# Patient Record
Sex: FEMALE | Race: WHITE | NOT HISPANIC OR LATINO | Employment: FULL TIME | URBAN - METROPOLITAN AREA
[De-identification: names, ages, dates, MRNs, and addresses within clinical notes are randomized per-mention and may not be internally consistent; named-entity substitution may affect disease eponyms.]

---

## 2016-12-20 LAB — HUMAN PAPILLOMAVIRUS (HPV): NORMAL

## 2016-12-22 LAB
HUMAN PAPILLOMAVIRUS (HPV): NORMAL
PAP RECOMMENDATION EXT: NORMAL

## 2017-03-27 DIAGNOSIS — M41.9 SCOLIOSIS, UNSPECIFIED SCOLIOSIS TYPE, UNSPECIFIED SPINAL REGION: Primary | ICD-10-CM

## 2017-03-28 RX ORDER — HYDROCODONE BITARTRATE AND ACETAMINOPHEN 5; 325 MG/1; MG/1
1 TABLET ORAL
Qty: 90 TABLET | Refills: 0 | Status: SHIPPED | OUTPATIENT
Start: 2017-03-28 | End: 2017-06-19 | Stop reason: SDUPTHER

## 2017-03-29 ENCOUNTER — TELEPHONE (OUTPATIENT)
Dept: ORTHOPEDICS | Facility: CLINIC | Age: 52
End: 2017-03-29

## 2017-03-29 NOTE — TELEPHONE ENCOUNTER
Left message for pt advising that her Norco Prescription is ready for  at main [facility] at the .

## 2017-06-19 DIAGNOSIS — M41.9 SCOLIOSIS, UNSPECIFIED SCOLIOSIS TYPE, UNSPECIFIED SPINAL REGION: ICD-10-CM

## 2017-06-19 RX ORDER — HYDROCODONE BITARTRATE AND ACETAMINOPHEN 5; 325 MG/1; MG/1
1 TABLET ORAL
Qty: 90 TABLET | Refills: 0 | Status: SHIPPED | OUTPATIENT
Start: 2017-06-19 | End: 2017-06-29

## 2017-06-19 NOTE — TELEPHONE ENCOUNTER
Left message for pt advising that her script is ready for  at [facility]  on the [facility] floor [facility] spine [facility] at font desk.

## 2017-06-19 NOTE — TELEPHONE ENCOUNTER
----- Message from Mari Lewis sent at 6/19/2017  3:55 PM CDT -----  _ x 1st Request  _  2nd Request  _  3rd Request    Please refill the medication(s) listed below. Please call the patient when the prescription(s) is ready for  at the phone number [phone]    Medication #1    hydrocodone-acetaminophen 5-325mg (NORCO) 5-325 mg per tablet 90 tablet 0 3/28/2017 4/7/2017 No  Sig - Route: Take 1 tablet by mouth every 4 to 6 hours as needed for Pain. - Oral  Class: Print  Order: [id number]

## 2017-08-24 DIAGNOSIS — Z12.11 COLON CANCER SCREENING: ICD-10-CM

## 2017-08-29 ENCOUNTER — PATIENT MESSAGE (OUTPATIENT)
Dept: SPINE | Facility: CLINIC | Age: 52
End: 2017-08-29

## 2017-08-30 RX ORDER — HYDROCODONE BITARTRATE AND ACETAMINOPHEN 5; 325 MG/1; MG/1
1 TABLET ORAL EVERY 6 HOURS PRN
Qty: 60 TABLET | Refills: 0 | Status: SHIPPED | OUTPATIENT
Start: 2017-08-30 | End: 2017-09-09

## 2017-10-12 DIAGNOSIS — N63.0 LUMP OR MASS IN BREAST: Primary | ICD-10-CM

## 2017-11-03 ENCOUNTER — TELEPHONE (OUTPATIENT)
Dept: ORTHOPEDICS | Facility: CLINIC | Age: 52
End: 2017-11-03

## 2017-11-03 NOTE — TELEPHONE ENCOUNTER
Left message for pt advising that per Dr. Herrmann, she would need to get this filled by her PCP as she is too far from surgery for him to fill it.

## 2017-11-03 NOTE — TELEPHONE ENCOUNTER
----- Message from José Miguel Calvo sent at 11/3/2017 12:09 PM CDT -----  Contact: self  Pt request refill on her norco

## 2017-12-04 ENCOUNTER — OFFICE VISIT (OUTPATIENT)
Dept: PHYSICAL MEDICINE AND REHAB | Facility: CLINIC | Age: 52
End: 2017-12-04
Payer: COMMERCIAL

## 2017-12-04 VITALS
DIASTOLIC BLOOD PRESSURE: 89 MMHG | HEIGHT: 65 IN | SYSTOLIC BLOOD PRESSURE: 176 MMHG | HEART RATE: 74 BPM | BODY MASS INDEX: 26.38 KG/M2 | WEIGHT: 158.31 LBS

## 2017-12-04 DIAGNOSIS — G95.9 MYELOPATHY OF LUMBAR REGION: ICD-10-CM

## 2017-12-04 DIAGNOSIS — M47.16 LUMBAR SPONDYLOSIS WITH MYELOPATHY: ICD-10-CM

## 2017-12-04 DIAGNOSIS — R29.898 WEAKNESS OF BOTH LEGS: ICD-10-CM

## 2017-12-04 DIAGNOSIS — M41.9 KYPHOSCOLIOSIS: ICD-10-CM

## 2017-12-04 DIAGNOSIS — R29.898 WEAKNESS OF RIGHT LEG: Primary | ICD-10-CM

## 2017-12-04 DIAGNOSIS — R26.9 GAIT DISORDER: ICD-10-CM

## 2017-12-04 DIAGNOSIS — M51.37 DEGENERATION OF LUMBAR OR LUMBOSACRAL INTERVERTEBRAL DISC: ICD-10-CM

## 2017-12-04 DIAGNOSIS — M54.16 LUMBAR RADICULOPATHY: ICD-10-CM

## 2017-12-04 DIAGNOSIS — M17.11 PRIMARY OSTEOARTHRITIS OF RIGHT KNEE: ICD-10-CM

## 2017-12-04 PROCEDURE — 99204 OFFICE O/P NEW MOD 45 MIN: CPT | Mod: S$GLB,,, | Performed by: PHYSICAL MEDICINE & REHABILITATION

## 2017-12-04 PROCEDURE — 99999 PR PBB SHADOW E&M-EST. PATIENT-LVL III: CPT | Mod: PBBFAC,,, | Performed by: PHYSICAL MEDICINE & REHABILITATION

## 2017-12-04 RX ORDER — HYDROCODONE BITARTRATE AND ACETAMINOPHEN 7.5; 325 MG/1; MG/1
1 TABLET ORAL EVERY 8 HOURS PRN
Qty: 90 TABLET | Refills: 0 | Status: SHIPPED | OUTPATIENT
Start: 2018-02-04 | End: 2018-04-04 | Stop reason: SDUPTHER

## 2017-12-04 RX ORDER — HYDROCODONE BITARTRATE AND ACETAMINOPHEN 7.5; 325 MG/1; MG/1
1 TABLET ORAL EVERY 8 HOURS PRN
Qty: 90 TABLET | Refills: 0 | Status: SHIPPED | OUTPATIENT
Start: 2018-01-04 | End: 2018-02-03

## 2017-12-04 RX ORDER — PREGABALIN 50 MG/1
50 CAPSULE ORAL 3 TIMES DAILY
Qty: 90 CAPSULE | Refills: 5 | Status: SHIPPED | OUTPATIENT
Start: 2017-12-04 | End: 2018-04-04 | Stop reason: SDUPTHER

## 2017-12-04 RX ORDER — HYDROCODONE BITARTRATE AND ACETAMINOPHEN 7.5; 325 MG/1; MG/1
1 TABLET ORAL EVERY 8 HOURS PRN
Qty: 90 TABLET | Refills: 0 | Status: SHIPPED | OUTPATIENT
Start: 2017-12-04 | End: 2018-01-03

## 2017-12-04 NOTE — PROGRESS NOTES
Subjective:       Patient ID: Genia Davis is a 52 y.o. female.    Chief Complaint: Back Pain and Extremity Weakness    HPI   Mrs. Davis is coming first time to clinic for chronic low back pain, and chronic   Pain management with opiates.  Referred by Dr. Zeynep Delaney.  Patient with long standing back pain, she had a spinal tumor at 22 y/o, that required   Multiple back surgeries, with last surgery done in Feb, 2016.  She is s/p revision T10-Pelvis fusion of a failed iliac bolt on Feb 12, 2016, by dr. Herrmann. She has been having increasing b/l Lt>Rt buttock and lateral thigh pain since surgery.  She has had multiple falls because of the RT leg giving way.   She wears KAFO, secondary to Rt leg weakness,and hyperextension in knee, with Rt  Foot drop. This current prosthesis is fairly old more than 4-5 yrs.  She had multiple JENNIFFER, after the surgery, the one to the left L5/S1 TESI ,done in July, 2016 was the moat helpful, while repeat injection in November 2016,helped a little.  She continues to needs pain medication, opiates for pain control.  She failed Tramadol, was taking 3 x/ day, but states that Tramadol is very slow to work, while Hydrocodone helps quicker and  Better.  She also failed Gabapentin, did not help,a nd she did not like the way it fells.  She usualy takes Hydrocodone 1-2 tabs/day, rarely 3 tabs/day, only for more severe pain.  She is able to walk with RW, using current KAFO ( she has it since 2012-13), across 2-3 rooms, for distance she uses manual WC.   KAFO brace helps, she does not belive that she would be able to make that much walking w/o brace. She received this brace from [employer].  She tries to be self sufficient , and tries to do everything on her own.      Back Pain Description:  Length: pain is chronic pain. Length >  Since age of 22 y/o.   No past, recent injury, falls.  Intensity:  CURRENT  5/10,  AVERAGE  Pain   5/10. at BEST   0/10 , At WORST  8/10 on the WORST  day.   Location: pain is localized across lower back , bellow the waist line.  That pain radiates to b/l buttock, hips, Rt leg > Lt leg.   Pain in Rt leg comes on side and to the knee level.   It is more  leg  And hip than back pain.  Radiation: Positive to buttocks. Positive to legs.   Timing : constant  morning, afternoon pain , worse with activity, in evening  QUALITY:  Sharp/shooting pain in Lt leg. Back pain is more sharp pain.   No neuropathic : burning, tingling,   There is numbness in toes at the bottom of toes.   Positive Rt leg weakness.   Worsening factors: activity , walking,   Alleviating factors:   Symptoms interfere with daily activity, sleeping and work.   Current medications:    Failed medications:    Prior procedures. Multiple surgeries, s/p fusion T10-pelvis, and multiple JENNIFFER, last one s/p Lt L5-S1 TFESI in 07/16 and 11/2016, with some pain relief.  PT/OT: multiple times, with temporary pain relief.  Patient denies night fever/night sweats, bowel incontinence, significant weight loss and significant motor weakness ( red flags).  Patient denies any suicidal or homicidal ideations.   She is here for evaluation and chronic pain management with opiates.  .  Past Medical History:   Diagnosis Date    Cancer     tumor on back, was removed    Encounter for blood transfusion        Past Surgical History:   Procedure Laterality Date    BACK SURGERY      x 5    CHOLECYSTECTOMY         Family History   Problem Relation Age of Onset    Anesthesia problems Neg Hx     Clotting disorder Neg Hx        Social History     Social History    Marital status:      Spouse name: N/A    Number of children: N/A    Years of education: N/A     Social History Main Topics    Smoking status: Never Smoker    Smokeless tobacco: Never Used    Alcohol use No    Drug use: No    Sexual activity: Not Asked     Other Topics Concern    None     Social History Narrative    None       Current Outpatient Prescriptions    Medication Sig Dispense Refill    hydrocodone-acetaminophen 7.5-325mg (NORCO) 7.5-325 mg per tablet Take 1 tablet by mouth every 8 (eight) hours as needed for Pain. 90 tablet 0    [START ON 1/4/2018] hydrocodone-acetaminophen 7.5-325mg (NORCO) 7.5-325 mg per tablet Take 1 tablet by mouth every 8 (eight) hours as needed for Pain. 90 tablet 0    [START ON 2/4/2018] hydrocodone-acetaminophen 7.5-325mg (NORCO) 7.5-325 mg per tablet Take 1 tablet by mouth every 8 (eight) hours as needed for Pain. 90 tablet 0    lisinopril 10 MG tablet Take 1 tablet (10 mg total) by mouth once daily. 30 tablet 5    pregabalin (LYRICA) 50 MG capsule Take 1 capsule (50 mg total) by mouth 3 (three) times daily. 90 capsule 5     No current facility-administered medications for this visit.        Review of patient's allergies indicates:  No Known Allergies      Review of Systems   Constitutional: Negative for appetite change, chills, fatigue, fever and unexpected weight change.   HENT: Negative for drooling, trouble swallowing and voice change.    Eyes: Negative for pain and visual disturbance.   Respiratory: Negative for shortness of breath and wheezing.    Cardiovascular: Negative for chest pain and palpitations.   Gastrointestinal: Negative for abdominal distention, abdominal pain, constipation and diarrhea.   Genitourinary: Negative for difficulty urinating.   Musculoskeletal: Positive for arthralgias (Rt knee pain), back pain and gait problem. Negative for joint swelling, myalgias and neck stiffness.               Skin: Negative for color change and rash.   Neurological: Negative for dizziness, facial asymmetry, speech difficulty, weakness, light-headedness and numbness. Headaches:     Hematological: Negative for adenopathy.   Psychiatric/Behavioral: Negative for behavioral problems, confusion and sleep disturbance. The patient is not nervous/anxious.          Objective:      Physical Exam      GENERAL: The patient is alert,  oriented, pleasant.  HEENT: PERRLA  NECK: supple, no masses,    CV: S1S2, RRR, no murmurs, rales.  LUNGS: CTA bilateral.   ABDOMEN: soft, non-tender, non distended, bowel sounds nl.  EXTREMITIES: no edema, +2 pulses DP, b/l  SKIN: no skin rash, no skin breakdowns.  MUSCULOSKELETAL:   Gait :abnormal, she is able to make a few steps walks with no AD.  Needs a a slight longer time to get up , secondary to Rt KAFO.  Walks with limping ( in swing) and hyperextending ( in stance) RT leg.   Cervical spine: full AROM in cervical spine.   Lumbar spine, decreased activerange of motion in all planes, philomena. flexion to 60 from neutral, ext. Lacks full extension -3 degrees, cannot hyperextend secondary to long fusion, s/p T10- pelvis. palpable sacral screw,  Side bending and rotation to 25-30 degrees, b/l.  Straight leg raising on Lt tested and it is a Negative, negative on Rt, weak Hip flexors on RT leg.   Full range of motion in all joints x3 extremities, except in RT LE, that is weak.  Muscle strength 5/5 throughout x 3 extremities,  except in RT LE, that is weak.  Rt LE: HF3-, KF/KE- NT ( in KAFO), no active DF in Rt foot.  No  joint laxity throughout x4 extremities.   NEUROLOGIC: Cranial nerves II through XII intact.   Deep tendon reflexes is normal, +2 in the upper and lower extremities bilaterally.   Muscle tone is normal, no spasticity.  Sensory is intact to light touch and pinprick throughout x4 extremities.         Assessment:       1. Weakness of right leg    2. Lumbar spondylosis with myelopathy    3. Myelopathy of lumbar region    4. Degeneration of lumbar or lumbosacral intervertebral disc    5. Lumbar radiculopathy    6. Weakness of both legs    7. Primary osteoarthritis of right knee    8. Kyphoscoliosis    9. Gait disorder        Plan:       Weakness of right leg  -     HME - OTHER    Lumbar spondylosis with myelopathy  -     HME - OTHER    Myelopathy of lumbar region  -     HME - OTHER    Degeneration of lumbar  or lumbosacral intervertebral disc    Lumbar radiculopathy    Weakness of both legs    Primary osteoarthritis of right knee  -     X-Ray Knee AP Standing Bilateral; Future    Kyphoscoliosis    Gait disorder  -     X-Ray Knee AP Standing Bilateral; Future  -     HME - OTHER    Other orders  -     hydrocodone-acetaminophen 7.5-325mg (NORCO) 7.5-325 mg per tablet; Take 1 tablet by mouth every 8 (eight) hours as needed for Pain.  Dispense: 90 tablet; Refill: 0  -     hydrocodone-acetaminophen 7.5-325mg (NORCO) 7.5-325 mg per tablet; Take 1 tablet by mouth every 8 (eight) hours as needed for Pain.  Dispense: 90 tablet; Refill: 0  -     hydrocodone-acetaminophen 7.5-325mg (NORCO) 7.5-325 mg per tablet; Take 1 tablet by mouth every 8 (eight) hours as needed for Pain.  Dispense: 90 tablet; Refill: 0  -     pregabalin (LYRICA) 50 MG capsule; Take 1 capsule (50 mg total) by mouth 3 (three) times daily.  Dispense: 90 capsule; Refill: 5    patient with long standing back pain, sec.to spinal Tm, that was surgically exceeded more than 25 yrs ago. She is s/p multiple surgeries, s/p T10-pelvis fusion.   With positive Weakness in Rt LE weakness, sec. To radiculopathy /myleopathoc injury.    -- DME- other, will order a new KAFO ( given first one in 2012-13).  - pain management:   Resume Hydrocodone 10/325 mg po TID prn pain.   Added Lyrica since she failed Gabapentin, for neuropathic pain.  Will resume all supportive therapy.    RTC in 3-4 months.     Total time spent face to face with patient was 45 minutes.   More than 50% of that time was spent in counseling on diagnosis , prognosis and treatment options.   I also  patient  on common and most usual side effect of prescribed medications. Risk and benefits of opiates, possible risk of developing opiate dependence and tolerance, need of strict compliance with prescribed medications.  Pain contract, rules and obligations were discussed with patient in details.  She is aware  that I would be the only doctor prescribing her pain medications and ED in a case of emergency.  I reviewed Primary care , and other specialty's notes to better coordinate patient's  care. All questions were answered, and patient voiced understanding.

## 2018-01-03 ENCOUNTER — HOSPITAL ENCOUNTER (OUTPATIENT)
Dept: RADIOLOGY | Facility: HOSPITAL | Age: 53
Discharge: HOME | End: 2018-01-03
Attending: SPECIALIST
Payer: COMMERCIAL

## 2018-01-03 DIAGNOSIS — N63.0 LUMP OR MASS IN BREAST: ICD-10-CM

## 2018-01-03 PROCEDURE — 77066 DX MAMMO INCL CAD BI: CPT | Mod: 26,,, | Performed by: RADIOLOGY

## 2018-01-03 PROCEDURE — 76642 ULTRASOUND BREAST LIMITED: CPT | Mod: 26,LT,, | Performed by: RADIOLOGY

## 2018-01-03 PROCEDURE — 77062 BREAST TOMOSYNTHESIS BI: CPT | Mod: 26,,, | Performed by: RADIOLOGY

## 2018-01-03 PROCEDURE — 77066 DX MAMMO INCL CAD BI: CPT | Mod: TC

## 2018-01-03 PROCEDURE — 76642 ULTRASOUND BREAST LIMITED: CPT | Mod: TC,LT

## 2018-03-12 DIAGNOSIS — I10 ESSENTIAL HYPERTENSION: ICD-10-CM

## 2018-03-12 RX ORDER — LISINOPRIL 10 MG/1
10 TABLET ORAL DAILY
Qty: 30 TABLET | Refills: 5 | OUTPATIENT
Start: 2018-03-12 | End: 2019-03-12

## 2018-04-04 ENCOUNTER — OFFICE VISIT (OUTPATIENT)
Dept: PHYSICAL MEDICINE AND REHAB | Facility: CLINIC | Age: 53
End: 2018-04-04
Payer: COMMERCIAL

## 2018-04-04 VITALS
WEIGHT: 160.81 LBS | SYSTOLIC BLOOD PRESSURE: 177 MMHG | HEART RATE: 66 BPM | BODY MASS INDEX: 26.79 KG/M2 | DIASTOLIC BLOOD PRESSURE: 76 MMHG | HEIGHT: 65 IN

## 2018-04-04 DIAGNOSIS — I10 ESSENTIAL HYPERTENSION: Primary | ICD-10-CM

## 2018-04-04 DIAGNOSIS — R29.898 WEAKNESS OF BOTH LEGS: ICD-10-CM

## 2018-04-04 DIAGNOSIS — I10 HYPERTENSION, UNSPECIFIED TYPE: ICD-10-CM

## 2018-04-04 DIAGNOSIS — G95.9 MYELOPATHY OF LUMBAR REGION: ICD-10-CM

## 2018-04-04 DIAGNOSIS — M54.16 LUMBAR RADICULOPATHY: ICD-10-CM

## 2018-04-04 DIAGNOSIS — M47.819 SPONDYLOSIS WITHOUT MYELOPATHY: ICD-10-CM

## 2018-04-04 PROCEDURE — 3077F SYST BP >= 140 MM HG: CPT | Mod: CPTII,S$GLB,, | Performed by: PHYSICAL MEDICINE & REHABILITATION

## 2018-04-04 PROCEDURE — 99214 OFFICE O/P EST MOD 30 MIN: CPT | Mod: S$GLB,,, | Performed by: PHYSICAL MEDICINE & REHABILITATION

## 2018-04-04 PROCEDURE — 99999 PR PBB SHADOW E&M-EST. PATIENT-LVL III: CPT | Mod: PBBFAC,,, | Performed by: PHYSICAL MEDICINE & REHABILITATION

## 2018-04-04 PROCEDURE — 3078F DIAST BP <80 MM HG: CPT | Mod: CPTII,S$GLB,, | Performed by: PHYSICAL MEDICINE & REHABILITATION

## 2018-04-04 RX ORDER — HYDROCODONE BITARTRATE AND ACETAMINOPHEN 7.5; 325 MG/1; MG/1
1 TABLET ORAL EVERY 8 HOURS PRN
Qty: 90 TABLET | Refills: 0 | Status: SHIPPED | OUTPATIENT
Start: 2018-06-04 | End: 2018-08-06 | Stop reason: DRUGHIGH

## 2018-04-04 RX ORDER — HYDROCODONE BITARTRATE AND ACETAMINOPHEN 7.5; 325 MG/1; MG/1
1 TABLET ORAL EVERY 8 HOURS PRN
Qty: 90 TABLET | Refills: 0 | Status: SHIPPED | OUTPATIENT
Start: 2018-04-04 | End: 2018-05-04

## 2018-04-04 RX ORDER — PREGABALIN 50 MG/1
50 CAPSULE ORAL 3 TIMES DAILY
Qty: 90 CAPSULE | Refills: 5 | Status: SHIPPED | OUTPATIENT
Start: 2018-04-04 | End: 2018-05-08

## 2018-04-04 RX ORDER — HYDROCODONE BITARTRATE AND ACETAMINOPHEN 7.5; 325 MG/1; MG/1
1 TABLET ORAL EVERY 8 HOURS PRN
Qty: 90 TABLET | Refills: 0 | Status: SHIPPED | OUTPATIENT
Start: 2018-05-04 | End: 2018-06-03

## 2018-04-04 NOTE — PROGRESS NOTES
Subjective:       Patient ID: Genia Davis is a 52 y.o. female.    Chief Complaint: Back Pain and Leg Pain    Back Pain   Associated symptoms include leg pain. Pertinent negatives include no abdominal pain, chest pain, fever, numbness or weakness. Headaches:     Leg Pain    Pertinent negatives include no numbness.      Mrs. Davis who returns to clinic for chronic low back pain, and chronic   Pain management with opiates.  V 12/04/17.  Patient with long standing back pain, she had a spinal tumor at 24 y/o, that required multiple back surgeries, with last surgery done in Feb, 2016.  She is s/p revision T10-Pelvis fusion of a failed iliac bolt on Feb 12, 2016, by dr. Herrmann. She has been having increasing b/l Lt>Rt buttock and lateral thigh pain since surgery.  She has had multiple falls because of the RT leg giving way.   She wears KAFO, secondary to Rt leg weakness,and hyperextension in knee, with Rt Foot drop. This current prosthesis is fairly old more than 4-5 yrs.  She had multiple JENNIFFER, after the surgery, the one to the left L5/S1 TESI ,done in July, 2016 was the moat helpful, while repeat injection in November 2016,helped a little.  She continues to needs pain medication, opiates for pain control.  She failed Tramadol, was taking 3 x/ day, but states that Tramadol is very slow to work, while Hydrocodone helps quicker and better.  She also failed Gabapentin, did not help,a nd she did not like the way it fells.  She usualy takes Hydrocodone 1-2 tabs/day, rarely 3 tabs/day, only for more severe pain.  She is able to walk with RW, using current KAFO ( she has it since 2012-13), across 2-3 rooms, for distance she uses manual WC.   KAFO brace helps, she does not belive that she would be able to make that much walking w/o brace. She received this brace from [employer].  She tries to be self sufficient , and tries to do everything on her own.  Today, she reports that she is slightly better, pain  meds are helping,   On LCV she received prescriptions for KAFO, but she still did not start process   To receive a new KAFO.    Back Pain Description:  Length: pain is chronic pain. Length >  Since age of 22 y/o.   No past, recent injury, falls.  Intensity:  CURRENT  3-4/10,  AVERAGE  Pain   4-5/10. at BEST   0/10 , At WORST  8/10 on the WORST day.   Location: pain is localized across lower back , bellow the waist line.  That pain radiates to b/l buttock, hips, Rt leg > Lt leg.   Pain in Rt leg comes on side and to the knee level.   It is more  leg  And hip than back pain.  Radiation: Positive to buttocks. Positive to legs.   Timing : constant  morning, afternoon pain , worse with activity, in evening  QUALITY:  Sharp/shooting pain in Lt leg. Back pain is more sharp pain.   No neuropathic : burning, tingling,   There is numbness in toes at the bottom of toes.   Positive Rt leg weakness.   Worsening factors: activity , walking,   Alleviating factors:   Symptoms interfere with daily activity, sleeping and work.   Current medications:    Failed medications:    Prior procedures. Multiple surgeries, s/p fusion T10-pelvis, and multiple JENNIFFER, last one s/p Lt L5-S1 TFESI in 07/16 and 11/2016, with some pain relief.  PT/OT: multiple times, with temporary pain relief.  Patient denies night fever/night sweats, bowel incontinence, significant weight loss and significant motor weakness ( red flags).  Patient denies any suicidal or homicidal ideations.   She is here for f/u and chronic pain management with opiates.  .  Past Medical History:   Diagnosis Date    Cancer     tumor on back, was removed    Encounter for blood transfusion        Past Surgical History:   Procedure Laterality Date    BACK SURGERY      x 5    CHOLECYSTECTOMY         Family History   Problem Relation Age of Onset    Breast cancer Maternal Aunt     Anesthesia problems Neg Hx     Clotting disorder Neg Hx        Social History     Social History     Marital status:      Spouse name: N/A    Number of children: N/A    Years of education: N/A     Social History Main Topics    Smoking status: Never Smoker    Smokeless tobacco: Never Used    Alcohol use No    Drug use: No    Sexual activity: Not on file     Other Topics Concern    Not on file     Social History Narrative    No narrative on file       Current Outpatient Prescriptions   Medication Sig Dispense Refill    lisinopril 10 MG tablet Take 1 tablet (10 mg total) by mouth once daily. 30 tablet 0    pregabalin (LYRICA) 50 MG capsule Take 1 capsule (50 mg total) by mouth 3 (three) times daily. 90 capsule 5    hydrocodone-acetaminophen 7.5-325mg (NORCO) 7.5-325 mg per tablet Take 1 tablet by mouth every 8 (eight) hours as needed for Pain. 90 tablet 0    [START ON 5/4/2018] hydrocodone-acetaminophen 7.5-325mg (NORCO) 7.5-325 mg per tablet Take 1 tablet by mouth every 8 (eight) hours as needed for Pain. 90 tablet 0    [START ON 6/4/2018] hydrocodone-acetaminophen 7.5-325mg (NORCO) 7.5-325 mg per tablet Take 1 tablet by mouth every 8 (eight) hours as needed for Pain. 90 tablet 0     No current facility-administered medications for this visit.        Review of patient's allergies indicates:  No Known Allergies      Review of Systems   Constitutional: Negative for appetite change, chills, fatigue, fever and unexpected weight change.   HENT: Negative for drooling, trouble swallowing and voice change.    Eyes: Negative for pain and visual disturbance.   Respiratory: Negative for shortness of breath and wheezing.    Cardiovascular: Negative for chest pain and palpitations.   Gastrointestinal: Negative for abdominal distention, abdominal pain, constipation and diarrhea.   Genitourinary: Negative for difficulty urinating.   Musculoskeletal: Positive for arthralgias (Rt knee pain), back pain and gait problem. Negative for joint swelling, myalgias and neck stiffness.               Skin: Negative for  color change and rash.   Neurological: Negative for dizziness, facial asymmetry, speech difficulty, weakness, light-headedness and numbness. Headaches:     Hematological: Negative for adenopathy.   Psychiatric/Behavioral: Negative for behavioral problems, confusion and sleep disturbance. The patient is not nervous/anxious.          Objective:      Physical Exam      GENERAL: The patient is alert, oriented, pleasant.  HEENT: PERRLA  NECK: supple, no masses,    CV: S1S2, RRR, no murmurs, rales.  LUNGS: CTA bilateral.   ABDOMEN: soft, non-tender, non distended, bowel sounds nl.  EXTREMITIES: no edema, +2 pulses DP, b/l  SKIN: no skin rash, no skin breakdowns.  MUSCULOSKELETAL:   Gait :abnormal, she is able to make a few steps walks with no AD.  Needs a a slight longer time to get up , secondary to Rt KAFO.  Walks with limping ( in swing) and hyperextending ( in stance) RT leg.   Cervical spine: full AROM in cervical spine.   Lumbar spine, decreased activerange of motion in all planes, philomena. flexion to 60 from neutral, ext. Lacks full extension -3 degrees, cannot hyperextend secondary to long fusion, s/p T10- pelvis. palpable sacral screw,  Side bending and rotation to 25-30 degrees, b/l.  Straight leg raising on Lt tested and it is a Negative, negative on Rt, weak Hip flexors on RT leg.   Full range of motion in all joints x3 extremities, except in RT LE, that is weak.  Muscle strength 5/5 throughout x 3 extremities,  except in RT LE, that is weak.  Rt LE: HF3-, KF/KE- NT ( in KAFO), no active DF in Rt foot.  No  joint laxity throughout x4 extremities.   NEUROLOGIC: Cranial nerves II through XII intact.   Deep tendon reflexes is normal, +2 in the upper and lower extremities bilaterally.   Muscle tone is normal, no spasticity.  Sensory is intact to light touch and pinprick throughout x4 extremities.         Assessment:       1. Essential hypertension    2. Spondylosis without myelopathy    3. Weakness of both legs    4.  Myelopathy of lumbar region    5. Lumbar radiculopathy    6. Hypertension, unspecified type        Plan:       Essential hypertension  -     pregabalin (LYRICA) 50 MG capsule; Take 1 capsule (50 mg total) by mouth 3 (three) times daily.  Dispense: 90 capsule; Refill: 5  -     lisinopril 10 MG tablet; Take 1 tablet (10 mg total) by mouth once daily.  Dispense: 30 tablet; Refill: 0    Spondylosis without myelopathy  -     hydrocodone-acetaminophen 7.5-325mg (NORCO) 7.5-325 mg per tablet; Take 1 tablet by mouth every 8 (eight) hours as needed for Pain.  Dispense: 90 tablet; Refill: 0  -     hydrocodone-acetaminophen 7.5-325mg (NORCO) 7.5-325 mg per tablet; Take 1 tablet by mouth every 8 (eight) hours as needed for Pain.  Dispense: 90 tablet; Refill: 0  -     hydrocodone-acetaminophen 7.5-325mg (NORCO) 7.5-325 mg per tablet; Take 1 tablet by mouth every 8 (eight) hours as needed for Pain.  Dispense: 90 tablet; Refill: 0  -     pregabalin (LYRICA) 50 MG capsule; Take 1 capsule (50 mg total) by mouth 3 (three) times daily.  Dispense: 90 capsule; Refill: 5    Weakness of both legs  -     hydrocodone-acetaminophen 7.5-325mg (NORCO) 7.5-325 mg per tablet; Take 1 tablet by mouth every 8 (eight) hours as needed for Pain.  Dispense: 90 tablet; Refill: 0  -     hydrocodone-acetaminophen 7.5-325mg (NORCO) 7.5-325 mg per tablet; Take 1 tablet by mouth every 8 (eight) hours as needed for Pain.  Dispense: 90 tablet; Refill: 0  -     hydrocodone-acetaminophen 7.5-325mg (NORCO) 7.5-325 mg per tablet; Take 1 tablet by mouth every 8 (eight) hours as needed for Pain.  Dispense: 90 tablet; Refill: 0  -     pregabalin (LYRICA) 50 MG capsule; Take 1 capsule (50 mg total) by mouth 3 (three) times daily.  Dispense: 90 capsule; Refill: 5    Myelopathy of lumbar region  -     hydrocodone-acetaminophen 7.5-325mg (NORCO) 7.5-325 mg per tablet; Take 1 tablet by mouth every 8 (eight) hours as needed for Pain.  Dispense: 90 tablet; Refill: 0  -      hydrocodone-acetaminophen 7.5-325mg (NORCO) 7.5-325 mg per tablet; Take 1 tablet by mouth every 8 (eight) hours as needed for Pain.  Dispense: 90 tablet; Refill: 0  -     hydrocodone-acetaminophen 7.5-325mg (NORCO) 7.5-325 mg per tablet; Take 1 tablet by mouth every 8 (eight) hours as needed for Pain.  Dispense: 90 tablet; Refill: 0  -     pregabalin (LYRICA) 50 MG capsule; Take 1 capsule (50 mg total) by mouth 3 (three) times daily.  Dispense: 90 capsule; Refill: 5    Lumbar radiculopathy  -     hydrocodone-acetaminophen 7.5-325mg (NORCO) 7.5-325 mg per tablet; Take 1 tablet by mouth every 8 (eight) hours as needed for Pain.  Dispense: 90 tablet; Refill: 0  -     hydrocodone-acetaminophen 7.5-325mg (NORCO) 7.5-325 mg per tablet; Take 1 tablet by mouth every 8 (eight) hours as needed for Pain.  Dispense: 90 tablet; Refill: 0  -     hydrocodone-acetaminophen 7.5-325mg (NORCO) 7.5-325 mg per tablet; Take 1 tablet by mouth every 8 (eight) hours as needed for Pain.  Dispense: 90 tablet; Refill: 0  -     pregabalin (LYRICA) 50 MG capsule; Take 1 capsule (50 mg total) by mouth 3 (three) times daily.  Dispense: 90 capsule; Refill: 5    Hypertension, unspecified type    patient with long standing back pain, sec.to spinal Tm, that was surgically exceeded more than 25 yrs ago. She is s/p multiple surgeries, s/p T10-pelvis fusion.   With positive Weakness in Rt LE weakness, sec. To radiculopathy /myleopathic injury.    -- DME- other,received  But did not start a process yet to receive a new KAFO   ( given first one in 2012-13).  - pain management:   Resume Hydrocodone 10/325 mg po TID prn pain.   Added Lyrica since she failed Gabapentin, for neuropathic pain.  Will resume all supportive therapy.    RTC in 3 months.     Total time spent face to face with patient was 25 minutes.   More than 50% of that time was spent in [id number] on diagnosis , prognosis and treatment options.   I also  patient  on common and most usual  side effect of prescribed medications. Risk and benefits of opiates, possible risk of developing opiate dependence and tolerance, need of strict compliance with prescribed medications.  Pain contract, rules and obligations were discussed with patient in details.  She is aware that I would be the only doctor prescribing her pain medications and ED in a case of emergency.  I reviewed Primary care , and other specialty's notes to better coordinate patient's  care. All questions were answered, and patient voiced understanding.

## 2018-04-05 RX ORDER — LISINOPRIL 10 MG/1
10 TABLET ORAL DAILY
Qty: 30 TABLET | Refills: 0 | Status: SHIPPED | OUTPATIENT
Start: 2018-04-05 | End: 2018-04-09 | Stop reason: DRUGHIGH

## 2018-04-09 ENCOUNTER — OFFICE VISIT (OUTPATIENT)
Dept: FAMILY MEDICINE | Facility: CLINIC | Age: 53
End: 2018-04-09
Payer: COMMERCIAL

## 2018-04-09 ENCOUNTER — DOCUMENTATION ONLY (OUTPATIENT)
Dept: FAMILY MEDICINE | Facility: CLINIC | Age: 53
End: 2018-04-09

## 2018-04-09 VITALS
BODY MASS INDEX: 26.77 KG/M2 | TEMPERATURE: 98 F | DIASTOLIC BLOOD PRESSURE: 84 MMHG | SYSTOLIC BLOOD PRESSURE: 142 MMHG | OXYGEN SATURATION: 98 % | HEIGHT: 65 IN | WEIGHT: 160.69 LBS | HEART RATE: 61 BPM

## 2018-04-09 DIAGNOSIS — H11.31 SUBCONJUNCTIVAL HEMORRHAGE OF RIGHT EYE: ICD-10-CM

## 2018-04-09 DIAGNOSIS — I10 BENIGN ESSENTIAL HTN: Primary | ICD-10-CM

## 2018-04-09 PROCEDURE — 3079F DIAST BP 80-89 MM HG: CPT | Mod: CPTII,S$GLB,, | Performed by: NURSE PRACTITIONER

## 2018-04-09 PROCEDURE — 99213 OFFICE O/P EST LOW 20 MIN: CPT | Mod: S$GLB,,, | Performed by: NURSE PRACTITIONER

## 2018-04-09 PROCEDURE — 3077F SYST BP >= 140 MM HG: CPT | Mod: CPTII,S$GLB,, | Performed by: NURSE PRACTITIONER

## 2018-04-09 RX ORDER — LISINOPRIL AND HYDROCHLOROTHIAZIDE 12.5; 2 MG/1; MG/1
1 TABLET ORAL DAILY
Qty: 30 TABLET | Refills: 11 | Status: SHIPPED | OUTPATIENT
Start: 2018-04-09 | End: 2018-05-08 | Stop reason: SINTOL

## 2018-04-09 NOTE — PATIENT INSTRUCTIONS
If you develop any pain or worsening vision in the right eye, See an ophthalmologist or ER immediately.     Stop taking ginko, it does not do anything to help you and is a blood thinner, which can cause severe bleeding. Do not save old antibiotics.  Follow dash diet. Cut out caffeine.  4 weeks with labs prior.    Low-Salt Diet  This diet removes foods that are high in salt. It also limits the amount of salt you use when cooking. It is most often used for people with high blood pressure, edema (fluid retention), and kidney, liver, or heart disease.  Table salt contains the mineral sodium. Your body needs sodium to work normally. But too much sodium can make your health problems worse. Your healthcare provider is recommending a low-salt (also called low-sodium) diet for you. Your total daily allowance of salt is 1,500 to 2,300 milligrams (mg). It is less than 1 teaspoon of table salt. This means you can have only about 500 to 700 mg of sodium at each meal. People with certain health problems should limit salt intake to the lower end of the recommended range.    When you cook, dont add much salt. If you can cook without using salt, even better. Dont add salt to your food at the table.  When shopping, read food labels. Salt is often called sodium on the label. Choose foods that are salt-free, low salt, or very low salt. Note that foods with reduced salt may not lower your salt intake enough.    Beans, potatoes, and pasta  Ok: Dry beans, split peas, lentils, potatoes, rice, macaroni, pasta, spaghetti without added salt  Avoid: Potato chips, tortilla chips, and similar products  Breads and cereals  Ok: Low-sodium breads, rolls, cereals, and cakes; low-salt crackers, matzo crackers  Avoid: Salted crackers, pretzels, popcorn, Icelandic toast, pancakes, muffins  Dairy  Ok: Milk, chocolate milk, hot chocolate mix, low-salt cheeses, and yogurt  Avoid: Processed cheese and cheese spreads; Roquefort, Camembert, and cottage  cheese; buttermilk, instant breakfast drink  Desserts  Ok: Ice cream, frozen yogurt, juice bars, gelatin, cookies and pies, sugar, honey, jelly, hard candy  Avoid: Most pies, cakes and cookies prepared or processed with salt; instant pudding  Drinks  Ok: Tea, coffee, fizzy (carbonated) drinks, juices  Avoid: Flavored coffees, electrolyte replacement drinks, sports drinks  Meats  Ok: All fresh meat, fish, poultry, low-salt tuna, eggs, egg substitute  Avoid: Smoked, pickled, brine-cured, or salted meats and fish. This includes frey, chipped beef, corned beef, hot dogs, deli meats, ham, kosher meats, salt pork, sausage, canned tuna, salted codfish, smoked salmon, herring, sardines, or anchovies.  Seasonings and spices  Ok: Most seasonings are okay. Good substitutes for salt include: fresh herb blends, hot sauce, lemon, garlic, fuentes, vinegar, dry mustard, parsley, cilantro, horseradish, tomato paste, regular margarine, mayonnaise, unsalted butter, cream cheese, vegetable oil, cream, low-salt salad dressing and gravy.  Avoid: Regular ketchup, relishes, pickles, soy sauce, teriyaki sauce, Worcestershire sauce, BBQ sauce, tartar sauce, meat tenderizer, chili sauce, regular gravy, regular salad dressing, salted butter  Soups  Ok: Low-salt soups and broths made with allowed foods  Avoid: Bouillon cubes, soups with smoked or salted meats, regular soup and broth  Vegetables  Ok: Most vegetables are okay; also low-salt tomato and vegetable juices  Avoid: Sauerkraut and other brine-soaked vegetables; pickles and other pickled vegetables; tomato juice, olives  Date Last Reviewed: 8/1/2016  © 7882-6206 [employer]. [address] [place] [address]. All rights reserved. This information is not intended as a substitute for professional medical care. Always follow your healthcare professional's instructions.

## 2018-04-09 NOTE — PROGRESS NOTES
Health Maintenance Due   Topic Date Due    TETANUS VACCINE  11/05/1981    Pap Smear with HPV Cotest  11/05/1984    Colonoscopy  11/05/2013    Influenza Vaccine  08/01/2017

## 2018-04-09 NOTE — PROGRESS NOTES
Subjective:       Patient ID: Genia Davis is a 52 y.o. female.    Chief Complaint: Hypertension and bloodshot eye    Hypertension   This is a chronic problem. The current episode started more than 1 year ago. The problem has been gradually worsening since onset. The problem is uncontrolled. Associated symptoms include blurred vision and headaches. Pertinent negatives include no chest pain or shortness of breath. Associated agents: Took ASA once last week. Taking turmeric and gingko OTC.  Past treatments include ACE inhibitors. Compliance problems include diet.    Eye Problem    The right eye is affected. This is a new problem. The current episode started in the past 7 days (Started Friday, woke up with eye red). The problem has been gradually improving. There was no injury mechanism. The patient is experiencing no pain. She does not wear contacts. Associated symptoms include blurred vision and eye redness. Pertinent negatives include no itching. Associated symptoms comments: Blurred vision is intermittant, no double vision, just harder than normal to read. . She has tried nothing for the symptoms.     Though she had a UTI, took some old cipro that was in the house, feels better currently.   Review of Systems   Eyes: Positive for blurred vision and redness. Negative for itching.   Respiratory: Negative for shortness of breath.    Cardiovascular: Negative for chest pain.   Neurological: Positive for headaches.       Objective:      Physical Exam   Constitutional: She is oriented to person, place, and time. She appears well-developed and well-nourished. No distress.   HENT:   Head: Normocephalic and atraumatic.   Eyes: Conjunctivae are normal. Right eye exhibits no discharge. Left eye exhibits no discharge. No scleral icterus.   Cardiovascular: Normal rate, regular rhythm and normal heart sounds.  Exam reveals no gallop and no friction rub.    No murmur heard.  Pulmonary/Chest: Effort normal and breath sounds  normal. No respiratory distress. She has no wheezes. She has no rales.   Neurological: She is alert and oriented to person, place, and time.   Skin: Skin is warm and dry. She is not diaphoretic.   Psychiatric: She has a normal mood and affect. Her behavior is normal.   Nursing note and vitals reviewed.      Assessment:       1. Benign essential HTN    2. Subconjunctival hemorrhage of right eye        Plan:       Benign essential HTN  -     lisinopril-hydrochlorothiazide (PRINZIDE,ZESTORETIC) 20-12.5 mg per tablet; Take 1 tablet by mouth once daily.  Dispense: 30 tablet; Refill: 11  -     CBC auto differential; Future; Expected date: 04/09/2018  -     Comprehensive metabolic panel; Future; Expected date: 04/09/2018  -     Lipid panel; Future; Expected date: 04/09/2018  -     Microalbumin/creatinine urine ratio; Future; Expected date: 04/09/2018  -     TSH; Future; Expected date: 04/09/2018    Subconjunctival hemorrhage of right eye      If you develop any pain or worsening vision in the right eye, See an ophthalmologist or ER immediately.     Stop taking ginko, it does not do anything to help you and is a blood thinner, which can cause severe bleeding. Do not save old antibiotics.  Follow dash diet. Cut out caffeine.  4 weeks with labs prior.

## 2018-05-02 ENCOUNTER — LAB VISIT (OUTPATIENT)
Dept: LAB | Facility: HOSPITAL | Age: 53
End: 2018-05-02
Attending: NURSE PRACTITIONER
Payer: COMMERCIAL

## 2018-05-02 ENCOUNTER — TELEPHONE (OUTPATIENT)
Dept: PHYSICAL MEDICINE AND REHAB | Facility: CLINIC | Age: 53
End: 2018-05-02

## 2018-05-02 DIAGNOSIS — I10 BENIGN ESSENTIAL HTN: ICD-10-CM

## 2018-05-02 LAB
ALBUMIN SERPL BCP-MCNC: 3.7 G/DL
ALP SERPL-CCNC: 64 U/L
ALT SERPL W/O P-5'-P-CCNC: 13 U/L
ANION GAP SERPL CALC-SCNC: 7 MMOL/L
AST SERPL-CCNC: 21 U/L
BASOPHILS # BLD AUTO: 0.02 K/UL
BASOPHILS NFR BLD: 0.5 %
BILIRUB SERPL-MCNC: 0.5 MG/DL
BUN SERPL-MCNC: 17 MG/DL
CALCIUM SERPL-MCNC: 9.2 MG/DL
CHLORIDE SERPL-SCNC: 107 MMOL/L
CHOLEST SERPL-MCNC: 207 MG/DL
CHOLEST/HDLC SERPL: 3.6 {RATIO}
CO2 SERPL-SCNC: 25 MMOL/L
CREAT SERPL-MCNC: 1 MG/DL
DIFFERENTIAL METHOD: ABNORMAL
EOSINOPHIL # BLD AUTO: 0.1 K/UL
EOSINOPHIL NFR BLD: 3.4 %
ERYTHROCYTE [DISTWIDTH] IN BLOOD BY AUTOMATED COUNT: 14.1 %
EST. GFR  (AFRICAN AMERICAN): >60 ML/MIN/1.73 M^2
EST. GFR  (NON AFRICAN AMERICAN): >60 ML/MIN/1.73 M^2
GLUCOSE SERPL-MCNC: 82 MG/DL
HCT VFR BLD AUTO: 37.3 %
HDLC SERPL-MCNC: 58 MG/DL
HDLC SERPL: 28 %
HGB BLD-MCNC: 11.9 G/DL
IMM GRANULOCYTES # BLD AUTO: 0.01 K/UL
IMM GRANULOCYTES NFR BLD AUTO: 0.2 %
LDLC SERPL CALC-MCNC: 133.2 MG/DL
LYMPHOCYTES # BLD AUTO: 0.9 K/UL
LYMPHOCYTES NFR BLD: 23.2 %
MCH RBC QN AUTO: 28.1 PG
MCHC RBC AUTO-ENTMCNC: 31.9 G/DL
MCV RBC AUTO: 88 FL
MONOCYTES # BLD AUTO: 0.3 K/UL
MONOCYTES NFR BLD: 6.2 %
NEUTROPHILS # BLD AUTO: 2.7 K/UL
NEUTROPHILS NFR BLD: 66.5 %
NONHDLC SERPL-MCNC: 149 MG/DL
NRBC BLD-RTO: 0 /100 WBC
PLATELET # BLD AUTO: 213 K/UL
PMV BLD AUTO: 11.6 FL
POTASSIUM SERPL-SCNC: 4.3 MMOL/L
PROT SERPL-MCNC: 6.9 G/DL
RBC # BLD AUTO: 4.24 M/UL
SODIUM SERPL-SCNC: 139 MMOL/L
TRIGL SERPL-MCNC: 79 MG/DL
TSH SERPL DL<=0.005 MIU/L-ACNC: 2.07 UIU/ML
WBC # BLD AUTO: 4.06 K/UL

## 2018-05-02 PROCEDURE — 80053 COMPREHEN METABOLIC PANEL: CPT

## 2018-05-02 PROCEDURE — 80061 LIPID PANEL: CPT

## 2018-05-02 PROCEDURE — 84443 ASSAY THYROID STIM HORMONE: CPT

## 2018-05-02 PROCEDURE — 85025 COMPLETE CBC W/AUTO DIFF WBC: CPT

## 2018-05-02 PROCEDURE — 36415 COLL VENOUS BLD VENIPUNCTURE: CPT | Mod: PO

## 2018-05-07 ENCOUNTER — DOCUMENTATION ONLY (OUTPATIENT)
Dept: FAMILY MEDICINE | Facility: CLINIC | Age: 53
End: 2018-05-07

## 2018-05-07 NOTE — PROGRESS NOTES
Health Maintenance Due   Topic Date Due    TETANUS VACCINE  11/05/1981    Pap Smear with HPV Cotest  11/05/1984    Colonoscopy  11/05/2013

## 2018-05-08 ENCOUNTER — OFFICE VISIT (OUTPATIENT)
Dept: FAMILY MEDICINE | Facility: CLINIC | Age: 53
End: 2018-05-08
Payer: COMMERCIAL

## 2018-05-08 VITALS
HEIGHT: 65 IN | DIASTOLIC BLOOD PRESSURE: 76 MMHG | WEIGHT: 160.94 LBS | BODY MASS INDEX: 26.81 KG/M2 | TEMPERATURE: 98 F | OXYGEN SATURATION: 99 % | SYSTOLIC BLOOD PRESSURE: 142 MMHG | HEART RATE: 62 BPM

## 2018-05-08 DIAGNOSIS — Z23 NEED FOR PROPHYLACTIC VACCINATION AGAINST DIPHTHERIA AND TETANUS: ICD-10-CM

## 2018-05-08 DIAGNOSIS — I10 ESSENTIAL HYPERTENSION: Primary | ICD-10-CM

## 2018-05-08 PROCEDURE — 90715 TDAP VACCINE 7 YRS/> IM: CPT | Mod: S$GLB,,, | Performed by: INTERNAL MEDICINE

## 2018-05-08 PROCEDURE — 3008F BODY MASS INDEX DOCD: CPT | Mod: CPTII,S$GLB,, | Performed by: NURSE PRACTITIONER

## 2018-05-08 PROCEDURE — 3077F SYST BP >= 140 MM HG: CPT | Mod: CPTII,S$GLB,, | Performed by: NURSE PRACTITIONER

## 2018-05-08 PROCEDURE — 90471 IMMUNIZATION ADMIN: CPT | Mod: S$GLB,,, | Performed by: INTERNAL MEDICINE

## 2018-05-08 PROCEDURE — 3078F DIAST BP <80 MM HG: CPT | Mod: CPTII,S$GLB,, | Performed by: NURSE PRACTITIONER

## 2018-05-08 PROCEDURE — 99213 OFFICE O/P EST LOW 20 MIN: CPT | Mod: 25,S$GLB,, | Performed by: NURSE PRACTITIONER

## 2018-05-08 RX ORDER — LISINOPRIL 20 MG/1
20 TABLET ORAL DAILY
Qty: 90 TABLET | Refills: 3 | Status: SHIPPED | OUTPATIENT
Start: 2018-05-08 | End: 2018-11-13 | Stop reason: CLARIF

## 2018-05-08 NOTE — PROGRESS NOTES
Subjective:       Patient ID: Genia Davis is a 52 y.o. female.    Chief Complaint: Follow-up    Hypertension   This is a chronic problem. The current episode started more than 1 year ago. The problem has been gradually improving since onset. Pertinent negatives include no blurred vision, chest pain, headaches or shortness of breath. Associated agents: Having pain, but does not wish to take anything stronger than what she is currently on.      Review of Systems   Eyes: Negative for blurred vision.   Respiratory: Negative for shortness of breath.    Cardiovascular: Negative for chest pain.   Neurological: Negative for headaches.       Objective:       CMP  Sodium   Date Value Ref Range Status   05/02/2018 139 136 - 145 mmol/L Final     Potassium   Date Value Ref Range Status   05/02/2018 4.3 3.5 - 5.1 mmol/L Final     Chloride   Date Value Ref Range Status   05/02/2018 107 95 - 110 mmol/L Final     CO2   Date Value Ref Range Status   05/02/2018 25 23 - 29 mmol/L Final     Glucose   Date Value Ref Range Status   05/02/2018 82 70 - 110 mg/dL Final     BUN, Bld   Date Value Ref Range Status   05/02/2018 17 6 - 20 mg/dL Final     Creatinine   Date Value Ref Range Status   05/02/2018 1.0 0.5 - 1.4 mg/dL Final     Calcium   Date Value Ref Range Status   05/02/2018 9.2 8.7 - 10.5 mg/dL Final     Total Protein   Date Value Ref Range Status   05/02/2018 6.9 6.0 - 8.4 g/dL Final     Albumin   Date Value Ref Range Status   05/02/2018 3.7 3.5 - 5.2 g/dL Final     Total Bilirubin   Date Value Ref Range Status   05/02/2018 0.5 0.1 - 1.0 mg/dL Final     Comment:     For infants and newborns, interpretation of results should be based  on gestational age, weight and in agreement with clinical  observations.  Premature Infant recommended reference ranges:  Up to 24 hours.............<8.0 mg/dL  Up to 48 hours............<12.0 mg/dL  3-5 days..................<15.0 mg/dL  6-29 days.................<15.0 mg/dL       Alkaline  Phosphatase   Date Value Ref Range Status   05/02/2018 64 55 - 135 U/L Final     AST   Date Value Ref Range Status   05/02/2018 21 10 - 40 U/L Final     ALT   Date Value Ref Range Status   05/02/2018 13 10 - 44 U/L Final     Anion Gap   Date Value Ref Range Status   05/02/2018 7 (L) 8 - 16 mmol/L Final     eGFR if    Date Value Ref Range Status   05/02/2018 >60.0 >60 mL/min/1.73 m^2 Final     eGFR if non    Date Value Ref Range Status   05/02/2018 >60.0 >60 mL/min/1.73 m^2 Final     Comment:     Calculation used to obtain the estimated glomerular filtration  rate (eGFR) is the CKD-EPI equation.        Lab Results   Component Value Date    WBC 4.06 05/02/2018    HGB 11.9 (L) 05/02/2018    HCT 37.3 05/02/2018    MCV 88 05/02/2018     05/02/2018     Lab Results   Component Value Date    CHOL 207 (H) 05/02/2018    CHOL 192 11/14/2016    CHOL 226 (H) 05/02/2007     Lab Results   Component Value Date    HDL 58 05/02/2018    HDL 48 11/14/2016    HDL 53 05/02/2007     Lab Results   Component Value Date    LDLCALC 133.2 05/02/2018    LDLCALC 128.0 11/14/2016    LDLCALC 150.4 (H) 05/02/2007     Lab Results   Component Value Date    TRIG 79 05/02/2018    TRIG 80 11/14/2016    TRIG 113 05/02/2007     Lab Results   Component Value Date    CHOLHDL 28.0 05/02/2018    CHOLHDL 25.0 11/14/2016    CHOLHDL 23.5 05/02/2007     Lab Results   Component Value Date    TSH 2.074 05/02/2018     Normal microalbumin/creatinine ratio  Physical Exam   Constitutional: She is oriented to person, place, and time. She appears well-developed and well-nourished. No distress.   HENT:   Head: Normocephalic and atraumatic.   Eyes: Conjunctivae are normal. Right eye exhibits no discharge. Left eye exhibits no discharge. No scleral icterus.   Cardiovascular: Normal rate, regular rhythm and normal heart sounds.  Exam reveals no gallop and no friction rub.    No murmur heard.  Pulmonary/Chest: Effort normal and breath  sounds normal. No respiratory distress. She has no wheezes. She has no rales.   Neurological: She is alert and oriented to person, place, and time.   Skin: Skin is warm and dry. She is not diaphoretic.   Psychiatric: She has a normal mood and affect. Her behavior is normal.   Nursing note and vitals reviewed.      Assessment:       1. Essential hypertension    2. Need for prophylactic vaccination against diphtheria and tetanus        Plan:       Essential hypertension  -     lisinopril (PRINIVIL,ZESTRIL) 20 MG tablet; Take 1 tablet (20 mg total) by mouth once daily.  Dispense: 90 tablet; Refill: 3    Need for prophylactic vaccination against diphtheria and tetanus  -     (In Office Administered) Tdap Vaccine

## 2018-05-22 ENCOUNTER — CLINICAL SUPPORT (OUTPATIENT)
Dept: FAMILY MEDICINE | Facility: CLINIC | Age: 53
End: 2018-05-22
Payer: COMMERCIAL

## 2018-05-22 VITALS — DIASTOLIC BLOOD PRESSURE: 78 MMHG | HEART RATE: 57 BPM | SYSTOLIC BLOOD PRESSURE: 130 MMHG

## 2018-05-22 NOTE — PROGRESS NOTES
In for blood pressure check.  Last blood pressure at visit was 142/76  .  Last medication dose was at night .    Blood pressure today is 130/78 with pulse 57.

## 2018-07-06 ENCOUNTER — TELEPHONE (OUTPATIENT)
Dept: PHYSICAL MEDICINE AND REHAB | Facility: CLINIC | Age: 53
End: 2018-07-06

## 2018-07-06 NOTE — TELEPHONE ENCOUNTER
----- Message from Heather Prescott sent at 7/6/2018  1:08 PM CDT -----  Contact: Pt. [phone]  Needs Advice    Reason for call: The patient would like to speak to someone regarding her appointment. She would like to come in at a later time on 8/6/18 if possible      Communication Preference:PHONE     Additional Information:

## 2018-07-06 NOTE — TELEPHONE ENCOUNTER
KRYSTAL on the patient VM and told to  Keep her  Apt because there ws no later apt on that date and someone would give her call.

## 2018-07-09 ENCOUNTER — TELEPHONE (OUTPATIENT)
Dept: PHYSICAL MEDICINE AND REHAB | Facility: CLINIC | Age: 53
End: 2018-07-09

## 2018-07-09 NOTE — TELEPHONE ENCOUNTER
----- Message from Major Birmingham sent at 7/9/2018  1:39 PM CDT -----  Contact: Patient @ [phone]  Patient is returning a missed call, pls return call

## 2018-08-02 ENCOUNTER — PATIENT MESSAGE (OUTPATIENT)
Dept: SPINE | Facility: CLINIC | Age: 53
End: 2018-08-02

## 2018-08-03 ENCOUNTER — TELEPHONE (OUTPATIENT)
Dept: ORTHOPEDICS | Facility: CLINIC | Age: 53
End: 2018-08-03

## 2018-08-03 DIAGNOSIS — M41.9 SCOLIOSIS, UNSPECIFIED SCOLIOSIS TYPE, UNSPECIFIED SPINAL REGION: Primary | ICD-10-CM

## 2018-08-06 ENCOUNTER — OFFICE VISIT (OUTPATIENT)
Dept: PHYSICAL MEDICINE AND REHAB | Facility: CLINIC | Age: 53
End: 2018-08-06
Payer: COMMERCIAL

## 2018-08-06 VITALS
WEIGHT: 156 LBS | HEART RATE: 5 BPM | HEIGHT: 65 IN | BODY MASS INDEX: 25.99 KG/M2 | SYSTOLIC BLOOD PRESSURE: 164 MMHG | DIASTOLIC BLOOD PRESSURE: 81 MMHG

## 2018-08-06 DIAGNOSIS — M47.819 SPONDYLOSIS WITHOUT MYELOPATHY: ICD-10-CM

## 2018-08-06 DIAGNOSIS — M43.10 ACQUIRED SPONDYLOLISTHESIS: ICD-10-CM

## 2018-08-06 DIAGNOSIS — M51.37 DEGENERATION OF LUMBAR OR LUMBOSACRAL INTERVERTEBRAL DISC: ICD-10-CM

## 2018-08-06 DIAGNOSIS — M41.9 KYPHOSCOLIOSIS: ICD-10-CM

## 2018-08-06 DIAGNOSIS — R29.898 WEAKNESS OF RIGHT LEG: Primary | ICD-10-CM

## 2018-08-06 DIAGNOSIS — G95.9 MYELOPATHY OF LUMBAR REGION: ICD-10-CM

## 2018-08-06 PROCEDURE — 3077F SYST BP >= 140 MM HG: CPT | Mod: CPTII,S$GLB,, | Performed by: PHYSICAL MEDICINE & REHABILITATION

## 2018-08-06 PROCEDURE — 3008F BODY MASS INDEX DOCD: CPT | Mod: CPTII,S$GLB,, | Performed by: PHYSICAL MEDICINE & REHABILITATION

## 2018-08-06 PROCEDURE — 99214 OFFICE O/P EST MOD 30 MIN: CPT | Mod: S$GLB,,, | Performed by: PHYSICAL MEDICINE & REHABILITATION

## 2018-08-06 PROCEDURE — 99999 PR PBB SHADOW E&M-EST. PATIENT-LVL III: CPT | Mod: PBBFAC,,, | Performed by: PHYSICAL MEDICINE & REHABILITATION

## 2018-08-06 PROCEDURE — 3079F DIAST BP 80-89 MM HG: CPT | Mod: CPTII,S$GLB,, | Performed by: PHYSICAL MEDICINE & REHABILITATION

## 2018-08-06 RX ORDER — HYDROCODONE BITARTRATE AND ACETAMINOPHEN 10; 325 MG/1; MG/1
1 TABLET ORAL EVERY 6 HOURS PRN
Qty: 120 TABLET | Refills: 0 | Status: SHIPPED | OUTPATIENT
Start: 2018-08-06 | End: 2018-09-05

## 2018-08-06 RX ORDER — HYDROCODONE BITARTRATE AND ACETAMINOPHEN 10; 325 MG/1; MG/1
1 TABLET ORAL EVERY 6 HOURS PRN
Qty: 120 TABLET | Refills: 0 | Status: ON HOLD | OUTPATIENT
Start: 2018-09-06 | End: 2018-10-05 | Stop reason: HOSPADM

## 2018-08-06 RX ORDER — HYDROCODONE BITARTRATE AND ACETAMINOPHEN 10; 325 MG/1; MG/1
1 TABLET ORAL EVERY 6 HOURS PRN
Qty: 120 TABLET | Refills: 0 | Status: SHIPPED | OUTPATIENT
Start: 2018-10-06 | End: 2018-09-12 | Stop reason: CLARIF

## 2018-08-06 NOTE — PROGRESS NOTES
Subjective:       Patient ID: Genia Davis is a 52 y.o. female.    Chief Complaint: Back Pain; Hip Pain; and Leg Pain    Back Pain   Associated symptoms include leg pain. Pertinent negatives include no abdominal pain, chest pain, fever, numbness or weakness. Headaches:     Leg Pain    Pertinent negatives include no numbness.   Hip Pain    Pertinent negatives include no numbness.      Mrs. Davis who returns to clinic for chronic low back pain, and chronic   Pain management with opioids.  LCV 4/4/18.  Patient with long standing back pain, she had a spinal tumor at 24 y/o, that required multiple back surgeries, with last surgery done in Feb, 2016.  She is s/p revision T10-Pelvis fusion of a failed iliac bolt on Feb 12, 2016, by .   She has been having increasing b/l Lt>Rt buttock and lateral thigh pain since surgery.  She has had multiple falls because of the RT leg giving way.   She wears KAFO, secondary to Rt leg weakness,and hyperextension in knee, with Rt Foot drop. This current prosthesis is fairly old more than 4-5 yrs.  She had multiple JENNIFFER, after the surgery, the one to the left L5/S1 TF JENNIFFER ,done in July, 2016 was the moat helpful, while repeat injection in November 2016,helped a little.  She continues to needs pain medication, opiates for pain control.  She failed Tramadol, was taking 3 x/ day, but states that Tramadol is very slow to work, while Hydrocodone helps quicker and better.  She also failed Gabapentin, did not help, and she did not like the way it fells.  She usualy takes Hydrocodone 1-2 tabs/day, rarely 3 tabs/day, only for more severe pain.  She is able to walk with RW, using current KAFO ( she has it since 2012-13), across 2-3 rooms, for distance she uses manual WC.   KAFO brace helps, she does not belive that she would be able to make that much walking w/o brace. She received this brace from [employer].  She tries to be self sufficient , and tries to do everything  "on her own.  Today, she reports that she is slightly better, pain meds are helping,   On LCV she received prescriptions for KAFO, but she still did not start process   to receive a new KAFO.  Since LCV, she reports worsening of LBP, that radiates to Rt hip and Rt leg down to the knee.  She reports increased weakness in RT leg, she has more difficulties to walk.   States that she is afraid that her hardware "shift" and she made an apointment with dr. Herrmann.    Back Pain Description:  Length: pain is chronic pain. Length >  Since age of 22 y/o.   No past, recent injury, falls.  Intensity:  CURRENT  5/10,  AVERAGE  Pain   4-5/10. at BEST   0/10 , At WORST  8/10 on the WORST day.   Location: pain is localized across lower back , bellow the waist line.  That pain radiates to b/l buttock, hips, Rt leg > Lt leg.   Pain in Rt leg comes on side and to the knee level.   It is more  leg  And hip than back pain.  Radiation: Positive to buttocks. Positive to legs.   Timing : constant  morning, afternoon pain , worse with activity, in evening  QUALITY:  Sharp/shooting pain in Lt leg. Back pain is more sharp pain.   No neuropathic : burning, tingling,   There is numbness in toes at the bottom of toes.   Positive Rt leg weakness.   Worsening factors: activity , walking,   Alleviating factors:   Symptoms interfere with daily activity, sleeping and work.   Current medications:    Failed medications:    Prior procedures. Multiple surgeries, s/p fusion T10-pelvis, and multiple JENNIFFER, last one s/p Lt L5-S1 TFESI in 07/16 and 11/2016, with some pain relief.  PT/OT: multiple times, with temporary pain relief.  Patient denies night fever/night sweats, bowel incontinence, significant weight loss and significant motor weakness ( red flags).  Patient denies any suicidal or homicidal ideations.   She is here for f/u and chronic pain management with opioids.  .  Past Medical History:   Diagnosis Date    Cancer     tumor on back, was removed "    Encounter for blood transfusion        Past Surgical History:   Procedure Laterality Date    BACK SURGERY      x 5    CHOLECYSTECTOMY         Family History   Problem Relation Age of Onset    Breast cancer Maternal Aunt     Anesthesia problems Neg Hx     Clotting disorder Neg Hx        Social History     Social History    Marital status:      Spouse name: N/A    Number of children: N/A    Years of education: N/A     Social History Main Topics    Smoking status: Never Smoker    Smokeless tobacco: Never Used    Alcohol use No    Drug use: No    Sexual activity: Not Asked     Other Topics Concern    None     Social History Narrative    None       Current Outpatient Prescriptions   Medication Sig Dispense Refill    HYDROcodone-acetaminophen (NORCO)  mg per tablet Take 1 tablet by mouth every 6 (six) hours as needed for Pain. 120 tablet 0    [START ON 9/6/2018] HYDROcodone-acetaminophen (NORCO)  mg per tablet Take 1 tablet by mouth every 6 (six) hours as needed for Pain. 120 tablet 0    [START ON 10/6/2018] HYDROcodone-acetaminophen (NORCO)  mg per tablet Take 1 tablet by mouth every 6 (six) hours as needed for Pain. 120 tablet 0    lisinopril (PRINIVIL,ZESTRIL) 20 MG tablet Take 1 tablet (20 mg total) by mouth once daily. 90 tablet 3     No current facility-administered medications for this visit.        Review of patient's allergies indicates:  No Known Allergies      Review of Systems   Constitutional: Negative for appetite change, chills, fatigue, fever and unexpected weight change.   HENT: Negative for drooling, trouble swallowing and voice change.    Eyes: Negative for pain and visual disturbance.   Respiratory: Negative for shortness of breath and wheezing.    Cardiovascular: Negative for chest pain and palpitations.   Gastrointestinal: Negative for abdominal distention, abdominal pain, constipation and diarrhea.   Genitourinary: Negative for difficulty urinating.    Musculoskeletal: Positive for arthralgias (Rt knee pain), back pain and gait problem. Negative for joint swelling, myalgias and neck stiffness.               Skin: Negative for color change and rash.   Neurological: Negative for dizziness, facial asymmetry, speech difficulty, weakness, light-headedness and numbness. Headaches:     Hematological: Negative for adenopathy.   Psychiatric/Behavioral: Negative for behavioral problems, confusion and sleep disturbance. The patient is not nervous/anxious.          Objective:      Physical Exam      GENERAL: The patient is alert, oriented, pleasant.  HEENT: PERRLA  NECK: supple, no masses,    CV: S1S2, RRR, no murmurs, rales.  LUNGS: CTA bilateral.   ABDOMEN: soft, non-tender, non distended, bowel sounds nl.  EXTREMITIES: no edema, +2 pulses DP, b/l  SKIN: no skin rash, no skin breakdowns.  MUSCULOSKELETAL:   Gait :abnormal, she is able to make a few steps walks with no AD.  Needs a a slight longer time to get up , secondary to Rt KAFO.  Walks with limping ( in swing) and hyperextending ( in stance) RT leg.   Cervical spine: full AROM in cervical spine.   Lumbar spine, decreased active range of motion in all planes, philomena. flexion to 60 from neutral, ext. Lacks full extension -3 degrees, cannot hyperextend secondary to long fusion, s/p T10- pelvis. palpable sacral screw,  Side bending and rotation to 25-30 degrees, b/l.  Straight leg raising on Lt tested and it is a Negative, negative on Rt, weak Hip flexors on RT leg.   Full range of motion in all joints x3 extremities, except in RT LE, that is weak.  Muscle strength 5/5 throughout x 3 extremities,  except in RT LE, that is weak.  Rt LE: HF3-, KF/KE- NT ( in KAFO), no active DF in Rt foot.  No  joint laxity throughout x4 extremities.   NEUROLOGIC: Cranial nerves II through XII intact.   Deep tendon reflexes is normal, +2 in the upper and lower extremities bilaterally.   Muscle tone is normal, no spasticity.  Sensory is  intact to light touch and pinprick throughout x4 extremities.     Assessment:       1. Weakness of right leg    2. Spondylosis without myelopathy    3. Degeneration of lumbar or lumbosacral intervertebral disc    4. Myelopathy of lumbar region    5. Kyphoscoliosis    6. Acquired spondylolisthesis        Plan:       Weakness of right leg  -     HYDROcodone-acetaminophen (NORCO)  mg per tablet; Take 1 tablet by mouth every 6 (six) hours as needed for Pain.  Dispense: 120 tablet; Refill: 0  -     HYDROcodone-acetaminophen (NORCO)  mg per tablet; Take 1 tablet by mouth every 6 (six) hours as needed for Pain.  Dispense: 120 tablet; Refill: 0  -     HYDROcodone-acetaminophen (NORCO)  mg per tablet; Take 1 tablet by mouth every 6 (six) hours as needed for Pain.  Dispense: 120 tablet; Refill: 0  -     HME - OTHER    Spondylosis without myelopathy  -     HYDROcodone-acetaminophen (NORCO)  mg per tablet; Take 1 tablet by mouth every 6 (six) hours as needed for Pain.  Dispense: 120 tablet; Refill: 0  -     HYDROcodone-acetaminophen (NORCO)  mg per tablet; Take 1 tablet by mouth every 6 (six) hours as needed for Pain.  Dispense: 120 tablet; Refill: 0  -     HYDROcodone-acetaminophen (NORCO)  mg per tablet; Take 1 tablet by mouth every 6 (six) hours as needed for Pain.  Dispense: 120 tablet; Refill: 0    Degeneration of lumbar or lumbosacral intervertebral disc  -     HYDROcodone-acetaminophen (NORCO)  mg per tablet; Take 1 tablet by mouth every 6 (six) hours as needed for Pain.  Dispense: 120 tablet; Refill: 0  -     HYDROcodone-acetaminophen (NORCO)  mg per tablet; Take 1 tablet by mouth every 6 (six) hours as needed for Pain.  Dispense: 120 tablet; Refill: 0  -     HYDROcodone-acetaminophen (NORCO)  mg per tablet; Take 1 tablet by mouth every 6 (six) hours as needed for Pain.  Dispense: 120 tablet; Refill: 0    Myelopathy of lumbar region  -     HYDROcodone-acetaminophen  (NORCO)  mg per tablet; Take 1 tablet by mouth every 6 (six) hours as needed for Pain.  Dispense: 120 tablet; Refill: 0  -     HYDROcodone-acetaminophen (NORCO)  mg per tablet; Take 1 tablet by mouth every 6 (six) hours as needed for Pain.  Dispense: 120 tablet; Refill: 0  -     HYDROcodone-acetaminophen (NORCO)  mg per tablet; Take 1 tablet by mouth every 6 (six) hours as needed for Pain.  Dispense: 120 tablet; Refill: 0  -     HME - OTHER    Kyphoscoliosis  -     HYDROcodone-acetaminophen (NORCO)  mg per tablet; Take 1 tablet by mouth every 6 (six) hours as needed for Pain.  Dispense: 120 tablet; Refill: 0  -     HYDROcodone-acetaminophen (NORCO)  mg per tablet; Take 1 tablet by mouth every 6 (six) hours as needed for Pain.  Dispense: 120 tablet; Refill: 0  -     HYDROcodone-acetaminophen (NORCO)  mg per tablet; Take 1 tablet by mouth every 6 (six) hours as needed for Pain.  Dispense: 120 tablet; Refill: 0    Acquired spondylolisthesis  -     HYDROcodone-acetaminophen (NORCO)  mg per tablet; Take 1 tablet by mouth every 6 (six) hours as needed for Pain.  Dispense: 120 tablet; Refill: 0  -     HYDROcodone-acetaminophen (NORCO)  mg per tablet; Take 1 tablet by mouth every 6 (six) hours as needed for Pain.  Dispense: 120 tablet; Refill: 0  -     HYDROcodone-acetaminophen (NORCO)  mg per tablet; Take 1 tablet by mouth every 6 (six) hours as needed for Pain.  Dispense: 120 tablet; Refill: 0    patient with long standing back pain, sec.to spinal Tm, that was surgically exceeded more than 25 yrs ago. She is s/p multiple surgeries, s/p T10-pelvis fusion.   With positive Weakness in Rt LE weakness, secondary to radiculopathy /myleopathic injury.    -- DME- other,new prescription given,    Since she did not start a process yet to receive a new KAFO      - Pain management:   Resume Hydrocodone 10/325 mg po TID-QID prn pain.   She failed Lyricu, and Gabapentin.  Will  resume all supportive therapy.    RTC in 3 months.     Total time spent face to face with patient was 25 minutes.   More than 50% of that time was spent in [id number] on diagnosis , prognosis and treatment options.   I also  patient  on common and most usual side effect of prescribed medications. Risk and benefits of opiates, possible risk of developing opiate dependence and tolerance, need of strict compliance with prescribed medications.  Pain contract, rules and obligations were discussed with patient in details.  She is aware that I would be the only doctor prescribing her pain medications and ED in a case of emergency.  I reviewed Primary care , and other specialty's notes to better coordinate patient's  care. All questions were answered, and patient voiced understanding.

## 2018-08-29 DIAGNOSIS — Z12.11 COLON CANCER SCREENING: ICD-10-CM

## 2018-08-31 ENCOUNTER — HOSPITAL ENCOUNTER (OUTPATIENT)
Dept: RADIOLOGY | Facility: HOSPITAL | Age: 53
Discharge: HOME | End: 2018-08-31
Attending: ORTHOPAEDIC SURGERY
Payer: COMMERCIAL

## 2018-08-31 ENCOUNTER — OFFICE VISIT (OUTPATIENT)
Dept: ORTHOPEDICS | Facility: CLINIC | Age: 53
End: 2018-08-31
Payer: COMMERCIAL

## 2018-08-31 VITALS — WEIGHT: 158.81 LBS | BODY MASS INDEX: 26.46 KG/M2 | HEIGHT: 65 IN

## 2018-08-31 DIAGNOSIS — S32.009K PSEUDOARTHROSIS OF LUMBAR SPINE: Primary | ICD-10-CM

## 2018-08-31 DIAGNOSIS — M41.9 SCOLIOSIS, UNSPECIFIED SCOLIOSIS TYPE, UNSPECIFIED SPINAL REGION: ICD-10-CM

## 2018-08-31 PROCEDURE — 72082 X-RAY EXAM ENTIRE SPI 2/3 VW: CPT | Mod: 26,,, | Performed by: RADIOLOGY

## 2018-08-31 PROCEDURE — 3008F BODY MASS INDEX DOCD: CPT | Mod: CPTII,S$GLB,, | Performed by: ORTHOPAEDIC SURGERY

## 2018-08-31 PROCEDURE — 72082 X-RAY EXAM ENTIRE SPI 2/3 VW: CPT | Mod: TC

## 2018-08-31 PROCEDURE — 99999 PR PBB SHADOW E&M-EST. PATIENT-LVL III: CPT | Mod: PBBFAC,,, | Performed by: ORTHOPAEDIC SURGERY

## 2018-08-31 PROCEDURE — 99213 OFFICE O/P EST LOW 20 MIN: CPT | Mod: S$GLB,,, | Performed by: ORTHOPAEDIC SURGERY

## 2018-08-31 NOTE — PROGRESS NOTES
Date: 08/31/2018    Supervising Physician: Rocky Herrmann M.D.    Date of Surgery: 2/12/2016    Procedure: revision T11-pelvis PSF with L iliac connector, revision for connector failure    History: Genia Davis is seen today for follow-up following the above listed procedure.  The patient had been doing well up until 6 weeks ago when she began experiencing new onser back pain. She has pain with inhalation on the right and radicular nerve and left hip pain. She has been trying NSAIDS with little relief.     Exam: Incision is healed.   Sacral screws palpable.  There is no sign of infection. Neuro exam is stable. No signs of DVT.    Radiographs: XR done today showing layton fracture at L5 on the left and loosening of the lest SI bolt.     CT reviewed which showed some evidence of iliac bolt loosening, but difficult to tell due to limited views of the pelvis.     Assessment/Plan: New onset back pain with layton fracture and loosening of the left SI screw. This is concerning for continued pseudoarthrosis of the lumbar spine. We will send her for CT lumbar spine to evaluate fusion and then call her to discuss non operative treatment vs revision fusion .     We will call the patient with the results.     Thank you for the opportunity to participate in this patient's care. Please give me a call if there are any concerns or questions.      I spent 15 minutes with the patient of which greater than 1/2 the time was devoted to counciling the patient regarding treatment options.

## 2018-09-04 ENCOUNTER — HOSPITAL ENCOUNTER (OUTPATIENT)
Dept: RADIOLOGY | Facility: HOSPITAL | Age: 53
Discharge: HOME | End: 2018-09-04
Attending: ORTHOPAEDIC SURGERY
Payer: COMMERCIAL

## 2018-09-04 DIAGNOSIS — S32.009K PSEUDOARTHROSIS OF LUMBAR SPINE: ICD-10-CM

## 2018-09-04 PROCEDURE — 72131 CT LUMBAR SPINE W/O DYE: CPT | Mod: 26,,, | Performed by: RADIOLOGY

## 2018-09-04 PROCEDURE — 72131 CT LUMBAR SPINE W/O DYE: CPT | Mod: TC

## 2018-09-10 DIAGNOSIS — S32.009K PSEUDOARTHROSIS OF LUMBAR SPINE: Primary | ICD-10-CM

## 2018-09-10 RX ORDER — MUPIROCIN 20 MG/G
OINTMENT TOPICAL
Status: CANCELLED | OUTPATIENT
Start: 2018-09-10

## 2018-09-10 RX ORDER — SODIUM CHLORIDE 9 MG/ML
INJECTION, SOLUTION INTRAVENOUS CONTINUOUS
Status: CANCELLED | OUTPATIENT
Start: 2018-09-10

## 2018-09-12 ENCOUNTER — TELEPHONE (OUTPATIENT)
Dept: FAMILY MEDICINE | Facility: CLINIC | Age: 53
End: 2018-09-12

## 2018-09-12 ENCOUNTER — TELEPHONE (OUTPATIENT)
Dept: PREADMISSION TESTING | Facility: HOSPITAL | Age: 53
End: 2018-09-12

## 2018-09-12 ENCOUNTER — ANESTHESIA EVENT (OUTPATIENT)
Dept: SURGERY | Facility: HOSPITAL | Age: 53
DRG: 460 | End: 2018-09-12
Payer: COMMERCIAL

## 2018-09-12 DIAGNOSIS — Z01.818 PREOPERATIVE TESTING: Primary | ICD-10-CM

## 2018-09-12 NOTE — TELEPHONE ENCOUNTER
----- Message from Sánchez Garrison sent at 9/12/2018 11:41 AM CDT -----  Contact: Pt  Name of Who is Calling: Genia      What is the request in detail: Patient is calling requesting to speak with a nurse in regards to getting clearance for a procedure on October 2      Can the clinic reply by [facility]: no      What Number to Call Back if not in [facility]: [phone]

## 2018-09-12 NOTE — PRE-PROCEDURE INSTRUCTIONS
Patient stated that has not had any problems with anesthesia in the past.Preop instructions given. Hold asa, asa containing products, nsaids, vitamins and supplements one week prior to surgery. Will need to get medical clearance from your PCP, Dr. Marcy Elliott in [place].  Please call to set up an appt. Will also need labs ua and poc appt. Our  will call to set up these appts.Verbalizes understanding.

## 2018-09-12 NOTE — PRE-PROCEDURE INSTRUCTIONS
Nothing to eat after midnight prior to surgery. May have gum, and hard candy until 8 hours before surgery/procedure time.  May have clear liquids( water, gatorade, powerade or apple juice) until 2 hours prior to surgery/procedure time.  No red or orange drinks. If in doubt , drink water. Nothing to drink 2 hours before arrival time for surgery/procedure. If you are told to take medication in the morning of surgery, it may be taken with a sip of water. If your doctor tells you something different pertaining to when to stop eating or drinking, follow your doctor's instructions.  Shower the night before surgery and the morning of surgery with an antibacterial soap( hibiclens or dial antibacterial soap).  Nothing on the skin once shower. Do not apply any deodorant, lotion, powder, perfume,or aftershave.  No jewelry going to surgery.  Call your surgeon for any change in medical status. Call the preop center for any questions.Verbalizes understanding.

## 2018-09-12 NOTE — ANESTHESIA PREPROCEDURE EVALUATION
Anesthesia Assessment: Preoperative EQUATION     Planned Procedure: Procedure(s) (LRB):  FUSION, SPINE, LUMBAR, TLIF, POSTERIOR APPROACH, USING PEDICLE SCREW T10-Pelvis Revision Co-Case With Dr. Samano **AIRO** Depuy SNS: Motors/SSEP/EMG (N/A)  Requested Anesthesia Type:General  Surgeon: Rocky Herrmann MD  Service: Neurosurgery  Known or anticipated Date of Surgery:10/2/2018     Surgeon notes: reviewed     Electronic QUestionnaire Assessment completed via nurse interview with patient.    NO AQ        Triage considerations:         Previous anesthesia records:GETA and No problems  2/12/2016 REVISION OF HARDWARE T10-PELVIS   Airway/Jaw/Neck:  Airway Findings: Mouth Opening: Small, but > 3cm Tongue: Normal  General Airway Assessment: Adult, Good  Mallampati: III  TM Distance: 4 - 6 cm      Present Prior to Hospital Arrival?: No Placement Date: 02/12/16 Placement Time: 1044 Method of Intubation: Direct laryngoscopy Inserted by: CRNA Airway Device: Endotracheal Tube Mask Ventilation: Easy Blade: Cortés #2 Airway Device Size: 7.0 Style: Cuffed Cuff Inflation: Minimal occlusive pressure Inflation Amount (mL): 8 Placement Verified By: Auscultation;Capnometry Grade: Grade II Complicating Factors: Small mouth Findings Post-Intubation: Positive EtCO2;Bilateral breath sounds;Atraumatic/Condition of teeth unchanged Depth of Insertion (cm): 20 Secured at: Lips Complications: None Breath Sounds: Equal Bilateral Insertion attempts (enter comment if more than 2 attempts): 1 Removal Date: 02/12/16 Removal Time: 1244  Last PCP note: within 1 month , outside [facility]   Subspecialty notes: ORTHOPEDICS, AND PM&R     Other important co-morbidities: PER Epic; HTN     Tests already available:  Available tests,  within 3 months , within [facility] .8/31/2018 CT LUMBAR SPINE , 11/15/2015 EKG                            Instructions given. (See in Nurse's note)     Optimization:  Anesthesia Preop Clinic Assessment  Indicated    Medical  Opinion Indicated                            t                Plan:    Testing:  CBC, BMP, PT/INR, PTT, T&S and UA                           Consultation:Patient's PCP for re-evaluation Patient's PCP for a statement of optimization                            Patient  has previously scheduled Medical Appointment:NONE     Navigation: Tests Scheduled.                         Consults scheduled.                        Results will be tracked by Preop Clinic.                                     Electronically signed by Shabnam Emerson RN at 9/12/2018 11:41 AM       Pre-admit on 10/2/2018            Detailed Report    9/17 Labs and ua resulted and noted. Notified Dr. Vick's staff to have Dr. Vick review ua and micro ua, abnormal.   9/18 per notes: Patient had taken some old cipro. Is 4 days out and does not have any symptoms,so is cured, per Dr. Vick.     9/18 Per addendum to Dr. Vick's note:Patient is medically cleared for surgery.  Shabnam Emerson RN BSN  Preop Center                                                                                                               09/12/2018  Genia Davis is a 52 y.o., female.    Anesthesia Evaluation    I have reviewed the Patient Summary Reports.    I have reviewed the Nursing Notes.   I have reviewed the Medications.     Review of Systems  Anesthesia Hx:  No problems with previous Anesthesia  History of prior surgery of interest to airway management or planning: Denies Family Hx of Anesthesia complications.   Denies Personal Hx of Anesthesia complications.   Social:  Non-Smoker, No Alcohol Use    Hematology/Oncology:  Hematology Normal   Oncology Normal     EENT/Dental:EENT/Dental Normal   Cardiovascular:   Exercise tolerance: good Hypertension, poorly controlled ECG has been reviewed.  Functional Capacity 2 METS  Hypertension, Essential Hypertension    Pulmonary:  Pulmonary Normal  Denies Shortness of breath.  Denies Recent URI.    Renal/:  Renal/ Normal      Hepatic/GI:  Hepatic/GI Normal    Musculoskeletal:  Musculoskeletal General/Symptoms: (uses wc for distance) Functional capacity is ambulatory with walker.  Joint Disease:  Arthritis DJD KNEE Spine Disorders: lumbar  Lumbar Spine Disorders, Lumbar Disc Disease PSEUDOARTHROSIS OF LUMBAR SPINE  Neurological:  Neuro Symptoms of pain Back pain, l hip pain and r leg pain Chronic Pain Syndrome Neuromuscular Disease   Endocrine:  Endocrine Normal    Dermatological:  Skin Normal    Psych:  Psychiatric Normal           Physical Exam  General:  Well nourished    Airway/Jaw/Neck:  Airway Findings: Mouth Opening: Normal Tongue: Normal  General Airway Assessment: Adult  Mallampati: II  TM Distance: Normal, at least 6 cm        Eyes/Ears/Nose:  EYES/EARS/NOSE FINDINGS: Normal   Dental:  DENTAL FINDINGS: Normal   Chest/Lungs:  Chest/Lungs Clear    Heart/Vascular:  Heart Findings: Normal Heart murmur: negative Vascular Findings: Normal    Abdomen:  Abdomen Findings: Normal    Musculoskeletal:  Musculoskeletal Findings: Normal   Skin:  Skin Findings: Normal    Mental Status:  Mental Status Findings: Normal        Anesthesia Plan  Type of Anesthesia, risks & benefits discussed:  Anesthesia Type:  general  Patient's Preference:   Intra-op Monitoring Plan: standard ASA monitors and arterial line  Intra-op Monitoring Plan Comments:   Post Op Pain Control Plan: multimodal analgesia, IV/PO Opioids PRN and per primary service following discharge from PACU  Post Op Pain Control Plan Comments:   Induction:   IV  Beta Blocker:  Patient is not currently on a Beta-Blocker (No further documentation required).       Informed Consent: Patient understands risks and agrees with Anesthesia plan.  Questions answered. Anesthesia consent signed with patient.  ASA Score: 2     Day of Surgery Review of History & Physical:    H&P update referred to the surgeon.         Ready For Surgery From Anesthesia Perspective.

## 2018-09-12 NOTE — TELEPHONE ENCOUNTER
----- Message from Shabnam Emerson RN sent at 9/12/2018 12:10 PM CDT -----  Please schedule poc, labs,and  ua no sooner than Sept 18th .Thanks!

## 2018-09-12 NOTE — TELEPHONE ENCOUNTER
Hello Provider,     You patient indicated above requires pre-op clearance/ risk stratification ro a TLIF with Neurosurgery on 10/2/2018. They were last seen in your office on   5/8/2018.  If a chart review is  appropriate for clearance, please indicated in a note in the EMR.      If not, please advise timely so that an appointment can be scheduled.  The follow labs/tests have been ordered, U/a, BMP, PT, PTT, CBC, & Type & Screen.    If further diagnostics are required please feel free to initiate.    Luis Alberto  [id number]

## 2018-09-12 NOTE — PRE ADMISSION SCREENING
Anesthesia Assessment: Preoperative EQUATION    Planned Procedure: Procedure(s) (LRB):  FUSION, SPINE, LUMBAR, TLIF, POSTERIOR APPROACH, USING PEDICLE SCREW T10-Pelvis Revision Co-Case With Dr. Samano **AIRO** Depuy SNS: Motors/SSEP/EMG (N/A)  Requested Anesthesia Type:General  Surgeon: Rocky Herrmann MD  Service: Neurosurgery  Known or anticipated Date of Surgery:10/2/2018    Surgeon notes: reviewed    Electronic QUestionnaire Assessment completed via nurse interview with patient.    NO AQ      Triage considerations:       Previous anesthesia records:GETA and No problems  2/12/2016 REVISION OF HARDWARE T10-PELVIS   Airway/Jaw/Neck:  Airway Findings: Mouth Opening: Small, but > 3cm Tongue: Normal  General Airway Assessment: Adult, Good  Mallampati: III  TM Distance: 4 - 6 cm      Present Prior to Hospital Arrival?: No Placement Date: 02/12/16 Placement Time: 1044 Method of Intubation: Direct laryngoscopy Inserted by: CRNA Airway Device: Endotracheal Tube Mask Ventilation: Easy Blade: Cortés #2 Airway Device Size: 7.0 Style: Cuffed Cuff Inflation: Minimal occlusive pressure Inflation Amount (mL): 8 Placement Verified By: Auscultation;Capnometry Grade: Grade II Complicating Factors: Small mouth Findings Post-Intubation: Positive EtCO2;Bilateral breath sounds;Atraumatic/Condition of teeth unchanged Depth of Insertion (cm): 20 Secured at: Lips Complications: None Breath Sounds: Equal Bilateral Insertion attempts (enter comment if more than 2 attempts): 1 Removal Date: 02/12/16 Removal Time: 1244  Last PCP note: within 1 month , outside [facility]   Subspecialty notes: ORTHOPEDICS, AND PM&R    Other important co-morbidities: PER Epic; HTN     Tests already available:  Available tests,  within 3 months , within [facility] .8/31/2018 CT LUMBAR SPINE , 11/15/2015 EKG            Instructions given. (See in Nurse's note)    Optimization:  Anesthesia Preop Clinic Assessment  Indicated    Medical Opinion Indicated       t       Plan:    Testing:  CBC, BMP, PT/INR, PTT, T&S and UA   Pre-anesthesia  visit       Visit focus: concerns in complex and/or prolonged anesthesia     Consultation:Patient's PCP for re-evaluation Patient's PCP for a statement of optimization      Patient  has previously scheduled Medical Appointment:NONE    Navigation: Tests Scheduled.              Consults scheduled.             Results will be tracked by Preop Clinic.

## 2018-09-13 ENCOUNTER — TELEPHONE (OUTPATIENT)
Dept: PREADMISSION TESTING | Facility: HOSPITAL | Age: 53
End: 2018-09-13

## 2018-09-13 ENCOUNTER — OFFICE VISIT (OUTPATIENT)
Dept: FAMILY MEDICINE | Facility: CLINIC | Age: 53
End: 2018-09-13
Payer: COMMERCIAL

## 2018-09-13 ENCOUNTER — LAB VISIT (OUTPATIENT)
Dept: LAB | Facility: HOSPITAL | Age: 53
End: 2018-09-13
Attending: ANESTHESIOLOGY
Payer: COMMERCIAL

## 2018-09-13 ENCOUNTER — DOCUMENTATION ONLY (OUTPATIENT)
Dept: FAMILY MEDICINE | Facility: CLINIC | Age: 53
End: 2018-09-13

## 2018-09-13 VITALS
TEMPERATURE: 98 F | BODY MASS INDEX: 26.77 KG/M2 | HEART RATE: 60 BPM | DIASTOLIC BLOOD PRESSURE: 94 MMHG | HEIGHT: 65 IN | OXYGEN SATURATION: 100 % | WEIGHT: 160.69 LBS | SYSTOLIC BLOOD PRESSURE: 144 MMHG | RESPIRATION RATE: 16 BRPM

## 2018-09-13 DIAGNOSIS — Z01.818 PREOPERATIVE TESTING: ICD-10-CM

## 2018-09-13 DIAGNOSIS — Z01.818 PREOPERATIVE CLEARANCE: Primary | ICD-10-CM

## 2018-09-13 LAB
ANION GAP SERPL CALC-SCNC: 7 MMOL/L
APTT BLDCRRT: 24.8 SEC
BASOPHILS # BLD AUTO: 0.02 K/UL
BASOPHILS NFR BLD: 0.5 %
BUN SERPL-MCNC: 17 MG/DL
CALCIUM SERPL-MCNC: 9.4 MG/DL
CHLORIDE SERPL-SCNC: 106 MMOL/L
CO2 SERPL-SCNC: 25 MMOL/L
CREAT SERPL-MCNC: 0.9 MG/DL
DIFFERENTIAL METHOD: ABNORMAL
EOSINOPHIL # BLD AUTO: 0.1 K/UL
EOSINOPHIL NFR BLD: 2.5 %
ERYTHROCYTE [DISTWIDTH] IN BLOOD BY AUTOMATED COUNT: 14.6 %
EST. GFR  (AFRICAN AMERICAN): >60 ML/MIN/1.73 M^2
EST. GFR  (NON AFRICAN AMERICAN): >60 ML/MIN/1.73 M^2
GLUCOSE SERPL-MCNC: 84 MG/DL
HCT VFR BLD AUTO: 40 %
HGB BLD-MCNC: 12.6 G/DL
IMM GRANULOCYTES # BLD AUTO: 0.01 K/UL
IMM GRANULOCYTES NFR BLD AUTO: 0.2 %
INR PPP: 0.9
LYMPHOCYTES # BLD AUTO: 1 K/UL
LYMPHOCYTES NFR BLD: 22.3 %
MCH RBC QN AUTO: 27.9 PG
MCHC RBC AUTO-ENTMCNC: 31.5 G/DL
MCV RBC AUTO: 89 FL
MONOCYTES # BLD AUTO: 0.4 K/UL
MONOCYTES NFR BLD: 8 %
NEUTROPHILS # BLD AUTO: 2.9 K/UL
NEUTROPHILS NFR BLD: 66.5 %
NRBC BLD-RTO: 0 /100 WBC
PLATELET # BLD AUTO: 211 K/UL
PMV BLD AUTO: 12.1 FL
POTASSIUM SERPL-SCNC: 4.5 MMOL/L
PROTHROMBIN TIME: 9.9 SEC
RBC # BLD AUTO: 4.52 M/UL
SODIUM SERPL-SCNC: 138 MMOL/L
WBC # BLD AUTO: 4.39 K/UL

## 2018-09-13 PROCEDURE — 3080F DIAST BP >= 90 MM HG: CPT | Mod: CPTII,S$GLB,, | Performed by: INTERNAL MEDICINE

## 2018-09-13 PROCEDURE — 85025 COMPLETE CBC W/AUTO DIFF WBC: CPT

## 2018-09-13 PROCEDURE — 80048 BASIC METABOLIC PNL TOTAL CA: CPT

## 2018-09-13 PROCEDURE — 36415 COLL VENOUS BLD VENIPUNCTURE: CPT | Mod: PO

## 2018-09-13 PROCEDURE — 99213 OFFICE O/P EST LOW 20 MIN: CPT | Mod: S$GLB,,, | Performed by: INTERNAL MEDICINE

## 2018-09-13 PROCEDURE — 3077F SYST BP >= 140 MM HG: CPT | Mod: CPTII,S$GLB,, | Performed by: INTERNAL MEDICINE

## 2018-09-13 PROCEDURE — 85610 PROTHROMBIN TIME: CPT

## 2018-09-13 PROCEDURE — 93010 ELECTROCARDIOGRAM REPORT: CPT | Mod: S$GLB,,, | Performed by: INTERNAL MEDICINE

## 2018-09-13 PROCEDURE — 3008F BODY MASS INDEX DOCD: CPT | Mod: CPTII,S$GLB,, | Performed by: INTERNAL MEDICINE

## 2018-09-13 PROCEDURE — 85730 THROMBOPLASTIN TIME PARTIAL: CPT

## 2018-09-13 PROCEDURE — 93005 ELECTROCARDIOGRAM TRACING: CPT | Mod: S$GLB,,, | Performed by: INTERNAL MEDICINE

## 2018-09-13 NOTE — TELEPHONE ENCOUNTER
----- Message from Shabnam Emerson RN sent at 9/13/2018  8:42 AM CDT -----  Please cancel poc and lab for 9/27. We will get her t&s am of surgery and can see anesthesia am of surgery. Everything else was done on the phone because she had told [facility] that she could not come here. Please notify the patient. Thanks!

## 2018-09-13 NOTE — PROGRESS NOTES
Health Maintenance Due   Topic Date Due    Pap Smear with HPV Cotest  11/05/1984    Colonoscopy  11/05/2013    Influenza Vaccine  08/01/2018

## 2018-09-13 NOTE — PROGRESS NOTES
"Subjective:       Patient ID: Genia Davis is a 52 y.o. female.    Chief Complaint: surgical clearance (back)    HPI       C.C..  Surgical Clearance    Surgery:     Repair broken layton and screws in back   Date of Surgery:  10/2/18   By Montez     Patient has  tolerated anesthesia without problems in the past   Yes    Hx of cad:   no  Chest pain in last 6 mo: no  Hx of lung disease :Pneumonia in 2016  Pt is a smoker this same: no    Functional status:  Good. Uses a walker.       Review of Systems      Objective:      Vitals:    09/13/18 0817   BP: (!) 144/94   Pulse: 60   Resp: 16   Temp: 97.7 °F (36.5 °C)   TempSrc: Oral   SpO2: 100%   Weight: 72.9 kg (160 lb 11.5 oz)   Height: 5' 5" (1.651 m)   PainSc: 0-No pain     Physical Exam   Constitutional: She appears well-developed and well-nourished.   Cardiovascular: Normal rate, regular rhythm and normal heart sounds.   Pulmonary/Chest: Effort normal and breath sounds normal.   Abdominal: Soft. There is no tenderness.   Neurological: She is alert.   Psychiatric: She has a normal mood and affect. Her behavior is normal. Thought content normal.   Nursing note and vitals reviewed.  poor gait, uses a walker.       Assessment:       1. Preoperative clearance          Plan:   Patient is medically cleared for surgery    Preoperative clearance  -     EKG 12-lead; Future      Follow-up in about 3 months (around 12/13/2018).      "

## 2018-09-17 ENCOUNTER — TELEPHONE (OUTPATIENT)
Dept: FAMILY MEDICINE | Facility: CLINIC | Age: 53
End: 2018-09-17

## 2018-09-17 NOTE — TELEPHONE ENCOUNTER
----- Message from Shabnam Emerson RN sent at 9/17/2018  8:22 AM CDT -----  Please have Dr. Vick review ua and micro ua. He may need to treat.Thanks!  Shabnam Emerson RN BSN  Preop Center

## 2018-09-17 NOTE — TELEPHONE ENCOUNTER
Spoke with patient.  Symptoms on 09/13 or so.  Took old cipro.  States she has not had any symptoms since.  States she is going out of town.  If you treat she will have to give us [facility] location [facility]

## 2018-09-18 ENCOUNTER — TELEPHONE (OUTPATIENT)
Dept: FAMILY MEDICINE | Facility: CLINIC | Age: 53
End: 2018-09-18

## 2018-09-18 NOTE — TELEPHONE ENCOUNTER
----- Message from Shabnam Emerson RN sent at 9/18/2018  8:00 AM CDT -----  I've reviewed your note for preop clearance. Can you please provide a statement of medical clearance for surgery?  Thanks!

## 2018-09-25 ENCOUNTER — TELEPHONE (OUTPATIENT)
Dept: FAMILY MEDICINE | Facility: CLINIC | Age: 53
End: 2018-09-25

## 2018-09-25 NOTE — TELEPHONE ENCOUNTER
Patient is not having symptoms.  I suggested culture in the urine with a new sample but she is out of town

## 2018-09-25 NOTE — TELEPHONE ENCOUNTER
----- Message from Kiley Calvo sent at 9/25/2018  1:17 PM CDT -----  Please call pt at [phone]  Pt was diagnosed with a urinary infection /Dr Vick has not called in any meds / she has surgery 1 week from today   [id number] [place] [place] - [address] AT [facility]  [address]  [place] [address]  Phone: [phone] Fax: [phone]

## 2018-10-01 ENCOUNTER — TELEPHONE (OUTPATIENT)
Dept: ORTHOPEDICS | Facility: CLINIC | Age: 53
End: 2018-10-01

## 2018-10-01 NOTE — TELEPHONE ENCOUNTER
Number left is not a valid number, so I contacted Tamera in preservice to see if there is something that we need to do ,as patients surgery is scheduled for tomorrow. Tamera advised that it is already flagged medically urgent, so the patient wont be held up due to authorization, but it does show that the case is currently with the medical director. My number was given to Tamrea in case she needs to get more information later today regarding this case.

## 2018-10-01 NOTE — TELEPHONE ENCOUNTER
----- Message from Emperatriz Quigley sent at 10/1/2018  9:03 AM CDT -----  Contact: EDITH Vaughn [[id number]]        What is the request in detail: Elizabeth calling to speak with April in regards to pt's authorization.. Please advise..      Can the clinic reply by [facility]: no      What Number to Call Back [facility]: [phone] ext [phone]

## 2018-10-02 ENCOUNTER — ANESTHESIA (OUTPATIENT)
Dept: SURGERY | Facility: HOSPITAL | Age: 53
DRG: 460 | End: 2018-10-02
Payer: COMMERCIAL

## 2018-10-02 ENCOUNTER — HOSPITAL ENCOUNTER (INPATIENT)
Facility: HOSPITAL | Age: 53
LOS: 5 days | Discharge: HOME HEALTH | DRG: 460 | End: 2018-10-07
Attending: ORTHOPAEDIC SURGERY | Admitting: ORTHOPAEDIC SURGERY
Payer: COMMERCIAL

## 2018-10-02 DIAGNOSIS — I10 ESSENTIAL HYPERTENSION: Primary | Chronic | ICD-10-CM

## 2018-10-02 DIAGNOSIS — S32.009K PSEUDOARTHROSIS OF LUMBAR SPINE: ICD-10-CM

## 2018-10-02 DIAGNOSIS — Z01.818 PREOPERATIVE TESTING: ICD-10-CM

## 2018-10-02 LAB
ABO + RH BLD: NORMAL
ALBUMIN SERPL BCP-MCNC: 3.1 G/DL
ALP SERPL-CCNC: 60 U/L
ALT SERPL W/O P-5'-P-CCNC: 22 U/L
ANION GAP SERPL CALC-SCNC: 5 MMOL/L
AST SERPL-CCNC: 33 U/L
B-HCG UR QL: NEGATIVE
BASOPHILS # BLD AUTO: 0 K/UL
BASOPHILS NFR BLD: 0 %
BILIRUB SERPL-MCNC: 0.3 MG/DL
BLD GP AB SCN CELLS X3 SERPL QL: NORMAL
BUN SERPL-MCNC: 14 MG/DL
CALCIUM SERPL-MCNC: 8.1 MG/DL
CHLORIDE SERPL-SCNC: 112 MMOL/L
CO2 SERPL-SCNC: 21 MMOL/L
CREAT SERPL-MCNC: 0.8 MG/DL
CTP QC/QA: YES
DIFFERENTIAL METHOD: ABNORMAL
EOSINOPHIL # BLD AUTO: 0 K/UL
EOSINOPHIL NFR BLD: 0 %
ERYTHROCYTE [DISTWIDTH] IN BLOOD BY AUTOMATED COUNT: 14.6 %
EST. GFR  (AFRICAN AMERICAN): >60 ML/MIN/1.73 M^2
EST. GFR  (NON AFRICAN AMERICAN): >60 ML/MIN/1.73 M^2
GLUCOSE SERPL-MCNC: 102 MG/DL (ref 70–110)
GLUCOSE SERPL-MCNC: 120 MG/DL
HCO3 UR-SCNC: 21.1 MMOL/L (ref 24–28)
HCT VFR BLD AUTO: 32.5 %
HCT VFR BLD CALC: 30 %PCV (ref 36–54)
HGB BLD-MCNC: 10.9 G/DL
IMM GRANULOCYTES # BLD AUTO: 0.05 K/UL
IMM GRANULOCYTES NFR BLD AUTO: 0.6 %
LYMPHOCYTES # BLD AUTO: 0.3 K/UL
LYMPHOCYTES NFR BLD: 3.8 %
MCH RBC QN AUTO: 29.1 PG
MCHC RBC AUTO-ENTMCNC: 33.5 G/DL
MCV RBC AUTO: 87 FL
MONOCYTES # BLD AUTO: 0.2 K/UL
MONOCYTES NFR BLD: 1.8 %
NEUTROPHILS # BLD AUTO: 7.9 K/UL
NEUTROPHILS NFR BLD: 93.8 %
NRBC BLD-RTO: 0 /100 WBC
PCO2 BLDA: 32.8 MMHG (ref 35–45)
PH SMN: 7.42 [PH] (ref 7.35–7.45)
PLATELET # BLD AUTO: 173 K/UL
PMV BLD AUTO: 11.7 FL
PO2 BLDA: 236 MMHG (ref 80–100)
POC BE: -3 MMOL/L
POC IONIZED CALCIUM: 1.13 MMOL/L (ref 1.06–1.42)
POC SATURATED O2: 100 % (ref 95–100)
POC TCO2: 22 MMOL/L (ref 23–27)
POTASSIUM BLD-SCNC: 3.5 MMOL/L (ref 3.5–5.1)
POTASSIUM SERPL-SCNC: 4 MMOL/L
PROT SERPL-MCNC: 5.6 G/DL
RBC # BLD AUTO: 3.75 M/UL
SAMPLE: ABNORMAL
SODIUM BLD-SCNC: 142 MMOL/L (ref 136–145)
SODIUM SERPL-SCNC: 138 MMOL/L
WBC # BLD AUTO: 8.4 K/UL

## 2018-10-02 PROCEDURE — 80053 COMPREHEN METABOLIC PANEL: CPT

## 2018-10-02 PROCEDURE — 0KXG0Z5 TRANSFER LEFT TRUNK MUSCLE, LATISSIMUS DORSI MYOCUTANEOUS FLAP, OPEN APPROACH: ICD-10-PCS | Performed by: SURGERY

## 2018-10-02 PROCEDURE — 27201423 OPTIME MED/SURG SUP & DEVICES STERILE SUPPLY: Performed by: ORTHOPAEDIC SURGERY

## 2018-10-02 PROCEDURE — 27100021 HC MULTIPORT INFUSION MANIFOLD: Performed by: NURSE ANESTHETIST, CERTIFIED REGISTERED

## 2018-10-02 PROCEDURE — 0QP304Z REMOVAL OF INTERNAL FIXATION DEVICE FROM LEFT PELVIC BONE, OPEN APPROACH: ICD-10-PCS | Performed by: ORTHOPAEDIC SURGERY

## 2018-10-02 PROCEDURE — 0SG30AJ FUSION OF LUMBOSACRAL JOINT WITH INTERBODY FUSION DEVICE, POSTERIOR APPROACH, ANTERIOR COLUMN, OPEN APPROACH: ICD-10-PCS | Performed by: ORTHOPAEDIC SURGERY

## 2018-10-02 PROCEDURE — 86922 COMPATIBILITY TEST ANTIGLOB: CPT

## 2018-10-02 PROCEDURE — 27100025 HC TUBING, SET FLUID WARMER: Performed by: NURSE ANESTHETIST, CERTIFIED REGISTERED

## 2018-10-02 PROCEDURE — 0JJT0ZZ INSPECTION OF TRUNK SUBCUTANEOUS TISSUE AND FASCIA, OPEN APPROACH: ICD-10-PCS | Performed by: SURGERY

## 2018-10-02 PROCEDURE — 20930 SP BONE ALGRFT MORSEL ADD-ON: CPT | Mod: ,,, | Performed by: ORTHOPAEDIC SURGERY

## 2018-10-02 PROCEDURE — 86901 BLOOD TYPING SEROLOGIC RH(D): CPT

## 2018-10-02 PROCEDURE — 85025 COMPLETE CBC W/AUTO DIFF WBC: CPT

## 2018-10-02 PROCEDURE — 27000221 HC OXYGEN, UP TO 24 HOURS

## 2018-10-02 PROCEDURE — 71000039 HC RECOVERY, EACH ADD'L HOUR: Performed by: ORTHOPAEDIC SURGERY

## 2018-10-02 PROCEDURE — 14301 TIS TRNFR ANY 30.1-60 SQ CM: CPT | Mod: 51,,, | Performed by: SURGERY

## 2018-10-02 PROCEDURE — 27201037 HC PRESSURE MONITORING SET UP

## 2018-10-02 PROCEDURE — 22633 ARTHRD CMBN 1NTRSPC LUMBAR: CPT | Mod: 62,,, | Performed by: ORTHOPAEDIC SURGERY

## 2018-10-02 PROCEDURE — 36000710: Performed by: ORTHOPAEDIC SURGERY

## 2018-10-02 PROCEDURE — 15002 WOUND PREP TRK/ARM/LEG: CPT | Mod: ,,, | Performed by: SURGERY

## 2018-10-02 PROCEDURE — 20936 SP BONE AGRFT LOCAL ADD-ON: CPT | Mod: ,,, | Performed by: ORTHOPAEDIC SURGERY

## 2018-10-02 PROCEDURE — 0QH304Z INSERTION OF INTERNAL FIXATION DEVICE INTO LEFT PELVIC BONE, OPEN APPROACH: ICD-10-PCS | Performed by: ORTHOPAEDIC SURGERY

## 2018-10-02 PROCEDURE — 22614 ARTHRD PST TQ 1NTRSPC EA ADD: CPT | Mod: 62,,, | Performed by: NEUROLOGICAL SURGERY

## 2018-10-02 PROCEDURE — 27800505 HC CATH, RADIAL ARTERY KIT: Performed by: NURSE ANESTHETIST, CERTIFIED REGISTERED

## 2018-10-02 PROCEDURE — 37000009 HC ANESTHESIA EA ADD 15 MINS: Performed by: ORTHOPAEDIC SURGERY

## 2018-10-02 PROCEDURE — 81025 URINE PREGNANCY TEST: CPT | Performed by: ORTHOPAEDIC SURGERY

## 2018-10-02 PROCEDURE — 61783 SCAN PROC SPINAL: CPT | Mod: ,,, | Performed by: ORTHOPAEDIC SURGERY

## 2018-10-02 PROCEDURE — 22614 ARTHRD PST TQ 1NTRSPC EA ADD: CPT | Mod: 62,,, | Performed by: ORTHOPAEDIC SURGERY

## 2018-10-02 PROCEDURE — C9290 INJ, BUPIVACAINE LIPOSOME: HCPCS | Performed by: ORTHOPAEDIC SURGERY

## 2018-10-02 PROCEDURE — 63600175 PHARM REV CODE 636 W HCPCS: Performed by: STUDENT IN AN ORGANIZED HEALTH CARE EDUCATION/TRAINING PROGRAM

## 2018-10-02 PROCEDURE — 0KXF0Z5 TRANSFER RIGHT TRUNK MUSCLE, LATISSIMUS DORSI MYOCUTANEOUS FLAP, OPEN APPROACH: ICD-10-PCS | Performed by: SURGERY

## 2018-10-02 PROCEDURE — 25000003 PHARM REV CODE 250: Performed by: NURSE ANESTHETIST, CERTIFIED REGISTERED

## 2018-10-02 PROCEDURE — 0SB40ZZ EXCISION OF LUMBOSACRAL DISC, OPEN APPROACH: ICD-10-PCS | Performed by: ORTHOPAEDIC SURGERY

## 2018-10-02 PROCEDURE — 0QH004Z INSERTION OF INTERNAL FIXATION DEVICE INTO LUMBAR VERTEBRA, OPEN APPROACH: ICD-10-PCS | Performed by: ORTHOPAEDIC SURGERY

## 2018-10-02 PROCEDURE — 25000003 PHARM REV CODE 250: Performed by: STUDENT IN AN ORGANIZED HEALTH CARE EDUCATION/TRAINING PROGRAM

## 2018-10-02 PROCEDURE — D9220A PRA ANESTHESIA: Mod: CRNA,,, | Performed by: NURSE ANESTHETIST, CERTIFIED REGISTERED

## 2018-10-02 PROCEDURE — 15734 MUSCLE-SKIN GRAFT TRUNK: CPT | Mod: ,,, | Performed by: SURGERY

## 2018-10-02 PROCEDURE — 25000003 PHARM REV CODE 250: Performed by: ORTHOPAEDIC SURGERY

## 2018-10-02 PROCEDURE — 22853 INSJ BIOMECHANICAL DEVICE: CPT | Mod: 59,,, | Performed by: ORTHOPAEDIC SURGERY

## 2018-10-02 PROCEDURE — 63600175 PHARM REV CODE 636 W HCPCS: Performed by: ORTHOPAEDIC SURGERY

## 2018-10-02 PROCEDURE — 27800903 OPTIME MED/SURG SUP & DEVICES OTHER IMPLANTS: Performed by: ORTHOPAEDIC SURGERY

## 2018-10-02 PROCEDURE — 22849 REINSERT SPINAL FIXATION: CPT | Mod: ,,, | Performed by: ORTHOPAEDIC SURGERY

## 2018-10-02 PROCEDURE — D9220A PRA ANESTHESIA: Mod: ANES,,, | Performed by: ANESTHESIOLOGY

## 2018-10-02 PROCEDURE — 94761 N-INVAS EAR/PLS OXIMETRY MLT: CPT

## 2018-10-02 PROCEDURE — S0028 INJECTION, FAMOTIDINE, 20 MG: HCPCS | Performed by: NURSE ANESTHETIST, CERTIFIED REGISTERED

## 2018-10-02 PROCEDURE — 14302 TIS TRNFR ADDL 30 SQ CM: CPT | Mod: ,,, | Performed by: SURGERY

## 2018-10-02 PROCEDURE — 0QP004Z REMOVAL OF INTERNAL FIXATION DEVICE FROM LUMBAR VERTEBRA, OPEN APPROACH: ICD-10-PCS | Performed by: ORTHOPAEDIC SURGERY

## 2018-10-02 PROCEDURE — 22633 ARTHRD CMBN 1NTRSPC LUMBAR: CPT | Mod: 62,,, | Performed by: NEUROLOGICAL SURGERY

## 2018-10-02 PROCEDURE — C1713 ANCHOR/SCREW BN/BN,TIS/BN: HCPCS | Performed by: ORTHOPAEDIC SURGERY

## 2018-10-02 PROCEDURE — C1729 CATH, DRAINAGE: HCPCS | Performed by: ORTHOPAEDIC SURGERY

## 2018-10-02 PROCEDURE — 20600001 HC STEP DOWN PRIVATE ROOM

## 2018-10-02 PROCEDURE — 63600175 PHARM REV CODE 636 W HCPCS: Performed by: NURSE ANESTHETIST, CERTIFIED REGISTERED

## 2018-10-02 PROCEDURE — 15003 WOUND PREP ADDL 100 CM: CPT | Mod: ,,, | Performed by: SURGERY

## 2018-10-02 PROCEDURE — 0QB20ZZ EXCISION OF RIGHT PELVIC BONE, OPEN APPROACH: ICD-10-PCS | Performed by: ORTHOPAEDIC SURGERY

## 2018-10-02 PROCEDURE — 36620 INSERTION CATHETER ARTERY: CPT | Mod: 59,,, | Performed by: ANESTHESIOLOGY

## 2018-10-02 PROCEDURE — 37000008 HC ANESTHESIA 1ST 15 MINUTES: Performed by: ORTHOPAEDIC SURGERY

## 2018-10-02 PROCEDURE — 71000033 HC RECOVERY, INTIAL HOUR: Performed by: ORTHOPAEDIC SURGERY

## 2018-10-02 PROCEDURE — 36000711: Performed by: ORTHOPAEDIC SURGERY

## 2018-10-02 DEVICE — MATRIX BONE CELLR VIVIGEN 1CC: Type: IMPLANTABLE DEVICE | Site: SPINE THORACIC | Status: FUNCTIONAL

## 2018-10-02 DEVICE — IMPLANTABLE DEVICE: Type: IMPLANTABLE DEVICE | Site: SPINE THORACIC | Status: FUNCTIONAL

## 2018-10-02 DEVICE — SCREW BONE SPINAL CORICAL 6X40: Type: IMPLANTABLE DEVICE | Site: SPINE THORACIC | Status: FUNCTIONAL

## 2018-10-02 DEVICE — SCREW INNER SINGLE SET TITANIU: Type: IMPLANTABLE DEVICE | Site: SPINE THORACIC | Status: FUNCTIONAL

## 2018-10-02 DEVICE — CONNECTOR ILIAC 5.5 X 20MM: Type: IMPLANTABLE DEVICE | Site: SPINE THORACIC | Status: FUNCTIONAL

## 2018-10-02 DEVICE — SCREW BONE SPINAL CORICAL 7X40: Type: IMPLANTABLE DEVICE | Site: SPINE THORACIC | Status: FUNCTIONAL

## 2018-10-02 DEVICE — KIT BONE GRAFT INFUSE LG 8MM: Type: IMPLANTABLE DEVICE | Site: SPINE THORACIC | Status: FUNCTIONAL

## 2018-10-02 DEVICE — SCREW SPINAL 5.5 8 X 80MM: Type: IMPLANTABLE DEVICE | Site: SPINE THORACIC | Status: FUNCTIONAL

## 2018-10-02 RX ORDER — LIDOCAINE HCL/PF 100 MG/5ML
SYRINGE (ML) INTRAVENOUS
Status: DISCONTINUED | OUTPATIENT
Start: 2018-10-02 | End: 2018-10-02

## 2018-10-02 RX ORDER — HYDROMORPHONE HYDROCHLORIDE 1 MG/ML
0.2 INJECTION, SOLUTION INTRAMUSCULAR; INTRAVENOUS; SUBCUTANEOUS EVERY 5 MIN PRN
Status: DISCONTINUED | OUTPATIENT
Start: 2018-10-02 | End: 2018-10-02 | Stop reason: HOSPADM

## 2018-10-02 RX ORDER — POLYETHYLENE GLYCOL 3350 17 G/17G
17 POWDER, FOR SOLUTION ORAL DAILY
Status: DISCONTINUED | OUTPATIENT
Start: 2018-10-02 | End: 2018-10-07 | Stop reason: HOSPADM

## 2018-10-02 RX ORDER — ACETAMINOPHEN 10 MG/ML
INJECTION, SOLUTION INTRAVENOUS
Status: DISCONTINUED | OUTPATIENT
Start: 2018-10-02 | End: 2018-10-02

## 2018-10-02 RX ORDER — PHENYLEPHRINE HYDROCHLORIDE 10 MG/ML
INJECTION INTRAVENOUS
Status: DISCONTINUED | OUTPATIENT
Start: 2018-10-02 | End: 2018-10-02

## 2018-10-02 RX ORDER — HYDROMORPHONE HYDROCHLORIDE 1 MG/ML
INJECTION, SOLUTION INTRAMUSCULAR; INTRAVENOUS; SUBCUTANEOUS
Status: DISCONTINUED
Start: 2018-10-02 | End: 2018-10-02 | Stop reason: WASHOUT

## 2018-10-02 RX ORDER — PROPOFOL 10 MG/ML
VIAL (ML) INTRAVENOUS CONTINUOUS PRN
Status: DISCONTINUED | OUTPATIENT
Start: 2018-10-02 | End: 2018-10-02

## 2018-10-02 RX ORDER — SODIUM CHLORIDE 9 MG/ML
INJECTION, SOLUTION INTRAVENOUS CONTINUOUS
Status: DISCONTINUED | OUTPATIENT
Start: 2018-10-02 | End: 2018-10-02

## 2018-10-02 RX ORDER — BACITRACIN 50000 [IU]/1
INJECTION, POWDER, FOR SOLUTION INTRAMUSCULAR
Status: DISCONTINUED | OUTPATIENT
Start: 2018-10-02 | End: 2018-10-02 | Stop reason: HOSPADM

## 2018-10-02 RX ORDER — ACETAMINOPHEN 10 MG/ML
1000 INJECTION, SOLUTION INTRAVENOUS EVERY 8 HOURS
Status: COMPLETED | OUTPATIENT
Start: 2018-10-02 | End: 2018-10-03

## 2018-10-02 RX ORDER — FENTANYL CITRATE 50 UG/ML
INJECTION, SOLUTION INTRAMUSCULAR; INTRAVENOUS
Status: DISCONTINUED | OUTPATIENT
Start: 2018-10-02 | End: 2018-10-02

## 2018-10-02 RX ORDER — BISACODYL 10 MG
10 SUPPOSITORY, RECTAL RECTAL DAILY PRN
Status: DISCONTINUED | OUTPATIENT
Start: 2018-10-02 | End: 2018-10-07 | Stop reason: HOSPADM

## 2018-10-02 RX ORDER — MULTIVIT WITH MINERALS/HERBS
1 TABLET ORAL DAILY
COMMUNITY

## 2018-10-02 RX ORDER — HEPARIN SODIUM 5000 [USP'U]/ML
5000 INJECTION, SOLUTION INTRAVENOUS; SUBCUTANEOUS EVERY 8 HOURS
Status: DISCONTINUED | OUTPATIENT
Start: 2018-10-03 | End: 2018-10-07 | Stop reason: HOSPADM

## 2018-10-02 RX ORDER — DIPHENHYDRAMINE HCL 25 MG
25 CAPSULE ORAL EVERY 6 HOURS PRN
Status: DISCONTINUED | OUTPATIENT
Start: 2018-10-02 | End: 2018-10-03

## 2018-10-02 RX ORDER — LISINOPRIL 20 MG/1
20 TABLET ORAL DAILY
Status: DISCONTINUED | OUTPATIENT
Start: 2018-10-02 | End: 2018-10-07 | Stop reason: HOSPADM

## 2018-10-02 RX ORDER — KETAMINE HYDROCHLORIDE 10 MG/ML
INJECTION, SOLUTION INTRAMUSCULAR; INTRAVENOUS
Status: DISCONTINUED | OUTPATIENT
Start: 2018-10-02 | End: 2018-10-02

## 2018-10-02 RX ORDER — MUPIROCIN 20 MG/G
OINTMENT TOPICAL
Status: DISCONTINUED | OUTPATIENT
Start: 2018-10-02 | End: 2018-10-02

## 2018-10-02 RX ORDER — FAMOTIDINE 10 MG/ML
INJECTION INTRAVENOUS
Status: DISCONTINUED | OUTPATIENT
Start: 2018-10-02 | End: 2018-10-02

## 2018-10-02 RX ORDER — PROPOFOL 10 MG/ML
VIAL (ML) INTRAVENOUS
Status: DISCONTINUED | OUTPATIENT
Start: 2018-10-02 | End: 2018-10-02

## 2018-10-02 RX ORDER — BUPIVACAINE HYDROCHLORIDE AND EPINEPHRINE 5; 5 MG/ML; UG/ML
INJECTION, SOLUTION EPIDURAL; INTRACAUDAL; PERINEURAL
Status: DISCONTINUED | OUTPATIENT
Start: 2018-10-02 | End: 2018-10-02

## 2018-10-02 RX ORDER — MIDAZOLAM HYDROCHLORIDE 1 MG/ML
INJECTION, SOLUTION INTRAMUSCULAR; INTRAVENOUS
Status: DISCONTINUED | OUTPATIENT
Start: 2018-10-02 | End: 2018-10-02

## 2018-10-02 RX ORDER — CEFAZOLIN SODIUM 1 G/3ML
INJECTION, POWDER, FOR SOLUTION INTRAMUSCULAR; INTRAVENOUS
Status: DISCONTINUED | OUTPATIENT
Start: 2018-10-02 | End: 2018-10-02

## 2018-10-02 RX ORDER — REMIFENTANIL HYDROCHLORIDE 1 MG/ML
INJECTION, POWDER, LYOPHILIZED, FOR SOLUTION INTRAVENOUS CONTINUOUS PRN
Status: DISCONTINUED | OUTPATIENT
Start: 2018-10-02 | End: 2018-10-02

## 2018-10-02 RX ORDER — SUCCINYLCHOLINE CHLORIDE 20 MG/ML
INJECTION INTRAMUSCULAR; INTRAVENOUS
Status: DISCONTINUED | OUTPATIENT
Start: 2018-10-02 | End: 2018-10-02

## 2018-10-02 RX ORDER — CEFAZOLIN SODIUM 1 G/3ML
2 INJECTION, POWDER, FOR SOLUTION INTRAMUSCULAR; INTRAVENOUS
Status: COMPLETED | OUTPATIENT
Start: 2018-10-02 | End: 2018-10-03

## 2018-10-02 RX ORDER — DEXAMETHASONE SODIUM PHOSPHATE 4 MG/ML
INJECTION, SOLUTION INTRA-ARTICULAR; INTRALESIONAL; INTRAMUSCULAR; INTRAVENOUS; SOFT TISSUE
Status: DISCONTINUED | OUTPATIENT
Start: 2018-10-02 | End: 2018-10-02

## 2018-10-02 RX ORDER — HYDROMORPHONE HCL IN 0.9% NACL 6 MG/30 ML
PATIENT CONTROLLED ANALGESIA SYRINGE INTRAVENOUS CONTINUOUS
Status: DISCONTINUED | OUTPATIENT
Start: 2018-10-02 | End: 2018-10-04

## 2018-10-02 RX ORDER — ONDANSETRON 2 MG/ML
INJECTION INTRAMUSCULAR; INTRAVENOUS
Status: DISCONTINUED | OUTPATIENT
Start: 2018-10-02 | End: 2018-10-02

## 2018-10-02 RX ORDER — LIDOCAINE HYDROCHLORIDE AND EPINEPHRINE 10; 10 MG/ML; UG/ML
INJECTION, SOLUTION INFILTRATION; PERINEURAL
Status: DISCONTINUED | OUTPATIENT
Start: 2018-10-02 | End: 2018-10-02 | Stop reason: HOSPADM

## 2018-10-02 RX ORDER — GLYCOPYRROLATE 0.2 MG/ML
INJECTION INTRAMUSCULAR; INTRAVENOUS
Status: DISCONTINUED | OUTPATIENT
Start: 2018-10-02 | End: 2018-10-02

## 2018-10-02 RX ORDER — ROCURONIUM BROMIDE 10 MG/ML
INJECTION, SOLUTION INTRAVENOUS
Status: DISCONTINUED | OUTPATIENT
Start: 2018-10-02 | End: 2018-10-02

## 2018-10-02 RX ORDER — NALOXONE HCL 0.4 MG/ML
0.02 VIAL (ML) INJECTION
Status: DISCONTINUED | OUTPATIENT
Start: 2018-10-02 | End: 2018-10-05

## 2018-10-02 RX ORDER — LACTULOSE 10 G/15ML
20 SOLUTION ORAL EVERY 6 HOURS PRN
Status: DISCONTINUED | OUTPATIENT
Start: 2018-10-02 | End: 2018-10-07 | Stop reason: HOSPADM

## 2018-10-02 RX ORDER — SODIUM CHLORIDE 0.9 % (FLUSH) 0.9 %
3 SYRINGE (ML) INJECTION
Status: DISCONTINUED | OUTPATIENT
Start: 2018-10-02 | End: 2018-10-02 | Stop reason: HOSPADM

## 2018-10-02 RX ORDER — METHOCARBAMOL 750 MG/1
750 TABLET, FILM COATED ORAL 3 TIMES DAILY
Status: DISCONTINUED | OUTPATIENT
Start: 2018-10-02 | End: 2018-10-07 | Stop reason: HOSPADM

## 2018-10-02 RX ORDER — FAMOTIDINE 20 MG/1
20 TABLET, FILM COATED ORAL 2 TIMES DAILY
Status: DISCONTINUED | OUTPATIENT
Start: 2018-10-02 | End: 2018-10-07 | Stop reason: HOSPADM

## 2018-10-02 RX ORDER — SODIUM CHLORIDE 0.9 % (FLUSH) 0.9 %
5 SYRINGE (ML) INJECTION
Status: DISCONTINUED | OUTPATIENT
Start: 2018-10-02 | End: 2018-10-07 | Stop reason: HOSPADM

## 2018-10-02 RX ORDER — VANCOMYCIN HYDROCHLORIDE 1 G/20ML
INJECTION, POWDER, LYOPHILIZED, FOR SOLUTION INTRAVENOUS
Status: DISCONTINUED | OUTPATIENT
Start: 2018-10-02 | End: 2018-10-02 | Stop reason: HOSPADM

## 2018-10-02 RX ORDER — SODIUM CHLORIDE 9 MG/ML
INJECTION, SOLUTION INTRAVENOUS CONTINUOUS
Status: ACTIVE | OUTPATIENT
Start: 2018-10-02 | End: 2018-10-02

## 2018-10-02 RX ORDER — ONDANSETRON 2 MG/ML
4 INJECTION INTRAMUSCULAR; INTRAVENOUS EVERY 8 HOURS PRN
Status: DISCONTINUED | OUTPATIENT
Start: 2018-10-02 | End: 2018-10-03

## 2018-10-02 RX ORDER — RAMELTEON 8 MG/1
8 TABLET ORAL NIGHTLY PRN
Status: DISCONTINUED | OUTPATIENT
Start: 2018-10-02 | End: 2018-10-07 | Stop reason: HOSPADM

## 2018-10-02 RX ADMIN — ROCURONIUM BROMIDE 10 MG: 10 INJECTION, SOLUTION INTRAVENOUS at 09:10

## 2018-10-02 RX ADMIN — FAMOTIDINE 20 MG: 10 INJECTION, SOLUTION INTRAVENOUS at 08:10

## 2018-10-02 RX ADMIN — SODIUM CHLORIDE: 0.9 INJECTION, SOLUTION INTRAVENOUS at 06:10

## 2018-10-02 RX ADMIN — LIDOCAINE HYDROCHLORIDE 75 MG: 20 INJECTION, SOLUTION INTRAVENOUS at 07:10

## 2018-10-02 RX ADMIN — GLYCOPYRROLATE 0.2 MG: 0.2 INJECTION, SOLUTION INTRAMUSCULAR; INTRAVENOUS at 01:10

## 2018-10-02 RX ADMIN — PROPOFOL 150 MCG/KG/MIN: 10 INJECTION, EMULSION INTRAVENOUS at 07:10

## 2018-10-02 RX ADMIN — GLYCOPYRROLATE 0.1 MG: 0.2 INJECTION, SOLUTION INTRAMUSCULAR; INTRAVENOUS at 06:10

## 2018-10-02 RX ADMIN — DIPHENHYDRAMINE HYDROCHLORIDE 25 MG: 25 CAPSULE ORAL at 04:10

## 2018-10-02 RX ADMIN — METHOCARBAMOL 750 MG: 750 TABLET ORAL at 03:10

## 2018-10-02 RX ADMIN — KETAMINE HYDROCHLORIDE 20 MG: 10 INJECTION, SOLUTION INTRAMUSCULAR; INTRAVENOUS at 10:10

## 2018-10-02 RX ADMIN — KETAMINE HYDROCHLORIDE 20 MG: 10 INJECTION, SOLUTION INTRAMUSCULAR; INTRAVENOUS at 08:10

## 2018-10-02 RX ADMIN — LISINOPRIL 20 MG: 20 TABLET ORAL at 03:10

## 2018-10-02 RX ADMIN — SODIUM CHLORIDE 0.75 MCG: 9 INJECTION, SOLUTION INTRAVENOUS at 01:10

## 2018-10-02 RX ADMIN — MUPIROCIN: 20 OINTMENT TOPICAL at 06:10

## 2018-10-02 RX ADMIN — DIPHENHYDRAMINE HYDROCHLORIDE 25 MG: 25 CAPSULE ORAL at 10:10

## 2018-10-02 RX ADMIN — KETAMINE HYDROCHLORIDE 20 MG: 10 INJECTION, SOLUTION INTRAMUSCULAR; INTRAVENOUS at 01:10

## 2018-10-02 RX ADMIN — SODIUM CHLORIDE, SODIUM GLUCONATE, SODIUM ACETATE, POTASSIUM CHLORIDE, MAGNESIUM CHLORIDE, SODIUM PHOSPHATE, DIBASIC, AND POTASSIUM PHOSPHATE: .53; .5; .37; .037; .03; .012; .00082 INJECTION, SOLUTION INTRAVENOUS at 08:10

## 2018-10-02 RX ADMIN — METHOCARBAMOL 750 MG: 750 TABLET ORAL at 09:10

## 2018-10-02 RX ADMIN — KETAMINE HYDROCHLORIDE 10 MG: 10 INJECTION, SOLUTION INTRAMUSCULAR; INTRAVENOUS at 09:10

## 2018-10-02 RX ADMIN — KETAMINE HYDROCHLORIDE 30 MG: 10 INJECTION, SOLUTION INTRAMUSCULAR; INTRAVENOUS at 07:10

## 2018-10-02 RX ADMIN — KETAMINE HYDROCHLORIDE 10 MG: 10 INJECTION, SOLUTION INTRAMUSCULAR; INTRAVENOUS at 11:10

## 2018-10-02 RX ADMIN — CEFAZOLIN 2 G: 1 INJECTION, POWDER, FOR SOLUTION INTRAMUSCULAR; INTRAVENOUS at 08:10

## 2018-10-02 RX ADMIN — Medication: at 05:10

## 2018-10-02 RX ADMIN — CEFAZOLIN 2 G: 330 INJECTION, POWDER, FOR SOLUTION INTRAMUSCULAR; INTRAVENOUS at 12:10

## 2018-10-02 RX ADMIN — ROCURONIUM BROMIDE 30 MG: 10 INJECTION, SOLUTION INTRAVENOUS at 09:10

## 2018-10-02 RX ADMIN — SODIUM CHLORIDE, SODIUM GLUCONATE, SODIUM ACETATE, POTASSIUM CHLORIDE, MAGNESIUM CHLORIDE, SODIUM PHOSPHATE, DIBASIC, AND POTASSIUM PHOSPHATE: .53; .5; .37; .037; .03; .012; .00082 INJECTION, SOLUTION INTRAVENOUS at 01:10

## 2018-10-02 RX ADMIN — PROPOFOL 100 MG: 10 INJECTION, EMULSION INTRAVENOUS at 07:10

## 2018-10-02 RX ADMIN — DEXAMETHASONE SODIUM PHOSPHATE 4 MG: 4 INJECTION, SOLUTION INTRAMUSCULAR; INTRAVENOUS at 08:10

## 2018-10-02 RX ADMIN — KETAMINE HYDROCHLORIDE 10 MG: 10 INJECTION, SOLUTION INTRAMUSCULAR; INTRAVENOUS at 01:10

## 2018-10-02 RX ADMIN — Medication: at 02:10

## 2018-10-02 RX ADMIN — ACETAMINOPHEN 1000 MG: 10 INJECTION, SOLUTION INTRAVENOUS at 08:10

## 2018-10-02 RX ADMIN — POLYETHYLENE GLYCOL 3350 17 G: 17 POWDER, FOR SOLUTION ORAL at 03:10

## 2018-10-02 RX ADMIN — SUCCINYLCHOLINE CHLORIDE 80 MG: 20 INJECTION, SOLUTION INTRAMUSCULAR; INTRAVENOUS at 07:10

## 2018-10-02 RX ADMIN — ONDANSETRON 4 MG: 2 INJECTION INTRAMUSCULAR; INTRAVENOUS at 01:10

## 2018-10-02 RX ADMIN — MIDAZOLAM HYDROCHLORIDE 2 MG: 1 INJECTION, SOLUTION INTRAMUSCULAR; INTRAVENOUS at 06:10

## 2018-10-02 RX ADMIN — PHENYLEPHRINE HYDROCHLORIDE 150 MCG: 10 INJECTION INTRAVENOUS at 01:10

## 2018-10-02 RX ADMIN — SODIUM CHLORIDE 0.25 MCG: 9 INJECTION, SOLUTION INTRAVENOUS at 01:10

## 2018-10-02 RX ADMIN — CEFAZOLIN 2 G: 330 INJECTION, POWDER, FOR SOLUTION INTRAMUSCULAR; INTRAVENOUS at 08:10

## 2018-10-02 RX ADMIN — FAMOTIDINE 20 MG: 20 TABLET ORAL at 09:10

## 2018-10-02 RX ADMIN — KETAMINE HYDROCHLORIDE 10 MG: 10 INJECTION, SOLUTION INTRAMUSCULAR; INTRAVENOUS at 12:10

## 2018-10-02 RX ADMIN — ACETAMINOPHEN 1000 MG: 10 INJECTION, SOLUTION INTRAVENOUS at 03:10

## 2018-10-02 RX ADMIN — SODIUM CHLORIDE: 0.9 INJECTION, SOLUTION INTRAVENOUS at 02:10

## 2018-10-02 RX ADMIN — FENTANYL CITRATE 100 MCG: 50 INJECTION, SOLUTION INTRAMUSCULAR; INTRAVENOUS at 07:10

## 2018-10-02 RX ADMIN — Medication 0.07 MCG/KG/MIN: at 07:10

## 2018-10-02 NOTE — ANESTHESIA PROCEDURE NOTES
Arterial    Diagnosis: arthrosis    Patient location during procedure: done in OR  Procedure start time: 10/2/2018 7:23 AM  Timeout: 10/2/2018 7:23 AM  Procedure end time: 10/2/2018 7:25 AM  Staffing  Anesthesiologist: Zbigniew Chanel MD  Performed: anesthesiologist   Anesthesiologist was present at the time of the procedure.  Preanesthetic Checklist  Completed: patient identified, site marked, surgical consent, pre-op evaluation, timeout performed, IV checked, risks and benefits discussed, monitors and equipment checked and anesthesia consent givenArterial  Skin Prep: chlorhexidine gluconate  Local Infiltration: none  Orientation: left  Location: radial  Catheter Size: 20 G  Catheter placement by Anatomical landmarks. Heme positive aspiration all ports.Insertion Attempts: 1

## 2018-10-02 NOTE — NURSING TRANSFER
Nursing Transfer Note      10/2/2018     Transfer To: 1003a    Transfer via bed    Transfer with cardiac monitoring    Transported by pca and float rn    Medicines sent: ivf,pca    Chart send with patient: Yes    Notified: spouse did not answer phone,  Message sent via [facility] messaging    Patient reassessed at: 10/2/2018

## 2018-10-02 NOTE — OP NOTE
DATE OF PROCEDURE:  10/02/2018    SURGEON:  Rocky Herrmann M.D.    CO-SURGEON:  Ahmet Samano M.D. of the Neurosurgery Service.    PREOPERATIVE DIAGNOSES:  Lumbosacral pseudoarthrosis status post T11 to pelvis   posterior spinal fusion.  History of L2 ependymoma status post corpectomy.    POSTOPERATIVE DIAGNOSES:  Lumbosacral pseudoarthrosis status post T11 to pelvis   posterior spinal fusion.  History of L2 ependymoma status post corpectomy.    PROCEDURES PERFORMED:  1.  Revision posterior spinal fusion, L2 to pelvis.  2.  Removal and reinsertion of posterior segmental instrumentation.  3.  Removal of pelvic instrumentation.  4.  Revision pelvic fixation with S2AI screws.  5.  Posterior lumbar interbody fusion, L5 to S1.  6.  Application of titanium intervertebral spacer device to L5 to S1 disk   defect.  7.  Local bone graft and Infuse large bone graft.  8.  Use of intraoperative CT-guided navigation.    ANESTHESIA:  General endotracheal anesthesia.    ESTIMATED BLOOD LOSS:  350 mL.    IMPLANTS:  DePuy Expedium and a size 12 titanium Concorde cage at the L5 to S1   level.    SPONGE AND NEEDLE COUNT:  Correct x2.    FINDINGS:  Dorsal durotomy at the L5 to S1 interspace, status post primary   watertight repair.    DRAINS:  Per Plastic Surgery.    NEUROLOGICAL MONITORING NOTES:  Motor evoked potentials, somatosensory evoked   potentials and free-run EMG as well as EMG stimulation of lumbar screws.  The   patient had absent right lower extremity motor potentials from pre-incisional   baseline throughout the entire procedure.  There was no significant EMG   activity.  All new screws stimulated are greater than 20 milliamperes with the   exception of the left L5 screw which stimulated at 10 milliamperes.  The tract   was palpated and found to be acceptable.    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  Recently, she has developed new onset back pain with   broken left-sided layton as well as loose iliac bolt and is here for revision   surgery today.    DESCRIPTION OF INFORMED CONSENT: I had a  discussion with the patient and her  regarding the planned procedure. We specifically discussed the risks, benefits, and alternatives to surgery. We discussed the surgical procedure including the skin incision, discectomy and nerve decompression: they understand the risks include but are not limited to bleeding, infection, damage to arteries, veins and nerves, re-herniation, death, paralysis, blindness, spinal fluid leak, continued or worsening pain, the possible need for more surgery in the future as well as the possibility other unforseen and unknown complications. We talked about expected hospital stay and recovery period. All questions were answered; they understand and wish to proceed.       DESCRIPTION OF PROCEDURE:  The patient was met in the preoperative area where   midline back was marked as the operative site.  All final questions were   answered.  Subsequently, she was brought to the Operating Room where general   endotracheal anesthesia was induced.  A Carpio catheter was placed in standard   sterile fashion.  The patient was then flipped prone onto a spinal navigation   table.  Head was secured on a facial pillow and the arms were held in 90/90   position.  All bony prominences were padded and then personally checked by me,   paying special attention to the eyes, nose, mouth, axilla, elbows, hands, chest,   hips, genitalia, knees and feet.  Being satisfied with positioning and   confirmed this to be adequate with Anesthesia, the patient's lower back was   prepped and draped in normal sterile fashion.    A full timeout was then closed identifying the patient, the procedural site and   levels, the availability of all instruments and implants, no specific nursing,   surgical,  anesthetic or neurological monitoring concerns.  Finding that it was   safe to use proceed with surgery, the patient was given weight appropriate dose   of Ancef and I infiltrated the planned incision with 10 mL of 1% lidocaine with   epinephrine.  Next, I made a midline lumbar skin incision and performed a   revision exposure of the patient's hardware essentially from the level of the   crosslink down.  Great care was taken of the patient's known wide central   laminectomy.  We then found the left-sided layton to be broken at the L5 level   consistent with preoperative imaging.  Additionally, we dissected out both   bilaterally.  We then removed the iliac bolt set plugs and all lumbar set plugs   and cut the layton at the L2 level.  Next, we removed all indwelling screws in L5   and the pelvis.  The wound was then thoroughly irrigated.  At this point, we   placed a navigation array through a small incision over the right iliac crest   and performed our navigation spin.  We then using CT-guided navigation placed   bilateral L5 and S1 as well as S2AI screws in a standard fashion.  These were   confirmed to be in adequate position with both AP and lateral intraoperative   radiographs.  The wound was then again irrigated.  Next, began our lumbar-5   laminectomy to facilitate interbody fusion.  The lamina was thinned with a   high-speed drill.  Please note a dorsal durotomy was encountered and closed with   primary repair.  We then identified the exiting L5 nerve root in the lateral   recess and followed the thecal sac distally to the L5 to S1 interspace.  We then   performed a subtotal L5 to S1 diskectomy in a standard fashion.  The disk space   was then thoroughly irrigated.  The patient's local bone graft was packed into   the anterior aspect of the disk space and the cage itself was packed with   autograft.  The cage was then inserted in standard fashion again confirmed to be   in adequate position on AP and lateral  radiographs.  At this point, rods were   measured, cut, contoured and locked into place with  provided set   plugs and torque wrench using a bar connector for the rods proximally.  All   bony surfaces from L2 to the sacrum were decorticated with a high-speed drill   and a large Infuse bone graft mixed with the patient's local bone graft and 1   gram of vancomycin powder was laid dorsally from L2 to the sacrum.  Evicel was   then used to supplement the durotomy repair and the wound was turned over to the   Plastic Surgery team for closure.    JUSTIFICATION OF MARIANNA, 22 MODIFIER:  Plastic Surgery closure:  This is a   complex revision operation.  The patient is with a history of multiple prior   spinal operations as well as radiation.  All aspects of this patient's care more   difficult from preoperative decision making to intraoperative dissection,   revision, instrumentation, laminectomy and interbody fusion.  The combined   efforts of two attending surgeons indicated to help expedite surgery, decrease   blood loss and improve outcomes.

## 2018-10-02 NOTE — TRANSFER OF CARE
"Anesthesia Transfer of Care Note    Patient: Genia Davis    Procedure(s) Performed: Procedure(s) (LRB):  FUSION, SPINE, LUMBAR, TLIF, POSTERIOR APPROACH, USING PEDICLE SCREW T10-Pelvis Revision  (N/A)  CREATION, FLAP, MUSCLE ROTATION (N/A)    Patient location: PACU    Anesthesia Type: general    Transport from OR: Transported from OR on 6-10 L/min O2 by face mask with adequate spontaneous ventilation    Post pain: adequate analgesia    Post assessment: no apparent anesthetic complications    Post vital signs: stable    Level of consciousness: responds to stimulation and sedated    Nausea/Vomiting: no nausea/vomiting    Complications: none    Transfer of care protocol was followed      Last vitals:   Visit Vitals  BP (!) 159/75 (BP Location: Right arm, Patient Position: Lying)   Pulse 100   Temp 37.2 °C (99 °F) (Axillary)   Resp 20   Ht 5' 5" (1.651 m)   Wt 72.6 kg (160 lb)   LMP 08/02/2018   SpO2 99%   Breastfeeding? No   BMI 26.63 kg/m²     "

## 2018-10-02 NOTE — H&P
DATE: 10/2/2018  PATIENT: Genia Davis    Attending Physician: Rocky Herrmann M.D.    CHIEF COMPLAINT: preop    HISTORY:  Genia Davis is a 52 y.o. female here for revision spinal fusion.    PAST MEDICAL/SURGICAL HISTORY:  Past Medical History:   Diagnosis Date    Cancer     tumor on back, was removed    Encounter for blood transfusion      Past Surgical History:   Procedure Laterality Date    BACK SURGERY      x 5    CHOLECYSTECTOMY      CLOSURE - back N/A 11/19/2015    Performed by [facility] Cox [facility] at [facility] OR 2ND FLR    FLAP-MUSCLE (ROTATION) - bilateral latissimus dorsi N/A 11/19/2015    Performed by [facility]mel Cox [facility] at [facility] OR [facility]    FUSION-SPINAL - T11-Pelvis Posterior Spinal Fusion/Revision Exploration Spinal Fusion Posterior Instrumentation T11-Pelvis N/A 11/19/2015    Performed by Rocky Herrmann MD at [facility] OR [facility]    INJECTION-STEROID-EPIDURAL-TRANSFORAMINAL Left 11/16/2016    Performed by Kevan Bah MD at [facility]    INJECTION-STEROID-EPIDURAL-TRANSFORAMINAL Left 7/6/2016    Performed by [facility]evan Bah MD at [facility]    MYELOGRAM-THORACIC AND LUMBAR N/A 6/20/2016    [facility]    Revision of hardware T10-Pelvis N/A 2/12/2016    Performed by Rocky Herrmann MD at [facility] OR [facility] FLR       Current Medications:   Current Facility-Administered Medications:     0.9%  NaCl infusion, , Intravenous, Continuous, Rocky Herrmann MD    mupirocin 2 % ointment, , Nasal, On Call Procedure, Rocky Herrmann MD    Social History:   Social History     Socioeconomic History    Marital status:      Spouse name: Not on file    Number of children: Not on file    Years of education: Not on file    Highest education level: Not on file   Social Needs    Financial resource strain: Not on file    Food insecurity - worry: Not on file    Food insecurity - inability: Not on file    Transportation  "needs - medical: Not on file    Transportation needs - non-medical: Not on file   Occupational History    Not on file   Tobacco Use    Smoking status: Never Smoker    Smokeless tobacco: Never Used   Substance and Sexual Activity    Alcohol use: No     Alcohol/week: 0.0 oz    Drug use: No    Sexual activity: Not on file   Other Topics Concern    Not on file   Social History Narrative    Not on file       REVIEW OF SYSTEMS:  Constitution: Negative. Negative for chills, fever and night sweats.   Cardiovascular: Negative for chest pain and syncope.     PHYSICAL EXAMINATION:    BP (!) 201/81   Pulse (!) 57   Temp 98 °F (36.7 °C) (Oral)   Resp 16   Ht 5' 5" (1.651 m)   Wt 72.6 kg (160 lb)   LMP 08/02/2018   SpO2 100%   Breastfeeding? No   BMI 26.63 kg/m²     General: The patient is a very pleasant 52 y.o. female in no apparent distress, the patient is orientatied to person, place and time.   Psych: Normal mood and affect  HEENT: Vision grossly intact, hearing intact to the spoken word.  Lungs: Respirations unlabored.        ASSESSMENT/PLAN:    Lumbar pseudoarthrosis.    Plan for revision T10-Pelvis PSF today with L4-S1 TLIF.    "

## 2018-10-02 NOTE — PLAN OF CARE
Pt vital signs wnl.  Pt verbalizes pain is tolerable with pca.  Pt verbalizes no nausea.  Back incision dressign intact.

## 2018-10-02 NOTE — PROGRESS NOTES
Attempted to call report to Michelle (6th flr rn [id number]) but Rn giving meds.  Instructed to call back in 5 minutes.

## 2018-10-03 LAB
ANION GAP SERPL CALC-SCNC: 5 MMOL/L
BASOPHILS # BLD AUTO: 0.02 K/UL
BASOPHILS NFR BLD: 0.3 %
BUN SERPL-MCNC: 11 MG/DL
CALCIUM SERPL-MCNC: 8.4 MG/DL
CHLORIDE SERPL-SCNC: 110 MMOL/L
CO2 SERPL-SCNC: 24 MMOL/L
CREAT SERPL-MCNC: 0.9 MG/DL
DIFFERENTIAL METHOD: ABNORMAL
EOSINOPHIL # BLD AUTO: 0.1 K/UL
EOSINOPHIL NFR BLD: 1.5 %
ERYTHROCYTE [DISTWIDTH] IN BLOOD BY AUTOMATED COUNT: 15.1 %
EST. GFR  (AFRICAN AMERICAN): >60 ML/MIN/1.73 M^2
EST. GFR  (NON AFRICAN AMERICAN): >60 ML/MIN/1.73 M^2
GLUCOSE SERPL-MCNC: 85 MG/DL
HCT VFR BLD AUTO: 29.3 %
HGB BLD-MCNC: 9.3 G/DL
IMM GRANULOCYTES # BLD AUTO: 0.03 K/UL
IMM GRANULOCYTES NFR BLD AUTO: 0.5 %
LYMPHOCYTES # BLD AUTO: 0.8 K/UL
LYMPHOCYTES NFR BLD: 13.3 %
MCH RBC QN AUTO: 28.5 PG
MCHC RBC AUTO-ENTMCNC: 31.7 G/DL
MCV RBC AUTO: 90 FL
MONOCYTES # BLD AUTO: 0.4 K/UL
MONOCYTES NFR BLD: 7.2 %
NEUTROPHILS # BLD AUTO: 4.6 K/UL
NEUTROPHILS NFR BLD: 77.2 %
NRBC BLD-RTO: 0 /100 WBC
PLATELET # BLD AUTO: 157 K/UL
PMV BLD AUTO: 12.1 FL
POTASSIUM SERPL-SCNC: 3.8 MMOL/L
RBC # BLD AUTO: 3.26 M/UL
SODIUM SERPL-SCNC: 139 MMOL/L
WBC # BLD AUTO: 6.01 K/UL

## 2018-10-03 PROCEDURE — 25000003 PHARM REV CODE 250: Performed by: STUDENT IN AN ORGANIZED HEALTH CARE EDUCATION/TRAINING PROGRAM

## 2018-10-03 PROCEDURE — 36415 COLL VENOUS BLD VENIPUNCTURE: CPT

## 2018-10-03 PROCEDURE — 20600001 HC STEP DOWN PRIVATE ROOM

## 2018-10-03 PROCEDURE — 63600175 PHARM REV CODE 636 W HCPCS: Performed by: STUDENT IN AN ORGANIZED HEALTH CARE EDUCATION/TRAINING PROGRAM

## 2018-10-03 PROCEDURE — 85025 COMPLETE CBC W/AUTO DIFF WBC: CPT

## 2018-10-03 PROCEDURE — 80048 BASIC METABOLIC PNL TOTAL CA: CPT

## 2018-10-03 PROCEDURE — 94761 N-INVAS EAR/PLS OXIMETRY MLT: CPT

## 2018-10-03 RX ORDER — ONDANSETRON 8 MG/1
8 TABLET, ORALLY DISINTEGRATING ORAL EVERY 8 HOURS PRN
Status: DISCONTINUED | OUTPATIENT
Start: 2018-10-03 | End: 2018-10-07 | Stop reason: HOSPADM

## 2018-10-03 RX ORDER — HYDROXYZINE HYDROCHLORIDE 25 MG/1
25 TABLET, FILM COATED ORAL 3 TIMES DAILY PRN
Status: DISCONTINUED | OUTPATIENT
Start: 2018-10-03 | End: 2018-10-07 | Stop reason: HOSPADM

## 2018-10-03 RX ADMIN — ACETAMINOPHEN 1000 MG: 10 INJECTION, SOLUTION INTRAVENOUS at 12:10

## 2018-10-03 RX ADMIN — FAMOTIDINE 20 MG: 20 TABLET ORAL at 09:10

## 2018-10-03 RX ADMIN — DIPHENHYDRAMINE HYDROCHLORIDE 25 MG: 25 CAPSULE ORAL at 03:10

## 2018-10-03 RX ADMIN — CEFAZOLIN 2 G: 1 INJECTION, POWDER, FOR SOLUTION INTRAMUSCULAR; INTRAVENOUS at 04:10

## 2018-10-03 RX ADMIN — METHOCARBAMOL 750 MG: 750 TABLET ORAL at 08:10

## 2018-10-03 RX ADMIN — HEPARIN SODIUM 5000 UNITS: 5000 INJECTION, SOLUTION INTRAVENOUS; SUBCUTANEOUS at 02:10

## 2018-10-03 RX ADMIN — ACETAMINOPHEN 1000 MG: 10 INJECTION, SOLUTION INTRAVENOUS at 10:10

## 2018-10-03 RX ADMIN — Medication: at 03:10

## 2018-10-03 RX ADMIN — DIPHENHYDRAMINE HYDROCHLORIDE 25 MG: 25 CAPSULE ORAL at 10:10

## 2018-10-03 RX ADMIN — Medication: at 11:10

## 2018-10-03 RX ADMIN — METHOCARBAMOL 750 MG: 750 TABLET ORAL at 02:10

## 2018-10-03 RX ADMIN — DIPHENHYDRAMINE HYDROCHLORIDE 25 MG: 25 CAPSULE ORAL at 04:10

## 2018-10-03 RX ADMIN — ONDANSETRON 8 MG: 8 TABLET, ORALLY DISINTEGRATING ORAL at 08:10

## 2018-10-03 RX ADMIN — Medication: at 06:10

## 2018-10-03 RX ADMIN — METHOCARBAMOL 750 MG: 750 TABLET ORAL at 09:10

## 2018-10-03 RX ADMIN — FAMOTIDINE 20 MG: 20 TABLET ORAL at 08:10

## 2018-10-03 RX ADMIN — HEPARIN SODIUM 5000 UNITS: 5000 INJECTION, SOLUTION INTRAVENOUS; SUBCUTANEOUS at 05:10

## 2018-10-03 RX ADMIN — LISINOPRIL 20 MG: 20 TABLET ORAL at 09:10

## 2018-10-03 RX ADMIN — HYDROXYZINE HYDROCHLORIDE 25 MG: 25 TABLET, FILM COATED ORAL at 07:10

## 2018-10-03 RX ADMIN — HEPARIN SODIUM 5000 UNITS: 5000 INJECTION, SOLUTION INTRAVENOUS; SUBCUTANEOUS at 10:10

## 2018-10-03 NOTE — ASSESSMENT & PLAN NOTE
52 y.o. female POD 1 status post: L2-Pelvis Revision PSF     Pain control  PT/OT: WBAT   DVT PPx: SCDs at all times when not ambulating / Heparin TID  Abx: Postop  Labs: Reviewed - Hgb 9 - Will follow and transfuse as needed  Drain(s):  Superficial: 440cc     Dispo: Lay flat through today. Carpio and PCA to remain in place. Plan for sitting up tomorrow. Plastic surgery managing drains.

## 2018-10-03 NOTE — PLAN OF CARE
Problem: Fall Risk (Adult)  Goal: Absence of Falls  Patient will demonstrate the desired outcomes by discharge/transition of care.  Outcome: Ongoing (interventions implemented as appropriate)  Plan of care reviewed patient verbalized understanding. VSS, AAOx4. Dilaudid PCA pump is being used to control the pain. Tolerating a regular diet. Client is to remain flat for 48 hours per MD order. Back incision intact, Left back JOSE drain has sanguinous output. Carpio catheter is intact with clear yellow output. Sinus rhythm on telemetry. Free from falls/injuries. No acute distress noted. KOSTAS's and SCD's are intact. Family is at the bedside. Client is sleeping in bed with call light within reach. Will continue to monitor.

## 2018-10-03 NOTE — PROGRESS NOTES
Plastic Surgery Progress Note    Genia Davis is a 52 y.o. female 1s/p complex closure of spine incision with myofascial advancement flap.        Vitals:    10/03/18 0800 10/03/18 1000 10/03/18 1100 10/03/18 1500   BP: (!) 146/65      BP Location: Left arm      Patient Position: Lying      Pulse: 61 65 62 67   Resp: 14 16     Temp: 98.2 °F (36.8 °C)      TempSrc: Oral      SpO2: 97% 96%     Weight:       Height:           I/O last 3 completed shifts:  In: 3204.3 [P.O.:400; I.V.:2604.3; IV Piggyback:200]  Out: 4690 [Urine:3900; Drains:440; Blood:350]  I/O this shift:  In: -   Out: 600 [Urine:550; Drains:50]  Drain output since surgery is 490cc s/s    Gen:  NAD  Chest: Non-labored breathing  Heart: RRR  Wound: c/d/i.  Drain in place.      Lab Results   Component Value Date    WBC 6.01 10/03/2018    HGB 9.3 (L) 10/03/2018    HCT 29.3 (L) 10/03/2018    MCV 90 10/03/2018     10/03/2018     Lab Results   Component Value Date    CREATININE 0.9 10/03/2018    BUN 11 10/03/2018     10/03/2018    K 3.8 10/03/2018     10/03/2018    CO2 24 10/03/2018     Appreciate primary team management.    --continue drains  --continue dressing  --continue abx

## 2018-10-03 NOTE — PROGRESS NOTES
"[facility]  Orthopedics  Progress Note    Patient Name: Genia Davis  MRN: [id number]  Admission Date: 10/2/2018  Hospital Length of Stay: 1 days  Attending Provider: Rocky Herrmann MD  Primary Care Provider: Norman Vick MD  Follow-up For: Procedure(s) (LRB):  FUSION, SPINE, LUMBAR, TLIF, POSTERIOR APPROACH, USING PEDICLE SCREW T10-Pelvis Revision  (N/A)  CREATION, FLAP, MUSCLE ROTATION (N/A)    Post-Operative Day: 1 Day Post-Op  Subjective:     Principal Problem:Pseudoarthrosis of lumbar spine    Principal Orthopedic Problem: As above - s/p L2-Pelvis revision with L5/1 TLIF    Interval History: NAEO, Pain controlled, No headaches. PCA and mchugh in place. Drains in place.    Review of patient's allergies indicates:   Allergen Reactions    Hydrochlorothiazide Other (See Comments)     Muscle pain       Current Facility-Administered Medications   Medication    acetaminophen (10 mg/mL) injection 1,000 mg    bisacodyl suppository 10 mg    diphenhydrAMINE capsule 25 mg    famotidine tablet 20 mg    heparin (porcine) injection 5,000 Units    HYDROmorphone PCA in 0.9 % NaCl 6 Mg/30 mL (0.2 mg/mL)    lactulose 20 gram/30 mL solution Soln 20 g    lisinopril tablet 20 mg    methocarbamol tablet 750 mg    naloxone 0.4 mg/mL injection 0.02 mg    ondansetron injection 4 mg    polyethylene glycol packet 17 g    promethazine (PHENERGAN) 6.25 mg in dextrose 5 % 50 mL IVPB    ramelteon tablet 8 mg    sodium chloride 0.9% flush 5 mL     Objective:     Vital Signs (Most Recent):  Temp: 97.6 °F (36.4 °C) (10/03/18 0322)  Pulse: 60 (10/03/18 0700)  Resp: 18 (10/03/18 0322)  BP: 136/62 (10/03/18 0322)  SpO2: 96 % (10/03/18 0322) Vital Signs (24h Range):  Temp:  [97.6 °F (36.4 °C)-99 °F (37.2 °C)] 97.6 °F (36.4 °C)  Pulse:  [] 60  Resp:  [10-24] 18  SpO2:  [91 %-99 %] 96 %  BP: (125-159)/(60-75) 136/62     Weight: 72.6 kg (160 lb)  Height: 5' 5" (165.1 cm)  Body mass index is 26.63 " kg/m².      Intake/Output Summary (Last 24 hours) at 10/3/2018 0907  Last data filed at 10/3/2018 0459  Gross per 24 hour   Intake 2404.25 ml   Output 4090 ml   Net -1685.75 ml       Ortho/SPM Exam  PE:    AA&O x 4.  NAD  HEENT:  NCAT, sclera nonicteric  Lungs:  Respirations are equal and unlabored.  CV:  2+ bilateral upper and lower extremity pulses.  Skin:  Intact throughout.    MSK:    Dressings CDI  Motor at baseline  SILT at baseline        Significant Labs: All pertinent labs within the past 24 hours have been reviewed.    Significant Imaging: I have reviewed all pertinent imaging results/findings.    Assessment/Plan:     * Pseudoarthrosis of lumbar spine    52 y.o. female POD 1 status post: L2-Pelvis Revision PSF     Pain control  PT/OT: WBAT   DVT PPx: SCDs at all times when not ambulating / Heparin TID  Abx: Postop  Labs: Reviewed - Hgb 9 - Will follow and transfuse as needed  Drain(s):  Superficial: 440cc     Dispo: Lay flat through today. Carpio and PCA to remain in place. Plan for sitting up tomorrow. Plastic surgery managing drains.                   Denis Dean MD  Orthopedics  [facility]

## 2018-10-03 NOTE — SUBJECTIVE & OBJECTIVE
"Principal Problem:Pseudoarthrosis of lumbar spine    Principal Orthopedic Problem: As above - s/p L2-Pelvis revision with L5/1 TLIF    Interval History: NAEO, Pain controlled, No headaches. PCA and mchugh in place. Drains in place.    Review of patient's allergies indicates:   Allergen Reactions    Hydrochlorothiazide Other (See Comments)     Muscle pain       Current Facility-Administered Medications   Medication    acetaminophen (10 mg/mL) injection 1,000 mg    bisacodyl suppository 10 mg    diphenhydrAMINE capsule 25 mg    famotidine tablet 20 mg    heparin (porcine) injection 5,000 Units    HYDROmorphone PCA in 0.9 % NaCl 6 Mg/30 mL (0.2 mg/mL)    lactulose 20 gram/30 mL solution Soln 20 g    lisinopril tablet 20 mg    methocarbamol tablet 750 mg    naloxone 0.4 mg/mL injection 0.02 mg    ondansetron injection 4 mg    polyethylene glycol packet 17 g    promethazine (PHENERGAN) 6.25 mg in dextrose 5 % 50 mL IVPB    ramelteon tablet 8 mg    sodium chloride 0.9% flush 5 mL     Objective:     Vital Signs (Most Recent):  Temp: 97.6 °F (36.4 °C) (10/03/18 0322)  Pulse: 60 (10/03/18 0700)  Resp: 18 (10/03/18 0322)  BP: 136/62 (10/03/18 0322)  SpO2: 96 % (10/03/18 0322) Vital Signs (24h Range):  Temp:  [97.6 °F (36.4 °C)-99 °F (37.2 °C)] 97.6 °F (36.4 °C)  Pulse:  [] 60  Resp:  [10-24] 18  SpO2:  [91 %-99 %] 96 %  BP: (125-159)/(60-75) 136/62     Weight: 72.6 kg (160 lb)  Height: 5' 5" (165.1 cm)  Body mass index is 26.63 kg/m².      Intake/Output Summary (Last 24 hours) at 10/3/2018 0907  Last data filed at 10/3/2018 0459  Gross per 24 hour   Intake 2404.25 ml   Output 4090 ml   Net -1685.75 ml       Ortho/SPM Exam  PE:    AA&O x 4.  NAD  HEENT:  NCAT, sclera nonicteric  Lungs:  Respirations are equal and unlabored.  CV:  2+ bilateral upper and lower extremity pulses.  Skin:  Intact throughout.    MSK:    Dressings CDI  Motor at baseline  SILT at baseline        Significant Labs: All pertinent labs " within the past 24 hours have been reviewed.    Significant Imaging: I have reviewed all pertinent imaging results/findings.

## 2018-10-03 NOTE — ANESTHESIA POSTPROCEDURE EVALUATION
"Anesthesia Post Evaluation    Patient: Genia Davis    Procedure(s) Performed: Procedure(s) (LRB):  FUSION, SPINE, LUMBAR, TLIF, POSTERIOR APPROACH, USING PEDICLE SCREW T10-Pelvis Revision  (N/A)  CREATION, FLAP, MUSCLE ROTATION (N/A)    Final Anesthesia Type: general  Patient location during evaluation: PACU  Patient participation: Yes- Able to Participate  Level of consciousness: awake and alert  Post-procedure vital signs: reviewed and stable  Pain management: adequate  Airway patency: patent  PONV status at discharge: No PONV  Anesthetic complications: no      Cardiovascular status: hemodynamically stable  Respiratory status: unassisted, spontaneous ventilation and room air  Hydration status: euvolemic  Follow-up not needed.        Visit Vitals  /62 (BP Location: Left arm, Patient Position: Lying)   Pulse 66   Temp 36.4 °C (97.6 °F) (Oral)   Resp 18   Ht 5' 5" (1.651 m)   Wt 72.6 kg (160 lb)   LMP 08/02/2018   SpO2 96%   Breastfeeding? No   BMI 26.63 kg/m²       Pain/Sascha Score: Pain Assessment Performed: Yes (10/3/2018 12:30 AM)  Presence of Pain: complains of pain/discomfort (10/2/2018  7:43 PM)  Pain Rating Prior to Med Admin: 5 (10/3/2018  6:10 AM)  Pain Rating Post Med Admin: 6 (10/2/2018  4:52 PM)  Sascha Score: 9 (10/2/2018  4:52 PM)        "

## 2018-10-03 NOTE — ANESTHESIA RELEASE NOTE
"Anesthesia Release from PACU Note    Patient: Genia Davis    Procedure(s) Performed: Procedure(s) (LRB):  FUSION, SPINE, LUMBAR, TLIF, POSTERIOR APPROACH, USING PEDICLE SCREW T10-Pelvis Revision  (N/A)  CREATION, FLAP, MUSCLE ROTATION (N/A)    Anesthesia type: general    Post pain: Adequate analgesia    Post assessment: no apparent anesthetic complications and tolerated procedure well    Last Vitals:   Visit Vitals  /62 (BP Location: Left arm, Patient Position: Lying)   Pulse 66   Temp 36.4 °C (97.6 °F) (Oral)   Resp 18   Ht 5' 5" (1.651 m)   Wt 72.6 kg (160 lb)   LMP 08/02/2018   SpO2 96%   Breastfeeding? No   BMI 26.63 kg/m²       Post vital signs: stable    Level of consciousness: awake and alert     Nausea/Vomiting: no nausea/no vomiting    Complications: none    Airway Patency: patent    Respiratory: unassisted, spontaneous ventilation, room air    Cardiovascular: stable and blood pressure at baseline    Hydration: euvolemic  "

## 2018-10-04 LAB
ANION GAP SERPL CALC-SCNC: 6 MMOL/L
BASOPHILS # BLD AUTO: 0.01 K/UL
BASOPHILS NFR BLD: 0.2 %
BUN SERPL-MCNC: 8 MG/DL
CALCIUM SERPL-MCNC: 8.2 MG/DL
CHLORIDE SERPL-SCNC: 107 MMOL/L
CO2 SERPL-SCNC: 23 MMOL/L
CREAT SERPL-MCNC: 0.7 MG/DL
DIFFERENTIAL METHOD: ABNORMAL
EOSINOPHIL # BLD AUTO: 0.2 K/UL
EOSINOPHIL NFR BLD: 3.5 %
ERYTHROCYTE [DISTWIDTH] IN BLOOD BY AUTOMATED COUNT: 14.8 %
EST. GFR  (AFRICAN AMERICAN): >60 ML/MIN/1.73 M^2
EST. GFR  (NON AFRICAN AMERICAN): >60 ML/MIN/1.73 M^2
GLUCOSE SERPL-MCNC: 78 MG/DL
HCT VFR BLD AUTO: 27.5 %
HGB BLD-MCNC: 8.7 G/DL
IMM GRANULOCYTES # BLD AUTO: 0.03 K/UL
IMM GRANULOCYTES NFR BLD AUTO: 0.5 %
LYMPHOCYTES # BLD AUTO: 0.7 K/UL
LYMPHOCYTES NFR BLD: 12 %
MCH RBC QN AUTO: 28.4 PG
MCHC RBC AUTO-ENTMCNC: 31.6 G/DL
MCV RBC AUTO: 90 FL
MONOCYTES # BLD AUTO: 0.5 K/UL
MONOCYTES NFR BLD: 8.3 %
NEUTROPHILS # BLD AUTO: 4.5 K/UL
NEUTROPHILS NFR BLD: 75.5 %
NRBC BLD-RTO: 0 /100 WBC
PLATELET # BLD AUTO: 140 K/UL
PMV BLD AUTO: 11.6 FL
POTASSIUM SERPL-SCNC: 4.2 MMOL/L
RBC # BLD AUTO: 3.06 M/UL
SODIUM SERPL-SCNC: 136 MMOL/L
WBC # BLD AUTO: 5.92 K/UL

## 2018-10-04 PROCEDURE — G8987 SELF CARE CURRENT STATUS: HCPCS | Mod: CK

## 2018-10-04 PROCEDURE — 25000003 PHARM REV CODE 250: Performed by: STUDENT IN AN ORGANIZED HEALTH CARE EDUCATION/TRAINING PROGRAM

## 2018-10-04 PROCEDURE — 63600175 PHARM REV CODE 636 W HCPCS: Performed by: STUDENT IN AN ORGANIZED HEALTH CARE EDUCATION/TRAINING PROGRAM

## 2018-10-04 PROCEDURE — 80048 BASIC METABOLIC PNL TOTAL CA: CPT

## 2018-10-04 PROCEDURE — 97166 OT EVAL MOD COMPLEX 45 MIN: CPT

## 2018-10-04 PROCEDURE — G8988 SELF CARE GOAL STATUS: HCPCS | Mod: CJ

## 2018-10-04 PROCEDURE — 97161 PT EVAL LOW COMPLEX 20 MIN: CPT

## 2018-10-04 PROCEDURE — 85025 COMPLETE CBC W/AUTO DIFF WBC: CPT

## 2018-10-04 PROCEDURE — 36415 COLL VENOUS BLD VENIPUNCTURE: CPT

## 2018-10-04 PROCEDURE — 20600001 HC STEP DOWN PRIVATE ROOM

## 2018-10-04 RX ORDER — HYDROMORPHONE HYDROCHLORIDE 1 MG/ML
1 INJECTION, SOLUTION INTRAMUSCULAR; INTRAVENOUS; SUBCUTANEOUS EVERY 4 HOURS PRN
Status: DISCONTINUED | OUTPATIENT
Start: 2018-10-04 | End: 2018-10-07 | Stop reason: HOSPADM

## 2018-10-04 RX ORDER — OXYCODONE HYDROCHLORIDE 10 MG/1
10 TABLET ORAL EVERY 4 HOURS PRN
Status: DISCONTINUED | OUTPATIENT
Start: 2018-10-04 | End: 2018-10-07 | Stop reason: HOSPADM

## 2018-10-04 RX ORDER — OXYCODONE HYDROCHLORIDE 5 MG/1
5 TABLET ORAL EVERY 4 HOURS PRN
Status: DISCONTINUED | OUTPATIENT
Start: 2018-10-04 | End: 2018-10-07 | Stop reason: HOSPADM

## 2018-10-04 RX ORDER — ACETAMINOPHEN 10 MG/ML
1000 INJECTION, SOLUTION INTRAVENOUS EVERY 8 HOURS
Status: COMPLETED | OUTPATIENT
Start: 2018-10-04 | End: 2018-10-05

## 2018-10-04 RX ADMIN — METHOCARBAMOL 750 MG: 750 TABLET ORAL at 09:10

## 2018-10-04 RX ADMIN — METHOCARBAMOL 750 MG: 750 TABLET ORAL at 03:10

## 2018-10-04 RX ADMIN — HYDROMORPHONE HYDROCHLORIDE 1 MG: 1 INJECTION, SOLUTION INTRAMUSCULAR; INTRAVENOUS; SUBCUTANEOUS at 07:10

## 2018-10-04 RX ADMIN — HYDROXYZINE HYDROCHLORIDE 25 MG: 25 TABLET, FILM COATED ORAL at 06:10

## 2018-10-04 RX ADMIN — LISINOPRIL 20 MG: 20 TABLET ORAL at 11:10

## 2018-10-04 RX ADMIN — HYDROXYZINE HYDROCHLORIDE 25 MG: 25 TABLET, FILM COATED ORAL at 03:10

## 2018-10-04 RX ADMIN — OXYCODONE HYDROCHLORIDE 10 MG: 10 TABLET ORAL at 03:10

## 2018-10-04 RX ADMIN — OXYCODONE HYDROCHLORIDE 10 MG: 10 TABLET ORAL at 09:10

## 2018-10-04 RX ADMIN — HEPARIN SODIUM 5000 UNITS: 5000 INJECTION, SOLUTION INTRAVENOUS; SUBCUTANEOUS at 09:10

## 2018-10-04 RX ADMIN — HEPARIN SODIUM 5000 UNITS: 5000 INJECTION, SOLUTION INTRAVENOUS; SUBCUTANEOUS at 05:10

## 2018-10-04 RX ADMIN — METHOCARBAMOL 750 MG: 750 TABLET ORAL at 11:10

## 2018-10-04 RX ADMIN — FAMOTIDINE 20 MG: 20 TABLET ORAL at 09:10

## 2018-10-04 RX ADMIN — HYDROXYZINE HYDROCHLORIDE 25 MG: 25 TABLET, FILM COATED ORAL at 09:10

## 2018-10-04 RX ADMIN — ACETAMINOPHEN 1000 MG: 10 INJECTION, SOLUTION INTRAVENOUS at 04:10

## 2018-10-04 RX ADMIN — ACETAMINOPHEN 1000 MG: 10 INJECTION, SOLUTION INTRAVENOUS at 09:10

## 2018-10-04 RX ADMIN — HEPARIN SODIUM 5000 UNITS: 5000 INJECTION, SOLUTION INTRAVENOUS; SUBCUTANEOUS at 04:10

## 2018-10-04 NOTE — PLAN OF CARE
Problem: Patient Care Overview  Goal: Plan of Care Review  Outcome: Ongoing (interventions implemented as appropriate)  Plan of care reviewed patient verbalized understanding. VSS, AAOx4. Dilaudid PCA pump is being used to control the pain. Tolerating a regular diet. Client is to remain flat  per MD order. Back incision intact, Left back JOSE drain has sanguinous output. Carpio catheter is intact with clear yellow output. Sinus rhythm on telemetry. Free from falls/injuries. No acute distress noted. KOSTAS's and SCD's are intact. Bilateral heel boots Family is at the bedside. Client is sleeping in bed with call light within reach. Will continue to monitor.

## 2018-10-04 NOTE — PLAN OF CARE
"POD 2 s/p T10-Pelvis Revision TLIF with muscle flap creation. PT/OT ordered to eval and treat. PT/OT recs pending. Patient currently lives with her spouse. Patient has good family support at home. CM completed discharge assessment and planning with patient. Patient verbalized understanding. All questions and concerns addressed. SW and CM will continue to follow for any additional needs. Plan A to discharge home with family support and plans for outpatient rehab as soon as medically stable. Plan B to discharge home with family support and home health.    Patient stated she has no HH preference and has been to [facility] in the past. Patient stated her desire is to discharge home with family support. Patient denies the need for home health stating "I've been through this enough in the past and I know what to do". Patient has family support and owns all necessary DME.    PCP: Norman Vick MD    Pharmacy:   [facility]/pharmacy #[id number] - [place], [place] - [address] AT [facility]  [address]  [place] [address]  Phone: [phone] Fax: [phone]    Payor: BLUE CROSS BLUE SHIELD / Plan: BCBS ALL OUT OF STATE / Product Type: PPO /      10/04/18 1430   Discharge Assessment   Assessment Type Discharge Planning Assessment   Confirmed/corrected address and phone number on facesheet? Yes   Assessment information obtained from? Patient;Medical Record   Expected Length of Stay (days) 4   Communicated expected length of stay with patient/caregiver yes   Prior to hospitilization cognitive status: Alert/Oriented   Prior to hospitalization functional status: Assistive Equipment   Current cognitive status: Alert/Oriented   Current Functional Status: Assistive Equipment   Lives With spouse   Able to Return to Prior Arrangements yes   Is patient able to care for self after discharge? Yes   Who are your caregiver(s) and their phone number(s)? spouse- Michel Davis [phone]   Patient's perception of discharge " disposition home or selfcare   Readmission Within The Last 30 Days no previous admission in last 30 days   Patient currently being followed by outpatient case management? No   Patient currently receives any other outside agency services? No   Equipment Currently Used at Home walker, rolling;raised toilet;bath bench   Do you have any problems affording any of your prescribed medications? No   Is the patient taking medications as prescribed? yes   Does the patient have transportation home? Yes   Transportation Available family or friend will provide   Does the patient receive services at the [facility]? No   Discharge Plan A Home with family;Other  (outpatient rehab)   Discharge Plan B Home Health;Home with family   Patient/Family In Agreement With Plan yes

## 2018-10-04 NOTE — ASSESSMENT & PLAN NOTE
52 y.o. female POD 2 status post: L2-Pelvis Revision PSF     Pain control  PT/OT: WBAT   DVT PPx: SCDs at all times when not ambulating / Heparin TID  Abx: Postop  Labs: Reviewed - Hgb 9 - Will follow and transfuse as needed  Drain(s):  Superficial: 190cc     Dispo: Sit up test performed at bedside at 1230. If no HA develops will transition to chair later today. DC PCA. Plan for oral PO pain control. Carpio out tomorrow if mobilizing well.

## 2018-10-04 NOTE — SUBJECTIVE & OBJECTIVE
"Principal Problem:Pseudoarthrosis of lumbar spine    Principal Orthopedic Problem: As above - s/p L2-Pelvis revision with L5/1 TLIF    Interval History: NAEO, Pain controlled, No headaches. PCA and mchugh in place. Drains in place. Itching improved with medication change    Review of patient's allergies indicates:   Allergen Reactions    Hydrochlorothiazide Other (See Comments)     Muscle pain       Current Facility-Administered Medications   Medication    acetaminophen (10 mg/mL) injection 1,000 mg    bisacodyl suppository 10 mg    famotidine tablet 20 mg    heparin (porcine) injection 5,000 Units    HYDROmorphone injection 1 mg    hydrOXYzine HCl tablet 25 mg    lactulose 20 gram/30 mL solution Soln 20 g    lisinopril tablet 20 mg    methocarbamol tablet 750 mg    naloxone 0.4 mg/mL injection 0.02 mg    ondansetron disintegrating tablet 8 mg    oxyCODONE immediate release tablet 5 mg    oxyCODONE immediate release tablet Tab 10 mg    polyethylene glycol packet 17 g    promethazine (PHENERGAN) 6.25 mg in dextrose 5 % 50 mL IVPB    ramelteon tablet 8 mg    sodium chloride 0.9% flush 5 mL     Objective:     Vital Signs (Most Recent):  Temp: 99.7 °F (37.6 °C) (10/04/18 1205)  Pulse: 80 (10/04/18 1205)  Resp: 18 (10/04/18 1205)  BP: 135/61 (10/04/18 1205)  SpO2: 97 % (10/04/18 1205) Vital Signs (24h Range):  Temp:  [97.7 °F (36.5 °C)-100 °F (37.8 °C)] 99.7 °F (37.6 °C)  Pulse:  [66-92] 80  Resp:  [14-19] 18  SpO2:  [95 %-97 %] 97 %  BP: (131-153)/(61-68) 135/61     Weight: 72.6 kg (160 lb)  Height: 5' 5" (165.1 cm)  Body mass index is 26.63 kg/m².      Intake/Output Summary (Last 24 hours) at 10/4/2018 1231  Last data filed at 10/4/2018 0500  Gross per 24 hour   Intake --   Output 2540 ml   Net -2540 ml       Ortho/SPM Exam    PE:    AA&O x 4.  NAD  HEENT:  NCAT, sclera nonicteric  Lungs:  Respirations are equal and unlabored.  CV:  2+ bilateral upper and lower extremity pulses.  Skin:  Intact " throughout.    MSK:    Dressings CDI  Motor at baseline  SILT at baseline        Significant Labs: All pertinent labs within the past 24 hours have been reviewed.    Significant Imaging: I have reviewed all pertinent imaging results/findings.

## 2018-10-04 NOTE — PLAN OF CARE
Problem: Patient Care Overview  Goal: Plan of Care Review  Outcome: Ongoing (interventions implemented as appropriate)  Pt AAOx4 w/ VSS. Patient is tolerating diet well w/ had no complaints of nausea or vomiting. Urine output remains adequate per mchugh catheter w/ clear yellow urine output. Neurovascular checks performed q4. Patient pain controlled w/ PCA pump w/ CO2 monitoring. HOB remained flat per md order. Patient is free from falls or injury. Pt is resting with side rails up x3, call light w/ in reach. wctmp.

## 2018-10-04 NOTE — PT/OT/SLP EVAL
Physical Therapy Evaluation    Patient Name:  Genia Davis   MRN:  [id number]    Recommendations:     Discharge Recommendations: home health therapy services (refusing placement)  Discharge Equipment Recommendations: none   Barriers to discharge: None    Assessment:     Genia Davis is a 52 y.o. female admitted with a medical diagnosis of Pseudoarthrosis of lumbar spine.  She presents with the following impairments/functional limitations:  weakness, gait instability, impaired functional mobilty, impaired balance, decreased lower extremity function, pain, edema, impaired skin, orthopedic precautions, impaired joint extensibility, impaired muscle length limiting overall functional mobility. Evaluation limited secondary to LSO not being present in room. Able to roll with Min A and transition from sit<>supine with Mod A for LE and trunk management. Verbalizes understanding of spinal precautions. Good static sitting balance edge of bed with BUE support. To benefit from continued skilled PT intervention to address deficits. DC rec's for HHPT to improve safety and overall functional independence.    Rehab Prognosis:  Good; patient would benefit from acute skilled PT services to address these deficits and reach maximum level of function.      Recent Surgery: Procedure(s) (LRB):  FUSION, SPINE, LUMBAR, TLIF, POSTERIOR APPROACH, USING PEDICLE SCREW T10-Pelvis Revision  (N/A)  CREATION, FLAP, MUSCLE ROTATION (N/A) 2 Days Post-Op    Plan:     During this hospitalization, patient to be seen 4 x/week to address the above listed problems via gait training, therapeutic activities, therapeutic exercises, neuromuscular re-education  · Plan of Care Expires:  11/04/18   Plan of Care Reviewed with: patient    Subjective     Communicated with RN prior to session.  Patient found supine upon PT entry to room, agreeable to evaluation.      Chief Complaint: mild low back    Pain/Comfort:  · Pain Rating 1: 0/10(at rest)  · Pain  Addressed 1: Reposition, Distraction, Cessation of Activity  · Pain Rating Post-Intervention 1: (reports minimal pain sitting on side of bed. Not rated with VAS)    Patients cultural, spiritual, Jehovah's witness conflicts given the current situation: none stated    Living Environment:  Patient live swith  in 1-story home [address]. Walk in shower with shower chair present.  Prior to admission, patients level of function was Indep using rolling walker for household and community ambulation with KAFO donned.  Patient has the following equipment: wheelchair, walker, rolling, shower chair(R KAFO).  DME owned (not currently used): none.  Upon discharge, patient will have assistance from  as needed.    Objective:     Patient found with: pulse ox (continuous), peripheral IV, PCA, JOSE drain, telemetry, mchugh catheter     General Precautions: Standard, fall   Orthopedic Precautions:spinal precautions   Braces: LSO(not present in room)     Exams:  · Cognitive Exam:  Patient is oriented to Person, Place, Time and Situation  · Gross Motor Coordination:  impaired secondary to RLE weakness  · Postural Exam:  Patient presented with the following abnormalities: -       Forward head  · Sensation: -       Intact  light/touch BLE  · Skin Integrity/Edema:  -       Skin integrity: Visible skin intact  · RLE ROM: PROM WFL  · RLE Strength: 3+/5 ankle PF; 3/5 ankle DF to neutral ROM; knee flexion 4/5; knee extension 3-/5; hip abduction/adduction 4-/5; hip flexion NT secondary to pain  · LLE ROM: WFL  · LLE Strength: 5/5 ankle PF; 5/5 ankle DF; knee flexion 4+/5; knee extension 5/5; hip abduction/adduction 5/5; hip flexion NT secondary to pain     Functional Mobility:  · Bed Mobility:     · Rolling Left:  minimum assistance  · Rolling Right: minimum assistance  · Supine to Sit: moderate assistance  · Sit to Supine: moderate assistance  · Balance: good static sitting balance with BUE support; poor dynamic sitting balance    AM-PAC 6  CLICK MOBILITY  Total Score:11       Therapeutic Activities and Exercises:  Co-evaluation with OT.     Patient educated on:   - role of PT/POC    - safety with all functional mobility   - bed mobility training   - spinal precautions   - importance of participation with therapy services   - evaluation limited secondary to brace not being present    Verbalized understanding of all education provided.    Writing therapist contacted CM who was able to reach MD team stating patient needs brace for out of bed activity. Brace not present in room. Further mobility deferred on this date.      Patient left supine with all lines intact, call button in reach, RN notified and ot present.    GOALS:   Multidisciplinary Problems     Physical Therapy Goals        Problem: Physical Therapy Goal    Goal Priority Disciplines Outcome Goal Variances Interventions   Physical Therapy Goal     PT, PT/OT Ongoing (interventions implemented as appropriate)     Description:  Goals to be met by: 10/14/18     Patient will increase functional independence with mobility by performin. Supine to sit with MInimal Assistance  2. Sit to supine with MInimal Assistance  3. Sit to stand transfer with Minimal Assistance  4. Bed to chair transfer with Minimal Assistance using Rolling Walker  5. Gait  x 20 feet with Minimal Assistance using Rolling Walker.                       History:     Past Medical History:   Diagnosis Date    Cancer     tumor on back, was removed    Encounter for blood transfusion        Past Surgical History:   Procedure Laterality Date    BACK SURGERY      x 5    CHOLECYSTECTOMY      CLOSURE - back N/A 2015    Performed by Rommel Cox MD at [facility] OR [address] [place] [address]    CREATION OF MUSCLE ROTATIONAL FLAP N/A 10/2/2018    Procedure: CREATION, FLAP, MUSCLE ROTATION;  Surgeon: Rommel Cox MD;  Location: [facility] OR [address] [place] [address];  Service: Plastics;  Laterality: N/A;    CREATION, FLAP, MUSCLE ROTATION N/A 10/2/2018     Performed by Rommel Cox MD at [facility] OR 2ND FLR    FLAP-MUSCLE (ROTATION) - bilateral latissimus dorsi N/A 11/19/2015    Performed by Rommel Cox MD at [facility] OR 2ND FLR    FUSION, SPINE, LUMBAR, TLIF, POSTERIOR APPROACH, USING PEDICLE SCREW T10-Pelvis Revision  N/A 10/2/2018    Performed by Rocky Herrmann MD at [facility] OR 2ND FLR    FUSION-SPINAL - T11-Pelvis Posterior Spinal Fusion/Revision Exploration Spinal Fusion Posterior Instrumentation T11-Pelvis N/A 11/19/2015    Performed by Rocky Herrmann MD at [facility] OR 2ND FLR    INJECTION-STEROID-EPIDURAL-TRANSFORAMINAL Left 11/16/2016    Performed by [facility]evan Bah [facility] at [facility] PAIN MGT    INJECTION-STEROID-EPIDURAL-TRANSFORAMINAL Left 7/6/2016    Performed by Kevan Bah MD at [facility] PAIN MGT    MYELOGRAM-THORACIC AND LUMBAR N/A 6/20/2016    Performed by [facility] at [facility] OR 2ND FLR    Revision of hardware T10-Pelvis N/A 2/12/2016    Performed by Rocky Herrmann MD at [facility] OR 2ND FLR       Clinical Decision Making:     History  Co-morbidities and personal factors that may impact the plan of care Examination  Body Structures and Functions, activity limitations and participation restrictions that may impact the plan of care Clinical Presentation   Decision Making/ Complexity Score   Co-morbidities:   [x] Time since onset of injury / illness / exacerbation  [x] Status of current condition  []Patient's cognitive status and safety concerns    [x] Multiple Medical Problems (see med hx)  Personal Factors:   [] Patient's age  [] Prior Level of function   [] Patient's home situation (environment and family support)  [] Patient's level of motivation  [] Expected progression of patient      HISTORY:(criteria)    [] 33092 - no personal factors/history    [] 05070 - has 1-2 personal factor/comorbidity     [x] 05121 - has >3 personal factor/comorbidity     Body Regions:  [] Objective examination findings  [] Head     []  Neck   [] Trunk   [] Upper Extremity  [x] Lower Extremity    Body Systems:  [] For communication ability, affect, cognition, language, and learning style: the assessment of the ability to make needs known, consciousness, orientation (person, place, and time), expected emotional /behavioral responses, and learning preferences (eg, learning barriers, education  needs)  [x] For the neuromuscular system: a general assessment of gross coordinated movement (eg, balance, gait, locomotion, transfers, and transitions) and motor function  (motor control and motor learning)  [x] For the musculoskeletal system: the assessment of gross symmetry, gross range of motion, gross strength, height, and weight  [x] For the integumentary system: the assessment of pliability(texture), presence of scar formation, skin color, and skin integrity  [] For cardiovascular/pulmonary system: the assessment of heart rate, respiratory rate, blood pressure, and edema     Activity limitations:    [] Patient's cognitive status and saf ety concerns          [] Status of current condition      [] Weight bearing restriction  [] Cardiopulmunary Restriction    Participation Restrictions:   [] Goals and goal agreement with the patient     [] Rehab potential (prognosis) and probable outcome      Examination of Body System: (criteria)    [] 22865 - addressing 1-2 elements    [x] 47413 - addressing a total of 3 or more elements     [] 06817 -  Addressing a total of 4 or more elements         Clinical Presentation: (criteria)  Stable - 89505     On examination of body system using standardized tests and measures patient presents with 4 or more elements from any of the following: body structures and functions, activity limitations, and/or participation restrictions.  Leading to a clinical presentation that is considered stable and/or uncomplicated                              Clinical Decision Making  (Eval Complexity):  Low- 92339     Time Tracking:     PT Received  On: 10/04/18  PT Start Time: 1501     PT Stop Time: 1533  PT Total Time (min): 32 min     Billable Minutes: Evaluation 32      Ahmet Hernández III, PT  10/04/2018

## 2018-10-04 NOTE — OP NOTE
DATE OF PROCEDURE:  10/02/2018.    PREOPERATIVE DIAGNOSIS:  Spinal wound.    POSTOPERATIVE DIAGNOSIS:  Spinal wound.    PROCEDURES PERFORMED:  1.  Paraspinous and latissimus dorsi muscle advancement flaps closure of spinal   wound, measuring 20 cm x 12 cm, and subcutaneous advancement flap closure of the   skin, measuring 20 cm x 10 cm.  2.  Wound preparation for flap.    SURGEON:  Rommel Cox M.D., F.A.CLinneaS.    ANESTHESIA:  General.    DESCRIPTION OF PROCEDURE:  After completion of the spinal portion of the   procedure, I entered the room.  The wound was then re-excised of all unhealthy   tissue.  The muscle was noted to be very scarred and stuck down.  The   paraspinous as well as latissimus dorsi muscle were free from the overlying   skin.  They were freed from underneath to increase mobility.  Next, the muscle   advancement flaps were then advanced to the midline, measuring 20 x 12 cm.  They   were closed using interrupted 0-PDS sutures.  Next, the subcutaneous   advancement flaps were advanced to the midline measuring 20 x 10 cm and closed   in the midline using interrupted 2-0 Vicryl followed by running 3-0 Monocryl   subcuticular suture.  There was one drain placed during this procedure.  It was   a superficial drain above the muscle.  There were no complications with this   procedure.  Blood loss was approximately 100 mL for all portions of the   procedure.  The patient was transferred to the Recovery Room in stable   condition.      SCOTTIEB/YARIEL  dd: 10/04/2018 10:53:25 (CDT)  td: 10/04/2018 12:14:13 (CDT)  Doc ID   #[id number]  Job ID #[id number]    CC:

## 2018-10-04 NOTE — PLAN OF CARE
Problem: Physical Therapy Goal  Goal: Physical Therapy Goal  Goals to be met by: 10/14/18     Patient will increase functional independence with mobility by performin. Supine to sit with MInimal Assistance  2. Sit to supine with MInimal Assistance  3. Sit to stand transfer with Minimal Assistance  4. Bed to chair transfer with Minimal Assistance using Rolling Walker  5. Gait  x 20 feet with Minimal Assistance using Rolling Walker.     Outcome: Ongoing (interventions implemented as appropriate)  Evaluation complete.  Goals established to improve functional mobility.    DC rec's for -SNF.    Ahmet Hernández III, VÍCTORT, PT  10/4/2018

## 2018-10-04 NOTE — PROGRESS NOTES
Plastic Surgery Progress Note    Genia Davis is a 52 y.o. female 2 days s/p complex spine incision closure with muscle advancement flap.      Vitals:    10/04/18 0800 10/04/18 0808 10/04/18 1100 10/04/18 1205   BP:  131/61  135/61   BP Location:  Left arm     Patient Position:  Lying  Lying   Pulse: 87 77 81 80   Resp:  14  18   Temp:  99.4 °F (37.4 °C)  99.7 °F (37.6 °C)   TempSrc:  Oral  Oral   SpO2:  96%  97%   Weight:       Height:           Drain ouput 190cc s/s last 24hrs.     Gen:  NAD  Chest: Non-labored breathing  Heart: RRR  Incision c/d/i    Lab Results   Component Value Date    WBC 5.92 10/04/2018    HGB 8.7 (L) 10/04/2018    HCT 27.5 (L) 10/04/2018    MCV 90 10/04/2018     (L) 10/04/2018     Lab Results   Component Value Date    CREATININE 0.7 10/04/2018    BUN 8 10/04/2018     10/04/2018    K 4.2 10/04/2018     10/04/2018    CO2 23 10/04/2018         Plan:  Appreciate primary team management of this patient.    Please keep drain in place    ARINA Albarado MD, FACS  Plastic Surgery Resident

## 2018-10-04 NOTE — PROGRESS NOTES
[facility]  Orthopedics  Progress Note    Patient Name: Genia Davis  MRN: [id number]  Admission Date: 10/2/2018  Hospital Length of Stay: 2 days  Attending Provider: Rocky Herrmann MD  Primary Care Provider: Norman Vick MD  Follow-up For: Procedure(s) (LRB):  FUSION, SPINE, LUMBAR, TLIF, POSTERIOR APPROACH, USING PEDICLE SCREW T10-Pelvis Revision  (N/A)  CREATION, FLAP, MUSCLE ROTATION (N/A)    Post-Operative Day: 2 Days Post-Op  Subjective:     Principal Problem:Pseudoarthrosis of lumbar spine    Principal Orthopedic Problem: As above - s/p L2-Pelvis revision with L5/1 TLIF    Interval History: NAEO, Pain controlled, No headaches. PCA and mchugh in place. Drains in place. Itching improved with medication change    Review of patient's allergies indicates:   Allergen Reactions    Hydrochlorothiazide Other (See Comments)     Muscle pain       Current Facility-Administered Medications   Medication    acetaminophen (10 mg/mL) injection 1,000 mg    bisacodyl suppository 10 mg    famotidine tablet 20 mg    heparin (porcine) injection 5,000 Units    HYDROmorphone injection 1 mg    hydrOXYzine HCl tablet 25 mg    lactulose 20 gram/30 mL solution Soln 20 g    lisinopril tablet 20 mg    methocarbamol tablet 750 mg    naloxone 0.4 mg/mL injection 0.02 mg    ondansetron disintegrating tablet 8 mg    oxyCODONE immediate release tablet 5 mg    oxyCODONE immediate release tablet Tab 10 mg    polyethylene glycol packet 17 g    promethazine (PHENERGAN) 6.25 mg in dextrose 5 % 50 mL IVPB    ramelteon tablet 8 mg    sodium chloride 0.9% flush 5 mL     Objective:     Vital Signs (Most Recent):  Temp: 99.7 °F (37.6 °C) (10/04/18 1205)  Pulse: 80 (10/04/18 1205)  Resp: 18 (10/04/18 1205)  BP: 135/61 (10/04/18 1205)  SpO2: 97 % (10/04/18 1205) Vital Signs (24h Range):  Temp:  [97.7 °F (36.5 °C)-100 °F (37.8 °C)] 99.7 °F (37.6 °C)  Pulse:  [66-92] 80  Resp:  [14-19] 18  SpO2:  [95 %-97 %] 97  "%  BP: (131-153)/(61-68) 135/61     Weight: 72.6 kg (160 lb)  Height: 5' 5" (165.1 cm)  Body mass index is 26.63 kg/m².      Intake/Output Summary (Last 24 hours) at 10/4/2018 1231  Last data filed at 10/4/2018 0500  Gross per 24 hour   Intake --   Output 2540 ml   Net -2540 ml       Ortho/SPM Exam    PE:    AA&O x 4.  NAD  HEENT:  NCAT, sclera nonicteric  Lungs:  Respirations are equal and unlabored.  CV:  2+ bilateral upper and lower extremity pulses.  Skin:  Intact throughout.    MSK:    Dressings CDI  Motor at baseline  SILT at baseline        Significant Labs: All pertinent labs within the past 24 hours have been reviewed.    Significant Imaging: I have reviewed all pertinent imaging results/findings.    Assessment/Plan:     * Pseudoarthrosis of lumbar spine    52 y.o. female POD 2 status post: L2-Pelvis Revision PSF     Pain control  PT/OT: WBAT   DVT PPx: SCDs at all times when not ambulating / Heparin TID  Abx: Postop  Labs: Reviewed - Hgb 9 - Will follow and transfuse as needed  Drain(s):  Superficial: 190cc     Dispo: Sit up test performed at bedside at 1230. If no HA develops will transition to chair later today. DC PCA. Plan for oral PO pain control. Carpio out tomorrow if mobilizing well.                  Denis Dean MD  Orthopedics  [facility]  "

## 2018-10-04 NOTE — PLAN OF CARE
Problem: Occupational Therapy Goal  Goal: Occupational Therapy Goal  Goals to be met by: 7 days (10/11/18)     Patient will increase functional independence with ADLs by performing:    UE Dressing with Supervision.  LE Dressing with Contact Guard Assistance.  Grooming while EOB with Supervision.  Toileting from toilet with Contact Guard Assistance for hygiene and clothing management.   Supine to sit with Contact Guard Assistance.  Stand pivot transfers with Contact Guard Assistance.  Pt will complete functional mobility household distance with CGA using AD as needed.    Outcome: Ongoing (interventions implemented as appropriate)  Eval and POC set 10/4/18

## 2018-10-04 NOTE — PT/OT/SLP EVAL
Occupational Therapy   Evaluation    Name: Genia Davis  MRN: [id number]  Admitting Diagnosis:  Pseudoarthrosis of lumbar spine 2 Days Post-Op  T10-Pelvis Revision TLIF with muscle flap creation    Recommendations:     Discharge Recommendations: home with home health (refuses SNF)  Discharge Equipment Recommendations:  none  Barriers to discharge:  None    History:     Occupational Profile:  Living Environment: Pt lives with her  and daughter in 1-story house with 1 NOLBERTO and walk-in shower with seat.  Previous level of function: Uses RW for ambulation, W/C for long distances; (I) with ADLs and wears R KAFO for ambulation  Roles and Routines: Volunteer work  Equipment Used at Home:  walker, rolling, shower chair, wheelchair(R KAFO)  Assistance upon Discharge: Family able to assist    Past Medical History:   Diagnosis Date    Cancer     tumor on back, was removed    Encounter for blood transfusion        Past Surgical History:   Procedure Laterality Date    BACK SURGERY      x 5    CHOLECYSTECTOMY      CLOSURE - back N/A 11/19/2015    Performed by Rommel Cox MD at [facility] OR [address] [place] [address]    CREATION OF MUSCLE ROTATIONAL FLAP N/A 10/2/2018    Procedure: CREATION, FLAP, MUSCLE ROTATION;  Surgeon: Rommel Cox MD;  Location: [facility] OR [address] [place] [address];  Service: Plastics;  Laterality: N/A;    CREATION, FLAP, MUSCLE ROTATION N/A 10/2/2018    Performed by Rommel Cox MD at [facility] OR [address] [place] [address]    FLAP-MUSCLE (ROTATION) - bilateral latissimus dorsi N/A 11/19/2015    Performed by Rommel Cox MD at [facility] OR [address] [place] [address]    FUSION, SPINE, LUMBAR, TLIF, POSTERIOR APPROACH, USING PEDICLE SCREW T10-Pelvis Revision  N/A 10/2/2018    Performed by Rocky Herrmann MD at [facility] OR [address] [place] [address]    FUSION-SPINAL - T11-Pelvis Posterior Spinal Fusion/Revision Exploration Spinal Fusion Posterior Instrumentation T11-Pelvis N/A 11/19/2015    Performed by Rocky Herrmann MD at [facility] OR [address] [place] [address]     "INJECTION-STEROID-EPIDURAL-TRANSFORAMINAL Left 11/16/2016    Performed by Kevan Bah MD at [facility] PAIN [facility]GT    INJECTION-STEROID-EPIDURAL-TRANSFORAMINAL Left 7/6/2016    Performed by Kevan Bah MD at [facility]    MYELOGRAM-THORACIC AND LUMBAR N/A 6/20/2016    [facility]    Revision of hardware T10-Pelvis N/A 2/12/2016    Performed by Rocky Herrmann MD at [facility] OR 2ND FLR     Subjective     Chief Complaint: "Loopy" from anesthesia   Patient/Family Comments/goals: Return home    Pain/Comfort:  · Pain Rating 1: 0/10(at rest)  · Location 1: (back pain sitting EOB)  · Pain Addressed 1: Reposition, Cessation of Activity, Pre-medicate for activity    Patients cultural, spiritual, Hoahaoism conflicts given the current situation: None    Objective:     Communicated with: RN prior to session. Patient found with HOB elevated and pressure relief boots, PCA, peripheral IV, JOSE drain, telemetry, mchugh catheter upon OT entry to room. Pt with HOB flat restrictions in AM; ok to see in PM; paged Dr. Dean due to LSO brace not present in room.    General Precautions: Standard, fall   Orthopedic Precautions:spinal precautions   Braces: LSO(ordered but not at bedside)     Occupational Performance:    Bed Mobility:    · Patient completed Rolling/Turning to Left with  minimum assistance  · Patient completed Supine to Sit with moderate assistance  · Patient completed Sit to Supine with moderate assistance    Functional Mobility/Transfers:  · Did not occur due to pt with no LSO brace present in room; MD notified  · Pt also wears R KAFO for T/Fs and ambulation-- may be able to bring from home    Activities of Daily Living:  · Upper Body Dressing: minimum assistance to don gown around back  · Lower Body Dressing: total assistance to don socks    Cognitive/Visual Perceptual:  WFL    Physical Exam:  Pt demo good sitting balance, able to sit EOB ~20 min with close SBA " "with B UE supported on bed  B UE ROM WFL with 4/5 strength throughout; intact coordination and sensation    AMPAC 6 Click ADL:  AMPAC Total Score: 15    Treatment & Education:  OT/PT eval; educated on spinal precautions as well as OT role and POC  Education:    Patient left HOB elevated with all lines intact, call button in reach and RN notified    Assessment:     Genia Davis is a 52 y.o. female with a medical diagnosis of Pseudoarthrosis of lumbar spine.  She presents with the following performance deficits affecting function: weakness, impaired self care skills, impaired functional mobilty, impaired balance, gait instability, pain, decreased lower extremity function. Pt would continue to benefit from OT to increase functional independence. Eval limited due to LSO brace not at bedside and needed for OOB activity.     Rehab Prognosis: Good; patient would benefit from acute skilled OT services to address these deficits and reach maximum level of function.         Clinical Decision Makin.  OT Mod:  "Pt evaluation falls under moderate complexity for evaluation coding due to identification of 3-5 performance deficits noted as stated above. Eval required Min/Mod assistance to complete on this date and detailed assessment(s) were utilized. Moreover, an expanded review of history and occupational profile obtained with additional review of cognitive, physical and psychosocial hx."     Plan:     Patient to be seen 4 x/week to address the above listed problems via self-care/home management, therapeutic activities, therapeutic exercises, neuromuscular re-education  · Plan of Care Expires: 18  · Plan of Care Reviewed with: patient    This Plan of care has been discussed with the patient who was involved in its development and understands and is in agreement with the identified goals and treatment plan    GOALS:   Multidisciplinary Problems     Occupational Therapy Goals        Problem: Occupational Therapy " Goal    Goal Priority Disciplines Outcome Interventions   Occupational Therapy Goal     OT, PT/OT Ongoing (interventions implemented as appropriate)    Description:  Goals to be met by: 7 days (10/11/18)     Patient will increase functional independence with ADLs by performing:    UE Dressing with Supervision.  LE Dressing with Contact Guard Assistance.  Grooming while EOB with Supervision.  Toileting from toilet with Contact Guard Assistance for hygiene and clothing management.   Supine to sit with Contact Guard Assistance.  Stand pivot transfers with Contact Guard Assistance.  Pt will complete functional mobility household distance with CGA using AD as needed.                      Time Tracking:     OT Date of Treatment: 10/04/18  OT Start Time: 1501  OT Stop Time: 1533  OT Total Time (min): 32 min    Billable Minutes:Evaluation 32 minutes    HASEEB Montoya  10/4/2018

## 2018-10-05 LAB
ANION GAP SERPL CALC-SCNC: 7 MMOL/L
BASOPHILS # BLD AUTO: 0.01 K/UL
BASOPHILS NFR BLD: 0.2 %
BUN SERPL-MCNC: 8 MG/DL
CALCIUM SERPL-MCNC: 8.5 MG/DL
CHLORIDE SERPL-SCNC: 106 MMOL/L
CO2 SERPL-SCNC: 25 MMOL/L
CREAT SERPL-MCNC: 0.7 MG/DL
DIFFERENTIAL METHOD: ABNORMAL
EOSINOPHIL # BLD AUTO: 0.2 K/UL
EOSINOPHIL NFR BLD: 4.8 %
ERYTHROCYTE [DISTWIDTH] IN BLOOD BY AUTOMATED COUNT: 14.8 %
EST. GFR  (AFRICAN AMERICAN): >60 ML/MIN/1.73 M^2
EST. GFR  (NON AFRICAN AMERICAN): >60 ML/MIN/1.73 M^2
GLUCOSE SERPL-MCNC: 92 MG/DL
HCT VFR BLD AUTO: 28.6 %
HGB BLD-MCNC: 8.7 G/DL
IMM GRANULOCYTES # BLD AUTO: 0.03 K/UL
IMM GRANULOCYTES NFR BLD AUTO: 0.6 %
LYMPHOCYTES # BLD AUTO: 0.8 K/UL
LYMPHOCYTES NFR BLD: 16.2 %
MCH RBC QN AUTO: 27.4 PG
MCHC RBC AUTO-ENTMCNC: 30.4 G/DL
MCV RBC AUTO: 90 FL
MONOCYTES # BLD AUTO: 0.4 K/UL
MONOCYTES NFR BLD: 9.5 %
NEUTROPHILS # BLD AUTO: 3.2 K/UL
NEUTROPHILS NFR BLD: 68.7 %
NRBC BLD-RTO: 0 /100 WBC
PLATELET # BLD AUTO: 160 K/UL
PMV BLD AUTO: 11.6 FL
POTASSIUM SERPL-SCNC: 4 MMOL/L
RBC # BLD AUTO: 3.17 M/UL
SODIUM SERPL-SCNC: 138 MMOL/L
WBC # BLD AUTO: 4.63 K/UL

## 2018-10-05 PROCEDURE — 85025 COMPLETE CBC W/AUTO DIFF WBC: CPT

## 2018-10-05 PROCEDURE — 97530 THERAPEUTIC ACTIVITIES: CPT

## 2018-10-05 PROCEDURE — 20600001 HC STEP DOWN PRIVATE ROOM

## 2018-10-05 PROCEDURE — 36415 COLL VENOUS BLD VENIPUNCTURE: CPT

## 2018-10-05 PROCEDURE — 25000003 PHARM REV CODE 250: Performed by: STUDENT IN AN ORGANIZED HEALTH CARE EDUCATION/TRAINING PROGRAM

## 2018-10-05 PROCEDURE — 97535 SELF CARE MNGMENT TRAINING: CPT

## 2018-10-05 PROCEDURE — 80048 BASIC METABOLIC PNL TOTAL CA: CPT

## 2018-10-05 PROCEDURE — 63600175 PHARM REV CODE 636 W HCPCS: Performed by: STUDENT IN AN ORGANIZED HEALTH CARE EDUCATION/TRAINING PROGRAM

## 2018-10-05 PROCEDURE — 97116 GAIT TRAINING THERAPY: CPT

## 2018-10-05 RX ORDER — ONDANSETRON 8 MG/1
8 TABLET, ORALLY DISINTEGRATING ORAL EVERY 8 HOURS PRN
Qty: 50 TABLET | Refills: 0 | Status: SHIPPED | OUTPATIENT
Start: 2018-10-05 | End: 2018-10-07

## 2018-10-05 RX ORDER — POLYETHYLENE GLYCOL 3350 17 G/17G
17 POWDER, FOR SOLUTION ORAL DAILY
Qty: 238 G | Refills: 0 | Status: SHIPPED | OUTPATIENT
Start: 2018-10-05 | End: 2018-10-07 | Stop reason: SDUPTHER

## 2018-10-05 RX ORDER — METHOCARBAMOL 750 MG/1
750 TABLET, FILM COATED ORAL 3 TIMES DAILY
Qty: 50 TABLET | Refills: 0 | Status: SHIPPED | OUTPATIENT
Start: 2018-10-05 | End: 2018-10-07

## 2018-10-05 RX ORDER — OXYCODONE AND ACETAMINOPHEN 10; 325 MG/1; MG/1
1 TABLET ORAL EVERY 4 HOURS PRN
Qty: 90 TABLET | Refills: 0 | Status: SHIPPED | OUTPATIENT
Start: 2018-10-05 | End: 2018-10-07 | Stop reason: SDUPTHER

## 2018-10-05 RX ORDER — CLINDAMYCIN HYDROCHLORIDE 150 MG/1
300 CAPSULE ORAL EVERY 8 HOURS
Status: DISCONTINUED | OUTPATIENT
Start: 2018-10-05 | End: 2018-10-07 | Stop reason: HOSPADM

## 2018-10-05 RX ORDER — CLINDAMYCIN HYDROCHLORIDE 300 MG/1
300 CAPSULE ORAL EVERY 8 HOURS
Qty: 42 CAPSULE | Refills: 0 | Status: SHIPPED | OUTPATIENT
Start: 2018-10-05 | End: 2018-10-07

## 2018-10-05 RX ORDER — HYDROXYZINE HYDROCHLORIDE 25 MG/1
25 TABLET, FILM COATED ORAL 3 TIMES DAILY PRN
Qty: 45 TABLET | Refills: 0 | Status: SHIPPED | OUTPATIENT
Start: 2018-10-05 | End: 2018-10-07

## 2018-10-05 RX ADMIN — POLYETHYLENE GLYCOL 3350 17 G: 17 POWDER, FOR SOLUTION ORAL at 04:10

## 2018-10-05 RX ADMIN — FAMOTIDINE 20 MG: 20 TABLET ORAL at 09:10

## 2018-10-05 RX ADMIN — HYDROMORPHONE HYDROCHLORIDE 1 MG: 1 INJECTION, SOLUTION INTRAMUSCULAR; INTRAVENOUS; SUBCUTANEOUS at 12:10

## 2018-10-05 RX ADMIN — SODIUM CHLORIDE 500 ML: 0.9 INJECTION, SOLUTION INTRAVENOUS at 06:10

## 2018-10-05 RX ADMIN — FAMOTIDINE 20 MG: 20 TABLET ORAL at 08:10

## 2018-10-05 RX ADMIN — METHOCARBAMOL 750 MG: 750 TABLET ORAL at 02:10

## 2018-10-05 RX ADMIN — CLINDAMYCIN HYDROCHLORIDE 300 MG: 150 CAPSULE ORAL at 02:10

## 2018-10-05 RX ADMIN — CLINDAMYCIN HYDROCHLORIDE 300 MG: 150 CAPSULE ORAL at 09:10

## 2018-10-05 RX ADMIN — HEPARIN SODIUM 5000 UNITS: 5000 INJECTION, SOLUTION INTRAVENOUS; SUBCUTANEOUS at 06:10

## 2018-10-05 RX ADMIN — HEPARIN SODIUM 5000 UNITS: 5000 INJECTION, SOLUTION INTRAVENOUS; SUBCUTANEOUS at 09:10

## 2018-10-05 RX ADMIN — LISINOPRIL 20 MG: 20 TABLET ORAL at 08:10

## 2018-10-05 RX ADMIN — OXYCODONE HYDROCHLORIDE 5 MG: 5 TABLET ORAL at 11:10

## 2018-10-05 RX ADMIN — HEPARIN SODIUM 5000 UNITS: 5000 INJECTION, SOLUTION INTRAVENOUS; SUBCUTANEOUS at 02:10

## 2018-10-05 RX ADMIN — HYDROXYZINE HYDROCHLORIDE 25 MG: 25 TABLET, FILM COATED ORAL at 08:10

## 2018-10-05 RX ADMIN — HYDROMORPHONE HYDROCHLORIDE 1 MG: 1 INJECTION, SOLUTION INTRAMUSCULAR; INTRAVENOUS; SUBCUTANEOUS at 04:10

## 2018-10-05 RX ADMIN — ACETAMINOPHEN 1000 MG: 10 INJECTION, SOLUTION INTRAVENOUS at 06:10

## 2018-10-05 RX ADMIN — METHOCARBAMOL 750 MG: 750 TABLET ORAL at 09:10

## 2018-10-05 RX ADMIN — METHOCARBAMOL 750 MG: 750 TABLET ORAL at 08:10

## 2018-10-05 RX ADMIN — HYDROMORPHONE HYDROCHLORIDE 1 MG: 1 INJECTION, SOLUTION INTRAMUSCULAR; INTRAVENOUS; SUBCUTANEOUS at 09:10

## 2018-10-05 RX ADMIN — HYDROXYZINE HYDROCHLORIDE 25 MG: 25 TABLET, FILM COATED ORAL at 09:10

## 2018-10-05 RX ADMIN — OXYCODONE HYDROCHLORIDE 10 MG: 10 TABLET ORAL at 07:10

## 2018-10-05 RX ADMIN — OXYCODONE HYDROCHLORIDE 10 MG: 10 TABLET ORAL at 03:10

## 2018-10-05 RX ADMIN — HYDROMORPHONE HYDROCHLORIDE 1 MG: 1 INJECTION, SOLUTION INTRAMUSCULAR; INTRAVENOUS; SUBCUTANEOUS at 02:10

## 2018-10-05 NOTE — SUBJECTIVE & OBJECTIVE
"Principal Problem:Pseudoarthrosis of lumbar spine    Principal Orthopedic Problem: As above - s/p L2-Pelvis revision with L5/1 TLIF    Interval History: NAEO, Pain controlled, No headaches. PCA and mchugh in place. Drains in place. Itching improved with medication change.    Review of patient's allergies indicates:   Allergen Reactions    Hydrochlorothiazide Other (See Comments)     Muscle pain       Current Facility-Administered Medications   Medication    bisacodyl suppository 10 mg    clindamycin capsule 300 mg    famotidine tablet 20 mg    heparin (porcine) injection 5,000 Units    HYDROmorphone injection 1 mg    hydrOXYzine HCl tablet 25 mg    lactulose 20 gram/30 mL solution Soln 20 g    lisinopril tablet 20 mg    methocarbamol tablet 750 mg    naloxone 0.4 mg/mL injection 0.02 mg    ondansetron disintegrating tablet 8 mg    oxyCODONE immediate release tablet 5 mg    oxyCODONE immediate release tablet Tab 10 mg    polyethylene glycol packet 17 g    promethazine (PHENERGAN) 6.25 mg in dextrose 5 % 50 mL IVPB    ramelteon tablet 8 mg    sodium chloride 0.9% flush 5 mL     Objective:     Vital Signs (Most Recent):  Temp: 96.7 °F (35.9 °C) (10/05/18 0734)  Pulse: 72 (10/05/18 0734)  Resp: 16 (10/05/18 0734)  BP: 132/61 (10/05/18 0734)  SpO2: 99 % (10/05/18 0734) Vital Signs (24h Range):  Temp:  [96.7 °F (35.9 °C)-99.7 °F (37.6 °C)] 96.7 °F (35.9 °C)  Pulse:  [66-91] 72  Resp:  [15-18] 16  SpO2:  [95 %-99 %] 99 %  BP: (113-135)/(56-78) 132/61     Weight: 72.6 kg (160 lb)  Height: 5' 5" (165.1 cm)  Body mass index is 26.63 kg/m².      Intake/Output Summary (Last 24 hours) at 10/5/2018 0858  Last data filed at 10/5/2018 0759  Gross per 24 hour   Intake 920 ml   Output 2827.5 ml   Net -1907.5 ml       Ortho/SPM Exam    PE:    AA&O x 4.  NAD  HEENT:  NCAT, sclera nonicteric  Lungs:  Respirations are equal and unlabored.  CV:  2+ bilateral upper and lower extremity pulses.  Skin:  Intact " throughout.    MSK:    Dressings CDI  Motor at baseline  SILT at baseline        Significant Labs: All pertinent labs within the past 24 hours have been reviewed.    Lab Results   Component Value Date    WBC 4.63 10/05/2018    HGB 8.7 (L) 10/05/2018    HCT 28.6 (L) 10/05/2018    MCV 90 10/05/2018     10/05/2018       Significant Imaging: I have reviewed all pertinent imaging results/findings.

## 2018-10-05 NOTE — DISCHARGE INSTRUCTIONS
DR. TAMMY FAIRBANKS    POSTOPERATIVE SCOLIOSIS SURGERY INSTRUCTIONS    Antibiotics: You do not need additional antibiotics at home.    NSAIDs: Please refrain from taking ibuprofen (Advil), naproxen (Aleve), and other non steroidal anti-inflammatory medications as they may inhibit bone healing in the postoperative period.    Wound Care: You may remove your dressing and shower 14 days after surgery. Until then please keep your wound clean and dry. Sponge baths are acceptable. Do not go in a pool or hot tub until seen in clinic. Please leave the small steri-strips covering your wound in place until they fall off naturally (2 weeks). You may notice clear suture ends hanging from the sides of your incense after the steri-strips are removed, it is ok to clip these with scissors.    Brace: You may be prescribed a brace, please wear this when up and walking, it is not necessary to wear at night when sleeping.    Pain: You will be given a prescription for pain medicines before discharge. If you pain is not adequately controlled with these medications, please call [phone].    Infection: Signs of infection include increasing wound drainage and redness around the wound, as well as a temperature over 101.5 degrees. It is unnecessary to take your temperature on a routine basis. Please call the above number if you are concerned about an infection.    Driving and Work/School: Please wait to return to driving and work/school until seen and cleared by Dr. Herrmann. Please do not lift over 10 pounds or participate in exercise or sports until cleared by Dr. Herrmann.    Deep Venous Thrombosis (Blood Clots): Symptoms include swelling in the legs and shortness of breath. Please call the office or proceed to the nearest emergency room if you have any of these symptoms.    Physical Therapy: The best physical therapy after surgery is walking. Please try to walk as much as possible.    Follow-up: You will be scheduled for a follow-up  appointment in 2-3 weeks.    Questions: During business hours please call [phone] for routine questions. For after hours questions please call [phone] and ask to speak with the orthopaedic resident on call.

## 2018-10-05 NOTE — PT/OT/SLP PROGRESS
Occupational Therapy   Treatment    Name: Genia Davis  MRN: [id number]  Admitting Diagnosis:  Pseudoarthrosis of lumbar spine  3 Days Post-Op    Recommendations:     Discharge Recommendations: home with home health  Discharge Equipment Recommendations:  none  Barriers to discharge:  None    Subjective     Communicated with: RN prior to session.  Pain/Comfort:  · Pain Rating 1: (pt reported she was uncomfortable, but did not rate pain)    Patients cultural, spiritual, Christian conflicts given the current situation: None    Objective:     Patient found with: peripheral IV, JOSE drain, telemetry, mchugh catheter.  Sitting up in chair    General Precautions: Standard, fall   Orthopedic Precautions:spinal precautions   Braces: LSO(pt uses R KAFO at baseline)     Occupational Performance:    Bed Mobility:    · Not assessed 2* pt up in chair upon arrival    Functional Mobility/Transfers:  · Patient completed Sit <> Stand Transfer with minimum assistance  with  rolling walker x 1 trial from chair  · Toilet:  Min A x 1 trial from toilet with RW  · Functional Mobility: Pt ambulated 20 ft within room with RW and CGA.  R KAFO not present in room, but pt able to ambulate to and from bathroom with no instances of LOB.  Postural instability observed.    Activities of Daily Living:  · Grooming: stand by assistance for cleansing body with wipes while seated on toilet.  SBA for washing hands while standing at sink with RW.  · Upper Body Dressing: stand by assistance for doffing gowns and donning clean ones while seated on toilet. SBA for doffing/donning LSO brace.    Patient left up in chair with call button in reach and friends present    [facility] 6 Click:  [facility] Total Score: 16    Treatment & Education:  *Pt ambulated to bathroom to perform grooming and UB dressing.  Pt sat on toilet with RW placed in front of her and performed tasks with SBA.    *Spinal precautions reviewed with pt with specific examples regarding ADLs  discussed.  *POC reviewed with pt  Education:    Assessment:     Genia Davis is a 52 y.o. female with a medical diagnosis of Pseudoarthrosis of lumbar spine.  She presents with the following performance deficits affecting function: weakness, impaired endurance, gait instability, impaired functional mobilty, impaired self care skills, impaired balance.  Pt demonstrated improvement with mobility this date ambulating with RW and CGA.  Pt able to perform UB dressing and grooming while standing with SBA.  Pt is making progress towards goals and would continue to benefit from skilled OT services to address problems listed below and increase independence with ADLs.      Rehab Prognosis:  Good; patient would benefit from acute skilled OT services to address these deficits and reach maximum level of function.       Plan:     Patient to be seen 4 x/week to address the above listed problems via self-care/home management, therapeutic activities, therapeutic exercises  · Plan of Care Expires: 11/04/18  · Plan of Care Reviewed with: patient    This Plan of care has been discussed with the patient who was involved in its development and understands and is in agreement with the identified goals and treatment plan    GOALS:   Multidisciplinary Problems     Occupational Therapy Goals        Problem: Occupational Therapy Goal    Goal Priority Disciplines Outcome Interventions   Occupational Therapy Goal     OT, PT/OT Ongoing (interventions implemented as appropriate)    Description:  Goals to be met by: 7 days (10/11/18)     Patient will increase functional independence with ADLs by performing:    UE Dressing with Supervision.  MET 10/5/2018  LE Dressing with Contact Guard Assistance.  MET 10/5/2018  Grooming while EOB with Supervision.  Toileting from toilet with Contact Guard Assistance for hygiene and clothing management.   Supine to sit with Contact Guard Assistance.  Stand pivot transfers with Contact Guard Assistance.  Pt  will complete functional mobility household distance with CGA using AD as needed.                       Time Tracking:     OT Date of Treatment: 10/05/18  OT Start Time: 1254  OT Stop Time: 1324  OT Total Time (min): 30 min    Billable Minutes:Self Care/Home Management 30    HASEEB Carson  10/5/2018

## 2018-10-05 NOTE — PLAN OF CARE
Problem: Patient Care Overview  Goal: Plan of Care Review  Outcome: Ongoing (interventions implemented as appropriate)  POC reviewed with patient who verbalized understanding. AAOx4. VSS on room air. Vertical back incision noted with Telfa dressing CDI. Left lower back JOSE drain intact with serosanguinous output. Pain controlled with PRN medications per MAR. Tolerating regular diet, denies any nausea. Carpio intact draining clear, yellow urine, passing gas. Telemetry being monitored running NSR. Neurovascular checks completed Q4 and WNL. Safety precautions maintained, call light within reach. BLE SCDS in place with heel boots.Remains free from falls and injury. No acute events. No distress noted. Will continue to monitor.

## 2018-10-05 NOTE — NURSING
paged. Call returned by . MD notified that pt has not urinated since d/c of catheter. MD stated to bladder scan pt, administer 500 ml bolus, and wait 30 minutes for pt to void. If pt does not void, perform in and out straight cath.

## 2018-10-05 NOTE — NURSING
Pt transported to radiology via hospital transport with orthopedic brace in place to get X-ray done.

## 2018-10-05 NOTE — PROGRESS NOTES
[facility]  Orthopedics  Progress Note    Patient Name: Genia Davis  MRN: [id number]  Admission Date: 10/2/2018  Hospital Length of Stay: 3 days  Attending Provider: Rocky Herrmann MD  Primary Care Provider: Norman Vick MD  Follow-up For: Procedure(s) (LRB):  FUSION, SPINE, LUMBAR, TLIF, POSTERIOR APPROACH, USING PEDICLE SCREW T10-Pelvis Revision  (N/A)  CREATION, FLAP, MUSCLE ROTATION (N/A)    Post-Operative Day: 3 Days Post-Op  Subjective:     Principal Problem:Pseudoarthrosis of lumbar spine    Principal Orthopedic Problem: As above - s/p L2-Pelvis revision with L5/1 TLIF    Interval History: NAEO, Pain controlled, No headaches. PCA and mchugh in place. Drains in place. Itching improved with medication change.    Review of patient's allergies indicates:   Allergen Reactions    Hydrochlorothiazide Other (See Comments)     Muscle pain       Current Facility-Administered Medications   Medication    bisacodyl suppository 10 mg    clindamycin capsule 300 mg    famotidine tablet 20 mg    heparin (porcine) injection 5,000 Units    HYDROmorphone injection 1 mg    hydrOXYzine HCl tablet 25 mg    lactulose 20 gram/30 mL solution Soln 20 g    lisinopril tablet 20 mg    methocarbamol tablet 750 mg    naloxone 0.4 mg/mL injection 0.02 mg    ondansetron disintegrating tablet 8 mg    oxyCODONE immediate release tablet 5 mg    oxyCODONE immediate release tablet Tab 10 mg    polyethylene glycol packet 17 g    promethazine (PHENERGAN) 6.25 mg in dextrose 5 % 50 mL IVPB    ramelteon tablet 8 mg    sodium chloride 0.9% flush 5 mL     Objective:     Vital Signs (Most Recent):  Temp: 96.7 °F (35.9 °C) (10/05/18 0734)  Pulse: 72 (10/05/18 0734)  Resp: 16 (10/05/18 0734)  BP: 132/61 (10/05/18 0734)  SpO2: 99 % (10/05/18 0734) Vital Signs (24h Range):  Temp:  [96.7 °F (35.9 °C)-99.7 °F (37.6 °C)] 96.7 °F (35.9 °C)  Pulse:  [66-91] 72  Resp:  [15-18] 16  SpO2:  [95 %-99 %] 99 %  BP:  "(113-135)/(56-78) 132/61     Weight: 72.6 kg (160 lb)  Height: 5' 5" (165.1 cm)  Body mass index is 26.63 kg/m².      Intake/Output Summary (Last 24 hours) at 10/5/2018 0849  Last data filed at 10/5/2018 0759  Gross per 24 hour   Intake 920 ml   Output 2827.5 ml   Net -1907.5 ml       Ortho/SPM Exam    PE:    AA&O x 4.  NAD  HEENT:  NCAT, sclera nonicteric  Lungs:  Respirations are equal and unlabored.  CV:  2+ bilateral upper and lower extremity pulses.  Skin:  Intact throughout.    MSK:    Dressings CDI  Motor at baseline  SILT at baseline        Significant Labs: All pertinent labs within the past 24 hours have been reviewed.    Lab Results   Component Value Date    WBC 4.63 10/05/2018    HGB 8.7 (L) 10/05/2018    HCT 28.6 (L) 10/05/2018    MCV 90 10/05/2018     10/05/2018       Significant Imaging: I have reviewed all pertinent imaging results/findings.    Assessment/Plan:     * Pseudoarthrosis of lumbar spine    52 y.o. female POD 3 status post: L2-Pelvis Revision PSF     Pain control  PT/OT: WBAT   DVT PPx: SCDs at all times when not ambulating / Heparin TID  Abx: Postop  Labs: Reviewed - Hgb 9 - stable  Drain(s):  Superficial: 10cc     Dispo: PT/OT as tolerated. Clindamycin 300TID while drains in place. Plan for mobilization with discharge Sunday if ready. Carpio out this AM                Denis Dean MD  Orthopedics  [facility]  "

## 2018-10-05 NOTE — PLAN OF CARE
Problem: Patient Care Overview  Goal: Plan of Care Review  POC is pain management, and encouraging pt to void spontaneously post discontinued mchugh catheter.

## 2018-10-05 NOTE — PT/OT/SLP PROGRESS
Physical Therapy Treatment    Patient Name:  Genia Davis   MRN:  [id number]    Recommendations:     Discharge Recommendations:  home health OT, home health PT   Discharge Equipment Recommendations: none   Barriers to discharge: None    Assessment:     Genia Daivs is a 52 y.o. female admitted with a medical diagnosis of Pseudoarthrosis of lumbar spine; s/p T10-pelvis TLIF revision POD #3. Pt's LSO brace available this visit, so pt was able to progress OOB activity; however, at baseline pt utilizes R KAFO 2/2 chronic RLE deficits and KAFO left at home per pt report. Despite RLE deficits, pt tolerated increase in gait distance and was able to perform transfers and ambulation trial with RW and CGA.  She presents with the following impairments/functional limitations:  weakness, impaired endurance, impaired functional mobilty, impaired balance, gait instability, pain. Pt would benefit from continued PT intervention to address listed deficits and maximize return to PLOF.     Rehab Prognosis:  good; patient would benefit from acute skilled PT services to address these deficits and reach maximum level of function.      Recent Surgery: Procedure(s) (LRB):  FUSION, SPINE, LUMBAR, TLIF, POSTERIOR APPROACH, USING PEDICLE SCREW T10-Pelvis Revision  (N/A)  CREATION, FLAP, MUSCLE ROTATION (N/A) 3 Days Post-Op    Plan:     During this hospitalization, patient to be seen 4 x/week to address the above listed problems via gait training, therapeutic activities, therapeutic exercises, neuromuscular re-education  · Plan of Care Expires:  11/04/18   Plan of Care Reviewed with: patient    Subjective     Communicated with RN prior to session.  Patient found supine upon PT entry to room, agreeable to treatment.      Chief Complaint: pain   Patient comments/goals: return home   Pain/Comfort:  · Pain Rating 1: 6/10  · Location 1: back  · Pain Addressed 1: Reposition, Nurse notified, Distraction  · Pain Rating Post-Intervention 1:  (remained throughout)    Patients cultural, spiritual, Uatsdin conflicts given the current situation: no conflicts    Objective:     Patient found with: peripheral IV, JOSE drain, telemetry, mchugh catheter     General Precautions: Standard, fall   Orthopedic Precautions:spinal precautions   Braces: LSO(Pt uses R KAFO at baseline; brace not available for session- pt stated her family will bring brace this PM at unknown time)     Functional Mobility:       Bed Mobility  · Rolling: to left with stand by assistance and use of handrail   · Supine to sit: CGA with use of handrail     · Sit to supine: N/T        Transfers · Sit <> Stand: CGA from EOB x 1 trial with RW       Gait  · Distance: ~70 ft.   · Assistance level: RW and CGA-min A   · Gait Deviations: decreased step length RLE, RLE hip circumduction to assist with clearance of RLE during swing phase, decreased RLE knee flexion, RLE foot drop (Pt's KAFO not present for ambulation trial-- typically uses brace at baseline))         Therapeutic Activities and Exercises:  Therapeutic activities:  - aimed to increase pt's independence, safety, and efficiency with bed mobility and functional transfers. See above for assistance levels.   - educate pt on donning/doffing LSO; required min A to rupinder in sitting EOB   - reinforce education on spinal precautions   - educate pt on BLE therex including ankle pumps, LAQ and marches     Therapist facilitated progression of gait training to improve gait stability, endurance, and independence with functional ambulation. See above for details. Pt requiring CGA-min A 2/2 gait deficits (deficits are typically addressed with R KAFO, but pt does not have R KAFO available at this time).     Patient left up in chair with all lines intact, call button in reach and RN notified.    AM-PAC 6 CLICK MOBILITY  Turning over in bed (including adjusting bedclothes, sheets and blankets)?: 3  Sitting down on and standing up from a chair with arms (e.g.,  wheelchair, bedside commode, etc.): 3  Moving from lying on back to sitting on the side of the bed?: 3  Moving to and from a bed to a chair (including a wheelchair)?: 3  Need to walk in hospital room?: 3  Climbing 3-5 steps with a railing?: 2  Basic Mobility Total Score: 17       GOALS:   Multidisciplinary Problems     Physical Therapy Goals        Problem: Physical Therapy Goal    Goal Priority Disciplines Outcome Goal Variances Interventions   Physical Therapy Goal     PT, PT/OT Ongoing (interventions implemented as appropriate)     Description:  Goals to be met by: 10/14/18     Patient will increase functional independence with mobility by performin. Supine to sit with supervision with HOB flat   2. Sit to supine with  Supervision with HOB flat   3. Sit to stand transfer with supervision using rolling walker.   4. Gait  x 150 feet with stand by assistance using Rolling Walker.  5. Pt will perform BLE therex x 15 reps per handout with supervision                         Time Tracking:     PT Received On: 10/05/18  PT Start Time: 902     PT Stop Time: 928  PT Total Time (min): 26 min     Billable Minutes: Gait Training 14 min and Therapeutic Activity 12 min    Treatment Type: Treatment  PT/PTA: PT     PTA Visit Number: 0     Trinidad Mack PT, DPT   10/5/2018  Pager: [phone]

## 2018-10-05 NOTE — PLAN OF CARE
Problem: Physical Therapy Goal  Goal: Physical Therapy Goal  Goals to be met by: 10/14/18     Patient will increase functional independence with mobility by performin. Supine to sit with supervision with HOB flat   2. Sit to supine with  Supervision with HOB flat   3. Sit to stand transfer with supervision using rolling walker.   4. Gait  x 150 feet with stand by assistance using Rolling Walker.  5. Pt will perform BLE therex x 15 reps per handout with supervision       Outcome: Ongoing (interventions implemented as appropriate)  Goals updated; continue current POC. Discharge recommendations modified to home with home health PT 2/2 pt progress this visit.     Trinidad Mack, PT, DPT   10/5/2018  Pager: [phone]

## 2018-10-05 NOTE — PLAN OF CARE
Problem: Occupational Therapy Goal  Goal: Occupational Therapy Goal  Goals to be met by: 7 days (10/11/18)     Patient will increase functional independence with ADLs by performing:    UE Dressing with Supervision.  MET 10/5/2018  LE Dressing with Contact Guard Assistance.  MET 10/5/2018  Grooming while EOB with Supervision.  Toileting from toilet with Contact Guard Assistance for hygiene and clothing management.   Supine to sit with Contact Guard Assistance.  Stand pivot transfers with Contact Guard Assistance.  Pt will complete functional mobility household distance with CGA using AD as needed.       Pt met two goals this date and is making progress towards others.      HASEEB Carson  10/5/2018

## 2018-10-05 NOTE — ASSESSMENT & PLAN NOTE
52 y.o. female POD 3 status post: L2-Pelvis Revision PSF     Pain control  PT/OT: WBAT   DVT PPx: SCDs at all times when not ambulating / Heparin TID  Abx: Postop  Labs: Reviewed - Hgb 9 - stable  Drain(s):  Superficial: 10cc     Dispo: PT/OT as tolerated. Clindamycin 300TID while drains in place. Plan for mobilization with discharge Sunday if ready. Carpio out this AM

## 2018-10-06 LAB
ANION GAP SERPL CALC-SCNC: 8 MMOL/L
BASOPHILS # BLD AUTO: 0.01 K/UL
BASOPHILS NFR BLD: 0.2 %
BLD PROD TYP BPU: NORMAL
BLOOD UNIT EXPIRATION DATE: NORMAL
BLOOD UNIT TYPE CODE: 6200
BLOOD UNIT TYPE: NORMAL
BUN SERPL-MCNC: 12 MG/DL
CALCIUM SERPL-MCNC: 8.5 MG/DL
CHLORIDE SERPL-SCNC: 108 MMOL/L
CO2 SERPL-SCNC: 21 MMOL/L
CODING SYSTEM: NORMAL
CREAT SERPL-MCNC: 0.8 MG/DL
DIFFERENTIAL METHOD: ABNORMAL
DISPENSE STATUS: NORMAL
EOSINOPHIL # BLD AUTO: 0.2 K/UL
EOSINOPHIL NFR BLD: 4.5 %
ERYTHROCYTE [DISTWIDTH] IN BLOOD BY AUTOMATED COUNT: 14.6 %
EST. GFR  (AFRICAN AMERICAN): >60 ML/MIN/1.73 M^2
EST. GFR  (NON AFRICAN AMERICAN): >60 ML/MIN/1.73 M^2
GLUCOSE SERPL-MCNC: 105 MG/DL
HCT VFR BLD AUTO: 25 %
HGB BLD-MCNC: 7.9 G/DL
IMM GRANULOCYTES # BLD AUTO: 0.02 K/UL
IMM GRANULOCYTES NFR BLD AUTO: 0.4 %
LYMPHOCYTES # BLD AUTO: 0.8 K/UL
LYMPHOCYTES NFR BLD: 16.6 %
MCH RBC QN AUTO: 28 PG
MCHC RBC AUTO-ENTMCNC: 31.6 G/DL
MCV RBC AUTO: 89 FL
MONOCYTES # BLD AUTO: 0.4 K/UL
MONOCYTES NFR BLD: 9.3 %
NEUTROPHILS # BLD AUTO: 3.2 K/UL
NEUTROPHILS NFR BLD: 69 %
NRBC BLD-RTO: 0 /100 WBC
PLATELET # BLD AUTO: 179 K/UL
PMV BLD AUTO: 11.8 FL
POTASSIUM SERPL-SCNC: 4 MMOL/L
RBC # BLD AUTO: 2.82 M/UL
SODIUM SERPL-SCNC: 137 MMOL/L
TRANS ERYTHROCYTES VOL PATIENT: NORMAL ML
WBC # BLD AUTO: 4.64 K/UL

## 2018-10-06 PROCEDURE — 20600001 HC STEP DOWN PRIVATE ROOM

## 2018-10-06 PROCEDURE — 80048 BASIC METABOLIC PNL TOTAL CA: CPT

## 2018-10-06 PROCEDURE — 36415 COLL VENOUS BLD VENIPUNCTURE: CPT

## 2018-10-06 PROCEDURE — 63600175 PHARM REV CODE 636 W HCPCS: Performed by: STUDENT IN AN ORGANIZED HEALTH CARE EDUCATION/TRAINING PROGRAM

## 2018-10-06 PROCEDURE — 51798 US URINE CAPACITY MEASURE: CPT

## 2018-10-06 PROCEDURE — 85025 COMPLETE CBC W/AUTO DIFF WBC: CPT

## 2018-10-06 PROCEDURE — 51702 INSERT TEMP BLADDER CATH: CPT

## 2018-10-06 PROCEDURE — 25000003 PHARM REV CODE 250: Performed by: STUDENT IN AN ORGANIZED HEALTH CARE EDUCATION/TRAINING PROGRAM

## 2018-10-06 RX ORDER — CALCIUM CARBONATE 500(1250)
1000 TABLET ORAL
Status: DISCONTINUED | OUTPATIENT
Start: 2018-10-06 | End: 2018-10-07 | Stop reason: HOSPADM

## 2018-10-06 RX ADMIN — OXYCODONE HYDROCHLORIDE 10 MG: 10 TABLET ORAL at 08:10

## 2018-10-06 RX ADMIN — HYDROMORPHONE HYDROCHLORIDE 1 MG: 1 INJECTION, SOLUTION INTRAMUSCULAR; INTRAVENOUS; SUBCUTANEOUS at 05:10

## 2018-10-06 RX ADMIN — OXYCODONE HYDROCHLORIDE 10 MG: 10 TABLET ORAL at 07:10

## 2018-10-06 RX ADMIN — OXYCODONE HYDROCHLORIDE 10 MG: 10 TABLET ORAL at 11:10

## 2018-10-06 RX ADMIN — HYDROMORPHONE HYDROCHLORIDE 1 MG: 1 INJECTION, SOLUTION INTRAMUSCULAR; INTRAVENOUS; SUBCUTANEOUS at 09:10

## 2018-10-06 RX ADMIN — HYDROMORPHONE HYDROCHLORIDE 1 MG: 1 INJECTION, SOLUTION INTRAMUSCULAR; INTRAVENOUS; SUBCUTANEOUS at 01:10

## 2018-10-06 RX ADMIN — METHOCARBAMOL 750 MG: 750 TABLET ORAL at 08:10

## 2018-10-06 RX ADMIN — FAMOTIDINE 20 MG: 20 TABLET ORAL at 08:10

## 2018-10-06 RX ADMIN — HEPARIN SODIUM 5000 UNITS: 5000 INJECTION, SOLUTION INTRAVENOUS; SUBCUTANEOUS at 02:10

## 2018-10-06 RX ADMIN — CLINDAMYCIN HYDROCHLORIDE 300 MG: 150 CAPSULE ORAL at 02:10

## 2018-10-06 RX ADMIN — HEPARIN SODIUM 5000 UNITS: 5000 INJECTION, SOLUTION INTRAVENOUS; SUBCUTANEOUS at 06:10

## 2018-10-06 RX ADMIN — CLINDAMYCIN HYDROCHLORIDE 300 MG: 150 CAPSULE ORAL at 09:10

## 2018-10-06 RX ADMIN — OXYCODONE HYDROCHLORIDE 10 MG: 10 TABLET ORAL at 12:10

## 2018-10-06 RX ADMIN — CLINDAMYCIN HYDROCHLORIDE 300 MG: 150 CAPSULE ORAL at 06:10

## 2018-10-06 RX ADMIN — FAMOTIDINE 20 MG: 20 TABLET ORAL at 09:10

## 2018-10-06 RX ADMIN — HEPARIN SODIUM 5000 UNITS: 5000 INJECTION, SOLUTION INTRAVENOUS; SUBCUTANEOUS at 09:10

## 2018-10-06 RX ADMIN — POLYETHYLENE GLYCOL 3350 17 G: 17 POWDER, FOR SOLUTION ORAL at 08:10

## 2018-10-06 RX ADMIN — HYDROMORPHONE HYDROCHLORIDE 1 MG: 1 INJECTION, SOLUTION INTRAMUSCULAR; INTRAVENOUS; SUBCUTANEOUS at 10:10

## 2018-10-06 RX ADMIN — HYDROXYZINE HYDROCHLORIDE 25 MG: 25 TABLET, FILM COATED ORAL at 01:10

## 2018-10-06 RX ADMIN — HYDROMORPHONE HYDROCHLORIDE 1 MG: 1 INJECTION, SOLUTION INTRAMUSCULAR; INTRAVENOUS; SUBCUTANEOUS at 06:10

## 2018-10-06 RX ADMIN — OXYCODONE HYDROCHLORIDE 10 MG: 10 TABLET ORAL at 02:10

## 2018-10-06 RX ADMIN — CALCIUM 1000 MG: 500 TABLET ORAL at 01:10

## 2018-10-06 RX ADMIN — METHOCARBAMOL 750 MG: 750 TABLET ORAL at 02:10

## 2018-10-06 RX ADMIN — METHOCARBAMOL 750 MG: 750 TABLET ORAL at 09:10

## 2018-10-06 RX ADMIN — OXYCODONE HYDROCHLORIDE 10 MG: 10 TABLET ORAL at 04:10

## 2018-10-06 RX ADMIN — CALCIUM 1000 MG: 500 TABLET ORAL at 05:10

## 2018-10-06 NOTE — ASSESSMENT & PLAN NOTE
52 y.o. female POD 4 status post: L2-Pelvis Revision PSF     Pain control  PT/OT: WBAT   DVT PPx: SCDs at all times when not ambulating / Heparin TID  Abx: Postop  Labs: Reviewed - Hgb 7.9  Drain(s):  Superficial: 15cc  Carpio in place     Dispo: PT/OT as tolerated. Clindamycin 300TID while drains in place. Plan for mobilization with discharge Sunday if ready.

## 2018-10-06 NOTE — PROGRESS NOTES
[facility]  Orthopedics  Progress Note    Patient Name: Genia Davis  MRN: [id number]  Admission Date: 10/2/2018  Hospital Length of Stay: 4 days  Attending Provider: Rocky Herrmann MD  Primary Care Provider: Norman Vick MD  Follow-up For: Procedure(s) (LRB):  FUSION, SPINE, LUMBAR, TLIF, POSTERIOR APPROACH, USING PEDICLE SCREW T10-Pelvis Revision  (N/A)  CREATION, FLAP, MUSCLE ROTATION (N/A)    Post-Operative Day: 4 Days Post-Op  Subjective:     Principal Problem:Pseudoarthrosis of lumbar spine    Principal Orthopedic Problem: As above - s/p L2-Pelvis revision with L5/1 TLIF    Interval History: NAEO, Pain controlled, No headaches. Carpio had to be placed last night due to urinary retention. Drains in place.     Review of patient's allergies indicates:   Allergen Reactions    Hydrochlorothiazide Other (See Comments)     Muscle pain       Current Facility-Administered Medications   Medication    bisacodyl suppository 10 mg    clindamycin capsule 300 mg    famotidine tablet 20 mg    heparin (porcine) injection 5,000 Units    HYDROmorphone injection 1 mg    hydrOXYzine HCl tablet 25 mg    lactulose 20 gram/30 mL solution Soln 20 g    lisinopril tablet 20 mg    methocarbamol tablet 750 mg    ondansetron disintegrating tablet 8 mg    oxyCODONE immediate release tablet 5 mg    oxyCODONE immediate release tablet Tab 10 mg    polyethylene glycol packet 17 g    promethazine (PHENERGAN) 6.25 mg in dextrose 5 % 50 mL IVPB    ramelteon tablet 8 mg    sodium chloride 0.9% flush 5 mL     Objective:     Vital Signs (Most Recent):  Temp: 97.6 °F (36.4 °C) (10/06/18 0400)  Pulse: 69 (10/06/18 0400)  Resp: 15 (10/06/18 0400)  BP: (!) 107/53 (10/06/18 0400)  SpO2: 98 % (10/06/18 0400) Vital Signs (24h Range):  Temp:  [96.1 °F (35.6 °C)-98 °F (36.7 °C)] 97.6 °F (36.4 °C)  Pulse:  [69-86] 69  Resp:  [12-16] 15  SpO2:  [97 %-99 %] 98 %  BP: ()/(46-62) 107/53     Weight: 72.6 kg (160  "lb)  Height: 5' 5" (165.1 cm)  Body mass index is 26.63 kg/m².      Intake/Output Summary (Last 24 hours) at 10/6/2018 0649  Last data filed at 10/6/2018 0606  Gross per 24 hour   Intake 2760 ml   Output 2177.5 ml   Net 582.5 ml       Ortho/SPM Exam    PE:    AA&O x 4.  NAD  HEENT:  NCAT, sclera nonicteric  Lungs:  Respirations are equal and unlabored.  CV:  2+ bilateral upper and lower extremity pulses.  Skin:  Intact throughout.    MSK:    Dressings CDI  Motor at baseline  SILT at baseline        Significant Labs: All pertinent labs within the past 24 hours have been reviewed.    Lab Results   Component Value Date    WBC 4.64 10/06/2018    HGB 7.9 (L) 10/06/2018    HCT 25.0 (L) 10/06/2018    MCV 89 10/06/2018     10/06/2018       Significant Imaging: I have reviewed all pertinent imaging results/findings.    ROS        Assessment/Plan:     * Pseudoarthrosis of lumbar spine    52 y.o. female POD 4 status post: L2-Pelvis Revision PSF     Pain control  PT/OT: WBAT   DVT PPx: SCDs at all times when not ambulating / Heparin TID  Abx: Postop  Labs: Reviewed - Hgb 7.9  Drain(s): 227cc  Carpio in place     Dispo: PT/OT as tolerated. Clindamycin 300TID while drains in place. Plan for mobilization with discharge Sunday if ready.                Luis Enrique Thornton MD  Orthopedics  [facility]  "

## 2018-10-06 NOTE — NURSING
Ortho MD on call for pt was informed at 0800 of pt's blood pressure plummeting this morning. MD on call states to continue with hypertension medication.

## 2018-10-06 NOTE — SUBJECTIVE & OBJECTIVE
"Principal Problem:Pseudoarthrosis of lumbar spine    Principal Orthopedic Problem: As above - s/p L2-Pelvis revision with L5/1 TLIF    Interval History: NAEO, Pain controlled, No headaches. Carpio had to be placed last night due to urinary retention. Drains in place.     Review of patient's allergies indicates:   Allergen Reactions    Hydrochlorothiazide Other (See Comments)     Muscle pain       Current Facility-Administered Medications   Medication    bisacodyl suppository 10 mg    clindamycin capsule 300 mg    famotidine tablet 20 mg    heparin (porcine) injection 5,000 Units    HYDROmorphone injection 1 mg    hydrOXYzine HCl tablet 25 mg    lactulose 20 gram/30 mL solution Soln 20 g    lisinopril tablet 20 mg    methocarbamol tablet 750 mg    ondansetron disintegrating tablet 8 mg    oxyCODONE immediate release tablet 5 mg    oxyCODONE immediate release tablet Tab 10 mg    polyethylene glycol packet 17 g    promethazine (PHENERGAN) 6.25 mg in dextrose 5 % 50 mL IVPB    ramelteon tablet 8 mg    sodium chloride 0.9% flush 5 mL     Objective:     Vital Signs (Most Recent):  Temp: 97.6 °F (36.4 °C) (10/06/18 0400)  Pulse: 69 (10/06/18 0400)  Resp: 15 (10/06/18 0400)  BP: (!) 107/53 (10/06/18 0400)  SpO2: 98 % (10/06/18 0400) Vital Signs (24h Range):  Temp:  [96.1 °F (35.6 °C)-98 °F (36.7 °C)] 97.6 °F (36.4 °C)  Pulse:  [69-86] 69  Resp:  [12-16] 15  SpO2:  [97 %-99 %] 98 %  BP: ()/(46-62) 107/53     Weight: 72.6 kg (160 lb)  Height: 5' 5" (165.1 cm)  Body mass index is 26.63 kg/m².      Intake/Output Summary (Last 24 hours) at 10/6/2018 0649  Last data filed at 10/6/2018 0606  Gross per 24 hour   Intake 2760 ml   Output 2177.5 ml   Net 582.5 ml       Ortho/SPM Exam    PE:    AA&O x 4.  NAD  HEENT:  NCAT, sclera nonicteric  Lungs:  Respirations are equal and unlabored.  CV:  2+ bilateral upper and lower extremity pulses.  Skin:  Intact throughout.    MSK:    Dressings CDI  Motor at " baseline  SILT at baseline        Significant Labs: All pertinent labs within the past 24 hours have been reviewed.    Lab Results   Component Value Date    WBC 4.64 10/06/2018    HGB 7.9 (L) 10/06/2018    HCT 25.0 (L) 10/06/2018    MCV 89 10/06/2018     10/06/2018       Significant Imaging: I have reviewed all pertinent imaging results/findings.    ROS

## 2018-10-06 NOTE — NURSING
Notified Dr. Wallace with ortho that patient is unable to void on own after straight cath occurrence @ 2000. Bladder scan result of greater than 665cc. Patient attempted to urinate on own again but unsuccessful. MD ordered to place Carpio catheter at this time.

## 2018-10-06 NOTE — PROGRESS NOTES
Plastic Surgery Progress Note    Genia Davis is a 52 y.o. female 4 days s/p complex spine incision closure with muscle advancement flap.    No acute events o/n, pain currently as she just ambulated      Vitals:    10/05/18 1954 10/05/18 2359 10/06/18 0400 10/06/18 0721   BP: (!) 97/46 126/62 (!) 107/53 (!) 104/51   BP Location: Left arm Left arm Left arm Left arm   Patient Position: Lying Lying Lying Lying   Pulse: 84 79 69 69   Resp: 15 14 15 12   Temp: 98 °F (36.7 °C) 97.6 °F (36.4 °C) 97.6 °F (36.4 °C) 98.6 °F (37 °C)   TempSrc: Oral Oral Oral Oral   SpO2: 97% 98% 98% 95%   Weight:       Height:           Drain ouput 227.5cc s/s last 24hrs.     Gen:  NAD  Chest: Non-labored breathing  Heart: RRR  Incision c/d/i    Lab Results   Component Value Date    WBC 4.64 10/06/2018    HGB 7.9 (L) 10/06/2018    HCT 25.0 (L) 10/06/2018    MCV 89 10/06/2018     10/06/2018     Lab Results   Component Value Date    CREATININE 0.8 10/06/2018    BUN 12 10/06/2018     10/06/2018    K 4.0 10/06/2018     10/06/2018    CO2 21 (L) 10/06/2018         Plan:  Appreciate primary team management of this patient.    Please keep drain in place    Denis Martino MD  Plastic Surgery Fellow

## 2018-10-06 NOTE — PLAN OF CARE
Problem: Patient Care Overview  Goal: Plan of Care Review  POC is pain management, encouraging pt to ambulate with assist x1 to bathroom frequently in attempt to pass a BM.

## 2018-10-06 NOTE — CARE UPDATE
Patient was evaluated at 0000 this am for not voiding. Patient was given in and out cath at 8 pm yesterday. When she failed trail to void, she was examined. She had no suprapubic tenderness, mild distension of the bladder. The bladder scanner  Showed 650 cc fluid in bladder.    Ordered mchugh insertion for seven days with routine mchugh care.

## 2018-10-06 NOTE — PLAN OF CARE
Problem: Patient Care Overview  Goal: Plan of Care Review  Outcome: Ongoing (interventions implemented as appropriate)  POC reviewed with patient who verbalized understanding. AAOx4. VSS on room air. Vertical back incision noted with Telfa dressing CDI. Left lower back JOSE drain intact with serosanguinous output. Pain controlled with PRN medications per MAR. Tolerating regular diet, denies any nausea. Carpio reinserted, see previous note. Passing gas. Safety precautions maintained, call light within reach. BLE SCDS in place with heel boots.Remains free from falls and injury. No acute events. No distress noted. Will continue to monitor. Spouse at bedside.

## 2018-10-07 VITALS
WEIGHT: 160 LBS | HEART RATE: 71 BPM | RESPIRATION RATE: 16 BRPM | BODY MASS INDEX: 26.66 KG/M2 | DIASTOLIC BLOOD PRESSURE: 66 MMHG | TEMPERATURE: 98 F | SYSTOLIC BLOOD PRESSURE: 145 MMHG | HEIGHT: 65 IN | OXYGEN SATURATION: 97 %

## 2018-10-07 LAB
ANION GAP SERPL CALC-SCNC: 7 MMOL/L
BASOPHILS # BLD AUTO: 0 K/UL
BASOPHILS NFR BLD: 0 %
BUN SERPL-MCNC: 11 MG/DL
CALCIUM SERPL-MCNC: 8.7 MG/DL
CHLORIDE SERPL-SCNC: 107 MMOL/L
CO2 SERPL-SCNC: 25 MMOL/L
CREAT SERPL-MCNC: 0.8 MG/DL
DIFFERENTIAL METHOD: ABNORMAL
EOSINOPHIL # BLD AUTO: 0.2 K/UL
EOSINOPHIL NFR BLD: 5.2 %
ERYTHROCYTE [DISTWIDTH] IN BLOOD BY AUTOMATED COUNT: 15.1 %
EST. GFR  (AFRICAN AMERICAN): >60 ML/MIN/1.73 M^2
EST. GFR  (NON AFRICAN AMERICAN): >60 ML/MIN/1.73 M^2
GLUCOSE SERPL-MCNC: 106 MG/DL
HCT VFR BLD AUTO: 24.6 %
HGB BLD-MCNC: 7.6 G/DL
IMM GRANULOCYTES # BLD AUTO: 0.03 K/UL
IMM GRANULOCYTES NFR BLD AUTO: 0.7 %
LYMPHOCYTES # BLD AUTO: 0.9 K/UL
LYMPHOCYTES NFR BLD: 22.3 %
MCH RBC QN AUTO: 28.3 PG
MCHC RBC AUTO-ENTMCNC: 30.9 G/DL
MCV RBC AUTO: 91 FL
MONOCYTES # BLD AUTO: 0.4 K/UL
MONOCYTES NFR BLD: 10.9 %
NEUTROPHILS # BLD AUTO: 2.5 K/UL
NEUTROPHILS NFR BLD: 60.9 %
NRBC BLD-RTO: 0 /100 WBC
PLATELET # BLD AUTO: 185 K/UL
PMV BLD AUTO: 11.8 FL
POTASSIUM SERPL-SCNC: 4.4 MMOL/L
RBC # BLD AUTO: 2.69 M/UL
SODIUM SERPL-SCNC: 139 MMOL/L
WBC # BLD AUTO: 4.04 K/UL

## 2018-10-07 PROCEDURE — 25000003 PHARM REV CODE 250: Performed by: STUDENT IN AN ORGANIZED HEALTH CARE EDUCATION/TRAINING PROGRAM

## 2018-10-07 PROCEDURE — 80048 BASIC METABOLIC PNL TOTAL CA: CPT

## 2018-10-07 PROCEDURE — 36415 COLL VENOUS BLD VENIPUNCTURE: CPT

## 2018-10-07 PROCEDURE — 63600175 PHARM REV CODE 636 W HCPCS: Performed by: STUDENT IN AN ORGANIZED HEALTH CARE EDUCATION/TRAINING PROGRAM

## 2018-10-07 PROCEDURE — 85025 COMPLETE CBC W/AUTO DIFF WBC: CPT

## 2018-10-07 RX ORDER — POLYETHYLENE GLYCOL 3350 17 G/17G
17 POWDER, FOR SOLUTION ORAL DAILY
Qty: 238 G | Refills: 0 | Status: SHIPPED | OUTPATIENT
Start: 2018-10-07 | End: 2018-10-23 | Stop reason: ALTCHOICE

## 2018-10-07 RX ORDER — ONDANSETRON 8 MG/1
8 TABLET, ORALLY DISINTEGRATING ORAL EVERY 8 HOURS PRN
Qty: 50 TABLET | Refills: 0 | Status: SHIPPED | OUTPATIENT
Start: 2018-10-07 | End: 2018-10-23 | Stop reason: ALTCHOICE

## 2018-10-07 RX ORDER — CLINDAMYCIN HYDROCHLORIDE 300 MG/1
300 CAPSULE ORAL EVERY 8 HOURS
Qty: 42 CAPSULE | Refills: 0 | Status: SHIPPED | OUTPATIENT
Start: 2018-10-07 | End: 2018-10-21

## 2018-10-07 RX ORDER — METHOCARBAMOL 750 MG/1
750 TABLET, FILM COATED ORAL 3 TIMES DAILY
Qty: 50 TABLET | Refills: 0 | Status: SHIPPED | OUTPATIENT
Start: 2018-10-07 | End: 2018-11-13 | Stop reason: CLARIF

## 2018-10-07 RX ORDER — HYDROXYZINE HYDROCHLORIDE 25 MG/1
25 TABLET, FILM COATED ORAL 3 TIMES DAILY PRN
Qty: 45 TABLET | Refills: 0 | Status: SHIPPED | OUTPATIENT
Start: 2018-10-07 | End: 2018-10-25

## 2018-10-07 RX ORDER — OXYCODONE AND ACETAMINOPHEN 10; 325 MG/1; MG/1
1 TABLET ORAL EVERY 4 HOURS PRN
Qty: 90 TABLET | Refills: 0 | Status: SHIPPED | OUTPATIENT
Start: 2018-10-07 | End: 2018-10-25 | Stop reason: SDUPTHER

## 2018-10-07 RX ADMIN — CALCIUM 1000 MG: 500 TABLET ORAL at 02:10

## 2018-10-07 RX ADMIN — METHOCARBAMOL 750 MG: 750 TABLET ORAL at 09:10

## 2018-10-07 RX ADMIN — OXYCODONE HYDROCHLORIDE 10 MG: 10 TABLET ORAL at 06:10

## 2018-10-07 RX ADMIN — CALCIUM 1000 MG: 500 TABLET ORAL at 09:10

## 2018-10-07 RX ADMIN — LISINOPRIL 20 MG: 20 TABLET ORAL at 09:10

## 2018-10-07 RX ADMIN — METHOCARBAMOL 750 MG: 750 TABLET ORAL at 02:10

## 2018-10-07 RX ADMIN — HYDROMORPHONE HYDROCHLORIDE 1 MG: 1 INJECTION, SOLUTION INTRAMUSCULAR; INTRAVENOUS; SUBCUTANEOUS at 09:10

## 2018-10-07 RX ADMIN — FAMOTIDINE 20 MG: 20 TABLET ORAL at 09:10

## 2018-10-07 RX ADMIN — CLINDAMYCIN HYDROCHLORIDE 300 MG: 150 CAPSULE ORAL at 01:10

## 2018-10-07 RX ADMIN — CLINDAMYCIN HYDROCHLORIDE 300 MG: 150 CAPSULE ORAL at 06:10

## 2018-10-07 RX ADMIN — HYDROMORPHONE HYDROCHLORIDE 1 MG: 1 INJECTION, SOLUTION INTRAMUSCULAR; INTRAVENOUS; SUBCUTANEOUS at 03:10

## 2018-10-07 RX ADMIN — HYDROMORPHONE HYDROCHLORIDE 1 MG: 1 INJECTION, SOLUTION INTRAMUSCULAR; INTRAVENOUS; SUBCUTANEOUS at 01:10

## 2018-10-07 RX ADMIN — OXYCODONE HYDROCHLORIDE 10 MG: 10 TABLET ORAL at 12:10

## 2018-10-07 RX ADMIN — HEPARIN SODIUM 5000 UNITS: 5000 INJECTION, SOLUTION INTRAVENOUS; SUBCUTANEOUS at 06:10

## 2018-10-07 NOTE — PLAN OF CARE
Pt being discharged home today, with [facility].  Pt states she has all DME, and also states that her family will provide transportation home. Pt ready to discharge.     Awaiting call back from [facility] in [place], [phone], nurse will be out to see patient tomorrow.

## 2018-10-07 NOTE — NURSING
Discharge instructions and prescriptions given and explained to pt and spouse. Both verbalized understanding of information. IV d/c'd. Leg bag attached to mchugh catheter. Eduction provided on how to drain mchugh. Drainage bag provided as well. Instructions provided on how to empty sari drain. I/O log provided. Pt left unit via personal wheelchair with spouse and daughter.

## 2018-10-07 NOTE — HPI
Genia Dugan Ryan is admitted for issues following revision T11-pelvis PSF with L iliac connector.  The patient had been doing well up until 6 weeks ago when she began experiencing new onser back pain. She has pain with inhalation on the right and radicular nerve and left hip pain. She has been trying NSAIDS with little relief. She is admitted and booked for L2-Pelvis Revision PSF.

## 2018-10-07 NOTE — DISCHARGE SUMMARY
[facility]  Orthopedics  Discharge Summary      Patient Name: Genia Davis  MRN: [id number]  Admission Date: 10/2/2018  Hospital Length of Stay: 5 days  Discharge Date and Time:  10/07/2018 9:41 AM  Attending Physician: Rocky Herrmann MD   Discharging Provider: Luis Enrique Thornton MD  Primary Care Provider: Norman Vick MD    HPI:   Genia Davis is  admitted for issues following revision T11-pelvis PSF with L iliac connector.  The patient had been doing well up until 6 weeks ago when she began experiencing new onser back pain. She has pain with inhalation on the right and radicular nerve and left hip pain. She has been trying NSAIDS with little relief.  She is admitted and booked for L2-Pelvis Revision PSF.         Procedure(s) (LRB):  FUSION, SPINE, LUMBAR, TLIF, POSTERIOR APPROACH, USING PEDICLE SCREW T10-Pelvis Revision  (N/A)  CREATION, FLAP, MUSCLE ROTATION (N/A)      Hospital Course:  Patient presented for above procedure.  Tolerated it well and was discharged home POD 5 after tolerating diet, ambulating, pain controlled.  She is being discharged with a Carpio in place due to urinary retention. She is scheduled to see Urology later this week for removal. She is also being discharged with a drain placed by Plastics. She has an appointment on 10/10 for its removal. She is being sent out on Clindamycin. Discharge instructions, follow-up appointment, and med rec are below.      Consults (From admission, onward)        Status Ordering Provider     Inpatient consult to Neuro Critical Care  Once     Provider:  (Not yet assigned)    Acknowledged BRIDGETT PACHECO     IP consult case management/social work  Once     Provider:  (Not yet assigned)    Acknowledged BRIDGETT PACHECO          Significant Diagnostic Studies: Labs:   CBC   Recent Labs   Lab  10/06/18   0331  10/07/18   0403   WBC  4.64  4.04   HGB  7.9*  7.6*   HCT  25.0*  24.6*   PLT  179  185       Pending Diagnostic Studies:      Procedure Component Value Units Date/Time    CT Interoperative Limited [[id number]] Updated:  10/02/18 1628    Order Status:  Sent Lab Status:  In process         Final Active Diagnoses:    Diagnosis Date Noted POA    PRINCIPAL PROBLEM:  Pseudoarthrosis of lumbar spine [S32.009K] 09/10/2018 Yes    Essential hypertension [I10] 11/11/2016 Yes     Chronic      Problems Resolved During this Admission:      Discharged Condition: good    Disposition: Home or Self Care    Follow Up:  Plastics: 10/10/18 to remove drain  Urology: will call with appt time to remove Carpio  Orthopedics: will call with appt for wound check    Patient Instructions:      Ambulatory Referral to Urology   Referral Priority: Routine Referral Type: Consultation   Referral Reason: Specialty Services Required   Requested Specialty: Urology   Number of Visits Requested: 1     Medications:  Reconciled Home Medications:      Medication List      START taking these medications    clindamycin 300 MG capsule  Commonly known as:  CLEOCIN  Take 1 capsule (300 mg total) by mouth every 8 (eight) hours. for 14 days     hydrOXYzine HCl 25 MG tablet  Commonly known as:  ATARAX  Take 1 tablet (25 mg total) by mouth 3 (three) times daily as needed for Itching.     methocarbamol 750 MG Tab  Commonly known as:  ROBAXIN  Take 1 tablet (750 mg total) by mouth 3 (three) times daily.     ondansetron 8 MG Tbdl  Commonly known as:  ZOFRAN-ODT  Take 1 tablet (8 mg total) by mouth every 8 (eight) hours as needed.     oxyCODONE-acetaminophen  mg per tablet  Commonly known as:  PERCOCET  Take 1 tablet by mouth every 4 (four) hours as needed for Pain.     polyethylene glycol 17 gram/dose powder  Commonly known as:  GLYCOLAX  Take 17 g by mouth once daily.        CONTINUE taking these medications    b complex vitamins tablet  Take 1 tablet by mouth once daily.     lisinopril 20 MG tablet  Commonly known as:  PRINIVIL,ZESTRIL  Take 1 tablet (20 mg total) by mouth once  daily.        STOP taking these medications    HYDROcodone-acetaminophen  mg per tablet  Commonly known as:  DEVIN Thornton MD  Orthopedics  [facility]

## 2018-10-07 NOTE — PROGRESS NOTES
[facility]  Orthopedics  Progress Note    Patient Name: Genia Davis  MRN: [id number]  Admission Date: 10/2/2018  Hospital Length of Stay: 5 days  Attending Provider: Rocky Herrmann MD  Primary Care Provider: Norman Vick MD  Follow-up For: Procedure(s) (LRB):  FUSION, SPINE, LUMBAR, TLIF, POSTERIOR APPROACH, USING PEDICLE SCREW T10-Pelvis Revision  (N/A)  CREATION, FLAP, MUSCLE ROTATION (N/A)    Post-Operative Day: 5 Days Post-Op  Subjective:     Principal Problem:Pseudoarthrosis of lumbar spine    Principal Orthopedic Problem: As above - s/p L2-Pelvis revision with L5/1 TLIF    Interval History: NAEO, Pain controlled, No headaches. Carpio in place. Drains in place.     Review of patient's allergies indicates:   Allergen Reactions    Hydrochlorothiazide Other (See Comments)     Muscle pain       Current Facility-Administered Medications   Medication    bisacodyl suppository 10 mg    calcium carbonate (OS-JARVIS) tablet 500 mg    clindamycin capsule 300 mg    famotidine tablet 20 mg    heparin (porcine) injection 5,000 Units    HYDROmorphone injection 1 mg    hydrOXYzine HCl tablet 25 mg    lactulose 20 gram/30 mL solution Soln 20 g    lisinopril tablet 20 mg    methocarbamol tablet 750 mg    ondansetron disintegrating tablet 8 mg    oxyCODONE immediate release tablet 5 mg    oxyCODONE immediate release tablet Tab 10 mg    polyethylene glycol packet 17 g    promethazine (PHENERGAN) 6.25 mg in dextrose 5 % 50 mL IVPB    ramelteon tablet 8 mg    sodium chloride 0.9% flush 5 mL     Objective:     Vital Signs (Most Recent):  Temp: 98.7 °F (37.1 °C) (10/07/18 0419)  Pulse: 80 (10/07/18 0419)  Resp: 18 (10/07/18 0419)  BP: 132/61 (10/07/18 0419)  SpO2: 99 % (10/07/18 0419) Vital Signs (24h Range):  Temp:  [97.7 °F (36.5 °C)-98.7 °F (37.1 °C)] 98.7 °F (37.1 °C)  Pulse:  [74-90] 80  Resp:  [14-18] 18  SpO2:  [95 %-100 %] 99 %  BP: (106-133)/(50-61) 132/61     Weight: 72.6 kg (160  "lb)  Height: 5' 5" (165.1 cm)  Body mass index is 26.63 kg/m².      Intake/Output Summary (Last 24 hours) at 10/7/2018 0758  Last data filed at 10/7/2018 0419  Gross per 24 hour   Intake 1140 ml   Output 1385 ml   Net -245 ml       Ortho/SPM Exam  PE:    AA&O x 4.  NAD  HEENT:  NCAT, sclera nonicteric  Lungs:  Respirations are equal and unlabored.  CV:  2+ bilateral upper and lower extremity pulses.  Skin:  Intact throughout.    MSK:    Dressings CDI  Motor at baseline  SILT at baseline        Significant Labs: All pertinent labs within the past 24 hours have been reviewed.    Lab Results   Component Value Date    WBC 4.04 10/07/2018    HGB 7.6 (L) 10/07/2018    HCT 24.6 (L) 10/07/2018    MCV 91 10/07/2018     10/07/2018       Significant Imaging: I have reviewed all pertinent imaging results/findings.    ROS      Assessment/Plan:     * Pseudoarthrosis of lumbar spine    52 y.o. female POD 5 status post: L2-Pelvis Revision PSF     Pain control  PT/OT: WBAT   DVT PPx: SCDs at all times when not ambulating / Heparin TID  Abx: Postop  Labs: Reviewed   Drain(s):  145cc  Carpio in place     Dispo: DC today with Carpio. Clindamycin 300TID while drains in place. F/u with urology already established for 10/10/18                Luis Enrique Thornton MD  Orthopedics  [facility]  "

## 2018-10-07 NOTE — ASSESSMENT & PLAN NOTE
52 y.o. female POD 5 status post: L2-Pelvis Revision PSF     Pain control  PT/OT: WBAT   DVT PPx: SCDs at all times when not ambulating / Heparin TID  Abx: Postop  Labs: Reviewed   Drain(s):  145cc  Carpio in place     Dispo: DC today with Carpio. Clindamycin 300TID while drains in place. F/u with urology already established for 10/10/18

## 2018-10-07 NOTE — ASSESSMENT & PLAN NOTE
52 y.o. female POD 5 status post: L2-Pelvis Revision PSF     Abx: Clinda 300 TID    Appointments:  Plastics: 10/10/18 to remove drain  Urology: will call with appt time to remove Carpio  Orthopedics: will call with appt for wound check

## 2018-10-07 NOTE — PLAN OF CARE
Problem: Patient Care Overview  Goal: Plan of Care Review  Outcome: Ongoing (interventions implemented as appropriate)  Plan of care reviewed, patient verbalized understanding. AAAOx4. VSS throughout shift. Pain managed with PRN meds. Patient on regular diet tolerating well no complaints of N/V. Carpio intact with adequate urine output. Remains free of falls/injuries. No acute distress at this time. Will continue to monitor.

## 2018-10-07 NOTE — PLAN OF CARE
[facility]    HOME HEALTH ORDERS  FACE TO FACE ENCOUNTER    Patient Name: Genia Davis  YOB: 1965    PCP: Norman Vick MD   PCP Address: [address] / [place] [address]  PCP Phone Number: [phone]  PCP Fax: [phone]    Encounter Date: 10/07/2018    Admit to Home Health    Diagnoses:  Active Hospital Problems    Diagnosis  POA    *Pseudoarthrosis of lumbar spine [S32.009K]  Yes    Essential hypertension [I10]  Yes     Chronic      Resolved Hospital Problems   No resolved problems to display.       Future Appointments   Date Time Provider Department Center   10/10/2018  2:00 PM TAMRA Molina [facility] PLASTIC [facility]   12/13/2018  9:40 AM MIKE Terry [facility]           I have seen and examined this patient face to face today. My clinical findings that support the need for the home health skilled services and home bound status are the following:  Weakness/numbness causing balance and gait disturbance due to Surgery making it taxing to leave home.    Allergies:  Review of patient's allergies indicates:   Allergen Reactions    Hydrochlorothiazide Other (See Comments)     Muscle pain       Diet: regular diet    Activities: WBAT in LSO    Nursing:   SN to complete comprehensive assessment including routine vital signs. Instruct on disease process and s/s of complications to report to MD. Review/verify medication list sent home with the patient at time of discharge  and instruct patient/caregiver as needed. Frequency may be adjusted depending on start of care date.    Notify MD if SBP > 160 or < 90; DBP > 90 or < 50; HR > 120 or < 50; Temp > 101; Other:         CONSULTS:    Physical Therapy to evaluate and treat. Evaluate for home safety and equipment needs; Establish/upgrade home exercise program. Perform / instruct on therapeutic exercises, gait training, transfer training, and Range of Motion.  Occupational Therapy to evaluate and treat.  Evaluate home environment for safety and equipment needs. Perform/Instruct on transfers, ADL training, ROM, and therapeutic exercises.  Aide to provide assistance with personal care, ADLs, and vital signs.    MISCELLANEOUS CARE:  N/A    WOUND CARE ORDERS  n/a      Medications: Review discharge medications with patient and family and provide education.      Current Discharge Medication List      START taking these medications    Details   clindamycin (CLEOCIN) 300 MG capsule Take 1 capsule (300 mg total) by mouth every 8 (eight) hours. for 14 days  Qty: 42 capsule, Refills: 0      hydrOXYzine HCl (ATARAX) 25 MG tablet Take 1 tablet (25 mg total) by mouth 3 (three) times daily as needed for Itching.  Qty: 45 tablet, Refills: 0      methocarbamol (ROBAXIN) 750 MG Tab Take 1 tablet (750 mg total) by mouth 3 (three) times daily.  Qty: 50 tablet, Refills: 0      ondansetron (ZOFRAN-ODT) 8 MG TbDL Take 1 tablet (8 mg total) by mouth every 8 (eight) hours as needed.  Qty: 50 tablet, Refills: 0      oxyCODONE-acetaminophen (PERCOCET)  mg per tablet Take 1 tablet by mouth every 4 (four) hours as needed for Pain.  Qty: 90 tablet, Refills: 0      polyethylene glycol (GLYCOLAX) 17 gram/dose powder Take 17 g by mouth once daily.  Qty: 238 g, Refills: 0         CONTINUE these medications which have NOT CHANGED    Details   b complex vitamins tablet Take 1 tablet by mouth once daily.      lisinopril (PRINIVIL,ZESTRIL) 20 MG tablet Take 1 tablet (20 mg total) by mouth once daily.  Qty: 90 tablet, Refills: 3    Comments: Please note dose is changed due to side effects from hctz  Associated Diagnoses: Essential hypertension         STOP taking these medications       HYDROcodone-acetaminophen (NORCO)  mg per tablet Comments:   Reason for Stopping:               I certify that this patient is confined to her home and needs intermittent skilled nursing care, physical therapy and occupational therapy.

## 2018-10-07 NOTE — HOSPITAL COURSE
Patient presented for above procedure.  Tolerated it well and was discharged home POD 5 after tolerating diet, ambulating, pain controlled.  She is being discharged with a Carpio in place due to urinary retention. She is scheduled to see Urology later this week for removal. She is also being discharged with a drain placed by Plastics. She has an appointment on 10/10 for its removal. She is being sent out on Clindamycin. Discharge instructions, follow-up appointment, and med rec are below.

## 2018-10-07 NOTE — SUBJECTIVE & OBJECTIVE
"Principal Problem:Pseudoarthrosis of lumbar spine    Principal Orthopedic Problem: As above - s/p L2-Pelvis revision with L5/1 TLIF    Interval History: NAEO, Pain controlled, No headaches. Carpio in place. Drains in place.     Review of patient's allergies indicates:   Allergen Reactions    Hydrochlorothiazide Other (See Comments)     Muscle pain       Current Facility-Administered Medications   Medication    bisacodyl suppository 10 mg    calcium carbonate (OS-JARVIS) tablet 500 mg    clindamycin capsule 300 mg    famotidine tablet 20 mg    heparin (porcine) injection 5,000 Units    HYDROmorphone injection 1 mg    hydrOXYzine HCl tablet 25 mg    lactulose 20 gram/30 mL solution Soln 20 g    lisinopril tablet 20 mg    methocarbamol tablet 750 mg    ondansetron disintegrating tablet 8 mg    oxyCODONE immediate release tablet 5 mg    oxyCODONE immediate release tablet Tab 10 mg    polyethylene glycol packet 17 g    promethazine (PHENERGAN) 6.25 mg in dextrose 5 % 50 mL IVPB    ramelteon tablet 8 mg    sodium chloride 0.9% flush 5 mL     Objective:     Vital Signs (Most Recent):  Temp: 98.7 °F (37.1 °C) (10/07/18 0419)  Pulse: 80 (10/07/18 0419)  Resp: 18 (10/07/18 0419)  BP: 132/61 (10/07/18 0419)  SpO2: 99 % (10/07/18 0419) Vital Signs (24h Range):  Temp:  [97.7 °F (36.5 °C)-98.7 °F (37.1 °C)] 98.7 °F (37.1 °C)  Pulse:  [74-90] 80  Resp:  [14-18] 18  SpO2:  [95 %-100 %] 99 %  BP: (106-133)/(50-61) 132/61     Weight: 72.6 kg (160 lb)  Height: 5' 5" (165.1 cm)  Body mass index is 26.63 kg/m².      Intake/Output Summary (Last 24 hours) at 10/7/2018 0758  Last data filed at 10/7/2018 0419  Gross per 24 hour   Intake 1140 ml   Output 1385 ml   Net -245 ml       Ortho/SPM Exam  PE:    AA&O x 4.  NAD  HEENT:  NCAT, sclera nonicteric  Lungs:  Respirations are equal and unlabored.  CV:  2+ bilateral upper and lower extremity pulses.  Skin:  Intact throughout.    MSK:    Dressings CDI  Motor at baseline  SILT at " baseline        Significant Labs: All pertinent labs within the past 24 hours have been reviewed.    Lab Results   Component Value Date    WBC 4.04 10/07/2018    HGB 7.6 (L) 10/07/2018    HCT 24.6 (L) 10/07/2018    MCV 91 10/07/2018     10/07/2018       Significant Imaging: I have reviewed all pertinent imaging results/findings.    ROS

## 2018-10-08 ENCOUNTER — TELEPHONE (OUTPATIENT)
Dept: UROLOGY | Facility: CLINIC | Age: 53
End: 2018-10-08

## 2018-10-08 NOTE — PLAN OF CARE
On 10/8/2018 at 0829: CM noted patient discharged home on 10/7/2018. Patient discharged home with [facility].    Future Appointments   Date Time Provider Department Center   10/10/2018  2:00 PM TAMRA Molina [facility] PLASTIC [facility]   12/13/2018  9:40 AM MIKE Terry [facility]      10/08/18 0836   Final Note   Assessment Type Final Discharge Note   Discharge Disposition Home-Health  ([facility])   Hospital Follow Up  Appt(s) scheduled? Yes   Discharge plans and expectations educations in teach back method with documentation complete? Yes

## 2018-10-09 ENCOUNTER — OFFICE VISIT (OUTPATIENT)
Dept: UROLOGY | Facility: CLINIC | Age: 53
End: 2018-10-09
Payer: COMMERCIAL

## 2018-10-09 ENCOUNTER — TELEPHONE (OUTPATIENT)
Dept: ORTHOPEDICS | Facility: CLINIC | Age: 53
End: 2018-10-09

## 2018-10-09 VITALS
SYSTOLIC BLOOD PRESSURE: 123 MMHG | HEART RATE: 75 BPM | HEIGHT: 65 IN | WEIGHT: 156 LBS | BODY MASS INDEX: 25.99 KG/M2 | DIASTOLIC BLOOD PRESSURE: 71 MMHG

## 2018-10-09 DIAGNOSIS — R33.9 URINARY RETENTION: Primary | ICD-10-CM

## 2018-10-09 PROCEDURE — 99204 OFFICE O/P NEW MOD 45 MIN: CPT | Mod: S$GLB,,, | Performed by: NURSE PRACTITIONER

## 2018-10-09 PROCEDURE — 3078F DIAST BP <80 MM HG: CPT | Mod: CPTII,S$GLB,, | Performed by: NURSE PRACTITIONER

## 2018-10-09 PROCEDURE — 99999 PR PBB SHADOW E&M-EST. PATIENT-LVL III: CPT | Mod: PBBFAC,,, | Performed by: NURSE PRACTITIONER

## 2018-10-09 PROCEDURE — 3008F BODY MASS INDEX DOCD: CPT | Mod: CPTII,S$GLB,, | Performed by: NURSE PRACTITIONER

## 2018-10-09 PROCEDURE — 3074F SYST BP LT 130 MM HG: CPT | Mod: CPTII,S$GLB,, | Performed by: NURSE PRACTITIONER

## 2018-10-09 NOTE — LETTER
October 10, 2018      Jay Koo MD  [address]  [facility] [address]           [facility] - Urology  [address] [place] [address] Dr. Ray  [facility] [address]  Phone: [phone]  Fax: [phone]          Patient: Genia Davis   MR Number: [id number]   YOB: 1965   Date of Visit: 10/9/2018       Dear Dr. Jay Koo:    Thank you for referring Genia Davis to me for evaluation. Attached you will find relevant portions of my assessment and plan of care.    If you have questions, please do not hesitate to call me. I look forward to following Genia Davis along with you.    Sincerely,    Radha Bertrand, [facility]    Enclosure  CC:  No Recipients    If you would like to receive this communication electronically, please contact [email or web address] or [phone] to request more information on [employer] Link access.    For providers and/or their staff who would like to refer a patient to [facility], please contact us through our one-stop-shop provider referral line, [facility], at [phone].    If you feel you have received this communication in error or would no longer like to receive these types of communications, please e-mail [email or web address]

## 2018-10-09 NOTE — PROGRESS NOTES
[facility] Note  Staff: SURESH EstevesP-C    PCP: Dr. Norman Vick    Chief Complaint: Urinary Retention s/p back surgery (10-2-18)    Subjective:        HPI: Genia Davis is a 52 y.o. female NEW PATIENT presents IN OFFICE today with mchugh catheter in place and intact due to recent episode of urinary retention s/p back surgery.     On 10/02/18 Dr. Rocky Herrmann performed the following on pt:  PROCEDURES PERFORMED:  1.  Revision posterior spinal fusion, L2 to pelvis.  2.  Removal and reinsertion of posterior segmental instrumentation.  3.  Removal of pelvic instrumentation.  4.  Revision pelvic fixation with S2AI screws.  5.  Posterior lumbar interbody fusion, L5 to S1.  6.  Application of titanium intervertebral spacer device to L5 to S1 disk   defect.  7.  Local bone graft and Infuse large bone graft.  8.  Use of intraoperative CT-guided navigation.    Pt was then readmitted for postop complications with surgery-spinal fluid leak.  revision T11-pelvis PSF with L iliac connector.      Pt has had no prior problems with dysuria, hematuria or with urination habits.  She has had multiple back surgeries in the past and never had problems with urination afterwards.  She currently has a JOSE drain within left lower back region which she is scheduled to have removed tomorrow at [facility].    REVIEW OF SYSTEMS:  Review of Systems   Constitutional: Negative for chills, diaphoresis, fever and weight loss.   HENT: Negative for congestion, hearing loss, nosebleeds and sore throat.    Eyes: Negative for blurred vision and pain.   Respiratory: Negative for cough and wheezing.    Cardiovascular: Negative for chest pain, palpitations and leg swelling.   Gastrointestinal: Negative for abdominal pain, heartburn, nausea and vomiting.   Genitourinary: Negative for dysuria, flank pain, frequency, hematuria and urgency.        Urinary retention-mchugh catheter in place and intact   Musculoskeletal:  Positive for back pain (Recent back surgery). Negative for joint pain, myalgias and neck pain.   Skin: Negative for itching and rash.   Neurological: Negative for dizziness, tremors, sensory change, seizures, loss of consciousness, weakness and headaches.   Endo/Heme/Allergies: Does not bruise/bleed easily.   Psychiatric/Behavioral: Negative for depression and suicidal ideas. The patient is not nervous/anxious.      PMHx:  Past Medical History:   Diagnosis Date    Cancer     tumor on back, was removed    Encounter for blood transfusion      PSHx:  Past Surgical History:   Procedure Laterality Date    BACK SURGERY      x 5    CHOLECYSTECTOMY      CLOSURE - back N/A 11/19/2015    [facility]    CREATION OF MUSCLE ROTATIONAL FLAP N/A 10/2/2018    Procedure: CREATION, FLAP, MUSCLE ROTATION;  Surgeon: Rommel Cox MD;  Location: [facility] OR [address] [place] [address];  Service: Plastics;  Laterality: N/A;    CREATION, FLAP, MUSCLE ROTATION N/A 10/2/2018    [facility]    FLAP-MUSCLE (ROTATION) - bilateral latissimus dorsi N/A 11/19/2015    [facility]    FUSION, SPINE, LUMBAR, TLIF, POSTERIOR APPROACH, USING PEDICLE SCREW T10-Pelvis Revision  N/A 10/2/2018    [facility]    FUSION-SPINAL - T11-Pelvis Posterior Spinal Fusion/Revision Exploration Spinal Fusion Posterior Instrumentation T11-Pelvis N/A 11/19/2015    Performed by Rocky Herrmann MD at [facility] OR [facility]    INJECTION-STEROID-EPIDURAL-TRANSFORAMINAL Left 11/16/2016    Performed by Kevan Bah MD at [facility]    INJECTION-STEROID-EPIDURAL-TRANSFORAMINAL Left 7/6/2016    Performed by Kevan Bah MD at [facility]    MYELOGRAM-THORACIC AND LUMBAR N/A 6/20/2016    Performed by [facility]    Revision of hardware T10-Pelvis N/A  2/12/2016    Performed by Rocky Herrmann MD at [facility] OR [address] [place] [address] Hx:   malignancies: No , gyn malignancies: No   kidney stones: No     Allergies:  Hydrochlorothiazide    Medications: reviewed   Anticoagulation: No    Objective:     Vitals:    10/09/18 1403   BP: 123/71   Pulse: 75     Physical Exam   Vitals reviewed.  Constitutional: She is oriented to person, place, and time. She appears well-developed and well-nourished.   HENT:   Head: Normocephalic and atraumatic.   Eyes: Conjunctivae and EOM are normal. Pupils are equal, round, and reactive to light.   Neck: Normal range of motion. Neck supple.   Cardiovascular: Normal rate, regular rhythm, normal heart sounds and intact distal pulses.    Pulmonary/Chest: Effort normal and breath sounds normal.   Abdominal: Soft. Bowel sounds are normal.   Musculoskeletal: Normal range of motion.   Neurological: She is alert and oriented to person, place, and time. She has normal reflexes.   Skin: Skin is warm and dry.     Psychiatric: She has a normal mood and affect. Her behavior is normal. Judgment and thought content normal.     Assessment:       1. Urinary retention          Plan:   Urinary Retention S/P Back surgery:    Pt is requesting catheter to be removed this afternoon even though the risks and benefits of removing catheter this late in afternoon and plus this early since initial insertion was thoroughly discussed with this patient.    Therefore 16 Fr catheter removed by me today with tip intact.  Pt tolerated procedure with no problems.    Pt was instructed to return to ED tonight should she not be able to urinate on her own.    [facility]: Active    Radha Bertrand, ELANA

## 2018-10-09 NOTE — TELEPHONE ENCOUNTER
----- Message from Madiha Gil sent at 10/9/2018 10:56 AM CDT -----  Contact: Self            Name of Who is Calling: Self      What is the request in detail: Pt would like to request a later appt time on 10/25 for her Post Op appt. Please contact to further discuss and advise.        Can the clinic reply by [facility]:  N      What [facility] to [facility] Back [facility]: [phone]

## 2018-10-10 ENCOUNTER — OFFICE VISIT (OUTPATIENT)
Dept: PLASTIC SURGERY | Facility: CLINIC | Age: 53
End: 2018-10-10
Payer: COMMERCIAL

## 2018-10-10 VITALS
HEIGHT: 65 IN | RESPIRATION RATE: 16 BRPM | DIASTOLIC BLOOD PRESSURE: 66 MMHG | BODY MASS INDEX: 25.99 KG/M2 | TEMPERATURE: 99 F | HEART RATE: 70 BPM | WEIGHT: 156 LBS | SYSTOLIC BLOOD PRESSURE: 120 MMHG

## 2018-10-10 DIAGNOSIS — Z09 SURGERY FOLLOW-UP EXAMINATION: Primary | ICD-10-CM

## 2018-10-10 PROCEDURE — 99999 PR PBB SHADOW E&M-EST. PATIENT-LVL III: CPT | Mod: PBBFAC,,, | Performed by: PHYSICIAN ASSISTANT

## 2018-10-10 PROCEDURE — 99024 POSTOP FOLLOW-UP VISIT: CPT | Mod: S$GLB,,, | Performed by: PHYSICIAN ASSISTANT

## 2018-10-10 NOTE — PROGRESS NOTES
Genia Davis presents to [facility] on 10[facility]10/2018 for a follow up visit status post muscle flap closure of spinal wound on 10/02/2018. She is doing well today and was seen and evaluated by myself and Dr. Rommel Cox      Review of patient's allergies indicates:   Allergen Reactions    Hydrochlorothiazide Other (See Comments)     Muscle pain     Current Outpatient Medications on File Prior to Visit   Medication Sig Dispense Refill    b complex vitamins tablet Take 1 tablet by mouth once daily.      clindamycin (CLEOCIN) 300 MG capsule Take 1 capsule (300 mg total) by mouth every 8 (eight) hours. for 14 days 42 capsule 0    hydrOXYzine HCl (ATARAX) 25 MG tablet Take 1 tablet (25 mg total) by mouth 3 (three) times daily as needed for Itching. 45 tablet 0    lisinopril (PRINIVIL,ZESTRIL) 20 MG tablet Take 1 tablet (20 mg total) by mouth once daily. 90 tablet 3    methocarbamol (ROBAXIN) 750 MG Tab Take 1 tablet (750 mg total) by mouth 3 (three) times daily. 50 tablet 0    ondansetron (ZOFRAN-ODT) 8 MG TbDL Take 1 tablet (8 mg total) by mouth every 8 (eight) hours as needed. 50 tablet 0    oxyCODONE-acetaminophen (PERCOCET)  mg per tablet Take 1 tablet by mouth every 4 (four) hours as needed for Pain. 90 tablet 0    polyethylene glycol (GLYCOLAX) 17 gram/dose powder Take 17 g by mouth once daily. 238 g 0     No current facility-administered medications on file prior to visit.      Patient Active Problem List   Diagnosis    DJD (degenerative joint disease) of knee    Weakness of right leg    Weakness of both legs    Myelopathy of lumbar region    Bursitis of right hip    Gait disorder    Spondylosis without myelopathy    Degeneration of lumbar or lumbosacral intervertebral disc    Acquired spondylolisthesis    Lumbago    Kyphoscoliosis    Ependymoma of spinal cord    Closed pseudoarthrosis of lumbar spine    Slow transit constipation    Gross hematuria    Lumbar  radiculopathy    Sprain and strain    Essential hypertension    Pseudoarthrosis of lumbar spine     Past Surgical History:   Procedure Laterality Date    BACK SURGERY      x 5    CHOLECYSTECTOMY      CLOSURE - back N/A 11/19/2015    Performed by Rommel Cox MD at [facility] OR [address] [place] [address]    CREATION OF MUSCLE ROTATIONAL FLAP N/A 10/2/2018    Procedure: CREATION, FLAP, MUSCLE ROTATION;  Surgeon: Rommel Cox MD;  Location: [facility] OR [address] [place] [address];  Service: Plastics;  Laterality: N/A;    CREATION, FLAP, MUSCLE ROTATION N/A 10/2/2018    Performed by Rommel Cox MD at [facility] OR [address] [place] [address]    FLAP-MUSCLE (ROTATION) - bilateral latissimus dorsi N/A 11/19/2015    Performed by Rommel Cox MD at [facility] OR [address] [place] [address]    FUSION, SPINE, LUMBAR, TLIF, POSTERIOR APPROACH, USING PEDICLE SCREW T10-Pelvis Revision  N/A 10/2/2018    [facility]    FUSION-SPINAL - T11-Pelvis Posterior Spinal Fusion/Revision Exploration Spinal Fusion Posterior Instrumentation T11-Pelvis N/A 11/19/2015    [facility]    INJECTION-STEROID-EPIDURAL-TRANSFORAMINAL Left 11/16/2016    Performed by [facility]evan [facility] MD at [facility]    INJECTION-STEROID-EPIDURAL-TRANSFORAMINAL Left 7/6/2016    Performed by K[facility]an Bah MD at [facility]    MYELOGRAM-THORACIC AND LUMBAR N/A 6/20/2016    Performed by [facility] Diagnostic Provider at [facility] OR [address] [place] [address]    Revision of hardware T10-Pelvis N/A 2/12/2016    Performed by Rocky Herrmann MD at [facility] OR [address] [place] [address]     PHYSICAL EXAMINATION  Vitals:    10/10/18 1422   BP: 120/66   Pulse: 70   Resp: 16   Temp: 98.9 °F (37.2 °C)     WD WN NAD  VSS  Normal resp effort  Back - incisional tape intact, no surrounding erythema, L lower back drain to bulb suction    ASSESSMENT/PLAN  52 y.o. F s/p muscle flap closure of spinal wound  - Doing well, no issues.   - Drain to bulb suction out put per last 3 days per  24 hours (120cc/90cc/30cc), will leave drain in place.   - RTC x 2 weeks, can come In sooner should drainage output decrease to 20cc/24 hours    All questions were answered. The patient was advised to call the clinic with any questions or concerns prior to their next visit.

## 2018-10-19 NOTE — OP NOTE
DATE OF SURGERY: 10/2/18     PREOPERATIVE DIAGNOSIS:   Lumbosacral pseudoarthrosis status post T11 to pelvis   posterior spinal fusion.  History of L2 ependymoma status post corpectomy    POSTOPERATIVE DIAGNOSIS:  Same.     PROCEDURE PERFORMED:  1. Revision sosterior spinal fusion, L2 to pelvis   2. Removal and reinsertion of posterior segmental spinal instrumentation, L2-S1 (De[employer])  3. Pelvic fixation with S2AI screws (Depuy Synthes)  4. Transforaminal lumbar interbody fusion, L5-S1  5.  Application of titanium interbody spacer, L5-S1 (DePuy Synthes)  6.  Use of intraoperative fluoroscopy  7.  Use of intraoperative neuro-monitoring with MEPs  8. Use of intraoperative CT neuronavigation  9. Infuse and local  bone grafting     SURGEON: Ahmet Samano M.D.     CO-SURGEON: Rocky Herrmann M.D.     ANESTHESIA: GETA     ESTIMATED BLOOD LOSS: 350mL     COMPLICATIONS: None     DRAINS: Per plastic surgery     SPECIMENS SENT: Hardware explants     FINDINGS: None     INDICATIONS:     See Dr. Herrmann's separate operative note.  .  PROCEDURE:     For details about patient intubation, anesthesia induction, positioning, and localization please see Dr. Herrmann's separate operative note.  My involvement in this case began at the time of the incision.     A midline linear incision through scar was made with a 10 blade from approximately L2 to sacrum.  Supra and subfascial midline dissection was carried out with Bovie electrocautery.  Subperiosteal dissection was carried out with Bovie electrocautery and Retana elevators to expose the posterior elements and previous hardware from L2 to sacrum. The left L5 layton was broken as expected. We cut and removed both rods from L2 to pelvis. We also removed scres at L5 and pelvis.      The CT neuro navigation array was docked onto the right PSIS throught a separate stab incision and a CT spin was acquired.  The intraoperative CT confirmed levels.  Using neuro-navigation pedicle screws  were placed bilaterally at L5 and S1. All lumbar screws were stimulated with the neuromonitoring probe and found to stimulate above 20 milliamperes, except left L5 which stimulated at 10. Bilateral pelvic fixation was achieved with sacroiliac screws, using navigation. Pelvic inlet and outlet xrays and AP lateral showed excellent position of all hardware.    Attention was turned to performance of a decompressive laminectomy at L5 using a combination of the high speed drill, Leksell rongeur, Kerrison punches, and curettes. At the end of these maneuvers the thecal sac and nerve roots at L5-S1 were found to be well decompressed using a [place] probe. We found a durotomy at this level during this process and repaired it primarily in watertight fashion.     Attention was turned to performance of a transforaminal lumbar interbody fusion at L5-S1 from a left-sided approach.  A nerve root retractor was used to retract the thecal sac medially and expose the L5-S1 disc space.  Epidural veins were cauterized and divided.  An annulotomy was performed with a 15 blade.  A pituitary rongeur was used to remove disc material.  The endplates were prepared with disc reny, rasps, and curettes.   Local bone was packed into the L5-S1 disc space for anterior arthrodesis and titanium cage was countersunk into the disc space. Xrays showed good cage position.    Bilateral titanium rods were sized, contoured and reduced into the tulip heads and connected to the proximal rods with in line bar connectors.  Set screws were tightened.Final xrays showed excellent reduction of the deformity and excellent position of all hardware.  The wound was copiously irrigated with sterile normal saline and a dilute Betadyne solution. Exposed bony surfaces from L2 to S2 were decorticated bilaterally with a high-speed drill.  Infuse and local bone were placed posteriorly for arthrodesis from L2 to S2.     This concludes my involvement in this case.  For  details about wound closure, drain placement, patient extubation and OR disposition please see Dr. Herrmann's and the Plastic Surgery service's separate operative notes.  During my involvement the patient appeared to tolerate the procedure well from a hemodynamic and neuro monitoring standpoint, and all counts were correct.    JUSTIFICATION OF CO-SURGEON AND MODIFIER 22:  Plastic Surgery closure:  This is a complex revision operation.  The patient is with a history of multiple prior spinal operations as well as radiation.  All aspects of this patient's care more difficult from preoperative decision making to intraoperative dissection,   revision, instrumentation, laminectomy and interbody fusion.  The combined efforts of two attending surgeons indicated to help expedite surgery, decrease blood loss and improve outcomes.

## 2018-10-23 ENCOUNTER — OFFICE VISIT (OUTPATIENT)
Dept: PLASTIC SURGERY | Facility: CLINIC | Age: 53
End: 2018-10-23
Payer: COMMERCIAL

## 2018-10-23 VITALS
TEMPERATURE: 98 F | HEART RATE: 80 BPM | BODY MASS INDEX: 25.99 KG/M2 | DIASTOLIC BLOOD PRESSURE: 72 MMHG | RESPIRATION RATE: 14 BRPM | WEIGHT: 156 LBS | SYSTOLIC BLOOD PRESSURE: 123 MMHG | HEIGHT: 65 IN

## 2018-10-23 DIAGNOSIS — Z09 SURGERY FOLLOW-UP EXAMINATION: Primary | ICD-10-CM

## 2018-10-23 PROCEDURE — 99024 POSTOP FOLLOW-UP VISIT: CPT | Mod: S$GLB,,, | Performed by: PHYSICIAN ASSISTANT

## 2018-10-23 PROCEDURE — 99999 PR PBB SHADOW E&M-EST. PATIENT-LVL III: CPT | Mod: PBBFAC,,, | Performed by: PHYSICIAN ASSISTANT

## 2018-10-23 NOTE — PROGRESS NOTES
Genia Davis presents to [facility] on 10/23/2018 for a follow up visit status post muscle flap closure of spinal wound on 10/02/2018. She is doing well today with no issues since her last visit    Review of patient's allergies indicates:   Allergen Reactions    Hydrochlorothiazide Other (See Comments)     Muscle pain     Current Outpatient Medications on File Prior to Visit   Medication Sig Dispense Refill    b complex vitamins tablet Take 1 tablet by mouth once daily.      hydrOXYzine HCl (ATARAX) 25 MG tablet Take 1 tablet (25 mg total) by mouth 3 (three) times daily as needed for Itching. 45 tablet 0    lisinopril (PRINIVIL,ZESTRIL) 20 MG tablet Take 1 tablet (20 mg total) by mouth once daily. 90 tablet 3    methocarbamol (ROBAXIN) 750 MG Tab Take 1 tablet (750 mg total) by mouth 3 (three) times daily. 50 tablet 0    ondansetron (ZOFRAN-ODT) 8 MG TbDL Take 1 tablet (8 mg total) by mouth every 8 (eight) hours as needed. 50 tablet 0    oxyCODONE-acetaminophen (PERCOCET)  mg per tablet Take 1 tablet by mouth every 4 (four) hours as needed for Pain. 90 tablet 0    polyethylene glycol (GLYCOLAX) 17 gram/dose powder Take 17 g by mouth once daily. 238 g 0     No current facility-administered medications on file prior to visit.      Patient Active Problem List   Diagnosis    DJD (degenerative joint disease) of knee    Weakness of right leg    Weakness of both legs    Myelopathy of lumbar region    Bursitis of right hip    Gait disorder    Spondylosis without myelopathy    Degeneration of lumbar or lumbosacral intervertebral disc    Acquired spondylolisthesis    Lumbago    Kyphoscoliosis    Ependymoma of spinal cord    Closed pseudoarthrosis of lumbar spine    Slow transit constipation    Gross hematuria    Lumbar radiculopathy    Sprain and strain    Essential hypertension    Pseudoarthrosis of lumbar spine     Past Surgical History:   Procedure Laterality Date    BACK  SURGERY      x 5    CHOLECYSTECTOMY      CLOSURE - back N/A 11/19/2015    [facility]    CREATION OF MUSCLE ROTATIONAL FLAP N/A 10/2/2018    Procedure: CREATION, FLAP, MUSCLE ROTATION;  Surgeon: Rommel Cox MD;  Location: [facility] OR [address] [place] [address];  Service: Plastics;  Laterality: N/A;    CREATION, FLAP, MUSCLE ROTATION N/A 10/2/2018    Performed by Rommel Cox MD at [facility] OR [address] [place] [address]    FLAP-MUSCLE (ROTATION) - bilateral latissimus dorsi N/A 11/19/2015    Performed by Rommel Cox MD at [facility] OR [address] [place] [address]    FUSION, SPINE, LUMBAR, TLIF, POSTERIOR APPROACH, USING PEDICLE SCREW T10-Pelvis Revision  N/A 10/2/2018    Performed by Rocky Herrmann MD at [facility] OR [address] [place] [address]    FUSION-SPINAL - T11-Pelvis Posterior Spinal Fusion/Revision Exploration Spinal Fusion Posterior Instrumentation T11-Pelvis N/A 11/19/2015    Per[facility] by Rocky [facility]    INJECTION-STEROID-EPIDURAL-TRANSFORAMINAL Left 11/16/2016    Performed by Kevan Bah MD at [facility] [place]    INJECTION-STEROID-EPIDURAL-TRANSFORAMINAL Left 7/6/2016    Performed by Kevan Bah MD at [facility] [place]    MYELOGRAM-THORACIC AND LUMBAR N/A 6/20/2016    Performed by [facility] Diagnostic Provider at [facility] OR [address] [place] [address]    Revision of hardware T10-Pelvis N/A 2/12/2016    Performed by Rocky Herrmann MD at [facility]     PHYSICAL EXAMINATION  Vitals:    10/23/18 1054   BP: 123/72   Pulse: 80   Resp: 14   Temp: 98 °F (36.7 °C)     WD WN NAD  VSS  Normal resp effort  Back - incision clean/dry/intact, no erythema/drainage, L lower back drain to bulb suction    ASSESSMENT/PLAN  52 y.o. F s/p muscle flap closure of spinal wound  - Doing well, no issues.   - L lower back drain removed as she reports <20cc/24 hours, serous output  - Incisional tape and sutures removed  - Advised to wear brace for compression, to prevent fluid accumulation  - RTC x 2 weeks, appointment  scheduled    All questions were answered. The patient was advised to call the clinic with any questions or concerns prior to their next visit.       All questions were answered. The patient was advised to call the clinic with any questions or concerns prior to their next visit.

## 2018-10-25 ENCOUNTER — OFFICE VISIT (OUTPATIENT)
Dept: ORTHOPEDICS | Facility: CLINIC | Age: 53
End: 2018-10-25
Payer: COMMERCIAL

## 2018-10-25 VITALS
DIASTOLIC BLOOD PRESSURE: 63 MMHG | WEIGHT: 156.06 LBS | HEIGHT: 65 IN | SYSTOLIC BLOOD PRESSURE: 112 MMHG | BODY MASS INDEX: 26 KG/M2 | HEART RATE: 77 BPM

## 2018-10-25 DIAGNOSIS — Z98.1 S/P SPINAL FUSION: Primary | ICD-10-CM

## 2018-10-25 PROCEDURE — 99999 PR PBB SHADOW E&M-EST. PATIENT-LVL III: CPT | Mod: PBBFAC,,, | Performed by: PHYSICIAN ASSISTANT

## 2018-10-25 PROCEDURE — 99024 POSTOP FOLLOW-UP VISIT: CPT | Mod: S$GLB,,, | Performed by: PHYSICIAN ASSISTANT

## 2018-10-25 RX ORDER — OXYCODONE AND ACETAMINOPHEN 10; 325 MG/1; MG/1
1 TABLET ORAL EVERY 4 HOURS PRN
Qty: 90 TABLET | Refills: 0 | Status: SHIPPED | OUTPATIENT
Start: 2018-10-25 | End: 2018-11-28 | Stop reason: SDUPTHER

## 2018-10-25 NOTE — PROGRESS NOTES
Date: 10/25/2018    Supervising Physician: Rocky Herrmann M.D.    Date of Surgery: 10/2/2018    Procedure: T10-Pelvis Revision    History: Genia Davis is seen today for follow-up following the above listed procedure. Overall the patient is doing well but today notes her pain is 4/10 today.   She reports some left sided low back pain.  Pain is well controlled with current pain medication.  she denies fever, chills, and sweats since the time of the surgery.  She is in an LSO today.       Exam: Post op dressing taken down.  Incision is healing well, clean, dry and intact.   There is no sign of infection. Neuro exam is stable. No signs of DVT.    Radiographs: no new imaging today    Assessment/Plan: 3 weeks post op.    Doing well postoperatively.  reviewed.  Refill of percocet given today.    I will plan to see the patient back for the next postop visit in 5 weeks with imaging.     Thank you for the opportunity to participate in this patient's care. Please give me a call if there are any concerns or questions.

## 2018-10-30 ENCOUNTER — PATIENT MESSAGE (OUTPATIENT)
Dept: PHYSICAL MEDICINE AND REHAB | Facility: CLINIC | Age: 53
End: 2018-10-30

## 2018-10-31 ENCOUNTER — PATIENT MESSAGE (OUTPATIENT)
Dept: ORTHOPEDICS | Facility: CLINIC | Age: 53
End: 2018-10-31

## 2018-11-07 ENCOUNTER — OFFICE VISIT (OUTPATIENT)
Dept: PLASTIC SURGERY | Facility: CLINIC | Age: 53
End: 2018-11-07
Payer: COMMERCIAL

## 2018-11-07 VITALS
HEIGHT: 65 IN | RESPIRATION RATE: 12 BRPM | WEIGHT: 156 LBS | DIASTOLIC BLOOD PRESSURE: 84 MMHG | SYSTOLIC BLOOD PRESSURE: 188 MMHG | BODY MASS INDEX: 25.99 KG/M2 | HEART RATE: 89 BPM | TEMPERATURE: 99 F

## 2018-11-07 DIAGNOSIS — Z09 SURGERY FOLLOW-UP EXAMINATION: Primary | ICD-10-CM

## 2018-11-07 PROCEDURE — 99024 PR POST-OP FOLLOW-UP VISIT: ICD-10-PCS | Mod: S$GLB,,, | Performed by: SURGERY

## 2018-11-07 PROCEDURE — 99999 PR PBB SHADOW E&M-EST. PATIENT-LVL III: CPT | Mod: PBBFAC,,, | Performed by: SURGERY

## 2018-11-07 PROCEDURE — 99024 POSTOP FOLLOW-UP VISIT: CPT | Mod: S$GLB,,, | Performed by: SURGERY

## 2018-11-07 PROCEDURE — 99999 PR PBB SHADOW E&M-EST. PATIENT-LVL III: ICD-10-PCS | Mod: PBBFAC,,, | Performed by: SURGERY

## 2018-11-07 RX ORDER — CLINDAMYCIN HYDROCHLORIDE 300 MG/1
300 CAPSULE ORAL EVERY 8 HOURS
Qty: 30 CAPSULE | Refills: 0 | Status: SHIPPED | OUTPATIENT
Start: 2018-11-07 | End: 2018-11-17

## 2018-11-10 ENCOUNTER — PATIENT MESSAGE (OUTPATIENT)
Dept: ORTHOPEDICS | Facility: CLINIC | Age: 53
End: 2018-11-10

## 2018-11-12 ENCOUNTER — PATIENT MESSAGE (OUTPATIENT)
Dept: PLASTIC SURGERY | Facility: CLINIC | Age: 53
End: 2018-11-12

## 2018-11-12 ENCOUNTER — TELEPHONE (OUTPATIENT)
Dept: PLASTIC SURGERY | Facility: CLINIC | Age: 53
End: 2018-11-12

## 2018-11-12 NOTE — TELEPHONE ENCOUNTER
Called pt regarding ongoing fevers, states she is day 6 on abx PO, some nausea but denies VD or pain, HA. Sx was 10/2. Reported to MD. LINDA LOREDO    ----- Message from Corinna Castorena sent at 11/12/2018  9:00 AM CST -----  Contact: self  Pt is on day 6 of her antibiotic and is still running a fever.  She can be reached at [phone]

## 2018-11-13 ENCOUNTER — HOSPITAL ENCOUNTER (EMERGENCY)
Facility: HOSPITAL | Age: 53
Discharge: HOME | End: 2018-11-13
Attending: EMERGENCY MEDICINE
Payer: COMMERCIAL

## 2018-11-13 ENCOUNTER — DOCUMENTATION ONLY (OUTPATIENT)
Dept: FAMILY MEDICINE | Facility: CLINIC | Age: 53
End: 2018-11-13

## 2018-11-13 ENCOUNTER — OFFICE VISIT (OUTPATIENT)
Dept: FAMILY MEDICINE | Facility: CLINIC | Age: 53
End: 2018-11-13
Payer: COMMERCIAL

## 2018-11-13 ENCOUNTER — TELEPHONE (OUTPATIENT)
Dept: FAMILY MEDICINE | Facility: CLINIC | Age: 53
End: 2018-11-13

## 2018-11-13 VITALS
DIASTOLIC BLOOD PRESSURE: 71 MMHG | TEMPERATURE: 98 F | HEART RATE: 92 BPM | SYSTOLIC BLOOD PRESSURE: 145 MMHG | OXYGEN SATURATION: 100 % | WEIGHT: 126 LBS | BODY MASS INDEX: 20.97 KG/M2 | RESPIRATION RATE: 16 BRPM

## 2018-11-13 VITALS
WEIGHT: 157.63 LBS | OXYGEN SATURATION: 96 % | DIASTOLIC BLOOD PRESSURE: 64 MMHG | TEMPERATURE: 98 F | SYSTOLIC BLOOD PRESSURE: 126 MMHG | BODY MASS INDEX: 26.26 KG/M2 | HEART RATE: 84 BPM | HEIGHT: 65 IN

## 2018-11-13 DIAGNOSIS — T81.49XA WOUND INFECTION AFTER SURGERY: Primary | ICD-10-CM

## 2018-11-13 DIAGNOSIS — R50.9 FEVER: ICD-10-CM

## 2018-11-13 DIAGNOSIS — T81.49XA POSTOPERATIVE CELLULITIS OF SURGICAL WOUND: Primary | ICD-10-CM

## 2018-11-13 LAB
ALBUMIN SERPL BCP-MCNC: 2.7 G/DL
ALP SERPL-CCNC: 99 U/L
ALT SERPL W/O P-5'-P-CCNC: 10 U/L
ANION GAP SERPL CALC-SCNC: 11 MMOL/L
APTT BLDCRRT: 41.1 SEC
AST SERPL-CCNC: 13 U/L
BASOPHILS # BLD AUTO: 0 K/UL
BASOPHILS NFR BLD: 0.5 %
BILIRUB SERPL-MCNC: 0.3 MG/DL
BILIRUB UR QL STRIP: NEGATIVE
BUN SERPL-MCNC: 7 MG/DL
CALCIUM SERPL-MCNC: 9.4 MG/DL
CHLORIDE SERPL-SCNC: 100 MMOL/L
CLARITY UR: CLEAR
CO2 SERPL-SCNC: 26 MMOL/L
COLOR UR: YELLOW
CREAT SERPL-MCNC: 0.8 MG/DL
CRP SERPL-MCNC: 217.3 MG/L
DIFFERENTIAL METHOD: ABNORMAL
EOSINOPHIL # BLD AUTO: 0.1 K/UL
EOSINOPHIL NFR BLD: 2.1 %
ERYTHROCYTE [DISTWIDTH] IN BLOOD BY AUTOMATED COUNT: 15.8 %
ERYTHROCYTE [SEDIMENTATION RATE] IN BLOOD BY WESTERGREN METHOD: 140 MM/HR
EST. GFR  (AFRICAN AMERICAN): >60 ML/MIN/1.73 M^2
EST. GFR  (NON AFRICAN AMERICAN): >60 ML/MIN/1.73 M^2
FLUAV AG SPEC QL IA: NEGATIVE
FLUBV AG SPEC QL IA: NEGATIVE
GLUCOSE SERPL-MCNC: 98 MG/DL
GLUCOSE UR QL STRIP: NEGATIVE
HCT VFR BLD AUTO: 26.3 %
HGB BLD-MCNC: 8.6 G/DL
HGB UR QL STRIP: NEGATIVE
INR PPP: 1.1
KETONES UR QL STRIP: NEGATIVE
LACTATE SERPL-SCNC: 0.8 MMOL/L
LEUKOCYTE ESTERASE UR QL STRIP: NEGATIVE
LIPASE SERPL-CCNC: 8 U/L
LYMPHOCYTES # BLD AUTO: 0.8 K/UL
LYMPHOCYTES NFR BLD: 13.5 %
MAGNESIUM SERPL-MCNC: 2.3 MG/DL
MCH RBC QN AUTO: 26.4 PG
MCHC RBC AUTO-ENTMCNC: 32.9 G/DL
MCV RBC AUTO: 80 FL
MONOCYTES # BLD AUTO: 0.4 K/UL
MONOCYTES NFR BLD: 6.7 %
NEUTROPHILS # BLD AUTO: 4.9 K/UL
NEUTROPHILS NFR BLD: 77.2 %
NITRITE UR QL STRIP: NEGATIVE
PH UR STRIP: 7 [PH] (ref 5–8)
PHOSPHATE SERPL-MCNC: 3.4 MG/DL
PLATELET # BLD AUTO: 555 K/UL
PMV BLD AUTO: 8 FL
POTASSIUM SERPL-SCNC: 4.1 MMOL/L
PROCALCITONIN SERPL IA-MCNC: 0.13 NG/ML
PROT SERPL-MCNC: 8 G/DL
PROT UR QL STRIP: NEGATIVE
PROTHROMBIN TIME: 10.7 SEC
RBC # BLD AUTO: 3.28 M/UL
SODIUM SERPL-SCNC: 137 MMOL/L
SP GR UR STRIP: <=1.005 (ref 1–1.03)
SPECIMEN SOURCE: NORMAL
TROPONIN I SERPL DL<=0.01 NG/ML-MCNC: <0.006 NG/ML
URN SPEC COLLECT METH UR: ABNORMAL
UROBILINOGEN UR STRIP-ACNC: NEGATIVE EU/DL
WBC # BLD AUTO: 6.3 K/UL

## 2018-11-13 PROCEDURE — 81003 URINALYSIS AUTO W/O SCOPE: CPT

## 2018-11-13 PROCEDURE — 85025 COMPLETE CBC W/AUTO DIFF WBC: CPT

## 2018-11-13 PROCEDURE — 84100 ASSAY OF PHOSPHORUS: CPT

## 2018-11-13 PROCEDURE — 83605 ASSAY OF LACTIC ACID: CPT

## 2018-11-13 PROCEDURE — 36415 COLL VENOUS BLD VENIPUNCTURE: CPT

## 2018-11-13 PROCEDURE — 85730 THROMBOPLASTIN TIME PARTIAL: CPT

## 2018-11-13 PROCEDURE — 83735 ASSAY OF MAGNESIUM: CPT

## 2018-11-13 PROCEDURE — 86140 C-REACTIVE PROTEIN: CPT

## 2018-11-13 PROCEDURE — 99213 OFFICE O/P EST LOW 20 MIN: CPT | Mod: S$GLB,,, | Performed by: NURSE PRACTITIONER

## 2018-11-13 PROCEDURE — 80053 COMPREHEN METABOLIC PANEL: CPT

## 2018-11-13 PROCEDURE — 87400 INFLUENZA A/B EACH AG IA: CPT | Mod: 59

## 2018-11-13 PROCEDURE — 99285 EMERGENCY DEPT VISIT HI MDM: CPT | Mod: 25

## 2018-11-13 PROCEDURE — 84484 ASSAY OF TROPONIN QUANT: CPT

## 2018-11-13 PROCEDURE — 93005 ELECTROCARDIOGRAM TRACING: CPT

## 2018-11-13 PROCEDURE — 87040 BLOOD CULTURE FOR BACTERIA: CPT

## 2018-11-13 PROCEDURE — 83690 ASSAY OF LIPASE: CPT

## 2018-11-13 PROCEDURE — 93010 ELECTROCARDIOGRAM REPORT: CPT | Mod: ,,, | Performed by: INTERNAL MEDICINE

## 2018-11-13 PROCEDURE — 85610 PROTHROMBIN TIME: CPT

## 2018-11-13 PROCEDURE — 84145 PROCALCITONIN (PCT): CPT

## 2018-11-13 PROCEDURE — 85651 RBC SED RATE NONAUTOMATED: CPT

## 2018-11-13 RX ORDER — POLYETHYLENE GLYCOL 3350 17 G/17G
17 POWDER, FOR SOLUTION ORAL DAILY
COMMUNITY
End: 2018-12-07

## 2018-11-13 NOTE — TELEPHONE ENCOUNTER
Spoke with spouse.  Stressed importance of patient going to ED as recommended.  Verbalized understanding.

## 2018-11-13 NOTE — TELEPHONE ENCOUNTER
----- Message from Laila Alford sent at 11/13/2018  3:40 PM CST -----  Type: Needs Medical Advice    Who Called:  /Michel Davis  Best Call Back Number: [phone]  Additional Information: Would like to have Marcy call him back to see if he needs to bring patient to ER. Please call to advise.

## 2018-11-13 NOTE — ED PROVIDER NOTES
Encounter Date: 11/13/2018    SCRIBE #1 NOTE: I, Jessica Garces, am scribing for, and in the presence of, Dr. Yemi Walsh .       History     Chief Complaint   Patient presents with    Fever     x 8 days    Back Pain     recent back surgery. On clindamycin for infection       Time seen by provider: 4:45 PM on 11/13/2018    Genia Mcnulty is a 53 y.o. female who presents to the ED with an onset of a fever and back pain that has been present for 8 days. She had back surgery on 10/02/2018 for reconstruction. She has taken pain medication, Clindamycin, to help reduce her back pain. 8 days ago, she noticed that her incision began turning red and became tender to touch. Pt endorses a slight runny nose. The patient denies bed sores, cough, shortness of breath, burning urination, chest pain, rashes, or any other symptoms at this time. No pertinent SHx noted. Pt is allergic to Hydrochlorothiazide.       The history is provided by the patient.     Review of patient's allergies indicates:   Allergen Reactions    Hydrochlorothiazide Other (See Comments)     Muscle pain     Past Medical History:   Diagnosis Date    Cancer     tumor on back, was removed    Encounter for blood transfusion      Past Surgical History:   Procedure Laterality Date    BACK SURGERY      x 5    CHOLECYSTECTOMY      CLOSURE - back N/A 11/19/2015    Performed by Rommel Cox MD at [facility] OR [address] [place] [address]    CREATION OF MUSCLE ROTATIONAL FLAP N/A 10/2/2018    Procedure: CREATION, FLAP, MUSCLE ROTATION;  Surgeon: Rommel Cox MD;  Location: [facility] OR [address] [place] [address];  Service: Plastics;  Laterality: N/A;    CREATION, FLAP, MUSCLE ROTATION N/A 10/2/2018    Performed by Rommel Cox MD at [facility] OR [address] [place] [address]    FLAP-MUSCLE (ROTATION) - bilateral latissimus dorsi N/A 11/19/2015    Performed by Rommel Cox MD at [facility] OR [address] [place] [address]    FUSION, SPINE, LUMBAR, TLIF, POSTERIOR APPROACH, USING PEDICLE SCREW T10-Pelvis Revision   N/A 10/2/2018    Performed by Rocky Herrmann MD at [facility] OR 2ND FLR    FUSION-SPINAL - T11-Pelvis Posterior Spinal Fusion/Revision Exploration Spinal Fusion Posterior Instrumentation T11-Pelvis N/A 11/19/2015    Performed by Rocky Herrmann MD at [facility] OR 2ND FLR    INJECTION-STEROID-EPIDURAL-TRANSFORAMINAL Left 11/16/2016    Performed by Kevan Bah MD at [facility] PAIN MGT    INJECTION-STEROID-EPIDURAL-TRANSFORAMINAL Left 7/6/2016    Performed by Kevan Bah MD at [facility] PAIN [facility]GT    MYELOGRAM-THORACIC AND LUMBAR N/A 6/20/2016    Performed by [facility] Diagnostic Provider at [facility] OR 2ND FLR    Revision of hardware T10-Pelvis N/A 2/12/2016    Performed by Rocky Herrmann MD at [facility] OR 2ND FLR     Family History   Problem Relation Age of Onset    Breast cancer Maternal Aunt     Anesthesia problems Neg Hx     Clotting disorder Neg Hx      Social History     Tobacco Use    Smoking status: Never Smoker    Smokeless tobacco: Never Used   Substance Use Topics    Alcohol use: No     Alcohol/week: 0.0 oz    Drug use: No     Review of Systems   Constitutional: Positive for fever.   HENT: Positive for rhinorrhea (Slight). Negative for sore throat.    Respiratory: Negative for cough and shortness of breath.    Cardiovascular: Negative for chest pain.   Gastrointestinal: Negative for nausea.   Genitourinary: Negative for dysuria.   Musculoskeletal: Positive for back pain and myalgias.        Positive for tenderness of her back incision.   Skin: Positive for color change and wound. Negative for rash.        Positive for red back incision. Negative for bed sores.   Neurological: Negative for weakness.   Hematological: Does not bruise/bleed easily.       Physical Exam     Initial Vitals [11/13/18 1628]   BP Pulse Resp Temp SpO2   (!) 146/67 85 16 98.4 °F (36.9 °C) 99 %      MAP       --         Physical Exam    Nursing note and vitals reviewed.  Constitutional: She appears well-developed.   HENT:   Head:  Normocephalic and atraumatic.   Mouth/Throat: Oropharynx is clear and moist. Mucous membranes are dry.   Slightly dry tongue.    Eyes: EOM are normal. Pupils are equal, round, and reactive to light.   Neck: Neck supple. No thyromegaly present.   Cardiovascular: Normal rate, regular rhythm, normal heart sounds and intact distal pulses. Exam reveals no gallop and no friction rub.    No murmur heard.  Pulmonary/Chest: Effort normal and breath sounds normal. No respiratory distress. She has no decreased breath sounds. She has no wheezes. She has no rhonchi. She has no rales.   Abdominal: Soft. Bowel sounds are normal. She exhibits no distension. There is no tenderness.   Musculoskeletal: She exhibits tenderness.        Right knee: She exhibits decreased range of motion.        Lumbar back: She exhibits tenderness.        Right foot: There is decreased range of motion.   Partial 3/5 right knee flexion. No extension of the right knee. No dorsiflexion of the right foot.   Neurological: She is alert and oriented to person, place, and time.   Skin: Skin is warm and dry.   Midline vertical incision extending from the mid-lower lumbar region down to the sacral region of the back. Erythema extends 16 cm and 8 cm in diameter at its widest point and is 3 cm form midline. Area is warm and tender to touch.    Psychiatric: She has a normal mood and affect.             ED Course   Procedures  Labs Reviewed   CBC W/ AUTO DIFFERENTIAL - Abnormal; Notable for the following components:       Result Value    RBC 3.28 (*)     Hemoglobin 8.6 (*)     Hematocrit 26.3 (*)     MCV 80 (*)     MCH 26.4 (*)     RDW 15.8 (*)     Platelets 555 (*)     MPV 8.0 (*)     Lymph # 0.8 (*)     Gran% 77.2 (*)     Lymph% 13.5 (*)     All other components within normal limits   COMPREHENSIVE METABOLIC PANEL - Abnormal; Notable for the following components:    Albumin 2.7 (*)     All other components within normal limits   APTT - Abnormal; Notable for the  following components:    aPTT 41.1 (*)     All other components within normal limits   C-REACTIVE PROTEIN - Abnormal; Notable for the following components:    .3 (*)     All other components within normal limits   CULTURE, BLOOD   CULTURE, BLOOD   LACTIC ACID, PLASMA   MAGNESIUM   PHOSPHORUS   PROTIME-INR   LIPASE   TROPONIN I   INFLUENZA A AND B ANTIGEN   URINALYSIS, REFLEX TO URINE CULTURE   PROCALCITONIN   SEDIMENTATION RATE   LACTIC ACID, PLASMA     EKG Readings: (Independently Interpreted)   Initial Reading: No STEMI. Rhythm: Normal Sinus Rhythm. Heart Rate: 80 bpm. Ectopy: No Ectopy. Conduction: Normal. ST Segments: Normal ST Segments. T Waves: Normal. Clinical Impression: Normal Sinus Rhythm       Imaging Results          X-Ray Chest AP Portable (Final result)  Result time 11/13/18 17:37:20    Final result by Denis Zelaya Jr., MD (11/13/18 17:37:20)                 Impression:      No acute abnormality.      Electronically signed by: Denis Zelaya MD  Date:    11/13/2018  Time:    17:37             Narrative:    EXAMINATION:  XR CHEST AP PORTABLE    CLINICAL HISTORY:  fever, low back pain;    TECHNIQUE:  Single frontal view of the chest was performed.    COMPARISON:  Chest of February 1, 2016.    FINDINGS:  The lungs are clear, with normal appearance of pulmonary vasculature and no pleural effusion or pneumothorax.    The cardiac silhouette is normal in size. The hilar and mediastinal contours are unremarkable.    Bones are intact.                                 Medical Decision Making:   History:   Old Medical Records: I decided to obtain old medical records.  Clinical Tests:   Lab Tests: Ordered and Reviewed  Radiological Study: Ordered and Reviewed  Medical Tests: Ordered and Reviewed  Patient has had fever at home for the past week.  She is on clindamycin for the past few days.  She has erythema warmth and tenderness near the incision site.  I spoke with her surgeon, Dr. Herrmann who  would like to see the patient in the clinic this soon as possible.  He will contact his nurse to help schedule an appointment.  The patient has been trying to get an appointment to be seen but has been able to as of this point..  She does have an elevated sed rate.  Will continue clindamycin and anticipate her getting an outpatient clinic appointment ASAP, this week.            Scribe Attestation:   Scribe #1: I performed the above scribed service and the documentation accurately describes the services I performed. I attest to the accuracy of the note.    I, Dr. Yemi Walsh personally performed the services described in this documentation. All medical record entries made by the scribe were at my direction and in my presence.  I have reviewed the chart and agree that the record reflects my personal performance and is accurate and complete. Yemi Walsh MD.  7:00 PM 11/13/2018    DISCLAIMER: This note was prepared with Dragon NaturallySpeaking voice recognition transcription software. Garbled syntax, mangled pronouns, and other bizarre constructions may be attributed to that software system            Clinical Impression:   The primary encounter diagnosis was Postoperative cellulitis of surgical wound. A diagnosis of Fever was also pertinent to this visit.      Disposition:   Disposition: Discharged  Condition: Stable                        Yemi Walsh MD  11/13/18 [id number]

## 2018-11-13 NOTE — PROGRESS NOTES
"Subjective:       Patient ID: Genia Mcnulty is a 53 y.o. female.    Chief Complaint: Fever  Had back surgery a month ago. Has been running fever for over a week, called the surgeon and was put on clindamycin. She has been on it tid for 6 days. Her temp is 100-102, and she has been taking tylenol during the day for it. She denies any cough or dysuria. She states her incision site is "red and looks angry." It is painful, 3/10 with oxycodone 10 mg.   HPI  Review of Systems   Constitutional: Positive for fatigue and fever.   Respiratory: Negative for cough and shortness of breath.    Gastrointestinal: Positive for nausea. Negative for vomiting.   Genitourinary: Negative for dysuria.   Musculoskeletal: Positive for myalgias.   Skin: Positive for pallor.       Objective:      Physical Exam   Constitutional: She is oriented to person, place, and time. She appears well-developed and well-nourished. No distress.   HENT:   Head: Normocephalic and atraumatic.   Eyes: Conjunctivae are normal. Right eye exhibits no discharge. Left eye exhibits no discharge. No scleral icterus.   Cardiovascular: Normal rate, regular rhythm and normal heart sounds. Exam reveals no gallop and no friction rub.   No murmur heard.  Pulmonary/Chest: Effort normal and breath sounds normal. No stridor. No respiratory distress. She has no wheezes. She has no rales.   Neurological: She is alert and oriented to person, place, and time.   Skin: Skin is warm and dry. She is not diaphoretic. There is erythema.   Incision line over spine is warm to the touch and erythematous for about 10 inches in lumbar area. Erythematous rash extends almost 3 inches on each side of incision line.    Psychiatric: She has a normal mood and affect. Her behavior is normal.   Nursing note and vitals reviewed.      Assessment:     This provider spent  15 minutes face to face with patient, more than half the time for counseling and coordination of care as noted.    1. Wound " infection after surgery        Plan:     Wound infection after surgery  -     Refer to Emergency Dept.    Needs us of back to see if there is an abscess, ID consult and IV antibiotics.     Consulted with Dr. Vick and looked at patient face to face and agrees patient has failed outpatient antibiotic therapy.  After discussion with patient and Dr. Vick, patient is agreeable to going to ER for inpatient admission.

## 2018-11-14 ENCOUNTER — HOSPITAL ENCOUNTER (OUTPATIENT)
Dept: RADIOLOGY | Facility: HOSPITAL | Age: 53
Discharge: HOME | End: 2018-11-14
Attending: PHYSICIAN ASSISTANT
Payer: COMMERCIAL

## 2018-11-14 ENCOUNTER — OFFICE VISIT (OUTPATIENT)
Dept: ORTHOPEDICS | Facility: CLINIC | Age: 53
End: 2018-11-14
Payer: COMMERCIAL

## 2018-11-14 VITALS — DIASTOLIC BLOOD PRESSURE: 69 MMHG | SYSTOLIC BLOOD PRESSURE: 122 MMHG | HEART RATE: 82 BPM

## 2018-11-14 DIAGNOSIS — Z98.1 STATUS POST LUMBAR SPINAL FUSION: Primary | ICD-10-CM

## 2018-11-14 DIAGNOSIS — Z98.1 S/P SPINAL FUSION: ICD-10-CM

## 2018-11-14 PROCEDURE — 99999 PR PBB SHADOW E&M-EST. PATIENT-LVL II: CPT | Mod: PBBFAC,,, | Performed by: ORTHOPAEDIC SURGERY

## 2018-11-14 PROCEDURE — 72082 X-RAY EXAM ENTIRE SPI 2/3 VW: CPT | Mod: TC

## 2018-11-14 PROCEDURE — 99024 POSTOP FOLLOW-UP VISIT: CPT | Mod: S$GLB,,, | Performed by: ORTHOPAEDIC SURGERY

## 2018-11-14 PROCEDURE — 72082 X-RAY EXAM ENTIRE SPI 2/3 VW: CPT | Mod: 26,,, | Performed by: RADIOLOGY

## 2018-11-14 NOTE — ED NOTES
Presents to the ER with c/o fever and back pain. Patient reports having back surgery on October 2nd to have hardware repaired. Patient reports having a subjective fever for the past 8 days. Associated complaints clear rhinorrhea and  erythema to site. No drainage. Patient ambulates with a walker. AAOx4. Mucous membranes are pink and moist. Skin is warm, dry and intact. Lungs are clear bilaterally, respirations are regular and unlabored. Denies cough, congestion, rhinorrhea or SOB. BS active x4, no tenderness with palpation, abd is soft and not distended. Denies any appetite or activity change. S1S2, capillary refill is < 2 seconds. Denies dysuria, difficulty urinating, frequency, numbness, tingling or weakness. RADHA EARLYS

## 2018-11-14 NOTE — ED NOTES
Upon discharge, patient is AAOx4, no cardiac or respiratory complications. Follow up care reviewed with patient and has been instructed to return to the ER if needed. Patient verbalized understanding and ambulated to the lobby without difficulty. PAULA GARCIA

## 2018-11-14 NOTE — DISCHARGE INSTRUCTIONS
I suspect year fever is coming from a skin infection overlying the surgical site.  Continue the clindamycin.  Follow up with her spine surgeon, Dr. Herrmann, tomorrow morning for an outpatient appointment this week.  Return to the ER for worsening symptoms.

## 2018-11-18 LAB
BACTERIA BLD CULT: NORMAL
BACTERIA BLD CULT: NORMAL

## 2018-11-19 NOTE — PROGRESS NOTES
Date: 11/19/2018    Date of Surgery: 10/2/2018    Procedure: T10-Pelvis Revision    History: Genia Mcnulty is seen today for follow-up following the above listed procedure. For the past 10 days she has been having fevers and malaise. She spoke with Dr. Cox' office who prescribed clindamycin. She did go to the ER and had a workup, flu antigen was negative UA and CXR were negative.    Exam:  Incision is healing well there is no drainage, there is notable surrounding blanching erythema. Neuro exam is stable. No signs of DVT.    .    Radiographs: no new imaging today    Assessment/Plan: 3 weeks post op.    Today I discussed options with the patient. I have recommended she stop the clindamycin. I will call her Monday. If the wound begins draining she needs to come to the [facility].    UPDATE 11/19:    Low grade fevers, no drainage. Will call Wednesday.

## 2018-11-21 ENCOUNTER — TELEPHONE (OUTPATIENT)
Dept: ORTHOPEDICS | Facility: CLINIC | Age: 53
End: 2018-11-21

## 2018-11-28 DIAGNOSIS — Z98.890 S/P SPINAL SURGERY: Primary | ICD-10-CM

## 2018-11-28 RX ORDER — ONDANSETRON HYDROCHLORIDE 8 MG/1
8 TABLET, FILM COATED ORAL EVERY 8 HOURS PRN
Qty: 30 TABLET | Refills: 0 | Status: SHIPPED | OUTPATIENT
Start: 2018-11-28 | End: 2020-08-05

## 2018-11-28 RX ORDER — OXYCODONE AND ACETAMINOPHEN 10; 325 MG/1; MG/1
1 TABLET ORAL EVERY 4 HOURS PRN
Qty: 90 TABLET | Refills: 0 | Status: SHIPPED | OUTPATIENT
Start: 2018-11-28 | End: 2019-01-09 | Stop reason: SDUPTHER

## 2018-11-28 NOTE — TELEPHONE ENCOUNTER
----- Message from Joann Martinez sent at 11/28/2018  2:03 PM CST -----  Contact: pt  Can the clinic reply in [facility]: no      Please refill the medication(s) listed below. The patient can be reached at this phone number  once it is called into the pharmacy. [phone]      Medication #1 oxyCODONE-acetaminophen (PERCOCET)  mg per tablet      Medication #2 something for constipation that she does not have to drink. Something in a pill form.    Medication #3 Zofran or something for nausea          Preferred Pharmacy:[facility]/pharmacy #[id number] - [place] - [address] AT [facility] [phone] (Phone)  [phone] (Fax)

## 2018-11-28 NOTE — TELEPHONE ENCOUNTER
Left message for pt advising that her pain med and nausea med were sent to pharmacy. I also advised that per Gregoria Delaney, she can take dulcolax or exlax for the constipation and those will be in pill form.

## 2018-11-30 ENCOUNTER — PATIENT MESSAGE (OUTPATIENT)
Dept: ORTHOPEDICS | Facility: CLINIC | Age: 53
End: 2018-11-30

## 2018-12-03 ENCOUNTER — TELEPHONE (OUTPATIENT)
Dept: FAMILY MEDICINE | Facility: CLINIC | Age: 53
End: 2018-12-03

## 2018-12-03 NOTE — TELEPHONE ENCOUNTER
Spoke with patient.  Surgeon has requested that she see Marcy prior to visit on Wednesday.  Told patient we would call her back.  No opening prior to Thursday.  We will work her in and call back.

## 2018-12-03 NOTE — TELEPHONE ENCOUNTER
----- Message from Gabriella Dudley RT sent at 12/3/2018  2:10 PM CST -----  Contact: pt    pt , requesting an appt to be worked in tomorrow for her back pain issues and before she sees her back surgeron again on Wednesday, 12/05/2008, thanks.

## 2018-12-03 NOTE — PT/OT/SLP DISCHARGE
Physical Therapy Discharge Summary    Name: Genia Mcnulty  MRN: [id number]   Principal Problem: Pseudoarthrosis of lumbar spine     Patient Discharged from acute Physical Therapy on 10/7/18.  Please refer to prior PT noted date on 10/5/18 for functional status.     Assessment:     Patient has not met goals.    Objective:     GOALS:   Multidisciplinary Problems     Physical Therapy Goals     Not on file          Multidisciplinary Problems (Resolved)        Problem: Physical Therapy Goal    Goal Priority Disciplines Outcome Goal Variances Interventions   Physical Therapy Goal   (Resolved)     PT, PT/OT Outcome(s) achieved     Description:  Goals to be met by: 10/14/18     Patient will increase functional independence with mobility by performin. Supine to sit with supervision with HOB flat   2. Sit to supine with  Supervision with HOB flat   3. Sit to stand transfer with supervision using rolling walker.   4. Gait  x 150 feet with stand by assistance using Rolling Walker.  5. Pt will perform BLE therex x 15 reps per handout with supervision                         Reasons for Discontinuation of Therapy Services  [place] [facility]       Plan:     Patient Discharged to: Home with Home Health Service.    Trinidad Mack, PT  12/3/2018

## 2018-12-03 NOTE — TELEPHONE ENCOUNTER
----- Message from Ruby Barron sent at 12/3/2018  3:44 PM CST -----  Contact: pt  Type: Needs Medical Advice    Who Called:  Patient  Best Call Back Number: [phone] (home)   Additional Information: Pt states she is waiting on the Dr office of Marcy Elliott to call her for an appt today before she sees Dr Herrmann on Wednseday. Would like a call back. Thanks.

## 2018-12-04 NOTE — TELEPHONE ENCOUNTER
Spoke with ortho office.  Would like patient to see Marcy for wound.  Appointment scheduled,  Dr Herrmann appointment rescheduled to Friday.  Patient notified of both appointments.

## 2018-12-06 ENCOUNTER — OFFICE VISIT (OUTPATIENT)
Dept: FAMILY MEDICINE | Facility: CLINIC | Age: 53
End: 2018-12-06
Payer: COMMERCIAL

## 2018-12-06 ENCOUNTER — HOSPITAL ENCOUNTER (OUTPATIENT)
Dept: RADIOLOGY | Facility: HOSPITAL | Age: 53
Discharge: HOME | End: 2018-12-06
Attending: NURSE PRACTITIONER
Payer: COMMERCIAL

## 2018-12-06 ENCOUNTER — DOCUMENTATION ONLY (OUTPATIENT)
Dept: FAMILY MEDICINE | Facility: CLINIC | Age: 53
End: 2018-12-06

## 2018-12-06 VITALS
HEIGHT: 65 IN | DIASTOLIC BLOOD PRESSURE: 82 MMHG | TEMPERATURE: 99 F | BODY MASS INDEX: 25.97 KG/M2 | WEIGHT: 155.88 LBS | SYSTOLIC BLOOD PRESSURE: 122 MMHG | HEART RATE: 77 BPM | OXYGEN SATURATION: 96 %

## 2018-12-06 DIAGNOSIS — D50.9 IRON DEFICIENCY ANEMIA, UNSPECIFIED IRON DEFICIENCY ANEMIA TYPE: Primary | ICD-10-CM

## 2018-12-06 DIAGNOSIS — T81.49XA SURGICAL WOUND INFECTION: ICD-10-CM

## 2018-12-06 DIAGNOSIS — R50.9 FEVER, UNSPECIFIED FEVER CAUSE: ICD-10-CM

## 2018-12-06 PROCEDURE — 72130 CT CHEST SPINE W/O & W/DYE: CPT | Mod: TC

## 2018-12-06 PROCEDURE — 99214 OFFICE O/P EST MOD 30 MIN: CPT | Mod: S$GLB,,, | Performed by: NURSE PRACTITIONER

## 2018-12-06 PROCEDURE — 25500020 PHARM REV CODE 255

## 2018-12-06 PROCEDURE — 72130 CT CHEST SPINE W/O & W/DYE: CPT | Mod: 26,,, | Performed by: RADIOLOGY

## 2018-12-06 RX ORDER — SODIUM CHLORIDE 9 MG/ML
INJECTION, SOLUTION INTRAVENOUS
Status: DISCONTINUED
Start: 2018-12-06 | End: 2018-12-07 | Stop reason: HOSPADM

## 2018-12-06 RX ADMIN — IOHEXOL 75 ML: 350 INJECTION, SOLUTION INTRAVENOUS at 06:12

## 2018-12-06 NOTE — PROGRESS NOTES
Subjective:       Patient ID: Genia Mcnulty is a 53 y.o. female.    Chief Complaint: Wound redness and Fever  Has continued erythema and warmth of area around the thoracic incision line, no improvement even though she took antibiotics. She has severe nausea, anti-nausea medication does not help. She continues to run fever, 100.9 this morning prior to taking tylenol.  She was seen last month in ER and all inflammatory markers were very high, wbc was normal, but granulocytes were high and she is anemic. Blood cultures were negative at that time. She has seen her surgeon since going to ER, he did not want to do imaging as he was afraid they would not be able to tell the difference between scar tissue and an abscess, and he requested she come to PCP prior to returning to him. Over the weekend she fainted for no known reason, was not seen by ER at that time. She denies any injury from fainting.   HPI  Review of Systems    Objective:       Lab Results   Component Value Date    WBC 6.30 11/13/2018    HGB 8.6 (L) 11/13/2018    HCT 26.3 (L) 11/13/2018    MCV 80 (L) 11/13/2018     (H) 11/13/2018       Physical Exam   Constitutional: She is oriented to person, place, and time. She appears well-developed and well-nourished. No distress.   HENT:   Head: Normocephalic and atraumatic.   Eyes: Conjunctivae are normal. Right eye exhibits no discharge. Left eye exhibits no discharge. No scleral icterus.   Cardiovascular: Normal rate, regular rhythm and normal heart sounds. Exam reveals no gallop and no friction rub.   No murmur heard.  Pulmonary/Chest: Effort normal and breath sounds normal. No stridor. No respiratory distress. She has no wheezes. She has no rales.   Musculoskeletal: She exhibits tenderness.   Has tenderness left of incision line on back.    Neurological: She is alert and oriented to person, place, and time.   Skin: Skin is warm and dry. She is not diaphoretic. There is erythema.   Area along thoracic  back incision line remains erythematous and warm to the touch.    Psychiatric: She has a normal mood and affect. Her behavior is normal.   Nursing note and vitals reviewed.      Assessment:       1. Iron deficiency anemia, unspecified iron deficiency anemia type    2. Fever, unspecified fever cause    3. Surgical wound infection        Plan:       Iron deficiency anemia, unspecified iron deficiency anemia type  -     CBC auto differential; Future; Expected date: 12/06/2018  -     Iron and TIBC; Future; Expected date: 12/06/2018    Fever, unspecified fever cause  -     Comprehensive metabolic panel; Future; Expected date: 12/06/2018  -     Blood culture; Future; Expected date: 12/06/2018    Surgical wound infection  -     CT Thoracic Spine W Wo Contrast; Future; Expected date: 12/06/2018       Spoke with Dr. Zelaya on the phone and discussed patient's case explaining that I am afraid she may have an internal abscess and her surgeon did not think they could see the difference between an abscess and scar tissue. Dr. Zelaya said they can tell the difference and to order a CT with and with out contrast to help them identify any infection.   Follow up with surgeon tomorrow as scheduled

## 2018-12-07 ENCOUNTER — TELEPHONE (OUTPATIENT)
Dept: ORTHOPEDICS | Facility: CLINIC | Age: 53
End: 2018-12-07

## 2018-12-07 DIAGNOSIS — K59.03 DRUG-INDUCED CONSTIPATION: Primary | ICD-10-CM

## 2018-12-07 DIAGNOSIS — T81.49XA WOUND INFECTION AFTER SURGERY: Primary | ICD-10-CM

## 2018-12-07 DIAGNOSIS — D50.9 IRON DEFICIENCY ANEMIA, UNSPECIFIED IRON DEFICIENCY ANEMIA TYPE: ICD-10-CM

## 2018-12-07 RX ORDER — POLYETHYLENE GLYCOL 3350 17 G/17G
17 POWDER, FOR SOLUTION ORAL 3 TIMES DAILY
Qty: 1530 G | Refills: 5 | Status: SHIPPED | OUTPATIENT
Start: 2018-12-07 | End: 2020-08-05

## 2018-12-07 RX ORDER — DOXYCYCLINE 100 MG/1
100 CAPSULE ORAL 2 TIMES DAILY WITH MEALS
Qty: 20 CAPSULE | Refills: 0 | Status: SHIPPED | OUTPATIENT
Start: 2018-12-07 | End: 2020-02-13

## 2018-12-07 NOTE — TELEPHONE ENCOUNTER
Spoke with Mom told her of the Dr Herrmann cx to day  For her next appt she has to come after 2:30 pm      We will keep that in mind & call her

## 2018-12-07 NOTE — TELEPHONE ENCOUNTER
We spoke  She can not come Wednesday Dec 12  I will have April get back with her Monday   She approved

## 2018-12-10 ENCOUNTER — OFFICE VISIT (OUTPATIENT)
Dept: SPINE | Facility: CLINIC | Age: 53
End: 2018-12-10
Attending: ORTHOPAEDIC SURGERY
Payer: COMMERCIAL

## 2018-12-10 ENCOUNTER — TELEPHONE (OUTPATIENT)
Dept: ORTHOPEDICS | Facility: CLINIC | Age: 53
End: 2018-12-10

## 2018-12-10 VITALS
BODY MASS INDEX: 25.83 KG/M2 | SYSTOLIC BLOOD PRESSURE: 139 MMHG | DIASTOLIC BLOOD PRESSURE: 71 MMHG | HEART RATE: 86 BPM | HEIGHT: 65 IN | WEIGHT: 155 LBS

## 2018-12-10 DIAGNOSIS — Z98.1 STATUS POST LUMBAR SPINAL FUSION: Primary | ICD-10-CM

## 2018-12-10 DIAGNOSIS — M41.9 SCOLIOSIS, UNSPECIFIED SCOLIOSIS TYPE, UNSPECIFIED SPINAL REGION: Primary | ICD-10-CM

## 2018-12-10 PROCEDURE — 99024 POSTOP FOLLOW-UP VISIT: CPT | Mod: S$GLB,,, | Performed by: ORTHOPAEDIC SURGERY

## 2018-12-10 PROCEDURE — 3078F DIAST BP <80 MM HG: CPT | Mod: CPTII,S$GLB,, | Performed by: ORTHOPAEDIC SURGERY

## 2018-12-10 PROCEDURE — 3008F BODY MASS INDEX DOCD: CPT | Mod: CPTII,S$GLB,, | Performed by: ORTHOPAEDIC SURGERY

## 2018-12-10 PROCEDURE — 99999 PR PBB SHADOW E&M-EST. PATIENT-LVL III: CPT | Mod: PBBFAC,,, | Performed by: ORTHOPAEDIC SURGERY

## 2018-12-10 PROCEDURE — 3075F SYST BP GE 130 - 139MM HG: CPT | Mod: CPTII,S$GLB,, | Performed by: ORTHOPAEDIC SURGERY

## 2018-12-10 RX ORDER — ONDANSETRON 4 MG/1
4 TABLET, FILM COATED ORAL EVERY 6 HOURS PRN
Qty: 60 TABLET | Refills: 1 | Status: SHIPPED | OUTPATIENT
Start: 2018-12-10 | End: 2020-06-19 | Stop reason: SDUPTHER

## 2018-12-13 ENCOUNTER — TELEPHONE (OUTPATIENT)
Dept: FAMILY MEDICINE | Facility: CLINIC | Age: 53
End: 2018-12-13

## 2018-12-13 NOTE — TELEPHONE ENCOUNTER
Discussed with Marcy Elliott, she agreeable with January 2019 appointment. Pt notified and scheduled 1/15/2019 with OMEGA Naiud and added to waitlist.

## 2018-12-13 NOTE — TELEPHONE ENCOUNTER
----- Message from Jes Fu sent at 12/13/2018 11:57 AM CST -----  Type: Needs Medical Advice    Who Called:  Patient  Best Call Back Number: [phone]  Additional Information: contact regarding [facility] scheduling .    Thank you

## 2018-12-13 NOTE — TELEPHONE ENCOUNTER
"Phoned pt to schedule GI consult.   First available 1/9/2019 (Nai) or 1/15/2019 (Colleen). Pt declined to schedule and requesting "run that by Linnea Marcy to see if she's ok with this".   "

## 2018-12-14 NOTE — PROGRESS NOTES
Date: 12/14/2018    Date of Surgery: 10/2/2018    Procedure: T10-Pelvis Revision    History: Genia Mcnulty is seen today for follow-up following the above listed procedure. She has had intermittent fevers. She has taken 3 courses of oral antibiotics prescribed by Dr. Cox and Marcy Elliott. She does endorse some malaise. She denies fevers.    Exam:  Incision is healing well there is no drainage, there is decreasing surrounding blanching erythema. Neuro exam is stable. No signs of DVT.    Radiographs: no new imaging today    Assessment/Plan: 10 weeks postop.    We will continue observation for now, certainly it is possible that she has a deep wound infection but in the absence of drainage or john wound breakdown I do not recommend any intervention at this time.

## 2019-01-03 ENCOUNTER — LAB VISIT (OUTPATIENT)
Dept: LAB | Facility: HOSPITAL | Age: 54
End: 2019-01-03
Attending: ORTHOPAEDIC SURGERY
Payer: COMMERCIAL

## 2019-01-03 DIAGNOSIS — Z98.1 STATUS POST LUMBAR SPINAL FUSION: ICD-10-CM

## 2019-01-03 LAB
CRP SERPL-MCNC: 48.8 MG/L
ERYTHROCYTE [SEDIMENTATION RATE] IN BLOOD BY WESTERGREN METHOD: 62 MM/HR

## 2019-01-03 PROCEDURE — 85651 RBC SED RATE NONAUTOMATED: CPT | Mod: PO

## 2019-01-03 PROCEDURE — 36415 COLL VENOUS BLD VENIPUNCTURE: CPT | Mod: PO

## 2019-01-03 PROCEDURE — 86140 C-REACTIVE PROTEIN: CPT

## 2019-01-09 ENCOUNTER — OFFICE VISIT (OUTPATIENT)
Dept: ORTHOPEDICS | Facility: CLINIC | Age: 54
End: 2019-01-09
Payer: COMMERCIAL

## 2019-01-09 ENCOUNTER — HOSPITAL ENCOUNTER (OUTPATIENT)
Dept: RADIOLOGY | Facility: HOSPITAL | Age: 54
Discharge: HOME | End: 2019-01-09
Attending: ORTHOPAEDIC SURGERY
Payer: COMMERCIAL

## 2019-01-09 VITALS — BODY MASS INDEX: 25.83 KG/M2 | HEIGHT: 65 IN | WEIGHT: 155 LBS

## 2019-01-09 DIAGNOSIS — M41.9 SCOLIOSIS, UNSPECIFIED SCOLIOSIS TYPE, UNSPECIFIED SPINAL REGION: ICD-10-CM

## 2019-01-09 DIAGNOSIS — Z98.890 S/P SPINAL SURGERY: ICD-10-CM

## 2019-01-09 PROCEDURE — 99999 PR PBB SHADOW E&M-EST. PATIENT-LVL II: CPT | Mod: PBBFAC,,, | Performed by: ORTHOPAEDIC SURGERY

## 2019-01-09 PROCEDURE — 99212 OFFICE O/P EST SF 10 MIN: CPT | Mod: S$GLB,,, | Performed by: ORTHOPAEDIC SURGERY

## 2019-01-09 PROCEDURE — 99212 PR OFFICE/OUTPT VISIT, EST, LEVL II, 10-19 MIN: ICD-10-PCS | Mod: S$GLB,,, | Performed by: ORTHOPAEDIC SURGERY

## 2019-01-09 PROCEDURE — 72082 X-RAY EXAM ENTIRE SPI 2/3 VW: CPT | Mod: 26,,, | Performed by: RADIOLOGY

## 2019-01-09 PROCEDURE — 72082 X-RAY EXAM ENTIRE SPI 2/3 VW: CPT | Mod: TC

## 2019-01-09 PROCEDURE — 72082 XR SCOLIOSIS COMPLETE: ICD-10-PCS | Mod: 26,,, | Performed by: RADIOLOGY

## 2019-01-09 PROCEDURE — 3008F BODY MASS INDEX DOCD: CPT | Mod: CPTII,S$GLB,, | Performed by: ORTHOPAEDIC SURGERY

## 2019-01-09 PROCEDURE — 3008F PR BODY MASS INDEX (BMI) DOCUMENTED: ICD-10-PCS | Mod: CPTII,S$GLB,, | Performed by: ORTHOPAEDIC SURGERY

## 2019-01-09 PROCEDURE — 99999 PR PBB SHADOW E&M-EST. PATIENT-LVL II: ICD-10-PCS | Mod: PBBFAC,,, | Performed by: ORTHOPAEDIC SURGERY

## 2019-01-09 RX ORDER — OXYCODONE AND ACETAMINOPHEN 10; 325 MG/1; MG/1
1 TABLET ORAL EVERY 6 HOURS PRN
Qty: 90 TABLET | Refills: 0 | Status: SHIPPED | OUTPATIENT
Start: 2019-01-09 | End: 2019-02-26 | Stop reason: SDUPTHER

## 2019-01-13 NOTE — PROGRESS NOTES
Date: 01/13/2019    Date of Surgery: 10/2/2018    Procedure: T10-Pelvis Revision    History: Genia Mcnulty is seen today for follow-up following the above listed procedure. She is doing much better. No fevers. She is back to work.    Exam:  Incision is healed. There is mild surrounding erythema. Neuro exam is stable. No signs of DVT.    Radiographs: no new imaging today    Assessment/Plan 3 moths postop.    She is moving in the right direction, I will see her back for her 6 month visit.    I spent 10 minutes with the patient of which greater than 1/2 the time was devoted to counciling the patient regarding treatment options.            
Patient

## 2019-01-15 ENCOUNTER — TELEPHONE (OUTPATIENT)
Dept: PHYSICAL MEDICINE AND REHAB | Facility: CLINIC | Age: 54
End: 2019-01-15

## 2019-01-15 ENCOUNTER — TELEPHONE (OUTPATIENT)
Dept: GASTROENTEROLOGY | Facility: CLINIC | Age: 54
End: 2019-01-15

## 2019-01-15 NOTE — TELEPHONE ENCOUNTER
Called to inform pt that we are booked until late April, but that I have put her on the wait list and she will get at least a day notice if a slot opens up.  Informed pt that the MD will take this into account when filling refills and that we will call if anything changes.  Pt asked how to prevent in the future, advised pt to call three months in advance for all appointments.    ----- Message from Joselin Guzman sent at 1/15/2019  8:42 AM CST -----  Patient Requesting Sooner Appointment.     Reason for sooner appt.: concerned that her appt 4/18 will affect her prescription refills  When is the first available appointment?4/18  Communication Preference: pt@ [phone]  Additional Information:

## 2019-01-15 NOTE — TELEPHONE ENCOUNTER
----- Message from Whitney Anthony LPN sent at 1/15/2019  8:55 AM CST -----  Contact: Patient      ----- Message -----  From: Whitney Denny  Sent: 1/15/2019   8:35 AM  To: [facility] Staff    Type: Needs Medical Advice    Who Called:  Patient  Symptoms (please be specific):  First-time visit    Best Call Back Number: [phone]  Additional Information: Patient had a first-time visit scheduled today for 3:00.  She had to unexpectedly get a doctor's appointment for her daughter in [place] at 2:00, and she took off work today so that she could see Malissa Arroyo.  She doesn't think she can make it to your office from the 2:00 visit in [place] by 3:00, and she is wondering if you have any available time slots this morning or later than 3:00 this afternoon    Please call to advise  Thank you

## 2019-02-26 DIAGNOSIS — Z98.890 S/P SPINAL SURGERY: ICD-10-CM

## 2019-02-26 NOTE — TELEPHONE ENCOUNTER
Last visit: 08/06/2018  Next visit: 04/18/2019 High priority on wait list  Last refill Norco: 01/09/2019 by Dr Herrmann  Had spinal fusion 10/02/2018, that [facility] was probably the last time they could give pain medicine.    ----- Message from Mercedes Abad sent at 2/26/2019 12:47 PM CST -----  Contact: self @ [phone]  Pt is req a refill for hydrocodone 10/325.    [facility]/pharmacy #[id number] - [place] - [address] AT [facility]                    [phone] (Phone)        [phone] (Fax)

## 2019-02-28 NOTE — PROGRESS NOTES
Doing well. No issues or signs of infection. Will RTC as needed.    Vitals:    11/07/18 1434   BP: (!) 188/84   Pulse: 89   Resp: 12   Temp: 98.5 °F (36.9 °C)

## 2019-03-01 DIAGNOSIS — M43.10 ACQUIRED SPONDYLOLISTHESIS: ICD-10-CM

## 2019-03-01 DIAGNOSIS — G95.9 MYELOPATHY OF LUMBAR REGION: ICD-10-CM

## 2019-03-01 DIAGNOSIS — M51.37 DEGENERATION OF LUMBAR OR LUMBOSACRAL INTERVERTEBRAL DISC: ICD-10-CM

## 2019-03-01 DIAGNOSIS — M41.9 KYPHOSCOLIOSIS: ICD-10-CM

## 2019-03-01 DIAGNOSIS — M47.819 SPONDYLOSIS WITHOUT MYELOPATHY: ICD-10-CM

## 2019-03-01 DIAGNOSIS — R29.898 WEAKNESS OF RIGHT LEG: ICD-10-CM

## 2019-03-01 NOTE — TELEPHONE ENCOUNTER
----- Message from Joselin Guzman sent at 3/1/2019 10:26 AM CST -----  Rx Refill/Request     Is this a Refill or New Rx:  Refill    Rx Name and Strength:  HYDROcodone-acetaminophen (NORCO)  mg per tablet  Preferred Pharmacy with phone number:     [facility]/pharmacy #[id number] - [place] - [address] AT [facility]  [address]  [place] [address]  Phone: [phone] Fax: [phone]      Communication Preference:pt @ [phone]   Additional Information:

## 2019-03-03 RX ORDER — OXYCODONE AND ACETAMINOPHEN 10; 325 MG/1; MG/1
1 TABLET ORAL EVERY 6 HOURS PRN
Qty: 90 TABLET | Refills: 0 | Status: SHIPPED | OUTPATIENT
Start: 2019-03-03 | End: 2019-04-02

## 2019-03-03 RX ORDER — HYDROCODONE BITARTRATE AND ACETAMINOPHEN 10; 325 MG/1; MG/1
1 TABLET ORAL EVERY 6 HOURS PRN
Qty: 120 TABLET | Refills: 0 | OUTPATIENT
Start: 2019-03-03 | End: 2019-04-02

## 2019-04-03 ENCOUNTER — PATIENT MESSAGE (OUTPATIENT)
Dept: ORTHOPEDICS | Facility: CLINIC | Age: 54
End: 2019-04-03

## 2019-04-18 ENCOUNTER — OFFICE VISIT (OUTPATIENT)
Dept: PHYSICAL MEDICINE AND REHAB | Facility: CLINIC | Age: 54
End: 2019-04-18
Payer: COMMERCIAL

## 2019-04-18 VITALS
SYSTOLIC BLOOD PRESSURE: 169 MMHG | HEART RATE: 61 BPM | HEIGHT: 65 IN | DIASTOLIC BLOOD PRESSURE: 80 MMHG | WEIGHT: 150 LBS | BODY MASS INDEX: 24.99 KG/M2

## 2019-04-18 DIAGNOSIS — R29.898 WEAKNESS OF BOTH LEGS: ICD-10-CM

## 2019-04-18 DIAGNOSIS — M43.10 ACQUIRED SPONDYLOLISTHESIS: ICD-10-CM

## 2019-04-18 DIAGNOSIS — M17.11 PRIMARY OSTEOARTHRITIS OF RIGHT KNEE: ICD-10-CM

## 2019-04-18 DIAGNOSIS — G95.9 MYELOPATHY OF LUMBAR REGION: Primary | ICD-10-CM

## 2019-04-18 DIAGNOSIS — R26.9 GAIT DISORDER: ICD-10-CM

## 2019-04-18 DIAGNOSIS — M54.16 LUMBAR RADICULOPATHY: ICD-10-CM

## 2019-04-18 DIAGNOSIS — M21.371 FOOT DROP, RIGHT: ICD-10-CM

## 2019-04-18 DIAGNOSIS — M47.16 LUMBAR SPONDYLOSIS WITH MYELOPATHY: ICD-10-CM

## 2019-04-18 DIAGNOSIS — M41.9 KYPHOSCOLIOSIS: ICD-10-CM

## 2019-04-18 PROCEDURE — 99999 PR PBB SHADOW E&M-EST. PATIENT-LVL III: ICD-10-PCS | Mod: PBBFAC,,, | Performed by: PHYSICAL MEDICINE & REHABILITATION

## 2019-04-18 PROCEDURE — 3077F SYST BP >= 140 MM HG: CPT | Mod: CPTII,S$GLB,, | Performed by: PHYSICAL MEDICINE & REHABILITATION

## 2019-04-18 PROCEDURE — 3008F PR BODY MASS INDEX (BMI) DOCUMENTED: ICD-10-PCS | Mod: CPTII,S$GLB,, | Performed by: PHYSICAL MEDICINE & REHABILITATION

## 2019-04-18 PROCEDURE — 3079F DIAST BP 80-89 MM HG: CPT | Mod: CPTII,S$GLB,, | Performed by: PHYSICAL MEDICINE & REHABILITATION

## 2019-04-18 PROCEDURE — 99214 OFFICE O/P EST MOD 30 MIN: CPT | Mod: S$GLB,,, | Performed by: PHYSICAL MEDICINE & REHABILITATION

## 2019-04-18 PROCEDURE — 99214 PR OFFICE/OUTPT VISIT, EST, LEVL IV, 30-39 MIN: ICD-10-PCS | Mod: S$GLB,,, | Performed by: PHYSICAL MEDICINE & REHABILITATION

## 2019-04-18 PROCEDURE — 3077F PR MOST RECENT SYSTOLIC BLOOD PRESSURE >= 140 MM HG: ICD-10-PCS | Mod: CPTII,S$GLB,, | Performed by: PHYSICAL MEDICINE & REHABILITATION

## 2019-04-18 PROCEDURE — 3079F PR MOST RECENT DIASTOLIC BLOOD PRESSURE 80-89 MM HG: ICD-10-PCS | Mod: CPTII,S$GLB,, | Performed by: PHYSICAL MEDICINE & REHABILITATION

## 2019-04-18 PROCEDURE — 3008F BODY MASS INDEX DOCD: CPT | Mod: CPTII,S$GLB,, | Performed by: PHYSICAL MEDICINE & REHABILITATION

## 2019-04-18 PROCEDURE — 99999 PR PBB SHADOW E&M-EST. PATIENT-LVL III: CPT | Mod: PBBFAC,,, | Performed by: PHYSICAL MEDICINE & REHABILITATION

## 2019-04-18 RX ORDER — OXYCODONE AND ACETAMINOPHEN 10; 325 MG/1; MG/1
1 TABLET ORAL EVERY 8 HOURS PRN
Qty: 90 TABLET | Refills: 0 | Status: SHIPPED | OUTPATIENT
Start: 2019-06-18 | End: 2019-08-19 | Stop reason: SDUPTHER

## 2019-04-18 RX ORDER — OXYCODONE AND ACETAMINOPHEN 10; 325 MG/1; MG/1
1 TABLET ORAL EVERY 8 HOURS PRN
Qty: 90 TABLET | Refills: 0 | Status: SHIPPED | OUTPATIENT
Start: 2019-04-18 | End: 2019-05-18

## 2019-04-18 RX ORDER — DULOXETIN HYDROCHLORIDE 30 MG/1
30 CAPSULE, DELAYED RELEASE ORAL DAILY
Qty: 30 CAPSULE | Refills: 5 | Status: SHIPPED | OUTPATIENT
Start: 2019-04-18 | End: 2020-02-13

## 2019-04-18 RX ORDER — OXYCODONE AND ACETAMINOPHEN 10; 325 MG/1; MG/1
1 TABLET ORAL EVERY 8 HOURS PRN
Qty: 90 TABLET | Refills: 0 | Status: SHIPPED | OUTPATIENT
Start: 2019-05-18 | End: 2019-06-17

## 2019-04-18 NOTE — PROGRESS NOTES
Subjective:       Patient ID: Genia Davis is a 53 y.o. female.    Chief Complaint: Back Pain    Back Pain   Associated symptoms include leg pain. Pertinent negatives include no abdominal pain, chest pain, fever, numbness or weakness. Headaches:     Hip Pain    Pertinent negatives include no numbness.   Leg Pain    Pertinent negatives include no numbness.      Mrs. Davis who returns to clinic for chronic low back pain, and chronic pain management with opioids.  [facility]  08/06/18.  Since [facility], on 10/2/2018, pt underwent another surgery,T10-Pelvis Revision, by dr. Herrmann.  She reports a prolong recovery from surgery, and states that the last surgery did not help much with pain, nor with her function.  Today, she c/o more low and mid back pain.  She states that her current pain is 2/10, the worst pain is still 8-9/10.  She is still using KAFO for RT leg, and able to stand for no more than 10 minutes,a nd walk less than 20-30 ft using RW.  Rt leg is still a weaker than left leg. She needs a new KAFO orthosis , since she did not  last prescription.   She states that she is taking more Percocet, she is not able to function w/o pain medication.   And pain is well controlled , as today in range 2-3/10, with medications.      Patient with long standing back pain, she had a spinal tumor at 22 y/o, that required multiple back surgeries, with last surgery done in Feb, 2016.  She is s/p revision T10-Pelvis fusion of a failed iliac bolt on Feb 12, 2016, by .   She has been having increasing b/l Lt>Rt buttock and lateral thigh pain since surgery.  She has had multiple falls because of the RT leg giving way.   She wears KAFO, secondary to Rt leg weakness,and hyperextension in knee, with Rt Foot drop. This current prosthesis is fairly old more than 4-5 yrs.  She had multiple JENNIFFER, after the surgery, the one to the left L5/S1 TF JENNIFFER ,done in July, 2016 was the moat helpful, while repeat injection in November  "2016,helped a little.  She continues to needs pain medication, opiates for pain control.  She failed Tramadol, was taking 3 x/ day, but states that Tramadol is very slow to work, while Hydrocodone helps quicker and better.  She also failed Gabapentin, did not help, and she did not like the way it fells.  She usualy takes Hydrocodone 1-2 tabs/day, rarely 3 tabs/day, only for more severe pain.  She is able to walk with RW, using current KAFO ( she has it since 2012-13), across 2-3 rooms, for distance she uses manual WC.   KAFO brace helps, she does not belive that she would be able to make that much walking w/o brace. She received this brace from [employer].  She tries to be self sufficient , and tries to do everything on her own.  Today, she reports that she is slightly better, pain meds are helping,   On [facility] she received prescriptions for KAFO, but she still did not start process   to receive a new KAFO.  Since LCV, she reports worsening of LBP, that radiates to Rt hip and Rt leg down to the knee.  She reports increased weakness in RT leg, she has more difficulties to walk.   States that she is afraid that her hardware "shift" and she made an apointment with dr. Herrmann.    Back Pain Description:  Length: pain is chronic pain. Length >  Since age of 24 y/o.   No past, recent injury, falls.  Intensity:  CURRENT  2/10,  AVERAGE  Pain   4-5/10. at BEST   2-3/10 , At WORST  8-9/10 on the WORST day.   Location: pain is localized across lower back , bellow the waist line.  That pain radiates to b/l buttock, hips, Rt leg > Lt leg.   Pain in Rt leg comes on side and to the knee level.   It is more  leg  And hip than back pain.  Radiation: Positive to buttocks. Positive to legs.   Timing : constant  morning, afternoon pain , worse with activity, in evening  QUALITY:  Sharp/shooting pain in Lt leg. Back pain is more sharp pain.   No neuropathic : burning, tingling,   There is numbness in toes at the bottom of " toes.   Positive Rt leg weakness.   Worsening factors: activity , walking,   Alleviating factors: rest  Symptoms interfere with daily activity, sleeping and work.   Current medications:    Failed medications:    Prior procedures. Multiple surgeries, s/p fusion T10-pelvis, and multiple JENNIFFER, last one s/p Lt L5-S1 TFESI in 07/16 and 11/2016, with some pain relief.  PT/OT: multiple times, with temporary pain relief.  Patient denies night fever/night sweats, bowel incontinence, significant weight loss and significant motor weakness ( red flags).  Patient denies any suicidal or homicidal ideations.   She is here for f/u and chronic pain management with opioids.  .  Past Medical History:   Diagnosis Date    Cancer     tumor on back, was removed    Encounter for blood transfusion        Past Surgical History:   Procedure Laterality Date    BACK SURGERY      x 5    CHOLECYSTECTOMY      CLOSURE - back N/A 11/19/2015    Performed by [facility] Cox [facility] at [facility] OR 2ND FLR    CREATION, FLAP, MUSCLE ROTATION N/A 10/2/2018    Performed by Rommel Cox [facility] at [facility] OR 2ND FLR    FLAP-MUSCLE (ROTATION) - bilateral latissimus dorsi N/A 11/19/2015    Performed by Rommel Cox MD at [facility] OR 2ND FLR    FUSION, SPINE, LUMBAR, TLIF, POSTERIOR APPROACH, USING PEDICLE SCREW T10-Pelvis Revision  N/A 10/2/2018    Performed by Rocky Herrmann MD at [facility] OR 2ND FLR    FUSION-SPINAL - T11-Pelvis Posterior Spinal Fusion/Revision Exploration Spinal Fusion Posterior Instrumentation T11-Pelvis N/A 11/19/2015    Performed by Rocky [facility] MD at [facility] OR 2ND FLR    INJECTION-STEROID-EPIDURAL-TRANSFORAMINAL Left 11/16/2016    Performed by [facility] at [facility] PAIN MGT    INJECTION-STEROID-EPIDURAL-TRANSFORAMINAL Left 7/6/2016    Performed by [facility]an aBh [facility] at [facility] PAIN MGT    MYELOGRAM-THORACIC AND LUMBAR N/A 6/20/2016    Performed by [facility] at [facility] OR 2ND FLR    Revision of  hardware T10-Pelvis N/A 2/12/2016    Performed by Rocky [facility]mann MD at [facility] OR [address] [place] [address]       Family History   Problem Relation Age of Onset    Breast cancer Maternal Aunt     Anesthesia problems Neg Hx     Clotting disorder Neg Hx        Social History     Socioeconomic History    Marital status:      Spouse name: Not on file    Number of children: Not on file    Years of education: Not on file    Highest education level: Not on file   Occupational History    Not on file   Social Needs    Financial resource strain: Not on file    Food insecurity:     Worry: Not on file     Inability: Not on file    Transportation needs:     Medical: Not on file     Non-medical: Not on file   Tobacco Use    Smoking status: Never Smoker    Smokeless tobacco: Never Used   Substance and Sexual Activity    Alcohol use: No     Alcohol/week: 0.0 oz    Drug use: No    Sexual activity: Not on file   Lifestyle    Physical activity:     Days per week: Not on file     Minutes per session: Not on file    Stress: Not on file   Relationships    Social connections:     Talks on phone: Not on file     Gets together: Not on file     Attends Jainism service: Not on file     Active member of club or organization: Not on file     Attends meetings of clubs or organizations: Not on file     Relationship status: Not on file   Other Topics Concern    Not on file   Social History Narrative    Not on file       Current Outpatient Medications   Medication Sig Dispense Refill    b complex vitamins tablet Take 1 tablet by mouth once daily.      doxycycline (VIBRAMYCIN) 100 MG Cap Take 1 capsule (100 mg total) by mouth 2 (two) times daily with meals. 20 capsule 0    DULoxetine (CYMBALTA) 30 MG capsule Take 1 capsule (30 mg total) by mouth once daily. 30 capsule 5    ondansetron (ZOFRAN) 4 MG tablet Take 1 tablet (4 mg total) by mouth every 6 (six) hours as needed for Nausea. 60 tablet 1    ondansetron (ZOFRAN) 8 MG tablet  Take 1 tablet (8 mg total) by mouth every 8 (eight) hours as needed for Nausea. 30 tablet 0    oxyCODONE-acetaminophen (PERCOCET)  mg per tablet Take 1 tablet by mouth every 8 (eight) hours as needed. 90 tablet 0    [START ON 5/18/2019] oxyCODONE-acetaminophen (PERCOCET)  mg per tablet Take 1 tablet by mouth every 8 (eight) hours as needed. 90 tablet 0    [START ON 6/18/2019] oxyCODONE-acetaminophen (PERCOCET)  mg per tablet Take 1 tablet by mouth every 8 (eight) hours as needed. 90 tablet 0    polyethylene glycol (GLYCOLAX) 17 gram/dose powder Take 17 g by mouth 3 (three) times daily. May increase as needed if iron constipates you too much. 1530 g 5     No current facility-administered medications for this visit.        Review of patient's allergies indicates:  No Known Allergies      Review of Systems   Constitutional: Negative for appetite change, chills, fatigue, fever and unexpected weight change.   HENT: Negative for drooling, trouble swallowing and voice change.    Eyes: Negative for pain and visual disturbance.   Respiratory: Negative for shortness of breath and wheezing.    Cardiovascular: Negative for chest pain and palpitations.   Gastrointestinal: Negative for abdominal distention, abdominal pain, constipation and diarrhea.   Genitourinary: Negative for difficulty urinating.   Musculoskeletal: Positive for arthralgias (Rt knee pain), back pain and gait problem. Negative for joint swelling, myalgias and neck stiffness.               Skin: Negative for color change and rash.   Neurological: Negative for dizziness, facial asymmetry, speech difficulty, weakness, light-headedness and numbness. Headaches:     Hematological: Negative for adenopathy.   Psychiatric/Behavioral: Negative for behavioral problems, confusion and sleep disturbance. The patient is not nervous/anxious.          Objective:      Physical Exam      GENERAL: The patient is alert, oriented, pleasant.  HEENT:  PERRLA  NECK: supple, no masses,    CV: S1S2, RRR, no murmurs, rales.  LUNGS: CTA bilateral.   ABDOMEN: soft, non-tender, non distended, bowel sounds nl.  EXTREMITIES: no edema, +2 pulses DP, b/l  SKIN: no skin rash, no skin breakdowns.  MUSCULOSKELETAL:   Gait :abnormal, she is able to make a few steps walks with no AD.  Needs a a slight longer time to get up , secondary to Rt KAFO.  Walks with limping ( in swing) and hyperextending ( in stance) RT leg.   Cervical spine: full AROM in cervical spine.   Lumbar spine, decreased active range of motion in all planes, philomena. flexion to 60 from neutral, ext. Lacks full extension -3 degrees, cannot hyperextend secondary to long fusion, s/p T10- pelvis. palpable sacral screw,  Side bending and rotation to 25-30 degrees, b/l.  Straight leg raising on Lt tested and it is a Negative, negative on Rt, weak Hip flexors on RT leg.   Full range of motion in all joints x3 extremities, except in RT LE, that is weak.  Muscle strength 5/5 throughout x 3 extremities,  except in RT LE, that is weak.  Rt LE: HF3-, KF/KE- NT ( in KAFO), no active DF in Rt foot.  No  joint laxity throughout x4 extremities.   NEUROLOGIC: Cranial nerves II through XII intact.   Deep tendon reflexes is normal, +2 in the upper and lower extremities bilaterally.   Muscle tone is normal, no spasticity.  Sensory is intact to light touch and pinprick throughout x4 extremities.     Assessment:       1. Myelopathy of lumbar region    2. Weakness of both legs    3. Acquired spondylolisthesis    4. Kyphoscoliosis    5. Lumbar radiculopathy    6. Lumbar spondylosis with myelopathy    7. Primary osteoarthritis of right knee    8. Gait disorder    9. Foot drop, right      Plan:          Myelopathy of lumbar region  -     oxyCODONE-acetaminophen (PERCOCET)  mg per tablet; Take 1 tablet by mouth every 8 (eight) hours as needed.  Dispense: 90 tablet; Refill: 0  -     oxyCODONE-acetaminophen (PERCOCET)  mg per  tablet; Take 1 tablet by mouth every 8 (eight) hours as needed.  Dispense: 90 tablet; Refill: 0  -     oxyCODONE-acetaminophen (PERCOCET)  mg per tablet; Take 1 tablet by mouth every 8 (eight) hours as needed.  Dispense: 90 tablet; Refill: 0  -     HME - OTHER  -     DULoxetine (CYMBALTA) 30 MG capsule; Take 1 capsule (30 mg total) by mouth once daily.  Dispense: 30 capsule; Refill: 5    Weakness of both legs  -     oxyCODONE-acetaminophen (PERCOCET)  mg per tablet; Take 1 tablet by mouth every 8 (eight) hours as needed.  Dispense: 90 tablet; Refill: 0  -     oxyCODONE-acetaminophen (PERCOCET)  mg per tablet; Take 1 tablet by mouth every 8 (eight) hours as needed.  Dispense: 90 tablet; Refill: 0  -     oxyCODONE-acetaminophen (PERCOCET)  mg per tablet; Take 1 tablet by mouth every 8 (eight) hours as needed.  Dispense: 90 tablet; Refill: 0  -     HME - OTHER  -     DULoxetine (CYMBALTA) 30 MG capsule; Take 1 capsule (30 mg total) by mouth once daily.  Dispense: 30 capsule; Refill: 5    Acquired spondylolisthesis  -     oxyCODONE-acetaminophen (PERCOCET)  mg per tablet; Take 1 tablet by mouth every 8 (eight) hours as needed.  Dispense: 90 tablet; Refill: 0  -     oxyCODONE-acetaminophen (PERCOCET)  mg per tablet; Take 1 tablet by mouth every 8 (eight) hours as needed.  Dispense: 90 tablet; Refill: 0  -     oxyCODONE-acetaminophen (PERCOCET)  mg per tablet; Take 1 tablet by mouth every 8 (eight) hours as needed.  Dispense: 90 tablet; Refill: 0  -     DULoxetine (CYMBALTA) 30 MG capsule; Take 1 capsule (30 mg total) by mouth once daily.  Dispense: 30 capsule; Refill: 5    Kyphoscoliosis  -     oxyCODONE-acetaminophen (PERCOCET)  mg per tablet; Take 1 tablet by mouth every 8 (eight) hours as needed.  Dispense: 90 tablet; Refill: 0  -     oxyCODONE-acetaminophen (PERCOCET)  mg per tablet; Take 1 tablet by mouth every 8 (eight) hours as needed.  Dispense: 90 tablet;  Refill: 0  -     oxyCODONE-acetaminophen (PERCOCET)  mg per tablet; Take 1 tablet by mouth every 8 (eight) hours as needed.  Dispense: 90 tablet; Refill: 0  -     DULoxetine (CYMBALTA) 30 MG capsule; Take 1 capsule (30 mg total) by mouth once daily.  Dispense: 30 capsule; Refill: 5    Lumbar radiculopathy  -     oxyCODONE-acetaminophen (PERCOCET)  mg per tablet; Take 1 tablet by mouth every 8 (eight) hours as needed.  Dispense: 90 tablet; Refill: 0  -     oxyCODONE-acetaminophen (PERCOCET)  mg per tablet; Take 1 tablet by mouth every 8 (eight) hours as needed.  Dispense: 90 tablet; Refill: 0  -     oxyCODONE-acetaminophen (PERCOCET)  mg per tablet; Take 1 tablet by mouth every 8 (eight) hours as needed.  Dispense: 90 tablet; Refill: 0  -     DULoxetine (CYMBALTA) 30 MG capsule; Take 1 capsule (30 mg total) by mouth once daily.  Dispense: 30 capsule; Refill: 5    Lumbar spondylosis with myelopathy  -     oxyCODONE-acetaminophen (PERCOCET)  mg per tablet; Take 1 tablet by mouth every 8 (eight) hours as needed.  Dispense: 90 tablet; Refill: 0  -     oxyCODONE-acetaminophen (PERCOCET)  mg per tablet; Take 1 tablet by mouth every 8 (eight) hours as needed.  Dispense: 90 tablet; Refill: 0  -     oxyCODONE-acetaminophen (PERCOCET)  mg per tablet; Take 1 tablet by mouth every 8 (eight) hours as needed.  Dispense: 90 tablet; Refill: 0  -     DULoxetine (CYMBALTA) 30 MG capsule; Take 1 capsule (30 mg total) by mouth once daily.  Dispense: 30 capsule; Refill: 5    Primary osteoarthritis of right knee  -     oxyCODONE-acetaminophen (PERCOCET)  mg per tablet; Take 1 tablet by mouth every 8 (eight) hours as needed.  Dispense: 90 tablet; Refill: 0  -     oxyCODONE-acetaminophen (PERCOCET)  mg per tablet; Take 1 tablet by mouth every 8 (eight) hours as needed.  Dispense: 90 tablet; Refill: 0  -     oxyCODONE-acetaminophen (PERCOCET)  mg per tablet; Take 1 tablet by mouth  every 8 (eight) hours as needed.  Dispense: 90 tablet; Refill: 0  -     DULoxetine (CYMBALTA) 30 MG capsule; Take 1 capsule (30 mg total) by mouth once daily.  Dispense: 30 capsule; Refill: 5    Gait disorder  -     oxyCODONE-acetaminophen (PERCOCET)  mg per tablet; Take 1 tablet by mouth every 8 (eight) hours as needed.  Dispense: 90 tablet; Refill: 0  -     oxyCODONE-acetaminophen (PERCOCET)  mg per tablet; Take 1 tablet by mouth every 8 (eight) hours as needed.  Dispense: 90 tablet; Refill: 0  -     oxyCODONE-acetaminophen (PERCOCET)  mg per tablet; Take 1 tablet by mouth every 8 (eight) hours as needed.  Dispense: 90 tablet; Refill: 0  -     DULoxetine (CYMBALTA) 30 MG capsule; Take 1 capsule (30 mg total) by mouth once daily.  Dispense: 30 capsule; Refill: 5    Foot drop, right  -     oxyCODONE-acetaminophen (PERCOCET)  mg per tablet; Take 1 tablet by mouth every 8 (eight) hours as needed.  Dispense: 90 tablet; Refill: 0  -     oxyCODONE-acetaminophen (PERCOCET)  mg per tablet; Take 1 tablet by mouth every 8 (eight) hours as needed.  Dispense: 90 tablet; Refill: 0  -     oxyCODONE-acetaminophen (PERCOCET)  mg per tablet; Take 1 tablet by mouth every 8 (eight) hours as needed.  Dispense: 90 tablet; Refill: 0  -     HME - OTHER  -     DULoxetine (CYMBALTA) 30 MG capsule; Take 1 capsule (30 mg total) by mouth once daily.  Dispense: 30 capsule; Refill: 5      Genia Dawson, has a long standing back pain, sec.to spinal Tm, that was surgically exceeded more than 25 yrs ago. She is s/p multiple surgeries, s/p T10-pelvis fusion-revision on 10/02/18, by dr. Herrmann.,   She has Rt LE > Lt LE weakness, secondary to radiculopathy /myleopathic injury.     -- DME- other,new prescription given,  For RT KAFO.     - Pain management:   Opioid Risk Score     None        Hydrocodone 10/325 mg po TID-QID prn pain, changed to Percocet 10/325 mg po Q8 hrs after surgery.  She is requesting to  resume since it helps her with functioning. She is back to work.  Percocet 10/325 mg, refills on 3/3, 01/09/19, 09/09/18.  She failed Lyrica, and Gabapentin.  Willing to try Cymbalta.  Will resume all supportive therapy.    RTC in 3-4 months.     Total time spent face to face with patient was 25 minutes.   More than 50% of that time was spent in [id number] on diagnosis , prognosis and treatment options.   I also  patient  on common and most usual side effect of prescribed medications. Risk and benefits of opiates, possible risk of developing opiate dependence and tolerance, need of strict compliance with prescribed medications.  Pain contract, rules and obligations were discussed with patient in details.  She is aware that I would be the only doctor prescribing her pain medications and ED in a case of emergency.  I reviewed Primary care , and other specialty's notes to better coordinate patient's  care. All questions were answered, and patient voiced understanding.

## 2019-08-07 ENCOUNTER — TELEPHONE (OUTPATIENT)
Dept: ORTHOPEDICS | Facility: CLINIC | Age: 54
End: 2019-08-07

## 2019-08-07 ENCOUNTER — PATIENT MESSAGE (OUTPATIENT)
Dept: ORTHOPEDICS | Facility: CLINIC | Age: 54
End: 2019-08-07

## 2019-08-07 DIAGNOSIS — Z98.890 S/P SPINAL SURGERY: Primary | ICD-10-CM

## 2019-08-14 ENCOUNTER — OFFICE VISIT (OUTPATIENT)
Dept: ORTHOPEDICS | Facility: CLINIC | Age: 54
End: 2019-08-14
Payer: COMMERCIAL

## 2019-08-14 ENCOUNTER — HOSPITAL ENCOUNTER (OUTPATIENT)
Dept: RADIOLOGY | Facility: HOSPITAL | Age: 54
Discharge: HOME | End: 2019-08-14
Attending: ORTHOPAEDIC SURGERY
Payer: COMMERCIAL

## 2019-08-14 VITALS — HEIGHT: 65 IN | BODY MASS INDEX: 24.99 KG/M2 | WEIGHT: 150 LBS

## 2019-08-14 DIAGNOSIS — M54.16 LUMBAR RADICULOPATHY: Primary | ICD-10-CM

## 2019-08-14 DIAGNOSIS — Z98.890 S/P SPINAL SURGERY: ICD-10-CM

## 2019-08-14 PROCEDURE — 99213 PR OFFICE/OUTPT VISIT, EST, LEVL III, 20-29 MIN: ICD-10-PCS | Mod: S$GLB,,, | Performed by: ORTHOPAEDIC SURGERY

## 2019-08-14 PROCEDURE — 3008F PR BODY MASS INDEX (BMI) DOCUMENTED: ICD-10-PCS | Mod: CPTII,S$GLB,, | Performed by: ORTHOPAEDIC SURGERY

## 2019-08-14 PROCEDURE — 72082 X-RAY EXAM ENTIRE SPI 2/3 VW: CPT | Mod: TC

## 2019-08-14 PROCEDURE — 99213 OFFICE O/P EST LOW 20 MIN: CPT | Mod: S$GLB,,, | Performed by: ORTHOPAEDIC SURGERY

## 2019-08-14 PROCEDURE — 3008F BODY MASS INDEX DOCD: CPT | Mod: CPTII,S$GLB,, | Performed by: ORTHOPAEDIC SURGERY

## 2019-08-14 PROCEDURE — 72082 XR SCOLIOSIS COMPLETE: ICD-10-PCS | Mod: 26,,, | Performed by: RADIOLOGY

## 2019-08-14 PROCEDURE — 72082 X-RAY EXAM ENTIRE SPI 2/3 VW: CPT | Mod: 26,,, | Performed by: RADIOLOGY

## 2019-08-14 PROCEDURE — 99999 PR PBB SHADOW E&M-EST. PATIENT-LVL III: ICD-10-PCS | Mod: PBBFAC,,, | Performed by: ORTHOPAEDIC SURGERY

## 2019-08-14 PROCEDURE — 99999 PR PBB SHADOW E&M-EST. PATIENT-LVL III: CPT | Mod: PBBFAC,,, | Performed by: ORTHOPAEDIC SURGERY

## 2019-08-14 RX ORDER — CELECOXIB 200 MG/1
200 CAPSULE ORAL 2 TIMES DAILY
Qty: 60 CAPSULE | Refills: 1 | Status: SHIPPED | OUTPATIENT
Start: 2019-08-14 | End: 2019-10-23 | Stop reason: SDUPTHER

## 2019-08-17 ENCOUNTER — PATIENT MESSAGE (OUTPATIENT)
Dept: ORTHOPEDICS | Facility: CLINIC | Age: 54
End: 2019-08-17

## 2019-08-19 ENCOUNTER — OFFICE VISIT (OUTPATIENT)
Dept: PHYSICAL MEDICINE AND REHAB | Facility: CLINIC | Age: 54
End: 2019-08-19
Payer: COMMERCIAL

## 2019-08-19 ENCOUNTER — PATIENT MESSAGE (OUTPATIENT)
Dept: ORTHOPEDICS | Facility: CLINIC | Age: 54
End: 2019-08-19

## 2019-08-19 VITALS
DIASTOLIC BLOOD PRESSURE: 77 MMHG | HEIGHT: 65 IN | BODY MASS INDEX: 25.83 KG/M2 | SYSTOLIC BLOOD PRESSURE: 153 MMHG | WEIGHT: 155 LBS | HEART RATE: 61 BPM

## 2019-08-19 DIAGNOSIS — G95.9 MYELOPATHY OF LUMBAR REGION: Primary | ICD-10-CM

## 2019-08-19 DIAGNOSIS — R26.9 GAIT DISORDER: ICD-10-CM

## 2019-08-19 DIAGNOSIS — M43.10 ACQUIRED SPONDYLOLISTHESIS: ICD-10-CM

## 2019-08-19 DIAGNOSIS — M41.9 KYPHOSCOLIOSIS: ICD-10-CM

## 2019-08-19 DIAGNOSIS — M21.371 FOOT DROP, RIGHT: ICD-10-CM

## 2019-08-19 DIAGNOSIS — M47.16 LUMBAR SPONDYLOSIS WITH MYELOPATHY: ICD-10-CM

## 2019-08-19 DIAGNOSIS — M54.16 LUMBAR RADICULOPATHY: ICD-10-CM

## 2019-08-19 DIAGNOSIS — R29.898 WEAKNESS OF BOTH LEGS: ICD-10-CM

## 2019-08-19 DIAGNOSIS — M17.11 PRIMARY OSTEOARTHRITIS OF RIGHT KNEE: ICD-10-CM

## 2019-08-19 PROCEDURE — 3008F PR BODY MASS INDEX (BMI) DOCUMENTED: ICD-10-PCS | Mod: CPTII,S$GLB,, | Performed by: PHYSICAL MEDICINE & REHABILITATION

## 2019-08-19 PROCEDURE — 3077F PR MOST RECENT SYSTOLIC BLOOD PRESSURE >= 140 MM HG: ICD-10-PCS | Mod: CPTII,S$GLB,, | Performed by: PHYSICAL MEDICINE & REHABILITATION

## 2019-08-19 PROCEDURE — 99214 OFFICE O/P EST MOD 30 MIN: CPT | Mod: S$GLB,,, | Performed by: PHYSICAL MEDICINE & REHABILITATION

## 2019-08-19 PROCEDURE — 99999 PR PBB SHADOW E&M-EST. PATIENT-LVL III: CPT | Mod: PBBFAC,,, | Performed by: PHYSICAL MEDICINE & REHABILITATION

## 2019-08-19 PROCEDURE — 99214 PR OFFICE/OUTPT VISIT, EST, LEVL IV, 30-39 MIN: ICD-10-PCS | Mod: S$GLB,,, | Performed by: PHYSICAL MEDICINE & REHABILITATION

## 2019-08-19 PROCEDURE — 99999 PR PBB SHADOW E&M-EST. PATIENT-LVL III: ICD-10-PCS | Mod: PBBFAC,,, | Performed by: PHYSICAL MEDICINE & REHABILITATION

## 2019-08-19 PROCEDURE — 3008F BODY MASS INDEX DOCD: CPT | Mod: CPTII,S$GLB,, | Performed by: PHYSICAL MEDICINE & REHABILITATION

## 2019-08-19 PROCEDURE — 3078F DIAST BP <80 MM HG: CPT | Mod: CPTII,S$GLB,, | Performed by: PHYSICAL MEDICINE & REHABILITATION

## 2019-08-19 PROCEDURE — 3078F PR MOST RECENT DIASTOLIC BLOOD PRESSURE < 80 MM HG: ICD-10-PCS | Mod: CPTII,S$GLB,, | Performed by: PHYSICAL MEDICINE & REHABILITATION

## 2019-08-19 PROCEDURE — 3077F SYST BP >= 140 MM HG: CPT | Mod: CPTII,S$GLB,, | Performed by: PHYSICAL MEDICINE & REHABILITATION

## 2019-08-19 RX ORDER — OXYCODONE AND ACETAMINOPHEN 10; 325 MG/1; MG/1
1 TABLET ORAL EVERY 8 HOURS PRN
Qty: 90 TABLET | Refills: 0 | Status: SHIPPED | OUTPATIENT
Start: 2019-09-19 | End: 2019-10-19

## 2019-08-19 RX ORDER — OXYCODONE AND ACETAMINOPHEN 10; 325 MG/1; MG/1
1 TABLET ORAL EVERY 8 HOURS PRN
Qty: 90 TABLET | Refills: 0 | Status: SHIPPED | OUTPATIENT
Start: 2019-10-19 | End: 2019-11-20 | Stop reason: SDUPTHER

## 2019-08-19 RX ORDER — OXYCODONE AND ACETAMINOPHEN 10; 325 MG/1; MG/1
1 TABLET ORAL EVERY 8 HOURS PRN
Qty: 90 TABLET | Refills: 0 | Status: SHIPPED | OUTPATIENT
Start: 2019-08-19 | End: 2019-09-18

## 2019-08-19 NOTE — PROGRESS NOTES
Subjective:       Patient ID: Genia Davis is a 53 y.o. female.    Chief Complaint: No chief complaint on file.    Back Pain   Associated symptoms include leg pain. Pertinent negatives include no abdominal pain, chest pain, fever, numbness or weakness. Headaches:     Hip Pain    Pertinent negatives include no numbness.   Leg Pain    Pertinent negatives include no numbness.      Mrs. Davis who returns to clinic for chronic low back pain, and chronic pain management with opioids.  [facility] 04/18/19.  Since [facility], on 10/2/2018, pt underwent another surgery,T10-Pelvis Revision, by dr. Herrmann.  She reports a prolong recovery from surgery, and states that the last surgery did not help much with pain, nor with her function.  She states that broken hardware is fixed, but her pain is worse.  Today, she c/o more low and mid back pain.  She states that her current pain is 2/10, the worst pain is still 8-9/10.  She is still using KAFO for RT leg, and able to stand for no more than 10 minutes,a nd walk less than 20-30 ft using RW.  Rt leg is still a weaker than left leg. She needs a new KAFO orthosis , since she did not  last prescription.   She states that she is taking more Percocet, she is not able to function w/o pain medication.   Percocet now covers pain for maybe 4 hrs, and she is again in pain, that is more severe than before.  And pain is controlled in moderate range, she never goes bellow 4-5, with medications.      Patient with long standing back pain, she had a spinal tumor at 22 y/o, that required multiple back surgeries, with last surgery done in Feb, 2016.  She is s/p revision T10-Pelvis fusion of a failed iliac bolt on Feb 12, 2016, by .   She has been having increasing b/l Lt>Rt buttock and lateral thigh pain since surgery.  She has had multiple falls because of the RT leg giving way.   She wears KAFO, secondary to Rt leg weakness,and hyperextension in knee, with Rt Foot drop. This current  "prosthesis is fairly old more than 4-5 yrs.  She had multiple JENNIFFER, after the surgery, the one to the left L5/S1 TF JENNIFFER ,done in July, 2016 was the moat helpful, while repeat injection in November 2016,helped a little.  She continues to needs pain medication, opiates for pain control.  She failed Tramadol, was taking 3 x/ day, but states that Tramadol is very slow to work, while Hydrocodone helps quicker and better.  She also failed Gabapentin, did not help, and she did not like the way it fells.  Hydrocodone no longer helps with pain.  She is able to walk with RW, using current KAFO ( she has it since 2012-13), across 2-3 rooms, for distance she uses manual WC.   KAFO brace helps, she does not belive that she would be able to make that much walking w/o brace. She received this brace from [facility].  She tries to be self sufficient , and tries to do everything on her own.  Today, she reports that she is slightly better, pain meds are helping,   On [facility] she received prescriptions for KAFO, but she still did not start process   to receive a new KAFO.  Since [facility], she reports worsening of LBP, that radiates to Rt hip and Rt leg down to the knee.  She reports increased weakness in RT leg, she has more difficulties to walk.   States that she is afraid that her hardware "shift" and she made an apointment with dr. Herrmann.    Back Pain Description:  Length: pain is chronic pain. Length >  Since age of 22 y/o.   No past, recent injury, falls.  Intensity:  CURRENT  5/10,  AVERAGE  Pain   5-6/10. at BEST   3-4/10 , At WORST  9/10 on the WORST day.   Location: pain is localized across lower back , bellow the waist line.  That pain radiates to b/l buttock, hips, Rt leg > Lt leg.   Pain in Rt leg comes on side and to the knee level.   It is more  leg  And hip than back pain.  Radiation: Positive to buttocks. Positive to legs.   Timing : constant  morning, afternoon pain , worse with activity, in evening  QUALITY:  " Sharp/shooting pain in Lt leg. Back pain is more sharp pain.   No neuropathic : burning, tingling,   There is numbness in toes at the bottom of toes.   Positive Rt leg weakness.   Worsening factors: activity , walking,   Alleviating factors: rest  Symptoms interfere with daily activity, sleeping and work.   Current medications:    Failed medications:    Prior procedures. Multiple surgeries, s/p fusion T10-pelvis, and multiple JENNIFFER, last one s/p Lt L5-S1 TFESI in 07/16 and 11/2016, with some pain relief.  PT/OT: multiple times, with temporary pain relief.  Patient denies night fever/night sweats, bowel incontinence, significant weight loss and significant motor weakness ( red flags).  Patient denies any suicidal or homicidal ideations.   She is here for f/u and chronic pain management with opioids.  .  Past Medical History:   Diagnosis Date    Cancer     tumor on back, was removed    Encounter for blood transfusion        Past Surgical History:   Procedure Laterality Date    BACK SURGERY      x 5    CHOLECYSTECTOMY      CLOSURE - back N/A 11/19/2015    Performed by [facility]mel Cox [facility] at [facility] 2ND FLR    CREATION, FLAP, MUSCLE ROTATION N/A 10/2/2018    Performed by Rommel Cox [facility] at [facility] 2ND FLR    FLAP-MUSCLE (ROTATION) - bilateral latissimus dorsi N/A 11/19/2015    Performed by Rommel Cox [facility] at [facility] 2ND FLR    FUSION, SPINE, LUMBAR, TLIF, POSTERIOR APPROACH, USING PEDICLE SCREW T10-Pelvis Revision  N/A 10/2/2018    Performed by Rocky Herrmann MD at [facility] OR 2ND FLR    FUSION-SPINAL - T11-Pelvis Posterior Spinal Fusion/Revision Exploration Spinal Fusion Posterior Instrumentation T11-Pelvis N/A 11/19/2015    Performed by Rocky Herrmann MD at [facility] OR 2ND FLR    INJECTION-STEROID-EPIDURAL-TRANSFORAMINAL Left 11/16/2016    [facility]IN M[facility]    INJECTION-STEROID-EPIDURAL-TRANSFORAMINAL Left 7/6/2016    Performed by Kevan Bah,  MD at [facility] PAIN MGT    MYELOGRAM-THORACIC AND LUMBAR N/A 6/20/2016    Performed by [facility] OR 2ND FLR    Revision of hardware T10-Pelvis N/A 2/12/2016    Performed by Rocky [facility] MD at [facility] OR 2ND FLR       Family History   Problem Relation Age of Onset    Breast cancer Maternal Aunt     Anesthesia problems Neg Hx     Clotting disorder Neg Hx        Social History     Socioeconomic History    Marital status:      Spouse name: Not on file    Number of children: Not on file    Years of education: Not on file    Highest education level: Not on file   Occupational History    Not on file   Social Needs    Financial resource strain: Not on file    Food insecurity:     Worry: Not on file     Inability: Not on file    Transportation needs:     Medical: Not on file     Non-medical: Not on file   Tobacco Use    Smoking status: Never Smoker    Smokeless tobacco: Never Used   Substance and Sexual Activity    Alcohol use: No     Alcohol/week: 0.0 oz    Drug use: No    Sexual activity: Not on file   Lifestyle    Physical activity:     Days per week: Not on file     Minutes per session: Not on file    Stress: Not on file   Relationships    Social connections:     Talks on phone: Not on file     Gets together: Not on file     Attends Scientologist service: Not on file     Active member of club or organization: Not on file     Attends meetings of clubs or organizations: Not on file     Relationship status: Not on file   Other Topics Concern    Not on file   Social History Narrative    Not on file       Current Outpatient Medications   Medication Sig Dispense Refill    b complex vitamins tablet Take 1 tablet by mouth once daily.      celecoxib (CELEBREX) 200 MG capsule Take 1 capsule (200 mg total) by mouth 2 (two) times daily. 60 capsule 1    doxycycline (VIBRAMYCIN) 100 MG Cap Take 1 capsule (100 mg total) by mouth 2 (two) times daily with meals. 20 capsule 0     DULoxetine (CYMBALTA) 30 MG capsule Take 1 capsule (30 mg total) by mouth once daily. 30 capsule 5    ondansetron (ZOFRAN) 4 MG tablet Take 1 tablet (4 mg total) by mouth every 6 (six) hours as needed for Nausea. 60 tablet 1    ondansetron (ZOFRAN) 8 MG tablet Take 1 tablet (8 mg total) by mouth every 8 (eight) hours as needed for Nausea. 30 tablet 0    [START ON 9/19/2019] oxyCODONE-acetaminophen (PERCOCET)  mg per tablet Take 1 tablet by mouth every 8 (eight) hours as needed. 90 tablet 0    oxyCODONE-acetaminophen (PERCOCET)  mg per tablet Take 1 tablet by mouth every 8 (eight) hours as needed. 90 tablet 0    [START ON 10/19/2019] oxyCODONE-acetaminophen (PERCOCET)  mg per tablet Take 1 tablet by mouth every 8 (eight) hours as needed. 90 tablet 0    polyethylene glycol (GLYCOLAX) 17 gram/dose powder Take 17 g by mouth 3 (three) times daily. May increase as needed if iron constipates you too much. 1530 g 5     No current facility-administered medications for this visit.        Review of patient's allergies indicates:  No Known Allergies      Review of Systems   Constitutional: Negative for appetite change, chills, fatigue, fever and unexpected weight change.   HENT: Negative for drooling, trouble swallowing and voice change.    Eyes: Negative for pain and visual disturbance.   Respiratory: Negative for shortness of breath and wheezing.    Cardiovascular: Negative for chest pain and palpitations.   Gastrointestinal: Negative for abdominal distention, abdominal pain, constipation and diarrhea.   Genitourinary: Negative for difficulty urinating.   Musculoskeletal: Positive for arthralgias (Rt knee pain), back pain and gait problem. Negative for joint swelling, myalgias and neck stiffness.               Skin: Negative for color change and rash.   Neurological: Negative for dizziness, facial asymmetry, speech difficulty, weakness, light-headedness and numbness. Headaches:     Hematological:  Negative for adenopathy.   Psychiatric/Behavioral: Negative for behavioral problems, confusion and sleep disturbance. The patient is not nervous/anxious.          Objective:      Physical Exam      GENERAL: The patient is alert, oriented, pleasant.  HEENT: PERRLA  NECK: supple, no masses,    CV: S1S2, RRR, no murmurs, rales.  LUNGS: CTA bilateral.   ABDOMEN: soft, non-tender, non distended, bowel sounds nl.  EXTREMITIES: no edema, +2 pulses DP, b/l  SKIN: no skin rash, no skin breakdowns.  MUSCULOSKELETAL:   Gait :abnormal, she is able to make a few steps walks with no AD.  Needs a a slight longer time to get up , secondary to Rt KAFO.  Walks with limping ( in swing) and hyperextending ( in stance) RT leg.   Cervical spine: full AROM in cervical spine.   Lumbar spine, decreased active range of motion in all planes, philomena. flexion to 60 from neutral, ext. Lacks full extension -3 degrees, cannot hyperextend secondary to long fusion, s/p T10- pelvis. palpable sacral screw,  Side bending and rotation to 25-30 degrees, b/l.  Straight leg raising on Lt tested and it is a Negative, negative on Rt, weak Hip flexors on RT leg.   Full range of motion in all joints x3 extremities, except in RT LE, that is weak.  Muscle strength 5/5 throughout x 3 extremities,  except in RT LE, that is weak.  Rt LE: HF3-, KF/KE- NT ( in KAFO), no active DF in Rt foot.  No  joint laxity throughout x4 extremities.   NEUROLOGIC: Cranial nerves II through XII intact.   Deep tendon reflexes is normal, +2 in the upper and lower extremities bilaterally.   Muscle tone is normal, no spasticity.  Sensory is intact to light touch and pinprick throughout x4 extremities.     Assessment:       1. Myelopathy of lumbar region    2. Weakness of both legs    3. Kyphoscoliosis    4. Acquired spondylolisthesis    5. Lumbar radiculopathy    6. Lumbar spondylosis with myelopathy    7. Primary osteoarthritis of right knee    8. Gait disorder    9. Foot drop, right       Plan:          Myelopathy of lumbar region  -     oxyCODONE-acetaminophen (PERCOCET)  mg per tablet; Take 1 tablet by mouth every 8 (eight) hours as needed.  Dispense: 90 tablet; Refill: 0  -     oxyCODONE-acetaminophen (PERCOCET)  mg per tablet; Take 1 tablet by mouth every 8 (eight) hours as needed.  Dispense: 90 tablet; Refill: 0  -     oxyCODONE-acetaminophen (PERCOCET)  mg per tablet; Take 1 tablet by mouth every 8 (eight) hours as needed.  Dispense: 90 tablet; Refill: 0    Weakness of both legs  -     oxyCODONE-acetaminophen (PERCOCET)  mg per tablet; Take 1 tablet by mouth every 8 (eight) hours as needed.  Dispense: 90 tablet; Refill: 0  -     oxyCODONE-acetaminophen (PERCOCET)  mg per tablet; Take 1 tablet by mouth every 8 (eight) hours as needed.  Dispense: 90 tablet; Refill: 0  -     oxyCODONE-acetaminophen (PERCOCET)  mg per tablet; Take 1 tablet by mouth every 8 (eight) hours as needed.  Dispense: 90 tablet; Refill: 0    Kyphoscoliosis  -     oxyCODONE-acetaminophen (PERCOCET)  mg per tablet; Take 1 tablet by mouth every 8 (eight) hours as needed.  Dispense: 90 tablet; Refill: 0  -     oxyCODONE-acetaminophen (PERCOCET)  mg per tablet; Take 1 tablet by mouth every 8 (eight) hours as needed.  Dispense: 90 tablet; Refill: 0  -     oxyCODONE-acetaminophen (PERCOCET)  mg per tablet; Take 1 tablet by mouth every 8 (eight) hours as needed.  Dispense: 90 tablet; Refill: 0    Acquired spondylolisthesis  -     oxyCODONE-acetaminophen (PERCOCET)  mg per tablet; Take 1 tablet by mouth every 8 (eight) hours as needed.  Dispense: 90 tablet; Refill: 0  -     oxyCODONE-acetaminophen (PERCOCET)  mg per tablet; Take 1 tablet by mouth every 8 (eight) hours as needed.  Dispense: 90 tablet; Refill: 0  -     oxyCODONE-acetaminophen (PERCOCET)  mg per tablet; Take 1 tablet by mouth every 8 (eight) hours as needed.  Dispense: 90 tablet; Refill:  0    Lumbar radiculopathy  -     oxyCODONE-acetaminophen (PERCOCET)  mg per tablet; Take 1 tablet by mouth every 8 (eight) hours as needed.  Dispense: 90 tablet; Refill: 0  -     oxyCODONE-acetaminophen (PERCOCET)  mg per tablet; Take 1 tablet by mouth every 8 (eight) hours as needed.  Dispense: 90 tablet; Refill: 0  -     oxyCODONE-acetaminophen (PERCOCET)  mg per tablet; Take 1 tablet by mouth every 8 (eight) hours as needed.  Dispense: 90 tablet; Refill: 0    Lumbar spondylosis with myelopathy  -     oxyCODONE-acetaminophen (PERCOCET)  mg per tablet; Take 1 tablet by mouth every 8 (eight) hours as needed.  Dispense: 90 tablet; Refill: 0  -     oxyCODONE-acetaminophen (PERCOCET)  mg per tablet; Take 1 tablet by mouth every 8 (eight) hours as needed.  Dispense: 90 tablet; Refill: 0  -     oxyCODONE-acetaminophen (PERCOCET)  mg per tablet; Take 1 tablet by mouth every 8 (eight) hours as needed.  Dispense: 90 tablet; Refill: 0    Primary osteoarthritis of right knee  -     oxyCODONE-acetaminophen (PERCOCET)  mg per tablet; Take 1 tablet by mouth every 8 (eight) hours as needed.  Dispense: 90 tablet; Refill: 0  -     oxyCODONE-acetaminophen (PERCOCET)  mg per tablet; Take 1 tablet by mouth every 8 (eight) hours as needed.  Dispense: 90 tablet; Refill: 0  -     oxyCODONE-acetaminophen (PERCOCET)  mg per tablet; Take 1 tablet by mouth every 8 (eight) hours as needed.  Dispense: 90 tablet; Refill: 0    Gait disorder  -     oxyCODONE-acetaminophen (PERCOCET)  mg per tablet; Take 1 tablet by mouth every 8 (eight) hours as needed.  Dispense: 90 tablet; Refill: 0  -     oxyCODONE-acetaminophen (PERCOCET)  mg per tablet; Take 1 tablet by mouth every 8 (eight) hours as needed.  Dispense: 90 tablet; Refill: 0  -     oxyCODONE-acetaminophen (PERCOCET)  mg per tablet; Take 1 tablet by mouth every 8 (eight) hours as needed.  Dispense: 90 tablet; Refill:  0    Foot drop, right  -     oxyCODONE-acetaminophen (PERCOCET)  mg per tablet; Take 1 tablet by mouth every 8 (eight) hours as needed.  Dispense: 90 tablet; Refill: 0  -     oxyCODONE-acetaminophen (PERCOCET)  mg per tablet; Take 1 tablet by mouth every 8 (eight) hours as needed.  Dispense: 90 tablet; Refill: 0  -     oxyCODONE-acetaminophen (PERCOCET)  mg per tablet; Take 1 tablet by mouth every 8 (eight) hours as needed.  Dispense: 90 tablet; Refill: 0      Genia Dawson, has a long standing back pain, sec.to spinal Tm, that was surgically exceeded more than 25 yrs ago. She is s/p multiple surgeries, s/p T10-pelvis fusion-revision on 10/02/18, by dr. Herrmann, with increased pain.  She has Rt LE > Lt LE weakness, secondary to radiculopathy /myleopathic injury.     -- DME- other,new prescription given,  For RT KAFO.     - Pain management:   Opioid Risk Score       Value Time User    Opioid Risk Score  0 4/22/2019 12:33 AM Monica Ellis MD        Hydrocodone 10/325 mg po TID-QID prn pain, changed to Percocet 10/325 mg po Q8 hrs after surgery.  She is requesting to resume since it helps her with functioning, and even that medications effect lasts no longer than 4 hrs.  She is back to work.  Percocet 10/325 mg, refills on 6/18, 5/19, 4/18,  3/3, 01/09/19, 09/09/18.  She failed Lyrica, and Gabapentin.  Willing to try Cymbalta.  Will resume all supportive therapy.    RTC in 4 months.     Total time spent face to face with patient was 25 minutes.   More than 50% of that time was spent in [id number] on diagnosis , prognosis and treatment options.   I also  patient  on common and most usual side effect of prescribed medications. Risk and benefits of opiates, possible risk of developing opiate dependence and tolerance, need of strict compliance with prescribed medications.  Pain contract, rules and obligations were discussed with patient in details.  She is aware that I would be the only  doctor prescribing her pain medications and ED in a case of emergency.  I reviewed Primary care , and other specialty's notes to better coordinate patient's  care. All questions were answered, and patient voiced understanding.

## 2019-08-20 NOTE — PROGRESS NOTES
Date: 08/20/2019    Date of Surgery: 10/2/2018    Procedure: T10-Pelvis Revision    History: Genia Mcnulty is seen today for follow-up following the above listed procedure. She had been doing well. About 3 weeks ago she developed low back and R buttock pain without any inciting event. She continues to take percocet.    Exam:  Incision is healed. There is mild surrounding erythema that is unchanged, no evidence of wound drainage. Neuro exam is stable. No signs of DVT.    Radiographs: stable implants, no e/o hardware failure.    Assessment/Plan 10 moths postop.    I reassured the patient I do not see any hardware failure. I will see her back at 18 months from surgery with new scoli films.    I spent 15 minutes with the patient of which greater than 1/2 the time was devoted to counciling the patient regarding treatment options.

## 2019-10-23 ENCOUNTER — PATIENT MESSAGE (OUTPATIENT)
Dept: ORTHOPEDICS | Facility: CLINIC | Age: 54
End: 2019-10-23

## 2019-10-23 DIAGNOSIS — M54.16 LUMBAR RADICULOPATHY: Primary | ICD-10-CM

## 2019-10-23 RX ORDER — CELECOXIB 200 MG/1
200 CAPSULE ORAL 2 TIMES DAILY
Qty: 60 CAPSULE | Refills: 1 | Status: SHIPPED | OUTPATIENT
Start: 2019-10-23 | End: 2020-08-05 | Stop reason: SDUPTHER

## 2019-10-23 RX ORDER — CELECOXIB 200 MG/1
CAPSULE ORAL
Qty: 60 CAPSULE | Refills: 1 | Status: SHIPPED | OUTPATIENT
Start: 2019-10-23 | End: 2020-02-13 | Stop reason: SDUPTHER

## 2019-11-07 DIAGNOSIS — G95.9 MYELOPATHY OF LUMBAR REGION: ICD-10-CM

## 2019-11-07 DIAGNOSIS — M54.16 LUMBAR RADICULOPATHY: ICD-10-CM

## 2019-11-07 DIAGNOSIS — M41.9 KYPHOSCOLIOSIS: ICD-10-CM

## 2019-11-07 DIAGNOSIS — R29.898 WEAKNESS OF BOTH LEGS: ICD-10-CM

## 2019-11-07 DIAGNOSIS — M47.16 LUMBAR SPONDYLOSIS WITH MYELOPATHY: ICD-10-CM

## 2019-11-07 DIAGNOSIS — M43.10 ACQUIRED SPONDYLOLISTHESIS: ICD-10-CM

## 2019-11-07 DIAGNOSIS — M17.11 PRIMARY OSTEOARTHRITIS OF RIGHT KNEE: ICD-10-CM

## 2019-11-07 DIAGNOSIS — M21.371 FOOT DROP, RIGHT: ICD-10-CM

## 2019-11-07 DIAGNOSIS — R26.9 GAIT DISORDER: ICD-10-CM

## 2019-11-07 NOTE — TELEPHONE ENCOUNTER
----- Message from Mercedes Abad sent at 11/7/2019 12:04 PM CST -----  Contact: self @ [phone]  Pt is req a refill for oxycodone 10/325.     [facility]/pharmacy #[id number] - [place], [place] - [address] AT [facility] [phone] (Phone)       [phone] (Fax)

## 2019-11-10 RX ORDER — OXYCODONE AND ACETAMINOPHEN 10; 325 MG/1; MG/1
1 TABLET ORAL EVERY 8 HOURS PRN
Qty: 90 TABLET | Refills: 0 | OUTPATIENT
Start: 2019-11-10 | End: 2019-12-10

## 2019-11-20 ENCOUNTER — PATIENT MESSAGE (OUTPATIENT)
Dept: FAMILY MEDICINE | Facility: CLINIC | Age: 54
End: 2019-11-20

## 2019-11-20 DIAGNOSIS — R26.9 GAIT DISORDER: ICD-10-CM

## 2019-11-20 DIAGNOSIS — M21.371 FOOT DROP, RIGHT: ICD-10-CM

## 2019-11-20 DIAGNOSIS — G95.9 MYELOPATHY OF LUMBAR REGION: ICD-10-CM

## 2019-11-20 DIAGNOSIS — M43.10 ACQUIRED SPONDYLOLISTHESIS: ICD-10-CM

## 2019-11-20 DIAGNOSIS — M54.16 LUMBAR RADICULOPATHY: ICD-10-CM

## 2019-11-20 DIAGNOSIS — R29.898 WEAKNESS OF BOTH LEGS: ICD-10-CM

## 2019-11-20 DIAGNOSIS — M41.9 KYPHOSCOLIOSIS: ICD-10-CM

## 2019-11-20 DIAGNOSIS — M17.11 PRIMARY OSTEOARTHRITIS OF RIGHT KNEE: ICD-10-CM

## 2019-11-20 DIAGNOSIS — M47.16 LUMBAR SPONDYLOSIS WITH MYELOPATHY: ICD-10-CM

## 2019-11-24 RX ORDER — OXYCODONE AND ACETAMINOPHEN 10; 325 MG/1; MG/1
1 TABLET ORAL EVERY 8 HOURS PRN
Qty: 90 TABLET | Refills: 0 | Status: SHIPPED | OUTPATIENT
Start: 2019-11-24 | End: 2019-12-19 | Stop reason: SDUPTHER

## 2019-12-19 DIAGNOSIS — M47.16 LUMBAR SPONDYLOSIS WITH MYELOPATHY: ICD-10-CM

## 2019-12-19 DIAGNOSIS — M21.371 FOOT DROP, RIGHT: ICD-10-CM

## 2019-12-19 DIAGNOSIS — M54.16 LUMBAR RADICULOPATHY: ICD-10-CM

## 2019-12-19 DIAGNOSIS — R29.898 WEAKNESS OF BOTH LEGS: ICD-10-CM

## 2019-12-19 DIAGNOSIS — M17.11 PRIMARY OSTEOARTHRITIS OF RIGHT KNEE: ICD-10-CM

## 2019-12-19 DIAGNOSIS — G95.9 MYELOPATHY OF LUMBAR REGION: ICD-10-CM

## 2019-12-19 DIAGNOSIS — R26.9 GAIT DISORDER: ICD-10-CM

## 2019-12-19 DIAGNOSIS — M43.10 ACQUIRED SPONDYLOLISTHESIS: ICD-10-CM

## 2019-12-19 DIAGNOSIS — M41.9 KYPHOSCOLIOSIS: ICD-10-CM

## 2019-12-21 RX ORDER — OXYCODONE AND ACETAMINOPHEN 10; 325 MG/1; MG/1
1 TABLET ORAL EVERY 8 HOURS PRN
Qty: 90 TABLET | Refills: 0 | Status: SHIPPED | OUTPATIENT
Start: 2019-12-21 | End: 2019-12-24 | Stop reason: SDUPTHER

## 2019-12-22 ENCOUNTER — PATIENT MESSAGE (OUTPATIENT)
Dept: PHYSICAL MEDICINE AND REHAB | Facility: CLINIC | Age: 54
End: 2019-12-22

## 2019-12-24 DIAGNOSIS — M21.371 FOOT DROP, RIGHT: ICD-10-CM

## 2019-12-24 DIAGNOSIS — M41.9 KYPHOSCOLIOSIS: ICD-10-CM

## 2019-12-24 DIAGNOSIS — M47.16 LUMBAR SPONDYLOSIS WITH MYELOPATHY: ICD-10-CM

## 2019-12-24 DIAGNOSIS — M54.16 LUMBAR RADICULOPATHY: ICD-10-CM

## 2019-12-24 DIAGNOSIS — R29.898 WEAKNESS OF BOTH LEGS: ICD-10-CM

## 2019-12-24 DIAGNOSIS — G95.9 MYELOPATHY OF LUMBAR REGION: ICD-10-CM

## 2019-12-24 DIAGNOSIS — M17.11 PRIMARY OSTEOARTHRITIS OF RIGHT KNEE: ICD-10-CM

## 2019-12-24 DIAGNOSIS — R26.9 GAIT DISORDER: ICD-10-CM

## 2019-12-24 DIAGNOSIS — M43.10 ACQUIRED SPONDYLOLISTHESIS: ICD-10-CM

## 2019-12-26 RX ORDER — OXYCODONE AND ACETAMINOPHEN 10; 325 MG/1; MG/1
1 TABLET ORAL EVERY 8 HOURS PRN
Qty: 90 TABLET | Refills: 0 | Status: SHIPPED | OUTPATIENT
Start: 2019-12-26 | End: 2019-12-26 | Stop reason: SDUPTHER

## 2019-12-27 RX ORDER — OXYCODONE AND ACETAMINOPHEN 10; 325 MG/1; MG/1
1 TABLET ORAL EVERY 8 HOURS PRN
Qty: 90 TABLET | Refills: 0 | Status: SHIPPED | OUTPATIENT
Start: 2019-12-27 | End: 2020-02-13 | Stop reason: SDUPTHER

## 2020-01-24 DIAGNOSIS — Z12.39 BREAST CANCER SCREENING: ICD-10-CM

## 2020-02-10 ENCOUNTER — PATIENT OUTREACH (OUTPATIENT)
Dept: ADMINISTRATIVE | Facility: OTHER | Age: 55
End: 2020-02-10

## 2020-02-10 NOTE — PROGRESS NOTES
Chart reviewed.   Immunizations: updated  Orders placed: Fit kit  Upcoming appts to satisfy MIGUELINA topics: n/a

## 2020-02-11 DIAGNOSIS — Z12.11 ENCOUNTER FOR FIT (FECAL IMMUNOCHEMICAL TEST) SCREENING: Primary | ICD-10-CM

## 2020-02-13 ENCOUNTER — OFFICE VISIT (OUTPATIENT)
Dept: PHYSICAL MEDICINE AND REHAB | Facility: CLINIC | Age: 55
End: 2020-02-13
Payer: COMMERCIAL

## 2020-02-13 ENCOUNTER — LAB VISIT (OUTPATIENT)
Dept: LAB | Facility: HOSPITAL | Age: 55
End: 2020-02-13
Attending: PHYSICAL MEDICINE & REHABILITATION
Payer: COMMERCIAL

## 2020-02-13 VITALS
BODY MASS INDEX: 24.32 KG/M2 | HEART RATE: 70 BPM | WEIGHT: 146 LBS | DIASTOLIC BLOOD PRESSURE: 84 MMHG | SYSTOLIC BLOOD PRESSURE: 163 MMHG | HEIGHT: 65 IN

## 2020-02-13 DIAGNOSIS — M41.9 KYPHOSCOLIOSIS: ICD-10-CM

## 2020-02-13 DIAGNOSIS — M54.16 LUMBAR RADICULOPATHY: ICD-10-CM

## 2020-02-13 DIAGNOSIS — G95.9 MYELOPATHY OF LUMBAR REGION: Primary | ICD-10-CM

## 2020-02-13 DIAGNOSIS — Z79.891 OPIOID USE AGREEMENT EXISTS: ICD-10-CM

## 2020-02-13 DIAGNOSIS — M43.10 ACQUIRED SPONDYLOLISTHESIS: ICD-10-CM

## 2020-02-13 DIAGNOSIS — M47.16 LUMBAR SPONDYLOSIS WITH MYELOPATHY: ICD-10-CM

## 2020-02-13 DIAGNOSIS — M21.371 FOOT DROP, RIGHT: ICD-10-CM

## 2020-02-13 DIAGNOSIS — R29.898 WEAKNESS OF BOTH LEGS: ICD-10-CM

## 2020-02-13 DIAGNOSIS — M17.11 PRIMARY OSTEOARTHRITIS OF RIGHT KNEE: ICD-10-CM

## 2020-02-13 DIAGNOSIS — R26.9 GAIT DISORDER: ICD-10-CM

## 2020-02-13 PROCEDURE — 99214 OFFICE O/P EST MOD 30 MIN: CPT | Mod: S$GLB,,, | Performed by: PHYSICAL MEDICINE & REHABILITATION

## 2020-02-13 PROCEDURE — 3008F PR BODY MASS INDEX (BMI) DOCUMENTED: ICD-10-PCS | Mod: CPTII,S$GLB,, | Performed by: PHYSICAL MEDICINE & REHABILITATION

## 2020-02-13 PROCEDURE — 3079F PR MOST RECENT DIASTOLIC BLOOD PRESSURE 80-89 MM HG: ICD-10-PCS | Mod: CPTII,S$GLB,, | Performed by: PHYSICAL MEDICINE & REHABILITATION

## 2020-02-13 PROCEDURE — 80307 DRUG TEST PRSMV CHEM ANLYZR: CPT

## 2020-02-13 PROCEDURE — 99999 PR PBB SHADOW E&M-EST. PATIENT-LVL III: ICD-10-PCS | Mod: PBBFAC,,, | Performed by: PHYSICAL MEDICINE & REHABILITATION

## 2020-02-13 PROCEDURE — 3008F BODY MASS INDEX DOCD: CPT | Mod: CPTII,S$GLB,, | Performed by: PHYSICAL MEDICINE & REHABILITATION

## 2020-02-13 PROCEDURE — 99214 PR OFFICE/OUTPT VISIT, EST, LEVL IV, 30-39 MIN: ICD-10-PCS | Mod: S$GLB,,, | Performed by: PHYSICAL MEDICINE & REHABILITATION

## 2020-02-13 PROCEDURE — 3077F PR MOST RECENT SYSTOLIC BLOOD PRESSURE >= 140 MM HG: ICD-10-PCS | Mod: CPTII,S$GLB,, | Performed by: PHYSICAL MEDICINE & REHABILITATION

## 2020-02-13 PROCEDURE — 3079F DIAST BP 80-89 MM HG: CPT | Mod: CPTII,S$GLB,, | Performed by: PHYSICAL MEDICINE & REHABILITATION

## 2020-02-13 PROCEDURE — 99999 PR PBB SHADOW E&M-EST. PATIENT-LVL III: CPT | Mod: PBBFAC,,, | Performed by: PHYSICAL MEDICINE & REHABILITATION

## 2020-02-13 PROCEDURE — 3077F SYST BP >= 140 MM HG: CPT | Mod: CPTII,S$GLB,, | Performed by: PHYSICAL MEDICINE & REHABILITATION

## 2020-02-13 RX ORDER — CELECOXIB 200 MG/1
200 CAPSULE ORAL 2 TIMES DAILY
Qty: 60 CAPSULE | Refills: 5 | Status: SHIPPED | OUTPATIENT
Start: 2020-02-13 | End: 2021-01-21 | Stop reason: SDUPTHER

## 2020-02-13 RX ORDER — OXYCODONE AND ACETAMINOPHEN 10; 325 MG/1; MG/1
1 TABLET ORAL EVERY 6 HOURS PRN
Qty: 120 TABLET | Refills: 0 | Status: SHIPPED | OUTPATIENT
Start: 2020-02-13 | End: 2020-03-14

## 2020-02-13 RX ORDER — OXYCODONE AND ACETAMINOPHEN 10; 325 MG/1; MG/1
1 TABLET ORAL EVERY 6 HOURS PRN
Qty: 90 TABLET | Refills: 0 | Status: SHIPPED | OUTPATIENT
Start: 2020-04-13 | End: 2020-06-17 | Stop reason: SDUPTHER

## 2020-02-13 RX ORDER — OXYCODONE AND ACETAMINOPHEN 10; 325 MG/1; MG/1
1 TABLET ORAL EVERY 6 HOURS PRN
Qty: 120 TABLET | Refills: 0 | Status: SHIPPED | OUTPATIENT
Start: 2020-03-13 | End: 2020-04-12

## 2020-02-13 NOTE — PROGRESS NOTES
Subjective:       Patient ID: Genia Davis is a 54 y.o. female.    Chief Complaint: Back Pain    Back Pain   Associated symptoms include leg pain. Pertinent negatives include no abdominal pain, chest pain, fever, numbness or weakness. Headaches:     Hip Pain    Pertinent negatives include no numbness.   Leg Pain    Pertinent negatives include no numbness.      Mrs. Davis who returns to clinic for chronic low back pain, and chronic pain management with opioids.  [facility] 08/18/19.  Since [facility], on 10/2/2018, pt underwent another surgery,T10-Pelvis Revision, by dr. Herrmann.  She reports a prolong recovery from surgery, and states that the last surgery did not help much with pain, nor with her function.  She states that broken hardware is fixed, but her pain is worse.  Today, she c/o more low and mid back pain.  She states that her current pain is 2/10, the worst pain is still 8-9/10.  She is still using KAFO for RT leg, and able to stand for no more than 10 minutes,a nd walk less than 20-30 ft using RW.  Rt leg is still a weaker than left leg. She needs a new KAFO orthosis , since she did not  last prescription.   She states that she is taking more Percocet, she is not able to function w/o pain medication.   Percocet now covers pain for maybe 4 hrs, and she is again in pain, that is more severe than before.  And pain is controlled in moderate range, she never goes bellow 4-5, with medications.    Patient with long standing back pain, she had a spinal tumor at 22 y/o, that required multiple back surgeries, with last surgery done in Feb, 2016.  She is s/p revision T10-Pelvis fusion of a failed iliac bolt on Feb 12, 2016, by .   She has been having increasing b/l Lt>Rt buttock and lateral thigh pain since surgery.  She has had multiple falls because of the RT leg giving way.   She wears KAFO, secondary to Rt leg weakness,and hyperextension in knee, with Rt Foot drop. This current prosthesis is fairly  "old more than 4-5 yrs.  She had multiple JENNIFFER, after the surgery, the one to the left L5/S1 TF JENNIFFER ,done in July, 2016 was the moat helpful, while repeat injection in November 2016,helped a little.  She continues to needs pain medication, opiates for pain control.  She failed Tramadol, was taking 3 x/ day, but states that Tramadol is very slow to work, while Hydrocodone helps quicker and better.  She also failed Gabapentin, did not help, and she did not like the way it fells.  Hydrocodone no longer helps with pain.  She is able to walk with RW, using current KAFO ( she has it since 2012-13), across 2-3 rooms, for distance she uses manual WC.   KAFO brace helps, she does not belive that she would be able to make that much walking w/o brace. She received this brace from [employer].  She tries to be self sufficient , and tries to do everything on her own.  Today, she reports that she is slightly better, pain meds are helping,   On [facility] she received prescriptions for KAFO, but she still did not start process   to receive a new KAFO.  Since [facility], she reports worsening of LBP, that radiates to Rt hip and Rt leg down to the knee.  She reports increased weakness in RT leg, she has more difficulties to walk.   States that she is afraid that her hardware "shift" and she made an apointment with dr. Herrmann.    Back Pain Description:  Length: pain is chronic pain. Length >  Since age of 24 y/o.   No past, recent injury, falls.  Intensity:  CURRENT  5/10,  AVERAGE  Pain   5-6/10. at BEST   3-4/10 , At WORST  9/10 on the WORST day.   Location: pain is localized across lower back , bellow the waist line.  That pain radiates to b/l buttock, hips, Rt leg > Lt leg.   Pain in Rt leg comes on side and to the knee level.   It is more  leg  And hip than back pain.  Radiation: Positive to buttocks. Positive to legs.   Timing : constant  morning, afternoon pain , worse with activity, in evening  QUALITY:  Sharp/shooting pain in " Lt leg. Back pain is more sharp pain.   No neuropathic : burning, tingling,   There is numbness in toes at the bottom of toes.   Positive Rt leg weakness.   Worsening factors: activity , walking,   Alleviating factors: rest  Symptoms interfere with daily activity, sleeping and work.   Current medications:    Failed medications:    Prior procedures. Multiple surgeries, s/p fusion T10-pelvis, and multiple JENNIFFER, last one s/p Lt L5-S1 TFESI in 07/16 and 11/2016, with some pain relief.  PT/OT: multiple times, with temporary pain relief.  Patient denies night fever/night sweats, bowel incontinence, significant weight loss and significant motor weakness ( red flags).  Patient denies any suicidal or homicidal ideations.   She is here for f/u and chronic pain management with opioids.  .  Past Medical History:   Diagnosis Date    Cancer     tumor on back, was removed    Encounter for blood transfusion        Past Surgical History:   Procedure Laterality Date    BACK SURGERY      x 5    CHOLECYSTECTOMY      CREATION OF MUSCLE ROTATIONAL FLAP N/A 10/2/2018    Procedure: CREATION, FLAP, MUSCLE ROTATION;  Surgeon: Rommel Cox MD;  Location: [facility] OR [address] [place] [address];  Service: Plastics;  Laterality: N/A;       Family History   Problem Relation Age of Onset    Breast cancer Maternal Aunt     Anesthesia problems Neg Hx     Clotting disorder Neg Hx        Social History     Socioeconomic History    Marital status:      Spouse name: Not on file    Number of children: Not on file    Years of education: Not on file    Highest education level: Not on file   Occupational History    Not on file   Social Needs    Financial resource strain: Not on file    Food insecurity:     Worry: Not on file     Inability: Not on file    Transportation needs:     Medical: Not on file     Non-medical: Not on file   Tobacco Use    Smoking status: Never Smoker    Smokeless tobacco: Never Used   Substance and Sexual Activity     Alcohol use: No     Alcohol/week: 0.0 standard drinks    Drug use: No    Sexual activity: Not on file   Lifestyle    Physical activity:     Days per week: Not on file     Minutes per session: Not on file    Stress: Not on file   Relationships    Social connections:     Talks on phone: Not on file     Gets together: Not on file     Attends [employer] service: Not on file     Active member of club or organization: Not on file     Attends meetings of clubs or organizations: Not on file     Relationship status: Not on file   Other Topics Concern    Not on file   Social History Narrative    Not on file       Current Outpatient Medications   Medication Sig Dispense Refill    b complex vitamins tablet Take 1 tablet by mouth once daily.      celecoxib (CELEBREX) 200 MG capsule Take 1 capsule (200 mg total) by mouth 2 (two) times daily. 60 capsule 1    celecoxib (CELEBREX) 200 MG capsule Take 1 capsule (200 mg total) by mouth 2 (two) times daily. 60 capsule 5    ondansetron (ZOFRAN) 4 MG tablet Take 1 tablet (4 mg total) by mouth every 6 (six) hours as needed for Nausea. 60 tablet 1    ondansetron (ZOFRAN) 8 MG tablet Take 1 tablet (8 mg total) by mouth every 8 (eight) hours as needed for Nausea. 30 tablet 0    [START ON 4/13/2020] oxyCODONE-acetaminophen (PERCOCET)  mg per tablet Take 1 tablet by mouth every 6 (six) hours as needed for Pain. Greater than 7 day quantity Medically Necessary 90 tablet 0    [START ON 3/13/2020] oxyCODONE-acetaminophen (PERCOCET)  mg per tablet Take 1 tablet by mouth every 6 (six) hours as needed for Pain. Greater than 7 day quantity Medically Necessary 120 tablet 0    oxyCODONE-acetaminophen (PERCOCET)  mg per tablet Take 1 tablet by mouth every 6 (six) hours as needed. Greater than 7 day quantity Medically Necessary 120 tablet 0    polyethylene glycol (GLYCOLAX) 17 gram/dose powder Take 17 g by mouth 3 (three) times daily. May increase as needed if iron  constipates you too much. 1530 g 5     No current facility-administered medications for this visit.        Review of patient's allergies indicates:  No Known Allergies      Review of Systems   Constitutional: Negative for appetite change, chills, fatigue, fever and unexpected weight change.   HENT: Negative for drooling, trouble swallowing and voice change.    Eyes: Negative for pain and visual disturbance.   Respiratory: Negative for shortness of breath and wheezing.    Cardiovascular: Negative for chest pain and palpitations.   Gastrointestinal: Negative for abdominal distention, abdominal pain, constipation and diarrhea.   Genitourinary: Negative for difficulty urinating.   Musculoskeletal: Positive for arthralgias (Rt knee pain), back pain and gait problem. Negative for joint swelling, myalgias and neck stiffness.               Skin: Negative for color change and rash.   Neurological: Negative for dizziness, facial asymmetry, speech difficulty, weakness, light-headedness and numbness. Headaches:     Hematological: Negative for adenopathy.   Psychiatric/Behavioral: Negative for behavioral problems, confusion and sleep disturbance. The patient is not nervous/anxious.          Objective:      Physical Exam      GENERAL: The patient is alert, oriented, pleasant.  HEENT: PERRLA  NECK: supple, no masses,    CV: S1S2, RRR, no murmurs, rales.  LUNGS: CTA bilateral.   ABDOMEN: soft, non-tender, non distended, bowel sounds nl.  EXTREMITIES: no edema, +2 pulses DP, b/l  SKIN: no skin rash, no skin breakdowns.  MUSCULOSKELETAL:   Gait :abnormal, she is able to make a few steps walks with no AD.  Needs a a slight longer time to get up , secondary to Rt KAFO.  Walks with limping ( in swing) and hyperextending ( in stance) RT leg.   Cervical spine: full AROM in cervical spine.   Lumbar spine, decreased active range of motion in all planes, phiolmena. flexion to 60 from neutral, ext. Lacks full extension -3 degrees, cannot  hyperextend secondary to long fusion, s/p T10- pelvis. palpable sacral screw,  Side bending and rotation to 25-30 degrees, b/l.  Straight leg raising on Lt tested and it is a Negative, negative on Rt, weak Hip flexors on RT leg.   Full range of motion in all joints x3 extremities, except in RT LE, that is weak.  Muscle strength 5/5 throughout x 3 extremities,  except in RT LE, that is weak.  Rt LE: HF3-, KF/KE- NT ( in KAFO), no active DF in Rt foot.  No  joint laxity throughout x4 extremities.   NEUROLOGIC: Cranial nerves II through XII intact.   Deep tendon reflexes is normal, +2 in the upper and lower extremities bilaterally.   Muscle tone is normal, no spasticity.  Sensory is intact to light touch and pinprick throughout x4 extremities.     Assessment:       1. Myelopathy of lumbar region    2. Weakness of both legs    3. Acquired spondylolisthesis    4. Lumbar radiculopathy    5. Kyphoscoliosis    6. Opioid use agreement exists    7. Lumbar spondylosis with myelopathy    8. Primary osteoarthritis of right knee    9. Gait disorder    10. Foot drop, right      Plan:          Myelopathy of lumbar region  -     oxyCODONE-acetaminophen (PERCOCET)  mg per tablet; Take 1 tablet by mouth every 6 (six) hours as needed for Pain. Greater than 7 day quantity Medically Necessary  Dispense: 90 tablet; Refill: 0  -     oxyCODONE-acetaminophen (PERCOCET)  mg per tablet; Take 1 tablet by mouth every 6 (six) hours as needed for Pain. Greater than 7 day quantity Medically Necessary  Dispense: 120 tablet; Refill: 0  -     oxyCODONE-acetaminophen (PERCOCET)  mg per tablet; Take 1 tablet by mouth every 6 (six) hours as needed. Greater than 7 day quantity Medically Necessary  Dispense: 120 tablet; Refill: 0  -     celecoxib (CELEBREX) 200 MG capsule; Take 1 capsule (200 mg total) by mouth 2 (two) times daily.  Dispense: 60 capsule; Refill: 5  -     Comprehensive metabolic panel; Future; Expected date:  02/13/2020    Weakness of both legs  -     oxyCODONE-acetaminophen (PERCOCET)  mg per tablet; Take 1 tablet by mouth every 6 (six) hours as needed for Pain. Greater than 7 day quantity Medically Necessary  Dispense: 90 tablet; Refill: 0  -     oxyCODONE-acetaminophen (PERCOCET)  mg per tablet; Take 1 tablet by mouth every 6 (six) hours as needed for Pain. Greater than 7 day quantity Medically Necessary  Dispense: 120 tablet; Refill: 0  -     oxyCODONE-acetaminophen (PERCOCET)  mg per tablet; Take 1 tablet by mouth every 6 (six) hours as needed. Greater than 7 day quantity Medically Necessary  Dispense: 120 tablet; Refill: 0  -     celecoxib (CELEBREX) 200 MG capsule; Take 1 capsule (200 mg total) by mouth 2 (two) times daily.  Dispense: 60 capsule; Refill: 5  -     Comprehensive metabolic panel; Future; Expected date: 02/13/2020    Acquired spondylolisthesis  -     oxyCODONE-acetaminophen (PERCOCET)  mg per tablet; Take 1 tablet by mouth every 6 (six) hours as needed for Pain. Greater than 7 day quantity Medically Necessary  Dispense: 90 tablet; Refill: 0  -     oxyCODONE-acetaminophen (PERCOCET)  mg per tablet; Take 1 tablet by mouth every 6 (six) hours as needed for Pain. Greater than 7 day quantity Medically Necessary  Dispense: 120 tablet; Refill: 0  -     oxyCODONE-acetaminophen (PERCOCET)  mg per tablet; Take 1 tablet by mouth every 6 (six) hours as needed. Greater than 7 day quantity Medically Necessary  Dispense: 120 tablet; Refill: 0  -     celecoxib (CELEBREX) 200 MG capsule; Take 1 capsule (200 mg total) by mouth 2 (two) times daily.  Dispense: 60 capsule; Refill: 5  -     Comprehensive metabolic panel; Future; Expected date: 02/13/2020    Lumbar radiculopathy  -     oxyCODONE-acetaminophen (PERCOCET)  mg per tablet; Take 1 tablet by mouth every 6 (six) hours as needed for Pain. Greater than 7 day quantity Medically Necessary  Dispense: 90 tablet; Refill: 0  -      oxyCODONE-acetaminophen (PERCOCET)  mg per tablet; Take 1 tablet by mouth every 6 (six) hours as needed for Pain. Greater than 7 day quantity Medically Necessary  Dispense: 120 tablet; Refill: 0  -     oxyCODONE-acetaminophen (PERCOCET)  mg per tablet; Take 1 tablet by mouth every 6 (six) hours as needed. Greater than 7 day quantity Medically Necessary  Dispense: 120 tablet; Refill: 0  -     celecoxib (CELEBREX) 200 MG capsule; Take 1 capsule (200 mg total) by mouth 2 (two) times daily.  Dispense: 60 capsule; Refill: 5  -     Comprehensive metabolic panel; Future; Expected date: 02/13/2020    Kyphoscoliosis  -     oxyCODONE-acetaminophen (PERCOCET)  mg per tablet; Take 1 tablet by mouth every 6 (six) hours as needed for Pain. Greater than 7 day quantity Medically Necessary  Dispense: 90 tablet; Refill: 0  -     oxyCODONE-acetaminophen (PERCOCET)  mg per tablet; Take 1 tablet by mouth every 6 (six) hours as needed for Pain. Greater than 7 day quantity Medically Necessary  Dispense: 120 tablet; Refill: 0  -     oxyCODONE-acetaminophen (PERCOCET)  mg per tablet; Take 1 tablet by mouth every 6 (six) hours as needed. Greater than 7 day quantity Medically Necessary  Dispense: 120 tablet; Refill: 0  -     celecoxib (CELEBREX) 200 MG capsule; Take 1 capsule (200 mg total) by mouth 2 (two) times daily.  Dispense: 60 capsule; Refill: 5  -     Comprehensive metabolic panel; Future; Expected date: 02/13/2020    Opioid use agreement exists  -     [facility] Drug Screen; Future; Expected date: 02/13/2020  -     celecoxib (CELEBREX) 200 MG capsule; Take 1 capsule (200 mg total) by mouth 2 (two) times daily.  Dispense: 60 capsule; Refill: 5  -     Comprehensive metabolic panel; Future; Expected date: 02/13/2020    Lumbar spondylosis with myelopathy  -     oxyCODONE-acetaminophen (PERCOCET)  mg per tablet; Take 1 tablet by mouth every 6 (six) hours as needed for Pain. Greater than 7 day quantity  Medically Necessary  Dispense: 90 tablet; Refill: 0  -     oxyCODONE-acetaminophen (PERCOCET)  mg per tablet; Take 1 tablet by mouth every 6 (six) hours as needed for Pain. Greater than 7 day quantity Medically Necessary  Dispense: 120 tablet; Refill: 0  -     oxyCODONE-acetaminophen (PERCOCET)  mg per tablet; Take 1 tablet by mouth every 6 (six) hours as needed. Greater than 7 day quantity Medically Necessary  Dispense: 120 tablet; Refill: 0  -     celecoxib (CELEBREX) 200 MG capsule; Take 1 capsule (200 mg total) by mouth 2 (two) times daily.  Dispense: 60 capsule; Refill: 5  -     Comprehensive metabolic panel; Future; Expected date: 02/13/2020    Primary osteoarthritis of right knee  -     oxyCODONE-acetaminophen (PERCOCET)  mg per tablet; Take 1 tablet by mouth every 6 (six) hours as needed for Pain. Greater than 7 day quantity Medically Necessary  Dispense: 90 tablet; Refill: 0  -     oxyCODONE-acetaminophen (PERCOCET)  mg per tablet; Take 1 tablet by mouth every 6 (six) hours as needed for Pain. Greater than 7 day quantity Medically Necessary  Dispense: 120 tablet; Refill: 0  -     oxyCODONE-acetaminophen (PERCOCET)  mg per tablet; Take 1 tablet by mouth every 6 (six) hours as needed. Greater than 7 day quantity Medically Necessary  Dispense: 120 tablet; Refill: 0  -     celecoxib (CELEBREX) 200 MG capsule; Take 1 capsule (200 mg total) by mouth 2 (two) times daily.  Dispense: 60 capsule; Refill: 5  -     Comprehensive metabolic panel; Future; Expected date: 02/13/2020    Gait disorder  -     oxyCODONE-acetaminophen (PERCOCET)  mg per tablet; Take 1 tablet by mouth every 6 (six) hours as needed for Pain. Greater than 7 day quantity Medically Necessary  Dispense: 90 tablet; Refill: 0  -     oxyCODONE-acetaminophen (PERCOCET)  mg per tablet; Take 1 tablet by mouth every 6 (six) hours as needed for Pain. Greater than 7 day quantity Medically Necessary  Dispense: 120  tablet; Refill: 0  -     oxyCODONE-acetaminophen (PERCOCET)  mg per tablet; Take 1 tablet by mouth every 6 (six) hours as needed. Greater than 7 day quantity Medically Necessary  Dispense: 120 tablet; Refill: 0  -     celecoxib (CELEBREX) 200 MG capsule; Take 1 capsule (200 mg total) by mouth 2 (two) times daily.  Dispense: 60 capsule; Refill: 5  -     Comprehensive metabolic panel; Future; Expected date: 02/13/2020    Foot drop, right  -     oxyCODONE-acetaminophen (PERCOCET)  mg per tablet; Take 1 tablet by mouth every 6 (six) hours as needed for Pain. Greater than 7 day quantity Medically Necessary  Dispense: 90 tablet; Refill: 0  -     oxyCODONE-acetaminophen (PERCOCET)  mg per tablet; Take 1 tablet by mouth every 6 (six) hours as needed for Pain. Greater than 7 day quantity Medically Necessary  Dispense: 120 tablet; Refill: 0  -     oxyCODONE-acetaminophen (PERCOCET)  mg per tablet; Take 1 tablet by mouth every 6 (six) hours as needed. Greater than 7 day quantity Medically Necessary  Dispense: 120 tablet; Refill: 0  -     celecoxib (CELEBREX) 200 MG capsule; Take 1 capsule (200 mg total) by mouth 2 (two) times daily.  Dispense: 60 capsule; Refill: 5  -     Comprehensive metabolic panel; Future; Expected date: 02/13/2020      Genia Dawson, has a long standing back pain, sec.to spinal Tm, that was surgically exceeded more than 25 yrs ago. She is s/p multiple surgeries, s/p T10-pelvis fusion-revision on 10/02/18, by dr. Herrmann, with increased pain.  She has Rt LE > Lt LE weakness, secondary to radiculopathy /myleopathic injury.     -- DME- other,new prescription given,  For RT KAFO.     - Pain management:   Opioid Risk Score       Value Time User    Opioid Risk Score  0 4/22/2019 12:33 AM Monica Ellis MD        Hydrocodone 10/325 mg po TID-QID prn pain, changed to Percocet 10/325 mg po Q8 hrs after surgery.  She is requesting to resume since it helps her with functioning, and even  that medications effect lasts no longer than 4 hrs.  She is back to work.  Percocet 10/325 mg, refills on 12/27, 11/24, 10/25, 09/27/19.  She failed Lyrica, and Gabapentin.  Willing to try Cymbalta.  Will resume all supportive therapy.  UDS ordered, today.    RTC in 4 months.     Total time spent face to face with patient was 25 minutes.   More than 50% of that time was spent in [id number] on diagnosis , prognosis and treatment options.   I also  patient  on common and most usual side effect of prescribed medications. Risk and benefits of opiates, possible risk of developing opiate dependence and tolerance, need of strict compliance with prescribed medications.  Pain contract, rules and obligations were discussed with patient in details.  She is aware that I would be the only doctor prescribing her pain medications and ED in a case of emergency.  I reviewed Primary care , and other specialty's notes to better coordinate patient's  care. All questions were answered, and patient voiced understanding.

## 2020-02-17 LAB

## 2020-02-21 ENCOUNTER — LAB VISIT (OUTPATIENT)
Dept: LAB | Facility: HOSPITAL | Age: 55
End: 2020-02-21
Attending: PHYSICAL MEDICINE & REHABILITATION
Payer: COMMERCIAL

## 2020-02-21 DIAGNOSIS — M54.16 LUMBAR RADICULOPATHY: ICD-10-CM

## 2020-02-21 DIAGNOSIS — Z79.891 OPIOID USE AGREEMENT EXISTS: ICD-10-CM

## 2020-02-21 DIAGNOSIS — M41.9 KYPHOSCOLIOSIS: ICD-10-CM

## 2020-02-21 DIAGNOSIS — M17.11 PRIMARY OSTEOARTHRITIS OF RIGHT KNEE: ICD-10-CM

## 2020-02-21 DIAGNOSIS — R29.898 WEAKNESS OF BOTH LEGS: ICD-10-CM

## 2020-02-21 DIAGNOSIS — G95.9 MYELOPATHY OF LUMBAR REGION: ICD-10-CM

## 2020-02-21 DIAGNOSIS — M47.16 LUMBAR SPONDYLOSIS WITH MYELOPATHY: ICD-10-CM

## 2020-02-21 DIAGNOSIS — R26.9 GAIT DISORDER: ICD-10-CM

## 2020-02-21 DIAGNOSIS — M43.10 ACQUIRED SPONDYLOLISTHESIS: ICD-10-CM

## 2020-02-21 DIAGNOSIS — M21.371 FOOT DROP, RIGHT: ICD-10-CM

## 2020-02-21 LAB
ALBUMIN SERPL BCP-MCNC: 3.8 G/DL (ref 3.5–5.2)
ALP SERPL-CCNC: 80 U/L (ref 55–135)
ALT SERPL W/O P-5'-P-CCNC: 12 U/L (ref 10–44)
ANION GAP SERPL CALC-SCNC: 8 MMOL/L (ref 8–16)
AST SERPL-CCNC: 20 U/L (ref 10–40)
BILIRUB SERPL-MCNC: 0.3 MG/DL (ref 0.1–1)
BUN SERPL-MCNC: 17 MG/DL (ref 6–20)
CALCIUM SERPL-MCNC: 9.2 MG/DL (ref 8.7–10.5)
CHLORIDE SERPL-SCNC: 104 MMOL/L (ref 95–110)
CO2 SERPL-SCNC: 26 MMOL/L (ref 23–29)
CREAT SERPL-MCNC: 0.9 MG/DL (ref 0.5–1.4)
EST. GFR  (AFRICAN AMERICAN): >60 ML/MIN/1.73 M^2
EST. GFR  (NON AFRICAN AMERICAN): >60 ML/MIN/1.73 M^2
GLUCOSE SERPL-MCNC: 85 MG/DL (ref 70–110)
POTASSIUM SERPL-SCNC: 4.5 MMOL/L (ref 3.5–5.1)
PROT SERPL-MCNC: 7.7 G/DL (ref 6–8.4)
SODIUM SERPL-SCNC: 138 MMOL/L (ref 136–145)

## 2020-02-21 PROCEDURE — 80053 COMPREHEN METABOLIC PANEL: CPT

## 2020-02-21 PROCEDURE — 36415 COLL VENOUS BLD VENIPUNCTURE: CPT | Mod: PO

## 2020-05-05 ENCOUNTER — PATIENT MESSAGE (OUTPATIENT)
Dept: ADMINISTRATIVE | Facility: HOSPITAL | Age: 55
End: 2020-05-05

## 2020-06-15 ENCOUNTER — PATIENT OUTREACH (OUTPATIENT)
Dept: ADMINISTRATIVE | Facility: HOSPITAL | Age: 55
End: 2020-06-15

## 2020-06-15 DIAGNOSIS — R29.898 WEAKNESS OF BOTH LEGS: ICD-10-CM

## 2020-06-15 DIAGNOSIS — M41.9 KYPHOSCOLIOSIS: ICD-10-CM

## 2020-06-15 DIAGNOSIS — M43.10 ACQUIRED SPONDYLOLISTHESIS: ICD-10-CM

## 2020-06-15 DIAGNOSIS — R26.9 GAIT DISORDER: ICD-10-CM

## 2020-06-15 DIAGNOSIS — M54.16 LUMBAR RADICULOPATHY: ICD-10-CM

## 2020-06-15 DIAGNOSIS — M17.11 PRIMARY OSTEOARTHRITIS OF RIGHT KNEE: ICD-10-CM

## 2020-06-15 DIAGNOSIS — M21.371 FOOT DROP, RIGHT: ICD-10-CM

## 2020-06-15 DIAGNOSIS — G95.9 MYELOPATHY OF LUMBAR REGION: ICD-10-CM

## 2020-06-15 DIAGNOSIS — M47.16 LUMBAR SPONDYLOSIS WITH MYELOPATHY: ICD-10-CM

## 2020-06-17 ENCOUNTER — TELEPHONE (OUTPATIENT)
Dept: FAMILY MEDICINE | Facility: CLINIC | Age: 55
End: 2020-06-17

## 2020-06-17 DIAGNOSIS — G95.9 MYELOPATHY OF LUMBAR REGION: ICD-10-CM

## 2020-06-17 DIAGNOSIS — M17.11 PRIMARY OSTEOARTHRITIS OF RIGHT KNEE: ICD-10-CM

## 2020-06-17 DIAGNOSIS — M21.371 FOOT DROP, RIGHT: ICD-10-CM

## 2020-06-17 DIAGNOSIS — M41.9 KYPHOSCOLIOSIS: ICD-10-CM

## 2020-06-17 DIAGNOSIS — R26.9 GAIT DISORDER: ICD-10-CM

## 2020-06-17 DIAGNOSIS — M54.16 LUMBAR RADICULOPATHY: ICD-10-CM

## 2020-06-17 DIAGNOSIS — M43.10 ACQUIRED SPONDYLOLISTHESIS: ICD-10-CM

## 2020-06-17 DIAGNOSIS — R29.898 WEAKNESS OF BOTH LEGS: ICD-10-CM

## 2020-06-17 DIAGNOSIS — M47.16 LUMBAR SPONDYLOSIS WITH MYELOPATHY: ICD-10-CM

## 2020-06-17 NOTE — TELEPHONE ENCOUNTER
Called patient to change time for virtual visit to later today due to Ms Marcy's schedule. Patient did not answer, lvm

## 2020-06-18 RX ORDER — OXYCODONE AND ACETAMINOPHEN 10; 325 MG/1; MG/1
1 TABLET ORAL EVERY 6 HOURS PRN
Qty: 90 TABLET | Refills: 0 | OUTPATIENT
Start: 2020-06-18 | End: 2020-07-18

## 2020-06-18 RX ORDER — OXYCODONE AND ACETAMINOPHEN 10; 325 MG/1; MG/1
1 TABLET ORAL EVERY 6 HOURS PRN
Qty: 90 TABLET | Refills: 0 | Status: SHIPPED | OUTPATIENT
Start: 2020-06-18 | End: 2020-07-24 | Stop reason: SDUPTHER

## 2020-06-19 ENCOUNTER — OFFICE VISIT (OUTPATIENT)
Dept: FAMILY MEDICINE | Facility: CLINIC | Age: 55
End: 2020-06-19
Payer: COMMERCIAL

## 2020-06-19 DIAGNOSIS — I10 ESSENTIAL HYPERTENSION: Primary | ICD-10-CM

## 2020-06-19 PROCEDURE — 99213 PR OFFICE/OUTPT VISIT, EST, LEVL III, 20-29 MIN: ICD-10-PCS | Mod: 95,,, | Performed by: NURSE PRACTITIONER

## 2020-06-19 PROCEDURE — 99213 OFFICE O/P EST LOW 20 MIN: CPT | Mod: 95,,, | Performed by: NURSE PRACTITIONER

## 2020-06-19 RX ORDER — LOSARTAN POTASSIUM 50 MG/1
50 TABLET ORAL DAILY
Qty: 30 TABLET | Refills: 3 | Status: SHIPPED | OUTPATIENT
Start: 2020-06-19 | End: 2021-11-17

## 2020-06-19 NOTE — PROGRESS NOTES
Subjective:       Patient ID: Genia Mcnulty is a 54 y.o. female.    The patient location is: [place]  The chief complaint leading to consultation is: hypertension    Visit type: audiovisual    Face to Face time with patient: 16   21 minutes of total time spent on the encounter, which includes face to face time and non-face to face time preparing to see the patient (eg, review of tests), Obtaining and/or reviewing separately obtained history, Documenting clinical information in the electronic or other health record, Independently interpreting results (not separately reported) and communicating results to the patient/family/caregiver, or Care coordination (not separately reported).         Each patient to whom he or she provides medical services by telemedicine is:  (1) informed of the relationship between the physician and patient and the respective role of any other health care provider with respect to management of the patient; and (2) notified that he or she may decline to receive medical services by telemedicine and may withdraw from such care at any time.    Notes: established patient    Chief Complaint: Hypertension    Hypertension  This is a chronic (Patient had been taking lisinopril, but after back surgery, b/p decreased and she was taken off. She checks b/p at home and noticed it was back up and started taking old lisinopril. ) problem. The current episode started more than 1 year ago. The problem has been waxing and waning since onset. The problem is uncontrolled. Pertinent negatives include no chest pain or shortness of breath. Agents associated with hypertension include NSAIDs. Risk factors for coronary artery disease include stress. Past treatments include ACE inhibitors (lisinopril). The current treatment provides significant improvement. There are no compliance problems.      Review of Systems   Respiratory: Negative for shortness of breath.    Cardiovascular: Negative for chest pain.          Objective:     There is no physical exam except for those things that patient could perform under my direction, or that I could see, as this was a virtual visit. All medications were reviewed with the patient. Home blood pressures without medication have been 170+/100+    Physical Exam  Vitals signs and nursing note reviewed.   Constitutional:       General: She is not in acute distress.     Appearance: She is well-developed. She is not diaphoretic.   HENT:      Head: Normocephalic and atraumatic.   Eyes:      General: No scleral icterus.        Right eye: No discharge.         Left eye: No discharge.      Conjunctiva/sclera: Conjunctivae normal.   Cardiovascular:      Rate and Rhythm: Normal rate.   Pulmonary:      Effort: No respiratory distress.   Skin:     General: Skin is warm and dry.   Neurological:      Mental Status: She is alert and oriented to person, place, and time.   Psychiatric:         Behavior: Behavior normal.         Assessment:       1. Essential hypertension        Plan:       Essential hypertension  -     losartan (COZAAR) 50 MG tablet; Take 1 tablet (50 mg total) by mouth once daily.  Dispense: 30 tablet; Refill: 3  -     Comprehensive metabolic panel; Future; Expected date: 07/03/2020      Discussed new study on ACE inhibitors and possible link to lung cancer. Patient would like to try an ARB instead. Explained that they both work on same system and affect the kidneys, so she will need CMP in about 2 weeks. Patient is agreeable.

## 2020-07-15 DIAGNOSIS — Z71.89 COMPLEX CARE COORDINATION: ICD-10-CM

## 2020-07-17 ENCOUNTER — LAB VISIT (OUTPATIENT)
Dept: PRIMARY CARE CLINIC | Facility: CLINIC | Age: 55
End: 2020-07-17
Payer: COMMERCIAL

## 2020-07-17 ENCOUNTER — PATIENT MESSAGE (OUTPATIENT)
Dept: FAMILY MEDICINE | Facility: CLINIC | Age: 55
End: 2020-07-17

## 2020-07-17 ENCOUNTER — TELEPHONE (OUTPATIENT)
Dept: FAMILY MEDICINE | Facility: CLINIC | Age: 55
End: 2020-07-17

## 2020-07-17 DIAGNOSIS — Z11.59 ENCOUNTER FOR SCREENING FOR OTHER VIRAL DISEASES: Primary | ICD-10-CM

## 2020-07-17 DIAGNOSIS — Z11.59 ENCOUNTER FOR SCREENING FOR OTHER VIRAL DISEASES: ICD-10-CM

## 2020-07-17 PROCEDURE — U0003 INFECTIOUS AGENT DETECTION BY NUCLEIC ACID (DNA OR RNA); SEVERE ACUTE RESPIRATORY SYNDROME CORONAVIRUS 2 (SARS-COV-2) (CORONAVIRUS DISEASE [COVID-19]), AMPLIFIED PROBE TECHNIQUE, MAKING USE OF HIGH THROUGHPUT TECHNOLOGIES AS DESCRIBED BY CMS-2020-01-R: HCPCS

## 2020-07-17 NOTE — TELEPHONE ENCOUNTER
----- Message from Sydni Biggs sent at 7/17/2020  4:33 PM CDT -----  Regarding: covid test  Pt needing a call back regarding orders for covid test   Please contact pt [phone]

## 2020-07-17 NOTE — PROGRESS NOTES
Patient stated she is having covid symptoms after eating in a restaurant that was closed the next day due to restaurant staff testing positive for covid. Patient requesting testing. Test ordered and patient given phone number for covid clinic.

## 2020-07-19 LAB — SARS-COV-2 RNA RESP QL NAA+PROBE: NOT DETECTED

## 2020-07-24 ENCOUNTER — TELEPHONE (OUTPATIENT)
Dept: FAMILY MEDICINE | Facility: CLINIC | Age: 55
End: 2020-07-24

## 2020-07-24 ENCOUNTER — PATIENT MESSAGE (OUTPATIENT)
Dept: FAMILY MEDICINE | Facility: CLINIC | Age: 55
End: 2020-07-24

## 2020-07-25 NOTE — TELEPHONE ENCOUNTER
Spoke with patient via phone. Advised to follow clear liquid diet X24 hours, if diarrhea slows or stops, then 8-12 hours of BRAT diet and if still ill by Monday, come in. The patient verbalized understanding and is agreeable.

## 2020-08-05 ENCOUNTER — HOSPITAL ENCOUNTER (OUTPATIENT)
Dept: RADIOLOGY | Facility: CLINIC | Age: 55
Discharge: HOME | End: 2020-08-05
Attending: NURSE PRACTITIONER
Payer: COMMERCIAL

## 2020-08-05 ENCOUNTER — OFFICE VISIT (OUTPATIENT)
Dept: FAMILY MEDICINE | Facility: CLINIC | Age: 55
End: 2020-08-05
Payer: COMMERCIAL

## 2020-08-05 VITALS
BODY MASS INDEX: 25.16 KG/M2 | HEIGHT: 65 IN | SYSTOLIC BLOOD PRESSURE: 130 MMHG | HEART RATE: 71 BPM | TEMPERATURE: 98 F | RESPIRATION RATE: 16 BRPM | DIASTOLIC BLOOD PRESSURE: 88 MMHG | OXYGEN SATURATION: 98 % | WEIGHT: 151 LBS

## 2020-08-05 DIAGNOSIS — R10.31 RIGHT LOWER QUADRANT ABDOMINAL PAIN: ICD-10-CM

## 2020-08-05 DIAGNOSIS — R19.7 DIARRHEA, UNSPECIFIED TYPE: Primary | ICD-10-CM

## 2020-08-05 DIAGNOSIS — K57.92 DIVERTICULITIS: ICD-10-CM

## 2020-08-05 PROCEDURE — 76700 US EXAM ABDOM COMPLETE: CPT | Mod: TC,PO

## 2020-08-05 PROCEDURE — 99214 OFFICE O/P EST MOD 30 MIN: CPT | Mod: S$GLB,,, | Performed by: NURSE PRACTITIONER

## 2020-08-05 PROCEDURE — 76700 US ABDOMEN COMPLETE: ICD-10-PCS | Mod: 26,,, | Performed by: RADIOLOGY

## 2020-08-05 PROCEDURE — 76700 US EXAM ABDOM COMPLETE: CPT | Mod: 26,,, | Performed by: RADIOLOGY

## 2020-08-05 PROCEDURE — 99214 PR OFFICE/OUTPT VISIT, EST, LEVL IV, 30-39 MIN: ICD-10-PCS | Mod: S$GLB,,, | Performed by: NURSE PRACTITIONER

## 2020-08-05 RX ORDER — AMOXICILLIN AND CLAVULANATE POTASSIUM 875; 125 MG/1; MG/1
1 TABLET, FILM COATED ORAL 2 TIMES DAILY
Qty: 14 TABLET | Refills: 0 | Status: SHIPPED | OUTPATIENT
Start: 2020-08-05 | End: 2020-08-12

## 2020-08-05 NOTE — PROGRESS NOTES
Subjective:       Patient ID: Genia Mcnulty is a 54 y.o. female.    Chief Complaint: Nausea (last 3 weeks), Diarrhea (body aches, difficulty eating), and Fatigue    Nausea  This is a new problem. The current episode started 1 to 4 weeks ago (Started 3 weeks ago today). The problem occurs constantly. The problem has been waxing and waning (Had stopped for about a week, then started back up again yesterday). Associated symptoms include abdominal pain, a change in bowel habit, diaphoresis, fatigue, myalgias, nausea and weakness. Pertinent negatives include no chills, fever or vomiting. Nothing aggravates the symptoms. She has tried rest for the symptoms. The treatment provided mild relief.   Diarrhea   This is a new problem. The current episode started 1 to 4 weeks ago. The problem occurs 2 to 4 times per day. The problem has been waxing and waning. The stool consistency is described as watery (brown usually, but when severe, yellowish liquid). Associated symptoms include abdominal pain and myalgias. Pertinent negatives include no chills, fever or vomiting. Nothing aggravates the symptoms. There are no known risk factors. She has tried anti-motility drug for the symptoms. The treatment provided moderate relief.   Fatigue  This is a recurrent problem. The current episode started 1 to 4 weeks ago. The problem occurs constantly. The problem has been waxing and waning. Associated symptoms include abdominal pain, a change in bowel habit, diaphoresis, fatigue, myalgias, nausea and weakness. Pertinent negatives include no chills, fever or vomiting. Nothing aggravates the symptoms. She has tried nothing for the symptoms.   Blood pressure is good currently, pt. Tried losartan and had paradoxical reaction with anxiety and b/p elevation, so stopped taking it.    Review of Systems   Constitutional: Positive for diaphoresis and fatigue. Negative for chills and fever.   Gastrointestinal: Positive for abdominal pain, change in  bowel habit, diarrhea, nausea and change in bowel habit. Negative for vomiting.   Musculoskeletal: Positive for myalgias.   Neurological: Positive for weakness.         Objective:      Physical Exam  Vitals signs and nursing note reviewed.   Constitutional:       General: She is not in acute distress.     Appearance: Normal appearance. She is well-developed. She is not diaphoretic.   HENT:      Head: Normocephalic and atraumatic.   Eyes:      General: No scleral icterus.        Right eye: No discharge.         Left eye: No discharge.      Conjunctiva/sclera: Conjunctivae normal.   Cardiovascular:      Rate and Rhythm: Normal rate and regular rhythm.      Heart sounds: Normal heart sounds. No murmur. No friction rub. No gallop.    Pulmonary:      Effort: Pulmonary effort is normal. No respiratory distress.      Breath sounds: Normal breath sounds. No stridor. No wheezing, rhonchi or rales.   Abdominal:      General: Abdomen is flat. There is no distension.      Palpations: Abdomen is soft. There is no mass.      Tenderness: There is no abdominal tenderness. There is no right CVA tenderness, left CVA tenderness, guarding or rebound.      Hernia: No hernia is present.      Comments: Hyperactive bowel sounds, worse on right side   Musculoskeletal:         General: No swelling.   Skin:     General: Skin is warm and dry.      Capillary Refill: Capillary refill takes less than 2 seconds.      Coloration: Skin is not jaundiced or pale.      Findings: No rash.   Neurological:      Mental Status: She is alert and oriented to person, place, and time.   Psychiatric:         Mood and Affect: Mood normal.         Behavior: Behavior normal.         Assessment:       1. Diarrhea, unspecified type    2. Right lower quadrant abdominal pain    3. Diverticulitis        Plan:       Diarrhea, unspecified type  -     Clostridium difficile EIA; Future; Expected date: 08/05/2020  -     Giardia / Cryptosporidum, EIA; Future; Expected date:  08/05/2020  -     Occult blood x 1, stool; Future; Expected date: 08/05/2020  -     Stool Exam-Ova,Cysts,Parasites; Future; Expected date: 08/05/2020  -     WBC, Stool; Future; Expected date: 08/05/2020  -     CBC auto differential; Future; Expected date: 08/05/2020  -     Comprehensive metabolic panel; Future; Expected date: 08/05/2020    Right lower quadrant abdominal pain  -     US Abdomen Complete; Future; Expected date: 08/05/2020    Diverticulitis  -     amoxicillin-clavulanate 875-125mg (AUGMENTIN) 875-125 mg per tablet; Take 1 tablet by mouth 2 (two) times daily. 1 tab po with food bid for 7 days  Dispense: 14 tablet; Refill: 0         Do not take any imodium or antibiotics until you get stool specimen.

## 2020-08-06 ENCOUNTER — LAB VISIT (OUTPATIENT)
Dept: LAB | Facility: HOSPITAL | Age: 55
End: 2020-08-06
Attending: NURSE PRACTITIONER
Payer: COMMERCIAL

## 2020-08-06 DIAGNOSIS — R19.7 DIARRHEA, UNSPECIFIED TYPE: ICD-10-CM

## 2020-08-06 PROCEDURE — 82272 OCCULT BLD FECES 1-3 TESTS: CPT

## 2020-08-06 PROCEDURE — 89055 LEUKOCYTE ASSESSMENT FECAL: CPT

## 2020-08-06 PROCEDURE — 87329 GIARDIA AG IA: CPT

## 2020-08-06 PROCEDURE — 87209 SMEAR COMPLEX STAIN: CPT

## 2020-08-07 LAB
OB PNL STL: NEGATIVE
WBC #/AREA STL HPF: NORMAL /[HPF]

## 2020-08-08 LAB
CRYPTOSP AG STL QL IA: NEGATIVE
G LAMBLIA AG STL QL IA: NEGATIVE

## 2020-08-10 LAB — O+P STL MICRO: ABNORMAL

## 2020-08-10 RX ORDER — SULFAMETHOXAZOLE AND TRIMETHOPRIM 800; 160 MG/1; MG/1
1 TABLET ORAL 2 TIMES DAILY
Qty: 20 TABLET | Refills: 0 | Status: SHIPPED | OUTPATIENT
Start: 2020-08-10 | End: 2021-11-17

## 2020-09-10 DIAGNOSIS — G95.9 MYELOPATHY OF LUMBAR REGION: ICD-10-CM

## 2020-09-10 DIAGNOSIS — M17.11 PRIMARY OSTEOARTHRITIS OF RIGHT KNEE: ICD-10-CM

## 2020-09-10 DIAGNOSIS — M43.10 ACQUIRED SPONDYLOLISTHESIS: ICD-10-CM

## 2020-09-10 DIAGNOSIS — M21.371 FOOT DROP, RIGHT: ICD-10-CM

## 2020-09-10 DIAGNOSIS — M54.16 LUMBAR RADICULOPATHY: ICD-10-CM

## 2020-09-10 DIAGNOSIS — R29.898 WEAKNESS OF BOTH LEGS: ICD-10-CM

## 2020-09-10 DIAGNOSIS — R26.9 GAIT DISORDER: ICD-10-CM

## 2020-09-10 DIAGNOSIS — M41.9 KYPHOSCOLIOSIS: ICD-10-CM

## 2020-09-10 DIAGNOSIS — M47.16 LUMBAR SPONDYLOSIS WITH MYELOPATHY: ICD-10-CM

## 2020-09-12 RX ORDER — OXYCODONE AND ACETAMINOPHEN 10; 325 MG/1; MG/1
1 TABLET ORAL EVERY 6 HOURS PRN
Qty: 90 TABLET | Refills: 0 | Status: SHIPPED | OUTPATIENT
Start: 2020-09-12 | End: 2020-10-16 | Stop reason: SDUPTHER

## 2020-10-04 ENCOUNTER — PATIENT MESSAGE (OUTPATIENT)
Dept: ORTHOPEDICS | Facility: CLINIC | Age: 55
End: 2020-10-04

## 2020-10-11 ENCOUNTER — PATIENT MESSAGE (OUTPATIENT)
Dept: PHYSICAL MEDICINE AND REHAB | Facility: CLINIC | Age: 55
End: 2020-10-11

## 2020-10-11 DIAGNOSIS — M21.371 FOOT DROP, RIGHT: ICD-10-CM

## 2020-10-11 DIAGNOSIS — M41.9 KYPHOSCOLIOSIS: ICD-10-CM

## 2020-10-11 DIAGNOSIS — M17.11 PRIMARY OSTEOARTHRITIS OF RIGHT KNEE: ICD-10-CM

## 2020-10-11 DIAGNOSIS — M43.10 ACQUIRED SPONDYLOLISTHESIS: ICD-10-CM

## 2020-10-11 DIAGNOSIS — M47.16 LUMBAR SPONDYLOSIS WITH MYELOPATHY: ICD-10-CM

## 2020-10-11 DIAGNOSIS — M54.16 LUMBAR RADICULOPATHY: ICD-10-CM

## 2020-10-11 DIAGNOSIS — R26.9 GAIT DISORDER: ICD-10-CM

## 2020-10-11 DIAGNOSIS — R29.898 WEAKNESS OF BOTH LEGS: ICD-10-CM

## 2020-10-11 DIAGNOSIS — G95.9 MYELOPATHY OF LUMBAR REGION: ICD-10-CM

## 2020-10-16 DIAGNOSIS — R26.9 GAIT DISORDER: ICD-10-CM

## 2020-10-16 DIAGNOSIS — M54.16 LUMBAR RADICULOPATHY: ICD-10-CM

## 2020-10-16 DIAGNOSIS — M47.16 LUMBAR SPONDYLOSIS WITH MYELOPATHY: ICD-10-CM

## 2020-10-16 DIAGNOSIS — G95.9 MYELOPATHY OF LUMBAR REGION: ICD-10-CM

## 2020-10-16 DIAGNOSIS — M21.371 FOOT DROP, RIGHT: ICD-10-CM

## 2020-10-16 DIAGNOSIS — M41.9 KYPHOSCOLIOSIS: ICD-10-CM

## 2020-10-16 DIAGNOSIS — M43.10 ACQUIRED SPONDYLOLISTHESIS: ICD-10-CM

## 2020-10-16 DIAGNOSIS — R29.898 WEAKNESS OF BOTH LEGS: ICD-10-CM

## 2020-10-16 DIAGNOSIS — M17.11 PRIMARY OSTEOARTHRITIS OF RIGHT KNEE: ICD-10-CM

## 2020-10-17 RX ORDER — OXYCODONE AND ACETAMINOPHEN 10; 325 MG/1; MG/1
1 TABLET ORAL EVERY 6 HOURS PRN
Qty: 90 TABLET | Refills: 0 | OUTPATIENT
Start: 2020-10-17 | End: 2020-11-16

## 2020-10-17 RX ORDER — OXYCODONE AND ACETAMINOPHEN 10; 325 MG/1; MG/1
1 TABLET ORAL EVERY 6 HOURS PRN
Qty: 90 TABLET | Refills: 0 | Status: SHIPPED | OUTPATIENT
Start: 2020-10-17 | End: 2020-11-16 | Stop reason: SDUPTHER

## 2020-10-27 ENCOUNTER — PATIENT OUTREACH (OUTPATIENT)
Dept: ADMINISTRATIVE | Facility: OTHER | Age: 55
End: 2020-10-27

## 2020-10-28 NOTE — PROGRESS NOTES
Patient's chart was reviewed for overdue MIGUELINA topics.  Message sent to patient about Mammogram by PCP 10/08  Immunizations reconciled.    Orders placed:n/a  Tasked appts:n/a  Labs Linked:n/a

## 2020-11-09 ENCOUNTER — OFFICE VISIT (OUTPATIENT)
Dept: ORTHOPEDICS | Facility: CLINIC | Age: 55
End: 2020-11-09
Payer: COMMERCIAL

## 2020-11-09 ENCOUNTER — HOSPITAL ENCOUNTER (OUTPATIENT)
Dept: RADIOLOGY | Facility: HOSPITAL | Age: 55
Discharge: HOME | End: 2020-11-09
Attending: ORTHOPAEDIC SURGERY
Payer: COMMERCIAL

## 2020-11-09 DIAGNOSIS — M41.9 SCOLIOSIS, UNSPECIFIED SCOLIOSIS TYPE, UNSPECIFIED SPINAL REGION: ICD-10-CM

## 2020-11-09 DIAGNOSIS — G95.9 MYELOPATHY: Primary | ICD-10-CM

## 2020-11-09 PROCEDURE — 99213 PR OFFICE/OUTPT VISIT, EST, LEVL III, 20-29 MIN: ICD-10-PCS | Mod: S$GLB,,, | Performed by: ORTHOPAEDIC SURGERY

## 2020-11-09 PROCEDURE — 72082 XR SCOLIOSIS COMPLETE: ICD-10-PCS | Mod: 26,,, | Performed by: RADIOLOGY

## 2020-11-09 PROCEDURE — 99999 PR PBB SHADOW E&M-EST. PATIENT-LVL III: CPT | Mod: PBBFAC,,, | Performed by: ORTHOPAEDIC SURGERY

## 2020-11-09 PROCEDURE — 99999 PR PBB SHADOW E&M-EST. PATIENT-LVL III: ICD-10-PCS | Mod: PBBFAC,,, | Performed by: ORTHOPAEDIC SURGERY

## 2020-11-09 PROCEDURE — 99213 OFFICE O/P EST LOW 20 MIN: CPT | Mod: S$GLB,,, | Performed by: ORTHOPAEDIC SURGERY

## 2020-11-09 PROCEDURE — 72082 X-RAY EXAM ENTIRE SPI 2/3 VW: CPT | Mod: TC

## 2020-11-09 PROCEDURE — 1125F PR PAIN SEVERITY QUANTIFIED, PAIN PRESENT: ICD-10-PCS | Mod: S$GLB,,, | Performed by: ORTHOPAEDIC SURGERY

## 2020-11-09 PROCEDURE — 1125F AMNT PAIN NOTED PAIN PRSNT: CPT | Mod: S$GLB,,, | Performed by: ORTHOPAEDIC SURGERY

## 2020-11-09 PROCEDURE — 72082 X-RAY EXAM ENTIRE SPI 2/3 VW: CPT | Mod: 26,,, | Performed by: RADIOLOGY

## 2020-11-09 NOTE — PROGRESS NOTES
Date: 11/09/2020    Date of Surgery: 10/2/2018    Procedure: T10-Pelvis Revision    History: Genia Mcnulty is seen today for follow-up following the above listed procedure. She has more recently developed some posture issues and some gait disturbance. She requires a walker for ambulation.   Exam:  Incision is healed. There is no surrounding erythema that is unchanged, no evidence of wound drainage. Neuro exam is stable. No signs of DVT.    Radiographs: stable implants, no appearance of hardware failure    Assessment/Plan 18 moths postop.    I reassured the patient I do not see any hardware failure.   Will obtain c-t-l MRI  FU after MRI  I spent 15 minutes with the patient of which greater than 1/2 the time was devoted to counciling the patient regarding treatment options.

## 2020-11-10 ENCOUNTER — PATIENT MESSAGE (OUTPATIENT)
Dept: ORTHOPEDICS | Facility: CLINIC | Age: 55
End: 2020-11-10

## 2020-11-13 ENCOUNTER — HOSPITAL ENCOUNTER (OUTPATIENT)
Dept: RADIOLOGY | Facility: HOSPITAL | Age: 55
Discharge: HOME | End: 2020-11-13
Attending: ORTHOPAEDIC SURGERY
Payer: COMMERCIAL

## 2020-11-13 DIAGNOSIS — G95.9 MYELOPATHY: ICD-10-CM

## 2020-11-13 LAB
CREAT SERPL-MCNC: 0.7 MG/DL (ref 0.5–1.4)
SAMPLE: NORMAL

## 2020-11-13 PROCEDURE — 72157 MRI CHEST SPINE W/O & W/DYE: CPT | Mod: TC

## 2020-11-13 PROCEDURE — A9585 GADOBUTROL INJECTION: HCPCS | Performed by: ORTHOPAEDIC SURGERY

## 2020-11-13 PROCEDURE — 25500020 PHARM REV CODE 255: Performed by: ORTHOPAEDIC SURGERY

## 2020-11-13 PROCEDURE — 72141 MRI NECK SPINE W/O DYE: CPT | Mod: 26,,, | Performed by: RADIOLOGY

## 2020-11-13 PROCEDURE — 72141 MRI NECK SPINE W/O DYE: CPT | Mod: TC

## 2020-11-13 PROCEDURE — 72141 MRI CERVICAL SPINE WITHOUT CONTRAST: ICD-10-PCS | Mod: 26,,, | Performed by: RADIOLOGY

## 2020-11-13 PROCEDURE — 72157 MRI CHEST SPINE W/O & W/DYE: CPT | Mod: 26,,, | Performed by: RADIOLOGY

## 2020-11-13 PROCEDURE — 72158 MRI LUMBAR SPINE W/O & W/DYE: CPT | Mod: 26,,, | Performed by: RADIOLOGY

## 2020-11-13 PROCEDURE — 72158 MRI LUMBAR SPINE W WO CONTRAST: ICD-10-PCS | Mod: 26,,, | Performed by: RADIOLOGY

## 2020-11-13 PROCEDURE — 72157 MRI THORACIC SPINE W WO CONTRAST: ICD-10-PCS | Mod: 26,,, | Performed by: RADIOLOGY

## 2020-11-13 PROCEDURE — 72158 MRI LUMBAR SPINE W/O & W/DYE: CPT | Mod: TC

## 2020-11-13 RX ORDER — GADOBUTROL 604.72 MG/ML
6 INJECTION INTRAVENOUS
Status: COMPLETED | OUTPATIENT
Start: 2020-11-13 | End: 2020-11-13

## 2020-11-13 RX ADMIN — GADOBUTROL 6 ML: 604.72 INJECTION INTRAVENOUS at 07:11

## 2020-11-16 ENCOUNTER — PATIENT MESSAGE (OUTPATIENT)
Dept: PHYSICAL MEDICINE AND REHAB | Facility: CLINIC | Age: 55
End: 2020-11-16

## 2020-11-16 DIAGNOSIS — M21.371 FOOT DROP, RIGHT: ICD-10-CM

## 2020-11-16 DIAGNOSIS — G95.9 MYELOPATHY OF LUMBAR REGION: ICD-10-CM

## 2020-11-16 DIAGNOSIS — M47.16 LUMBAR SPONDYLOSIS WITH MYELOPATHY: ICD-10-CM

## 2020-11-16 DIAGNOSIS — R29.898 WEAKNESS OF BOTH LEGS: ICD-10-CM

## 2020-11-16 DIAGNOSIS — M54.16 LUMBAR RADICULOPATHY: ICD-10-CM

## 2020-11-16 DIAGNOSIS — R26.9 GAIT DISORDER: ICD-10-CM

## 2020-11-16 DIAGNOSIS — M43.10 ACQUIRED SPONDYLOLISTHESIS: ICD-10-CM

## 2020-11-16 DIAGNOSIS — M41.9 KYPHOSCOLIOSIS: ICD-10-CM

## 2020-11-16 DIAGNOSIS — M17.11 PRIMARY OSTEOARTHRITIS OF RIGHT KNEE: ICD-10-CM

## 2020-11-19 RX ORDER — OXYCODONE AND ACETAMINOPHEN 10; 325 MG/1; MG/1
1 TABLET ORAL EVERY 6 HOURS PRN
Qty: 90 TABLET | Refills: 0 | Status: SHIPPED | OUTPATIENT
Start: 2020-11-19 | End: 2020-12-21 | Stop reason: SDUPTHER

## 2020-11-20 ENCOUNTER — PATIENT MESSAGE (OUTPATIENT)
Dept: PHYSICAL MEDICINE AND REHAB | Facility: CLINIC | Age: 55
End: 2020-11-20

## 2020-12-07 ENCOUNTER — PATIENT OUTREACH (OUTPATIENT)
Dept: ADMINISTRATIVE | Facility: OTHER | Age: 55
End: 2020-12-07

## 2020-12-08 NOTE — PROGRESS NOTES
Requested updates within Care Everywhere.  Patient's chart was reviewed for overdue MIGUELINA topics.  Immunizations reconciled.

## 2020-12-09 ENCOUNTER — OFFICE VISIT (OUTPATIENT)
Dept: ORTHOPEDICS | Facility: CLINIC | Age: 55
End: 2020-12-09
Payer: COMMERCIAL

## 2020-12-09 DIAGNOSIS — M51.24 THORACIC DISC HERNIATION: Primary | ICD-10-CM

## 2020-12-09 PROCEDURE — 99213 OFFICE O/P EST LOW 20 MIN: CPT | Mod: S$GLB,,, | Performed by: ORTHOPAEDIC SURGERY

## 2020-12-09 PROCEDURE — 99213 PR OFFICE/OUTPT VISIT, EST, LEVL III, 20-29 MIN: ICD-10-PCS | Mod: S$GLB,,, | Performed by: ORTHOPAEDIC SURGERY

## 2020-12-09 PROCEDURE — 99999 PR PBB SHADOW E&M-EST. PATIENT-LVL II: ICD-10-PCS | Mod: PBBFAC,,, | Performed by: ORTHOPAEDIC SURGERY

## 2020-12-09 PROCEDURE — 99999 PR PBB SHADOW E&M-EST. PATIENT-LVL II: CPT | Mod: PBBFAC,,, | Performed by: ORTHOPAEDIC SURGERY

## 2020-12-09 PROCEDURE — 1125F AMNT PAIN NOTED PAIN PRSNT: CPT | Mod: S$GLB,,, | Performed by: ORTHOPAEDIC SURGERY

## 2020-12-09 PROCEDURE — 1125F PR PAIN SEVERITY QUANTIFIED, PAIN PRESENT: ICD-10-PCS | Mod: S$GLB,,, | Performed by: ORTHOPAEDIC SURGERY

## 2020-12-09 RX ORDER — CYCLOBENZAPRINE HCL 10 MG
10 TABLET ORAL 3 TIMES DAILY PRN
Qty: 90 TABLET | Refills: 0 | Status: SHIPPED | OUTPATIENT
Start: 2020-12-09 | End: 2020-12-19

## 2020-12-09 NOTE — PROGRESS NOTES
Date: 12/09/2020    Date of Surgery: 10/2/2018    Procedure: T10-Pelvis Revision    History: Genia Mcnulty is seen today for follow-up following the above listed procedure. She has continued to have some posture issues (leaning forward) as well as  Severe pain.  She requires a walker for ambulation. Her pain has been having such difficulty with ambulation that she has difficulty walking. She takes oxycodone, celebrex with mild improvement.   Exam:  Incision is healed. There is no surrounding erythema that is unchanged, no evidence of wound drainage. Neuro exam is stable. No signs of DVT.    Radiographs: stable implants, no appearance of hardware failure  MRI Lumbar spine  Assessment/Plan  24 moths postop.      Will obtain c-t-l MRI  FU after MRI  I spent 15 minutes with the patient of which greater than 1/2 the time was devoted to counciling the patient regarding treatment options.

## 2020-12-14 ENCOUNTER — TELEPHONE (OUTPATIENT)
Dept: INTERVENTIONAL RADIOLOGY/VASCULAR | Facility: CLINIC | Age: 55
End: 2020-12-14

## 2020-12-14 NOTE — TELEPHONE ENCOUNTER
I have attempted to contact this patient by phone to schedule IR lumbar injection. No answer/Left message to return call to [phone]. Please forward call.

## 2020-12-21 ENCOUNTER — PATIENT MESSAGE (OUTPATIENT)
Dept: PHYSICAL MEDICINE AND REHAB | Facility: CLINIC | Age: 55
End: 2020-12-21

## 2020-12-21 DIAGNOSIS — M43.10 ACQUIRED SPONDYLOLISTHESIS: ICD-10-CM

## 2020-12-21 DIAGNOSIS — R29.898 WEAKNESS OF BOTH LEGS: ICD-10-CM

## 2020-12-21 DIAGNOSIS — R26.9 GAIT DISORDER: ICD-10-CM

## 2020-12-21 DIAGNOSIS — M21.371 FOOT DROP, RIGHT: ICD-10-CM

## 2020-12-21 DIAGNOSIS — M17.11 PRIMARY OSTEOARTHRITIS OF RIGHT KNEE: ICD-10-CM

## 2020-12-21 DIAGNOSIS — M47.16 LUMBAR SPONDYLOSIS WITH MYELOPATHY: ICD-10-CM

## 2020-12-21 DIAGNOSIS — G95.9 MYELOPATHY OF LUMBAR REGION: ICD-10-CM

## 2020-12-21 DIAGNOSIS — M41.9 KYPHOSCOLIOSIS: ICD-10-CM

## 2020-12-21 DIAGNOSIS — M54.16 LUMBAR RADICULOPATHY: ICD-10-CM

## 2020-12-24 RX ORDER — OXYCODONE AND ACETAMINOPHEN 10; 325 MG/1; MG/1
1 TABLET ORAL EVERY 6 HOURS PRN
Qty: 90 TABLET | Refills: 0 | Status: SHIPPED | OUTPATIENT
Start: 2020-12-24 | End: 2021-01-21 | Stop reason: SDUPTHER

## 2020-12-29 ENCOUNTER — HOSPITAL ENCOUNTER (OUTPATIENT)
Dept: INTERVENTIONAL RADIOLOGY/VASCULAR | Facility: HOSPITAL | Age: 55
Discharge: HOME | End: 2020-12-29
Attending: ORTHOPAEDIC SURGERY
Payer: COMMERCIAL

## 2020-12-29 VITALS — DIASTOLIC BLOOD PRESSURE: 78 MMHG | SYSTOLIC BLOOD PRESSURE: 175 MMHG

## 2020-12-29 DIAGNOSIS — M51.24 THORACIC DISC HERNIATION: ICD-10-CM

## 2020-12-29 PROCEDURE — 64479 IR EPIDURAL TRANSFORAMINAL INJ 1ST VERT CERV THOR BILAT: ICD-10-PCS | Mod: 50,,, | Performed by: RADIOLOGY

## 2020-12-29 PROCEDURE — 25000003 PHARM REV CODE 250: Performed by: RADIOLOGY

## 2020-12-29 PROCEDURE — 64479 NJX AA&/STRD TFRM EPI C/T 1: CPT | Mod: 50,,, | Performed by: RADIOLOGY

## 2020-12-29 PROCEDURE — 63600175 PHARM REV CODE 636 W HCPCS: Performed by: RADIOLOGY

## 2020-12-29 PROCEDURE — 64479 NJX AA&/STRD TFRM EPI C/T 1: CPT | Mod: 50

## 2020-12-29 PROCEDURE — 25500020 PHARM REV CODE 255: Performed by: RADIOLOGY

## 2020-12-29 RX ORDER — METHYLPREDNISOLONE ACETATE 40 MG/ML
INJECTION, SUSPENSION INTRA-ARTICULAR; INTRALESIONAL; INTRAMUSCULAR; SOFT TISSUE CODE/TRAUMA/SEDATION MEDICATION
Status: COMPLETED | OUTPATIENT
Start: 2020-12-29 | End: 2020-12-29

## 2020-12-29 RX ORDER — LIDOCAINE HYDROCHLORIDE 10 MG/ML
INJECTION INFILTRATION; PERINEURAL CODE/TRAUMA/SEDATION MEDICATION
Status: COMPLETED | OUTPATIENT
Start: 2020-12-29 | End: 2020-12-29

## 2020-12-29 RX ADMIN — METHYLPREDNISOLONE ACETATE 40 MG: 40 INJECTION, SUSPENSION INTRA-ARTICULAR; INTRALESIONAL; INTRAMUSCULAR; SOFT TISSUE at 12:12

## 2020-12-29 RX ADMIN — LIDOCAINE HYDROCHLORIDE 10 ML: 10 INJECTION, SOLUTION INFILTRATION; PERINEURAL at 11:12

## 2020-12-29 RX ADMIN — IOHEXOL 6 ML: 180 INJECTION INTRAVENOUS at 12:12

## 2020-12-29 RX ADMIN — METHYLPREDNISOLONE ACETATE 40 MG: 40 INJECTION, SUSPENSION INTRA-ARTICULAR; INTRALESIONAL; INTRAMUSCULAR; SOFT TISSUE at 11:12

## 2020-12-29 RX ADMIN — LIDOCAINE HYDROCHLORIDE 5 ML: 10 INJECTION, SOLUTION INFILTRATION; PERINEURAL at 11:12

## 2020-12-29 NOTE — PLAN OF CARE
Patient tolerated procedure well. Patient had 10/10 pain when position for injection was found. Patient to be taken home by daughter. Patient has no distress at this time. No bleeding from site.

## 2020-12-29 NOTE — PROCEDURES
Radiology Post-Procedure Note    Pre Op Diagnosis: LBP    Post Op Diagnosis: Same    Procedure: Thoracic JENNIFFER    Procedure performed by: Ahmet Alcantara MD    Written Informed Consent Obtained: Yes    Specimen Removed: NO    Estimated Blood Loss: Minimal    Findings: injected 2 cc of contrast at bilateral T7 - 8 neural foamen      Level injected: bilateral T7 - 8 TF JENNIFFER  Needle used: 22 gauge  Dose:  80 mg Depo-methylprednisolone   4 mL Lidocaine 1% MPF    Patient tolerated procedure well.    Bear Luong MD  NeuroIR fellow.

## 2020-12-29 NOTE — H&P
Radiology History & Physical      SUBJECTIVE:     Chief Complaint: thoracic back pain.     History of Present Illness:  Genia Mcnulty is a 55 y.o. female who presents for bilateral T7 - 8 TF JENNIFFER in patient with thoracic back pain.     Past Medical History:   Diagnosis Date    Cancer     tumor on back, was removed    Encounter for blood transfusion      Past Surgical History:   Procedure Laterality Date    BACK SURGERY      x 5    CHOLECYSTECTOMY      CREATION OF MUSCLE ROTATIONAL FLAP N/A 10/2/2018    Procedure: CREATION, FLAP, MUSCLE ROTATION;  Surgeon: Rommel Cox MD;  Location: [facility] OR [address] [place] [address];  Service: Plastics;  Laterality: N/A;       Home Meds:   Prior to Admission medications    Medication Sig Start Date End Date Taking? Authorizing Provider   b complex vitamins tablet Take 1 tablet by mouth once daily.    Historical Provider   celecoxib (CELEBREX) 200 MG capsule Take 1 capsule (200 mg total) by mouth 2 (two) times daily. 2/13/20   Monica Ellis MD   losartan (COZAAR) 50 MG tablet Take 1 tablet (50 mg total) by mouth once daily. 6/19/20 6/19/21  MIKE Terry   oxyCODONE-acetaminophen (PERCOCET)  mg per tablet Take 1 tablet by mouth every 6 (six) hours as needed for Pain. Greater than 7 day quantity Medically Necessary 12/24/20 1/23/21  Monica Ellis MD   sulfamethoxazole-trimethoprim 800-160mg (BACTRIM DS) 800-160 mg Tab Take 1 tablet by mouth 2 (two) times daily. 8/10/20   MIKE Terry     Anticoagulants/Antiplatelets: no anticoagulation    Allergies:   Review of patient's allergies indicates:   Allergen Reactions    Hydrochlorothiazide Other (See Comments)     Muscle pain    Losartan Other (See Comments)     Elevated b/p with anxiety    Promethazine Other (See Comments)     Other reaction(s): agitation  Other reaction(s): Hives     Sedation History:  no adverse reactions    Review of Systems:   Hematological: no known coagulopathies  Respiratory: no  shortness of breath  Cardiovascular: no chest pain  Gastrointestinal: no abdominal pain  Genito-Urinary: no dysuria  Musculoskeletal: negative  Neurological: no TIA or stroke symptoms         OBJECTIVE:     Vital Signs (Most Recent)       Physical Exam:  ASA: 2  Mallampati: 2    General: no acute distress  Mental Status: alert and oriented to person, place and time  HEENT: normocephalic, atraumatic  Chest: unlabored breathing  Heart: regular heart rate  Abdomen: nondistended  Extremity: moves all extremities    Laboratory  Lab Results   Component Value Date    INR 1.1 11/13/2018       Lab Results   Component Value Date    WBC 5.98 08/05/2020    HGB 11.6 (L) 08/05/2020    HCT 37.4 08/05/2020    MCV 87 08/05/2020     08/05/2020      Lab Results   Component Value Date    GLU 96 08/05/2020     08/05/2020    K 4.5 08/05/2020     08/05/2020    CO2 31 (H) 08/05/2020    BUN 10 08/05/2020    CREATININE 0.9 08/05/2020    CALCIUM 8.9 08/05/2020    MG 2.3 11/13/2018    ALT 34 08/05/2020    AST 29 08/05/2020    ALBUMIN 3.4 (L) 08/05/2020    BILITOT 0.2 08/05/2020       ASSESSMENT/PLAN:     Sedation Plan: local anesthesia  Patient will undergo : bilateral T7 -8 TF JENNIFFER.    Bear Luong MD  NeuroIR fellow.

## 2021-01-21 DIAGNOSIS — Z79.891 OPIOID USE AGREEMENT EXISTS: ICD-10-CM

## 2021-01-21 DIAGNOSIS — M17.11 PRIMARY OSTEOARTHRITIS OF RIGHT KNEE: ICD-10-CM

## 2021-01-21 DIAGNOSIS — M47.16 LUMBAR SPONDYLOSIS WITH MYELOPATHY: ICD-10-CM

## 2021-01-21 DIAGNOSIS — M21.371 FOOT DROP, RIGHT: ICD-10-CM

## 2021-01-21 DIAGNOSIS — G95.9 MYELOPATHY OF LUMBAR REGION: ICD-10-CM

## 2021-01-21 DIAGNOSIS — M54.16 LUMBAR RADICULOPATHY: ICD-10-CM

## 2021-01-21 DIAGNOSIS — R29.898 WEAKNESS OF BOTH LEGS: ICD-10-CM

## 2021-01-21 DIAGNOSIS — M43.10 ACQUIRED SPONDYLOLISTHESIS: ICD-10-CM

## 2021-01-21 DIAGNOSIS — R26.9 GAIT DISORDER: ICD-10-CM

## 2021-01-21 DIAGNOSIS — M41.9 KYPHOSCOLIOSIS: ICD-10-CM

## 2021-01-22 RX ORDER — CELECOXIB 200 MG/1
200 CAPSULE ORAL 2 TIMES DAILY
Qty: 60 CAPSULE | Refills: 5 | Status: SHIPPED | OUTPATIENT
Start: 2021-01-22 | End: 2021-07-26

## 2021-01-22 RX ORDER — OXYCODONE AND ACETAMINOPHEN 10; 325 MG/1; MG/1
1 TABLET ORAL EVERY 6 HOURS PRN
Qty: 90 TABLET | Refills: 0 | Status: SHIPPED | OUTPATIENT
Start: 2021-01-24 | End: 2021-03-02 | Stop reason: SDUPTHER

## 2021-02-26 DIAGNOSIS — M54.16 LUMBAR RADICULOPATHY: ICD-10-CM

## 2021-02-26 DIAGNOSIS — M43.10 ACQUIRED SPONDYLOLISTHESIS: ICD-10-CM

## 2021-02-26 DIAGNOSIS — M17.11 PRIMARY OSTEOARTHRITIS OF RIGHT KNEE: ICD-10-CM

## 2021-02-26 DIAGNOSIS — M47.16 LUMBAR SPONDYLOSIS WITH MYELOPATHY: ICD-10-CM

## 2021-02-26 DIAGNOSIS — M21.371 FOOT DROP, RIGHT: ICD-10-CM

## 2021-02-26 DIAGNOSIS — R29.898 WEAKNESS OF BOTH LEGS: ICD-10-CM

## 2021-02-26 DIAGNOSIS — G95.9 MYELOPATHY OF LUMBAR REGION: ICD-10-CM

## 2021-02-26 DIAGNOSIS — M41.9 KYPHOSCOLIOSIS: ICD-10-CM

## 2021-02-26 DIAGNOSIS — R26.9 GAIT DISORDER: ICD-10-CM

## 2021-03-02 ENCOUNTER — PATIENT MESSAGE (OUTPATIENT)
Dept: PHYSICAL MEDICINE AND REHAB | Facility: CLINIC | Age: 56
End: 2021-03-02

## 2021-03-02 DIAGNOSIS — M17.11 PRIMARY OSTEOARTHRITIS OF RIGHT KNEE: ICD-10-CM

## 2021-03-02 DIAGNOSIS — G95.9 MYELOPATHY OF LUMBAR REGION: ICD-10-CM

## 2021-03-02 DIAGNOSIS — R26.9 GAIT DISORDER: ICD-10-CM

## 2021-03-02 DIAGNOSIS — M41.9 KYPHOSCOLIOSIS: ICD-10-CM

## 2021-03-02 DIAGNOSIS — M54.16 LUMBAR RADICULOPATHY: ICD-10-CM

## 2021-03-02 DIAGNOSIS — M47.16 LUMBAR SPONDYLOSIS WITH MYELOPATHY: ICD-10-CM

## 2021-03-02 DIAGNOSIS — R29.898 WEAKNESS OF BOTH LEGS: ICD-10-CM

## 2021-03-02 DIAGNOSIS — M21.371 FOOT DROP, RIGHT: ICD-10-CM

## 2021-03-02 DIAGNOSIS — M43.10 ACQUIRED SPONDYLOLISTHESIS: ICD-10-CM

## 2021-03-02 RX ORDER — OXYCODONE AND ACETAMINOPHEN 10; 325 MG/1; MG/1
1 TABLET ORAL EVERY 6 HOURS PRN
Qty: 90 TABLET | Refills: 0 | Status: SHIPPED | OUTPATIENT
Start: 2021-03-02 | End: 2021-04-12 | Stop reason: SDUPTHER

## 2021-03-02 RX ORDER — OXYCODONE AND ACETAMINOPHEN 10; 325 MG/1; MG/1
1 TABLET ORAL EVERY 6 HOURS PRN
Qty: 90 TABLET | Refills: 0 | OUTPATIENT
Start: 2021-03-02 | End: 2021-04-01

## 2021-03-03 ENCOUNTER — PATIENT MESSAGE (OUTPATIENT)
Dept: PHYSICAL MEDICINE AND REHAB | Facility: CLINIC | Age: 56
End: 2021-03-03

## 2021-04-10 ENCOUNTER — PATIENT MESSAGE (OUTPATIENT)
Dept: PHYSICAL MEDICINE AND REHAB | Facility: CLINIC | Age: 56
End: 2021-04-10

## 2021-04-12 DIAGNOSIS — M54.16 LUMBAR RADICULOPATHY: ICD-10-CM

## 2021-04-12 DIAGNOSIS — M21.371 FOOT DROP, RIGHT: ICD-10-CM

## 2021-04-12 DIAGNOSIS — M41.9 KYPHOSCOLIOSIS: ICD-10-CM

## 2021-04-12 DIAGNOSIS — R26.9 GAIT DISORDER: ICD-10-CM

## 2021-04-12 DIAGNOSIS — M17.11 PRIMARY OSTEOARTHRITIS OF RIGHT KNEE: ICD-10-CM

## 2021-04-12 DIAGNOSIS — G95.9 MYELOPATHY OF LUMBAR REGION: ICD-10-CM

## 2021-04-12 DIAGNOSIS — M43.10 ACQUIRED SPONDYLOLISTHESIS: ICD-10-CM

## 2021-04-12 DIAGNOSIS — M47.16 LUMBAR SPONDYLOSIS WITH MYELOPATHY: ICD-10-CM

## 2021-04-12 DIAGNOSIS — R29.898 WEAKNESS OF BOTH LEGS: ICD-10-CM

## 2021-04-14 ENCOUNTER — PATIENT MESSAGE (OUTPATIENT)
Dept: PHYSICAL MEDICINE AND REHAB | Facility: CLINIC | Age: 56
End: 2021-04-14

## 2021-04-14 RX ORDER — OXYCODONE AND ACETAMINOPHEN 10; 325 MG/1; MG/1
1 TABLET ORAL EVERY 6 HOURS PRN
Qty: 90 TABLET | Refills: 0 | Status: SHIPPED | OUTPATIENT
Start: 2021-04-14 | End: 2021-05-13 | Stop reason: SDUPTHER

## 2021-05-13 ENCOUNTER — PATIENT MESSAGE (OUTPATIENT)
Dept: PHYSICAL MEDICINE AND REHAB | Facility: CLINIC | Age: 56
End: 2021-05-13

## 2021-05-19 ENCOUNTER — PATIENT MESSAGE (OUTPATIENT)
Dept: PHYSICAL MEDICINE AND REHAB | Facility: CLINIC | Age: 56
End: 2021-05-19

## 2021-06-02 ENCOUNTER — PATIENT MESSAGE (OUTPATIENT)
Dept: ADMINISTRATIVE | Facility: HOSPITAL | Age: 56
End: 2021-06-02

## 2021-06-14 DIAGNOSIS — M41.9 KYPHOSCOLIOSIS: ICD-10-CM

## 2021-06-14 DIAGNOSIS — R29.898 WEAKNESS OF BOTH LEGS: ICD-10-CM

## 2021-06-14 DIAGNOSIS — M17.11 PRIMARY OSTEOARTHRITIS OF RIGHT KNEE: ICD-10-CM

## 2021-06-14 DIAGNOSIS — M21.371 FOOT DROP, RIGHT: ICD-10-CM

## 2021-06-14 DIAGNOSIS — M54.16 LUMBAR RADICULOPATHY: ICD-10-CM

## 2021-06-14 DIAGNOSIS — M43.10 ACQUIRED SPONDYLOLISTHESIS: ICD-10-CM

## 2021-06-14 DIAGNOSIS — M47.16 LUMBAR SPONDYLOSIS WITH MYELOPATHY: ICD-10-CM

## 2021-06-14 DIAGNOSIS — R26.9 GAIT DISORDER: ICD-10-CM

## 2021-06-14 DIAGNOSIS — G95.9 MYELOPATHY OF LUMBAR REGION: ICD-10-CM

## 2021-06-19 DIAGNOSIS — Z79.891 OPIOID USE AGREEMENT EXISTS: Primary | ICD-10-CM

## 2021-06-19 RX ORDER — OXYCODONE AND ACETAMINOPHEN 10; 325 MG/1; MG/1
1 TABLET ORAL EVERY 6 HOURS PRN
Qty: 90 TABLET | Refills: 0 | Status: SHIPPED | OUTPATIENT
Start: 2021-06-19 | End: 2021-07-19 | Stop reason: SDUPTHER

## 2021-07-19 ENCOUNTER — PATIENT MESSAGE (OUTPATIENT)
Dept: PHYSICAL MEDICINE AND REHAB | Facility: CLINIC | Age: 56
End: 2021-07-19

## 2021-07-19 DIAGNOSIS — M43.10 ACQUIRED SPONDYLOLISTHESIS: ICD-10-CM

## 2021-07-19 DIAGNOSIS — M41.9 KYPHOSCOLIOSIS: ICD-10-CM

## 2021-07-19 DIAGNOSIS — M21.371 FOOT DROP, RIGHT: ICD-10-CM

## 2021-07-19 DIAGNOSIS — R26.9 GAIT DISORDER: ICD-10-CM

## 2021-07-19 DIAGNOSIS — G95.9 MYELOPATHY OF LUMBAR REGION: ICD-10-CM

## 2021-07-19 DIAGNOSIS — R29.898 WEAKNESS OF BOTH LEGS: ICD-10-CM

## 2021-07-19 DIAGNOSIS — M47.16 LUMBAR SPONDYLOSIS WITH MYELOPATHY: ICD-10-CM

## 2021-07-19 DIAGNOSIS — M54.16 LUMBAR RADICULOPATHY: ICD-10-CM

## 2021-07-19 DIAGNOSIS — M17.11 PRIMARY OSTEOARTHRITIS OF RIGHT KNEE: ICD-10-CM

## 2021-07-20 RX ORDER — OXYCODONE AND ACETAMINOPHEN 10; 325 MG/1; MG/1
1 TABLET ORAL EVERY 6 HOURS PRN
Qty: 90 TABLET | Refills: 0 | Status: SHIPPED | OUTPATIENT
Start: 2021-07-20 | End: 2021-08-16 | Stop reason: SDUPTHER

## 2021-07-21 ENCOUNTER — PATIENT MESSAGE (OUTPATIENT)
Dept: PHYSICAL MEDICINE AND REHAB | Facility: CLINIC | Age: 56
End: 2021-07-21

## 2021-07-26 ENCOUNTER — LAB VISIT (OUTPATIENT)
Dept: LAB | Facility: HOSPITAL | Age: 56
End: 2021-07-26
Attending: PHYSICAL MEDICINE & REHABILITATION
Payer: COMMERCIAL

## 2021-07-26 DIAGNOSIS — Z79.891 OPIOID USE AGREEMENT EXISTS: ICD-10-CM

## 2021-07-26 PROCEDURE — 80307 DRUG TEST PRSMV CHEM ANLYZR: CPT | Performed by: PHYSICAL MEDICINE & REHABILITATION

## 2021-07-30 LAB
6MAM UR QL: NOT DETECTED
7AMINOCLONAZEPAM UR QL: NOT DETECTED
A-OH ALPRAZ UR QL: NOT DETECTED
ALPHA-OH-MIDAZOLAM: NOT DETECTED
ALPRAZ UR QL: NOT DETECTED
AMPHET UR QL SCN: NOT DETECTED
ANNOTATION COMMENT IMP: NORMAL
ANNOTATION COMMENT IMP: NORMAL
BARBITURATES UR QL: NOT DETECTED
BUPRENORPHINE UR QL: NOT DETECTED
BZE UR QL: NOT DETECTED
CARBOXYTHC UR QL: NOT DETECTED
CARISOPRODOL UR QL: NOT DETECTED
CLONAZEPAM UR QL: NOT DETECTED
CODEINE UR QL: NOT DETECTED
CREAT UR-MCNC: 31.6 MG/DL (ref 20–400)
DIAZEPAM UR QL: NOT DETECTED
ETHYL GLUCURONIDE UR QL: NOT DETECTED
FENTANYL UR QL: NOT DETECTED
GABAPENTIN: NOT DETECTED
HYDROCODONE UR QL: NOT DETECTED
HYDROMORPHONE UR QL: NOT DETECTED
LORAZEPAM UR QL: NOT DETECTED
MDA UR QL: NOT DETECTED
MDEA UR QL: NOT DETECTED
MDMA UR QL: NOT DETECTED
ME-PHENIDATE UR QL: NOT DETECTED
METHADONE UR QL: NOT DETECTED
METHAMPHET UR QL: NOT DETECTED
MIDAZOLAM UR QL SCN: NOT DETECTED
MORPHINE UR QL: NOT DETECTED
NALOXONE: NOT DETECTED
NORBUPRENORPHINE UR QL CFM: NOT DETECTED
NORDIAZEPAM UR QL: NOT DETECTED
NORFENTANYL UR QL: NOT DETECTED
NORHYDROCODONE UR QL CFM: NOT DETECTED
NORMEPERIDINE UR QL CFM: NOT DETECTED
NOROXYCODONE UR QL CFM: PRESENT
NOROXYMORPHONE UR QL SCN: PRESENT
OXAZEPAM UR QL: NOT DETECTED
OXYCODONE UR QL: PRESENT
OXYMORPHONE UR QL: PRESENT
PATHOLOGY STUDY: NORMAL
PCP UR QL: NOT DETECTED
PHENTERMINE UR QL: NOT DETECTED
PREGABALIN: NOT DETECTED
SERVICE CMNT-IMP: NORMAL
TAPENTADOL UR QL SCN: NOT DETECTED
TAPENTADOL-O-SULF: NOT DETECTED
TEMAZEPAM UR QL: NOT DETECTED
TRAMADOL UR QL: NOT DETECTED
ZOLPIDEM METABOLITE: NOT DETECTED
ZOLPIDEM UR QL: NOT DETECTED

## 2021-08-16 ENCOUNTER — PATIENT MESSAGE (OUTPATIENT)
Dept: PHYSICAL MEDICINE AND REHAB | Facility: CLINIC | Age: 56
End: 2021-08-16

## 2021-08-16 DIAGNOSIS — M43.10 ACQUIRED SPONDYLOLISTHESIS: ICD-10-CM

## 2021-08-16 DIAGNOSIS — M41.9 KYPHOSCOLIOSIS: ICD-10-CM

## 2021-08-16 DIAGNOSIS — G95.9 MYELOPATHY OF LUMBAR REGION: ICD-10-CM

## 2021-08-16 DIAGNOSIS — R29.898 WEAKNESS OF BOTH LEGS: ICD-10-CM

## 2021-08-16 DIAGNOSIS — M21.371 FOOT DROP, RIGHT: ICD-10-CM

## 2021-08-16 DIAGNOSIS — M54.16 LUMBAR RADICULOPATHY: ICD-10-CM

## 2021-08-16 DIAGNOSIS — M47.16 LUMBAR SPONDYLOSIS WITH MYELOPATHY: ICD-10-CM

## 2021-08-16 DIAGNOSIS — M17.11 PRIMARY OSTEOARTHRITIS OF RIGHT KNEE: ICD-10-CM

## 2021-08-16 DIAGNOSIS — R26.9 GAIT DISORDER: ICD-10-CM

## 2021-08-19 ENCOUNTER — PATIENT MESSAGE (OUTPATIENT)
Dept: ORTHOPEDICS | Facility: CLINIC | Age: 56
End: 2021-08-19

## 2021-08-19 RX ORDER — OXYCODONE AND ACETAMINOPHEN 10; 325 MG/1; MG/1
1 TABLET ORAL EVERY 6 HOURS PRN
Qty: 90 TABLET | Refills: 0 | Status: SHIPPED | OUTPATIENT
Start: 2021-08-19 | End: 2021-09-17 | Stop reason: SDUPTHER

## 2021-09-17 ENCOUNTER — PATIENT MESSAGE (OUTPATIENT)
Dept: PHYSICAL MEDICINE AND REHAB | Facility: CLINIC | Age: 56
End: 2021-09-17

## 2021-09-17 DIAGNOSIS — R29.898 WEAKNESS OF BOTH LEGS: ICD-10-CM

## 2021-09-17 DIAGNOSIS — M17.11 PRIMARY OSTEOARTHRITIS OF RIGHT KNEE: ICD-10-CM

## 2021-09-17 DIAGNOSIS — M43.10 ACQUIRED SPONDYLOLISTHESIS: ICD-10-CM

## 2021-09-17 DIAGNOSIS — M47.16 LUMBAR SPONDYLOSIS WITH MYELOPATHY: ICD-10-CM

## 2021-09-17 DIAGNOSIS — R26.9 GAIT DISORDER: ICD-10-CM

## 2021-09-17 DIAGNOSIS — M41.9 KYPHOSCOLIOSIS: ICD-10-CM

## 2021-09-17 DIAGNOSIS — G95.9 MYELOPATHY OF LUMBAR REGION: ICD-10-CM

## 2021-09-17 DIAGNOSIS — M54.16 LUMBAR RADICULOPATHY: ICD-10-CM

## 2021-09-17 DIAGNOSIS — M21.371 FOOT DROP, RIGHT: ICD-10-CM

## 2021-09-17 RX ORDER — OXYCODONE AND ACETAMINOPHEN 10; 325 MG/1; MG/1
1 TABLET ORAL EVERY 6 HOURS PRN
Qty: 90 TABLET | Refills: 0 | OUTPATIENT
Start: 2021-09-17 | End: 2021-10-17

## 2021-10-15 ENCOUNTER — PATIENT MESSAGE (OUTPATIENT)
Dept: PHYSICAL MEDICINE AND REHAB | Facility: CLINIC | Age: 56
End: 2021-10-15
Payer: COMMERCIAL

## 2021-10-15 DIAGNOSIS — M21.371 FOOT DROP, RIGHT: ICD-10-CM

## 2021-10-15 DIAGNOSIS — M41.9 KYPHOSCOLIOSIS: ICD-10-CM

## 2021-10-15 DIAGNOSIS — M54.16 LUMBAR RADICULOPATHY: ICD-10-CM

## 2021-10-15 DIAGNOSIS — R29.898 WEAKNESS OF BOTH LEGS: ICD-10-CM

## 2021-10-15 DIAGNOSIS — M47.16 LUMBAR SPONDYLOSIS WITH MYELOPATHY: ICD-10-CM

## 2021-10-15 DIAGNOSIS — G95.9 MYELOPATHY OF LUMBAR REGION: ICD-10-CM

## 2021-10-15 DIAGNOSIS — R26.9 GAIT DISORDER: ICD-10-CM

## 2021-10-15 DIAGNOSIS — M43.10 ACQUIRED SPONDYLOLISTHESIS: ICD-10-CM

## 2021-10-15 DIAGNOSIS — M17.11 PRIMARY OSTEOARTHRITIS OF RIGHT KNEE: ICD-10-CM

## 2021-10-16 RX ORDER — OXYCODONE AND ACETAMINOPHEN 10; 325 MG/1; MG/1
1 TABLET ORAL EVERY 6 HOURS PRN
Qty: 90 TABLET | Refills: 0 | OUTPATIENT
Start: 2021-10-16 | End: 2021-11-15

## 2021-11-03 ENCOUNTER — LAB VISIT (OUTPATIENT)
Dept: LAB | Facility: CLINIC | Age: 56
End: 2021-11-03
Payer: COMMERCIAL

## 2021-11-03 DIAGNOSIS — R82.90 CLOUDY URINE: ICD-10-CM

## 2021-11-03 DIAGNOSIS — R82.90 CLOUDY URINE: Primary | ICD-10-CM

## 2021-11-03 LAB
AMORPH CRY URNS QL MICRO: ABNORMAL
BACTERIA #/AREA URNS HPF: ABNORMAL /HPF
BILIRUB UR QL STRIP: NEGATIVE
CLARITY UR: ABNORMAL
COLOR UR: YELLOW
GLUCOSE UR QL STRIP: NEGATIVE
HGB UR QL STRIP: NEGATIVE
KETONES UR QL STRIP: NEGATIVE
LEUKOCYTE ESTERASE UR QL STRIP: ABNORMAL
MICROSCOPIC COMMENT: ABNORMAL
NITRITE UR QL STRIP: NEGATIVE
PH UR STRIP: 6 [PH] (ref 5–8)
PROT UR QL STRIP: NEGATIVE
RBC #/AREA URNS HPF: 2 /HPF (ref 0–4)
SP GR UR STRIP: 1.01 (ref 1–1.03)
SQUAMOUS #/AREA URNS HPF: 3 /HPF
URN SPEC COLLECT METH UR: ABNORMAL
UROBILINOGEN UR STRIP-ACNC: NEGATIVE EU/DL
WBC #/AREA URNS HPF: 15 /HPF (ref 0–5)

## 2021-11-03 PROCEDURE — 87086 URINE CULTURE/COLONY COUNT: CPT | Performed by: FAMILY MEDICINE

## 2021-11-03 PROCEDURE — 87088 URINE BACTERIA CULTURE: CPT | Performed by: FAMILY MEDICINE

## 2021-11-03 PROCEDURE — 81000 URINALYSIS NONAUTO W/SCOPE: CPT | Performed by: FAMILY MEDICINE

## 2021-11-04 ENCOUNTER — TELEPHONE (OUTPATIENT)
Dept: FAMILY MEDICINE | Facility: CLINIC | Age: 56
End: 2021-11-04
Payer: COMMERCIAL

## 2021-11-05 LAB — BACTERIA UR CULT: ABNORMAL

## 2021-11-15 ENCOUNTER — PATIENT MESSAGE (OUTPATIENT)
Dept: PHYSICAL MEDICINE AND REHAB | Facility: CLINIC | Age: 56
End: 2021-11-15
Payer: COMMERCIAL

## 2021-11-15 DIAGNOSIS — M54.16 LUMBAR RADICULOPATHY: ICD-10-CM

## 2021-11-15 DIAGNOSIS — M43.10 ACQUIRED SPONDYLOLISTHESIS: ICD-10-CM

## 2021-11-15 DIAGNOSIS — G95.9 MYELOPATHY OF LUMBAR REGION: ICD-10-CM

## 2021-11-15 DIAGNOSIS — M41.9 KYPHOSCOLIOSIS: ICD-10-CM

## 2021-11-15 DIAGNOSIS — R26.9 GAIT DISORDER: ICD-10-CM

## 2021-11-15 DIAGNOSIS — M17.11 PRIMARY OSTEOARTHRITIS OF RIGHT KNEE: ICD-10-CM

## 2021-11-15 DIAGNOSIS — M21.371 FOOT DROP, RIGHT: ICD-10-CM

## 2021-11-15 DIAGNOSIS — R29.898 WEAKNESS OF BOTH LEGS: ICD-10-CM

## 2021-11-15 DIAGNOSIS — M47.16 LUMBAR SPONDYLOSIS WITH MYELOPATHY: ICD-10-CM

## 2021-11-17 ENCOUNTER — OFFICE VISIT (OUTPATIENT)
Dept: FAMILY MEDICINE | Facility: CLINIC | Age: 56
End: 2021-11-17
Payer: COMMERCIAL

## 2021-11-17 VITALS
BODY MASS INDEX: 25.33 KG/M2 | HEIGHT: 65 IN | WEIGHT: 152 LBS | HEART RATE: 69 BPM | OXYGEN SATURATION: 97 % | DIASTOLIC BLOOD PRESSURE: 78 MMHG | RESPIRATION RATE: 18 BRPM | SYSTOLIC BLOOD PRESSURE: 152 MMHG

## 2021-11-17 DIAGNOSIS — I10 ESSENTIAL HYPERTENSION: Primary | ICD-10-CM

## 2021-11-17 DIAGNOSIS — Z12.12 SCREENING FOR RECTAL CANCER: ICD-10-CM

## 2021-11-17 DIAGNOSIS — Z12.31 SCREENING MAMMOGRAM FOR BREAST CANCER: ICD-10-CM

## 2021-11-17 DIAGNOSIS — Z12.11 SCREENING FOR COLON CANCER: ICD-10-CM

## 2021-11-17 DIAGNOSIS — N30.00 ACUTE CYSTITIS WITHOUT HEMATURIA: ICD-10-CM

## 2021-11-17 PROCEDURE — 1160F PR REVIEW ALL MEDS BY PRESCRIBER/CLIN PHARMACIST DOCUMENTED: ICD-10-PCS | Mod: S$GLB,,, | Performed by: FAMILY MEDICINE

## 2021-11-17 PROCEDURE — 3077F PR MOST RECENT SYSTOLIC BLOOD PRESSURE >= 140 MM HG: ICD-10-PCS | Mod: S$GLB,,, | Performed by: FAMILY MEDICINE

## 2021-11-17 PROCEDURE — 3077F SYST BP >= 140 MM HG: CPT | Mod: S$GLB,,, | Performed by: FAMILY MEDICINE

## 2021-11-17 PROCEDURE — 1160F RVW MEDS BY RX/DR IN RCRD: CPT | Mod: S$GLB,,, | Performed by: FAMILY MEDICINE

## 2021-11-17 PROCEDURE — 99214 OFFICE O/P EST MOD 30 MIN: CPT | Mod: S$GLB,,, | Performed by: FAMILY MEDICINE

## 2021-11-17 PROCEDURE — 1159F PR MEDICATION LIST DOCUMENTED IN MEDICAL RECORD: ICD-10-PCS | Mod: S$GLB,,, | Performed by: FAMILY MEDICINE

## 2021-11-17 PROCEDURE — 3078F PR MOST RECENT DIASTOLIC BLOOD PRESSURE < 80 MM HG: ICD-10-PCS | Mod: S$GLB,,, | Performed by: FAMILY MEDICINE

## 2021-11-17 PROCEDURE — 99214 PR OFFICE/OUTPT VISIT, EST, LEVL IV, 30-39 MIN: ICD-10-PCS | Mod: S$GLB,,, | Performed by: FAMILY MEDICINE

## 2021-11-17 PROCEDURE — 3078F DIAST BP <80 MM HG: CPT | Mod: S$GLB,,, | Performed by: FAMILY MEDICINE

## 2021-11-17 PROCEDURE — 1159F MED LIST DOCD IN RCRD: CPT | Mod: S$GLB,,, | Performed by: FAMILY MEDICINE

## 2021-11-17 PROCEDURE — 3008F PR BODY MASS INDEX (BMI) DOCUMENTED: ICD-10-PCS | Mod: S$GLB,,, | Performed by: FAMILY MEDICINE

## 2021-11-17 PROCEDURE — 3008F BODY MASS INDEX DOCD: CPT | Mod: S$GLB,,, | Performed by: FAMILY MEDICINE

## 2021-11-17 RX ORDER — LEVOFLOXACIN 250 MG/1
250 TABLET ORAL DAILY
Qty: 3 TABLET | Refills: 0 | Status: SHIPPED | OUTPATIENT
Start: 2021-11-17 | End: 2021-11-20

## 2021-11-17 RX ORDER — PHENAZOPYRIDINE HYDROCHLORIDE 100 MG/1
100 TABLET, FILM COATED ORAL 3 TIMES DAILY PRN
Qty: 30 TABLET | Refills: 0 | Status: SHIPPED | OUTPATIENT
Start: 2021-11-17 | End: 2021-11-27

## 2021-11-17 RX ORDER — CIPROFLOXACIN 250 MG/1
250 TABLET, FILM COATED ORAL EVERY 12 HOURS
Qty: 10 TABLET | Refills: 0 | Status: SHIPPED | OUTPATIENT
Start: 2021-11-17 | End: 2021-11-17

## 2021-11-19 RX ORDER — OXYCODONE AND ACETAMINOPHEN 10; 325 MG/1; MG/1
1 TABLET ORAL EVERY 6 HOURS PRN
Qty: 90 TABLET | Refills: 0 | Status: SHIPPED | OUTPATIENT
Start: 2021-11-19 | End: 2021-12-15 | Stop reason: SDUPTHER

## 2021-12-01 ENCOUNTER — CLINICAL SUPPORT (OUTPATIENT)
Dept: FAMILY MEDICINE | Facility: CLINIC | Age: 56
End: 2021-12-01
Payer: COMMERCIAL

## 2021-12-01 ENCOUNTER — TELEPHONE (OUTPATIENT)
Dept: FAMILY MEDICINE | Facility: CLINIC | Age: 56
End: 2021-12-01

## 2021-12-01 VITALS — SYSTOLIC BLOOD PRESSURE: 148 MMHG | DIASTOLIC BLOOD PRESSURE: 80 MMHG

## 2021-12-01 DIAGNOSIS — Z01.30 BP CHECK: Primary | ICD-10-CM

## 2021-12-01 DIAGNOSIS — I10 ESSENTIAL HYPERTENSION: Primary | ICD-10-CM

## 2021-12-02 ENCOUNTER — TELEPHONE (OUTPATIENT)
Dept: FAMILY MEDICINE | Facility: CLINIC | Age: 56
End: 2021-12-02
Payer: COMMERCIAL

## 2021-12-03 RX ORDER — LISINOPRIL 20 MG/1
20 TABLET ORAL DAILY
Qty: 90 TABLET | Refills: 3 | Status: SHIPPED | OUTPATIENT
Start: 2021-12-03 | End: 2022-11-28

## 2021-12-15 ENCOUNTER — PATIENT MESSAGE (OUTPATIENT)
Dept: PHYSICAL MEDICINE AND REHAB | Facility: CLINIC | Age: 56
End: 2021-12-15
Payer: COMMERCIAL

## 2021-12-15 DIAGNOSIS — M43.10 ACQUIRED SPONDYLOLISTHESIS: ICD-10-CM

## 2021-12-15 DIAGNOSIS — M17.11 PRIMARY OSTEOARTHRITIS OF RIGHT KNEE: ICD-10-CM

## 2021-12-15 DIAGNOSIS — R26.9 GAIT DISORDER: ICD-10-CM

## 2021-12-15 DIAGNOSIS — R29.898 WEAKNESS OF BOTH LEGS: ICD-10-CM

## 2021-12-15 DIAGNOSIS — M54.16 LUMBAR RADICULOPATHY: ICD-10-CM

## 2021-12-15 DIAGNOSIS — M41.9 KYPHOSCOLIOSIS: ICD-10-CM

## 2021-12-15 DIAGNOSIS — M21.371 FOOT DROP, RIGHT: ICD-10-CM

## 2021-12-15 DIAGNOSIS — M47.16 LUMBAR SPONDYLOSIS WITH MYELOPATHY: ICD-10-CM

## 2021-12-15 DIAGNOSIS — G95.9 MYELOPATHY OF LUMBAR REGION: ICD-10-CM

## 2021-12-18 RX ORDER — OXYCODONE AND ACETAMINOPHEN 10; 325 MG/1; MG/1
1 TABLET ORAL EVERY 6 HOURS PRN
Qty: 90 TABLET | Refills: 0 | Status: SHIPPED | OUTPATIENT
Start: 2021-12-19 | End: 2022-01-16 | Stop reason: SDUPTHER

## 2022-01-11 ENCOUNTER — CLINICAL SUPPORT (OUTPATIENT)
Dept: FAMILY MEDICINE | Facility: CLINIC | Age: 57
End: 2022-01-11
Payer: COMMERCIAL

## 2022-01-11 ENCOUNTER — PATIENT MESSAGE (OUTPATIENT)
Dept: FAMILY MEDICINE | Facility: CLINIC | Age: 57
End: 2022-01-11

## 2022-01-11 ENCOUNTER — TELEPHONE (OUTPATIENT)
Dept: FAMILY MEDICINE | Facility: CLINIC | Age: 57
End: 2022-01-11
Payer: COMMERCIAL

## 2022-01-11 DIAGNOSIS — Z11.52 ENCOUNTER FOR SCREENING FOR SEVERE ACUTE RESPIRATORY SYNDROME CORONAVIRUS 2 (SARS-COV-2) INFECTION: Primary | ICD-10-CM

## 2022-01-11 LAB
CTP QC/QA: YES
SARS-COV-2 RDRP RESP QL NAA+PROBE: POSITIVE

## 2022-01-11 PROCEDURE — U0002 COVID-19 LAB TEST NON-CDC: HCPCS | Mod: QW,S$GLB,, | Performed by: INTERNAL MEDICINE

## 2022-01-11 PROCEDURE — U0002: ICD-10-PCS | Mod: QW,S$GLB,, | Performed by: INTERNAL MEDICINE

## 2022-01-11 NOTE — TELEPHONE ENCOUNTER
Covid positive  Covid risk score: 1  She does not qualify for monoclonal antibody infusion through [facility].  She should use over the counter medication for cold/flu like symptoms. Tylenol or nsaids for fever/body aches.  Hydrate and quarantine for 5 days and fever free for over 48 hrs without medications to reduce fever.  Monitor symptoms for worsening and proceed to ED for worsening symptoms/respiratory issues.

## 2022-01-16 ENCOUNTER — PATIENT MESSAGE (OUTPATIENT)
Dept: PHYSICAL MEDICINE AND REHAB | Facility: CLINIC | Age: 57
End: 2022-01-16
Payer: COMMERCIAL

## 2022-01-18 ENCOUNTER — TELEPHONE (OUTPATIENT)
Dept: FAMILY MEDICINE | Facility: CLINIC | Age: 57
End: 2022-01-18
Payer: COMMERCIAL

## 2022-01-18 DIAGNOSIS — U07.1 COVID-19: Primary | ICD-10-CM

## 2022-01-18 NOTE — TELEPHONE ENCOUNTER
Needs orders placed for rapid covid testing to be able to return back to work. Do you need to see her or can you place the order without her being seen? Tested positive on 1/11/22. Denies any fever, cough. Still has sinus congestion, fatigue

## 2022-01-18 NOTE — TELEPHONE ENCOUNTER
----- Message from Maulik Crowley sent at 1/18/2022  9:55 AM CST -----  Contact: pt  Pt and  is needing to both get tested to go back to work please call back at [phone]  Thanks

## 2022-02-23 ENCOUNTER — PATIENT MESSAGE (OUTPATIENT)
Dept: PHYSICAL MEDICINE AND REHAB | Facility: CLINIC | Age: 57
End: 2022-02-23
Payer: COMMERCIAL

## 2022-02-24 ENCOUNTER — HOSPITAL ENCOUNTER (OUTPATIENT)
Dept: RADIOLOGY | Facility: HOSPITAL | Age: 57
Discharge: HOME | End: 2022-02-24
Attending: FAMILY MEDICINE
Payer: COMMERCIAL

## 2022-02-24 DIAGNOSIS — R92.8 ABNORMAL MAMMOGRAM OF LEFT BREAST: ICD-10-CM

## 2022-02-24 PROCEDURE — 77066 DX MAMMO INCL CAD BI: CPT | Mod: 26,,, | Performed by: RADIOLOGY

## 2022-02-24 PROCEDURE — 77066 MAMMO DIGITAL DIAGNOSTIC BILAT WITH TOMO: ICD-10-PCS | Mod: 26,,, | Performed by: RADIOLOGY

## 2022-02-24 PROCEDURE — 77062 MAMMO DIGITAL DIAGNOSTIC BILAT WITH TOMO: ICD-10-PCS | Mod: 26,,, | Performed by: RADIOLOGY

## 2022-02-24 PROCEDURE — 77062 BREAST TOMOSYNTHESIS BI: CPT | Mod: 26,,, | Performed by: RADIOLOGY

## 2022-02-24 PROCEDURE — 77066 DX MAMMO INCL CAD BI: CPT | Mod: TC

## 2022-02-27 ENCOUNTER — PATIENT MESSAGE (OUTPATIENT)
Dept: PHYSICAL MEDICINE AND REHAB | Facility: CLINIC | Age: 57
End: 2022-02-27
Payer: COMMERCIAL

## 2022-02-28 DIAGNOSIS — R26.9 GAIT DISORDER: ICD-10-CM

## 2022-02-28 DIAGNOSIS — M21.371 FOOT DROP, RIGHT: ICD-10-CM

## 2022-02-28 DIAGNOSIS — M54.16 LUMBAR RADICULOPATHY: ICD-10-CM

## 2022-02-28 DIAGNOSIS — R29.898 WEAKNESS OF BOTH LEGS: ICD-10-CM

## 2022-02-28 DIAGNOSIS — M17.11 PRIMARY OSTEOARTHRITIS OF RIGHT KNEE: ICD-10-CM

## 2022-02-28 DIAGNOSIS — M43.10 ACQUIRED SPONDYLOLISTHESIS: ICD-10-CM

## 2022-02-28 DIAGNOSIS — G95.9 MYELOPATHY OF LUMBAR REGION: ICD-10-CM

## 2022-02-28 DIAGNOSIS — M41.9 KYPHOSCOLIOSIS: ICD-10-CM

## 2022-02-28 DIAGNOSIS — M47.16 LUMBAR SPONDYLOSIS WITH MYELOPATHY: ICD-10-CM

## 2022-02-28 RX ORDER — OXYCODONE AND ACETAMINOPHEN 10; 325 MG/1; MG/1
1 TABLET ORAL EVERY 6 HOURS PRN
Qty: 90 TABLET | Refills: 0 | Status: SHIPPED | OUTPATIENT
Start: 2022-02-28 | End: 2022-03-25 | Stop reason: SDUPTHER

## 2022-03-25 ENCOUNTER — PATIENT MESSAGE (OUTPATIENT)
Dept: PHYSICAL MEDICINE AND REHAB | Facility: CLINIC | Age: 57
End: 2022-03-25
Payer: COMMERCIAL

## 2022-03-29 ENCOUNTER — PATIENT MESSAGE (OUTPATIENT)
Dept: PHYSICAL MEDICINE AND REHAB | Facility: CLINIC | Age: 57
End: 2022-03-29
Payer: COMMERCIAL

## 2022-03-29 DIAGNOSIS — M21.371 FOOT DROP, RIGHT: ICD-10-CM

## 2022-03-29 DIAGNOSIS — M17.11 PRIMARY OSTEOARTHRITIS OF RIGHT KNEE: ICD-10-CM

## 2022-03-29 DIAGNOSIS — M41.9 KYPHOSCOLIOSIS: ICD-10-CM

## 2022-03-29 DIAGNOSIS — G95.9 MYELOPATHY OF LUMBAR REGION: ICD-10-CM

## 2022-03-29 DIAGNOSIS — M47.16 LUMBAR SPONDYLOSIS WITH MYELOPATHY: ICD-10-CM

## 2022-03-29 DIAGNOSIS — R29.898 WEAKNESS OF BOTH LEGS: ICD-10-CM

## 2022-03-29 DIAGNOSIS — R26.9 GAIT DISORDER: ICD-10-CM

## 2022-03-29 DIAGNOSIS — M43.10 ACQUIRED SPONDYLOLISTHESIS: ICD-10-CM

## 2022-03-29 DIAGNOSIS — M54.16 LUMBAR RADICULOPATHY: ICD-10-CM

## 2022-03-31 ENCOUNTER — PATIENT MESSAGE (OUTPATIENT)
Dept: PHYSICAL MEDICINE AND REHAB | Facility: CLINIC | Age: 57
End: 2022-03-31
Payer: COMMERCIAL

## 2022-04-01 ENCOUNTER — PATIENT MESSAGE (OUTPATIENT)
Dept: PHYSICAL MEDICINE AND REHAB | Facility: CLINIC | Age: 57
End: 2022-04-01
Payer: COMMERCIAL

## 2022-04-06 RX ORDER — OXYCODONE AND ACETAMINOPHEN 10; 325 MG/1; MG/1
1 TABLET ORAL EVERY 6 HOURS PRN
Qty: 90 TABLET | Refills: 0 | OUTPATIENT
Start: 2022-04-06 | End: 2022-05-06

## 2022-04-27 ENCOUNTER — PATIENT MESSAGE (OUTPATIENT)
Dept: PHYSICAL MEDICINE AND REHAB | Facility: CLINIC | Age: 57
End: 2022-04-27
Payer: COMMERCIAL

## 2022-05-27 ENCOUNTER — PATIENT MESSAGE (OUTPATIENT)
Dept: PHYSICAL MEDICINE AND REHAB | Facility: CLINIC | Age: 57
End: 2022-05-27
Payer: COMMERCIAL

## 2022-05-31 ENCOUNTER — PATIENT MESSAGE (OUTPATIENT)
Dept: ADMINISTRATIVE | Facility: HOSPITAL | Age: 57
End: 2022-05-31
Payer: COMMERCIAL

## 2022-05-31 ENCOUNTER — TELEPHONE (OUTPATIENT)
Dept: FAMILY MEDICINE | Facility: CLINIC | Age: 57
End: 2022-05-31
Payer: COMMERCIAL

## 2022-06-03 ENCOUNTER — OFFICE VISIT (OUTPATIENT)
Dept: FAMILY MEDICINE | Facility: CLINIC | Age: 57
End: 2022-06-03
Payer: COMMERCIAL

## 2022-06-03 ENCOUNTER — TELEPHONE (OUTPATIENT)
Dept: GASTROENTEROLOGY | Facility: CLINIC | Age: 57
End: 2022-06-03
Payer: COMMERCIAL

## 2022-06-03 VITALS
TEMPERATURE: 99 F | WEIGHT: 165.81 LBS | HEART RATE: 67 BPM | OXYGEN SATURATION: 96 % | HEIGHT: 65 IN | DIASTOLIC BLOOD PRESSURE: 76 MMHG | SYSTOLIC BLOOD PRESSURE: 134 MMHG | BODY MASS INDEX: 27.63 KG/M2

## 2022-06-03 DIAGNOSIS — Z13.220 SCREENING FOR HYPERLIPIDEMIA: ICD-10-CM

## 2022-06-03 DIAGNOSIS — G56.01 CARPAL TUNNEL SYNDROME OF RIGHT WRIST: Primary | ICD-10-CM

## 2022-06-03 DIAGNOSIS — R13.19 ESOPHAGEAL DYSPHAGIA: ICD-10-CM

## 2022-06-03 DIAGNOSIS — Z13.6 SCREENING FOR CARDIOVASCULAR CONDITION: ICD-10-CM

## 2022-06-03 DIAGNOSIS — G95.9 MYELOPATHY: ICD-10-CM

## 2022-06-03 DIAGNOSIS — I10 ESSENTIAL HYPERTENSION: ICD-10-CM

## 2022-06-03 PROCEDURE — 3008F PR BODY MASS INDEX (BMI) DOCUMENTED: ICD-10-PCS | Mod: S$GLB,,, | Performed by: FAMILY MEDICINE

## 2022-06-03 PROCEDURE — 3008F BODY MASS INDEX DOCD: CPT | Mod: S$GLB,,, | Performed by: FAMILY MEDICINE

## 2022-06-03 PROCEDURE — 1160F PR REVIEW ALL MEDS BY PRESCRIBER/CLIN PHARMACIST DOCUMENTED: ICD-10-PCS | Mod: S$GLB,,, | Performed by: FAMILY MEDICINE

## 2022-06-03 PROCEDURE — 3078F PR MOST RECENT DIASTOLIC BLOOD PRESSURE < 80 MM HG: ICD-10-PCS | Mod: S$GLB,,, | Performed by: FAMILY MEDICINE

## 2022-06-03 PROCEDURE — 1160F RVW MEDS BY RX/DR IN RCRD: CPT | Mod: S$GLB,,, | Performed by: FAMILY MEDICINE

## 2022-06-03 PROCEDURE — 4010F ACE/ARB THERAPY RXD/TAKEN: CPT | Mod: S$GLB,,, | Performed by: FAMILY MEDICINE

## 2022-06-03 PROCEDURE — 1159F MED LIST DOCD IN RCRD: CPT | Mod: S$GLB,,, | Performed by: FAMILY MEDICINE

## 2022-06-03 PROCEDURE — 99214 PR OFFICE/OUTPT VISIT, EST, LEVL IV, 30-39 MIN: ICD-10-PCS | Mod: S$GLB,,, | Performed by: FAMILY MEDICINE

## 2022-06-03 PROCEDURE — 3078F DIAST BP <80 MM HG: CPT | Mod: S$GLB,,, | Performed by: FAMILY MEDICINE

## 2022-06-03 PROCEDURE — 3075F PR MOST RECENT SYSTOLIC BLOOD PRESS GE 130-139MM HG: ICD-10-PCS | Mod: S$GLB,,, | Performed by: FAMILY MEDICINE

## 2022-06-03 PROCEDURE — 1159F PR MEDICATION LIST DOCUMENTED IN MEDICAL RECORD: ICD-10-PCS | Mod: S$GLB,,, | Performed by: FAMILY MEDICINE

## 2022-06-03 PROCEDURE — 99999 PR PBB SHADOW E&M-EST. PATIENT-LVL IV: CPT | Mod: PBBFAC,,, | Performed by: FAMILY MEDICINE

## 2022-06-03 PROCEDURE — 99999 PR PBB SHADOW E&M-EST. PATIENT-LVL IV: ICD-10-PCS | Mod: PBBFAC,,, | Performed by: FAMILY MEDICINE

## 2022-06-03 PROCEDURE — 4010F PR ACE/ARB THEARPY RXD/TAKEN: ICD-10-PCS | Mod: S$GLB,,, | Performed by: FAMILY MEDICINE

## 2022-06-03 PROCEDURE — 99214 OFFICE O/P EST MOD 30 MIN: CPT | Mod: S$GLB,,, | Performed by: FAMILY MEDICINE

## 2022-06-03 PROCEDURE — 3075F SYST BP GE 130 - 139MM HG: CPT | Mod: S$GLB,,, | Performed by: FAMILY MEDICINE

## 2022-06-03 NOTE — PROGRESS NOTES
Subjective:       Patient ID: Genia Mcnulty is a 56 y.o. female.    Chief Complaint: Follow-up (BP readings have not been consistent as stated by patient; however Lisinopril has been taken accordingly and there are no active side effects or concerns noted. ) and PES - Care Gap (Patient is overdue for Colonoscopy Screening and Cervical Cancer Screening. )    Patient Active Problem List:     DJD (degenerative joint disease) of knee     Weakness of right leg     Weakness of both legs     Myelopathy of lumbar region     Bursitis of right hip     Gait disorder     Spondylosis without myelopathy     Degeneration of lumbar or lumbosacral intervertebral disc     Acquired spondylolisthesis     Lumbago     Kyphoscoliosis     Ependymoma of spinal cord     Closed pseudoarthrosis of lumbar spine     Slow transit constipation     Gross hematuria     Lumbar radiculopathy     Sprain and strain     Essential hypertension     Pseudoarthrosis of lumbar spine    Current Outpatient Medications:  b complex vitamins tablet, Take 1 tablet by mouth once daily.  celecoxib (CELEBREX) 200 MG capsule, TAKE 1 CAPSULE BY MOUTH TWICE A DAY  oxyCODONE-acetaminophen (PERCOCET)  mg per tablet, Take 1 tablet by mouth every 6 (six) hours as needed for Pain. Greater than 7 day quantity Medically Necessary    Essential hypertension: Blood pressures have been well controlled  BP Readings from Last 3 Encounters:  06/03/22 : 134/76  12/01/21 : (!) 148/80  11/17/21 : (!) 152/78  Hypertension Medications     lisinopriL (PRINIVIL,ZESTRIL) 20 MG tablet Take 1 tablet (20 mg total) by mouth once daily.   Patient denies headache, chest pain, palpitations, shortness of breath.     Years ago she was diagnosed with carpal tunnel. She has just been wearing a brace on her tight wrist. She is now having a lot of discomfort in the right arm with numbness and heaviness up the right arm.     She has also been having some pains in the center of her chest. It will  be a sharp pain. After a few minutes it will go away. Sometimes will improve with a position change. She has had the sensation that her food has stopped there in the past.     Interested in looking at hypertension management.     Review of Systems   Constitutional: Negative for activity change, appetite change, fatigue and fever.   Respiratory: Negative for shortness of breath.    Cardiovascular: Positive for chest pain (precordial, sharp, change with position).   Gastrointestinal: Negative for abdominal pain.        Sensation of food getting stuck   Musculoskeletal:        Right hand pain and numbness   Integumentary:  Negative for rash.         Objective:      Physical Exam  Vitals and nursing note reviewed.   Constitutional:       General: She is not in acute distress.     Appearance: She is not ill-appearing.   Cardiovascular:      Rate and Rhythm: Normal rate and regular rhythm.      Heart sounds: No murmur heard.  Pulmonary:      Effort: Pulmonary effort is normal.      Breath sounds: Normal breath sounds. No wheezing.   Skin:     General: Skin is warm and dry.      Findings: No rash.   Neurological:      Mental Status: She is alert.   Psychiatric:         Mood and Affect: Mood normal.         Behavior: Behavior normal.         Assessment:       1. Carpal tunnel syndrome of right wrist    2. Screening for hyperlipidemia    3. Screening for cardiovascular condition    4. Essential hypertension    5. Myelopathy    6. Esophageal dysphagia        Plan:       Problem List Items Addressed This Visit        Cardiac/Vascular    Essential hypertension (Chronic)    Relevant Orders    Hypertension Digital Medicine (HDMP) Enrollment Order (Completed)    Hypertension Digital Medicine (HDMP): Assign Onboarding Questionnaires (Completed)      Other Visit Diagnoses     Carpal tunnel syndrome of right wrist    -  Primary    Relevant Orders    EMG W/ ULTRASOUND AND NERVE CONDUCTION TEST 1 Extremity    Screening for  hyperlipidemia        Relevant Orders    Lipid Panel    Screening for cardiovascular condition        Relevant Orders    Lipid Panel    Myelopathy        Esophageal dysphagia        Relevant Orders    Ambulatory referral/consult to Gastroenterology

## 2022-06-07 ENCOUNTER — LAB VISIT (OUTPATIENT)
Dept: LAB | Facility: CLINIC | Age: 57
End: 2022-06-07
Payer: COMMERCIAL

## 2022-06-07 DIAGNOSIS — Z13.6 SCREENING FOR CARDIOVASCULAR CONDITION: ICD-10-CM

## 2022-06-07 DIAGNOSIS — Z13.220 SCREENING FOR HYPERLIPIDEMIA: ICD-10-CM

## 2022-06-07 LAB
CHOLEST SERPL-MCNC: 207 MG/DL (ref 120–199)
CHOLEST/HDLC SERPL: 3.9 {RATIO} (ref 2–5)
HDLC SERPL-MCNC: 53 MG/DL (ref 40–75)
HDLC SERPL: 25.6 % (ref 20–50)
LDLC SERPL CALC-MCNC: 137.2 MG/DL (ref 63–159)
NONHDLC SERPL-MCNC: 154 MG/DL
TRIGL SERPL-MCNC: 84 MG/DL (ref 30–150)

## 2022-06-07 PROCEDURE — 80061 LIPID PANEL: CPT | Performed by: FAMILY MEDICINE

## 2022-06-09 ENCOUNTER — TELEPHONE (OUTPATIENT)
Dept: FAMILY MEDICINE | Facility: CLINIC | Age: 57
End: 2022-06-09
Payer: COMMERCIAL

## 2022-06-09 ENCOUNTER — PATIENT MESSAGE (OUTPATIENT)
Dept: FAMILY MEDICINE | Facility: CLINIC | Age: 57
End: 2022-06-09
Payer: COMMERCIAL

## 2022-06-09 ENCOUNTER — OFFICE VISIT (OUTPATIENT)
Dept: PHYSICAL MEDICINE AND REHAB | Facility: CLINIC | Age: 57
End: 2022-06-09
Payer: COMMERCIAL

## 2022-06-09 DIAGNOSIS — G56.01 CARPAL TUNNEL SYNDROME OF RIGHT WRIST: Primary | ICD-10-CM

## 2022-06-09 DIAGNOSIS — G56.00 CARPAL TUNNEL SYNDROME, UNSPECIFIED LATERALITY: Primary | ICD-10-CM

## 2022-06-09 PROCEDURE — 4010F ACE/ARB THERAPY RXD/TAKEN: CPT | Mod: CPTII,S$GLB,, | Performed by: PHYSICAL MEDICINE & REHABILITATION

## 2022-06-09 PROCEDURE — 4010F PR ACE/ARB THEARPY RXD/TAKEN: ICD-10-PCS | Mod: CPTII,S$GLB,, | Performed by: PHYSICAL MEDICINE & REHABILITATION

## 2022-06-09 PROCEDURE — 99499 UNLISTED E&M SERVICE: CPT | Mod: S$GLB,,, | Performed by: PHYSICAL MEDICINE & REHABILITATION

## 2022-06-09 PROCEDURE — 95909 NRV CNDJ TST 5-6 STUDIES: CPT | Mod: S$GLB,,, | Performed by: PHYSICAL MEDICINE & REHABILITATION

## 2022-06-09 PROCEDURE — 95886 PR EMG COMPLETE, W/ NERVE CONDUCTION STUDIES, 5+ MUSCLES: ICD-10-PCS | Mod: S$GLB,,, | Performed by: PHYSICAL MEDICINE & REHABILITATION

## 2022-06-09 PROCEDURE — 95886 MUSC TEST DONE W/N TEST COMP: CPT | Mod: S$GLB,,, | Performed by: PHYSICAL MEDICINE & REHABILITATION

## 2022-06-09 PROCEDURE — 95909 PR NERVE CONDUCTION STUDY; 5-6 STUDIES: ICD-10-PCS | Mod: S$GLB,,, | Performed by: PHYSICAL MEDICINE & REHABILITATION

## 2022-06-09 PROCEDURE — 99499 NO LOS: ICD-10-PCS | Mod: S$GLB,,, | Performed by: PHYSICAL MEDICINE & REHABILITATION

## 2022-06-09 NOTE — PROCEDURES
Procedures       [facility]  Physical Medicine and Rehabilitation   [address] [place] [address], [address]  [place] [address]             Full Name: Genia Davis YOB: 1965  Patient ID: [id number]      Visit Date: 6/9/2022 13:44  Age: 56 Years 7 Months Old  Examining Physician: Kyle Glez D.O.       Sensory NCS      Nerve / Sites Rec. Site Onset Lat Peak Lat NP Amp Segments Distance Velocity     ms ms µV  cm m/s   R Median - Digit II (Antidromic)      Wrist Dig II 3.02 5.16 13.1 Wrist - Dig II 14 46   R Ulnar - Digit V (Antidromic)      Wrist Dig V 2.66 3.39 4.9 Wrist - Dig V 14 53   R Radial - Anatomical snuff box (Forearm)      Forearm Wrist 1.88 2.40 12.4 Forearm - Wrist 10 53       Motor NCS      Nerve / Sites Muscle Latency Amplitude Amp % Duration Segments Distance Lat Diff Velocity     ms mV % ms  cm ms m/s   R Median - APB      Wrist APB 5.36 7.3 100 7.50 Wrist - APB 8        Elbow APB 9.43 7.2 98.4 7.45 Elbow - Wrist 21 4.06 52   R Ulnar - ADM      Wrist ADM 2.66 6.5 100 6.25 Wrist - ADM 8        B.Elbow ADM 5.47 6.4 98.4 6.41 B.Elbow - Wrist 17 2.81 60      A.Elbow ADM 6.98 6.3 96.7 6.35 A.Elbow - B.Elbow 8 1.51 53       EMG Summary Table     Spontaneous MUAP Recruitment   Muscle IA Fib PSW Fasc Other Amp Dur. PPP Pattern   R. Deltoid N None None None . N N N N   R. Biceps brachii N None None None . N N N N   R. Triceps brachii N None None None . N N N N   R. Pronator teres N None None None . N N N N   R. First dorsal interosseous N None None None . N N N N       Summary    The motor conduction test was performed on 2 nerve(s). The results were normal in 1 nerve(s): R Ulnar - ADM. Results outside the specified normal range were found in 1 nerve(s), as follows:   In the R Median - APB study  o the take off latency result was increased for Wrist stimulation    The sensory conduction test had results outside of the specified normal range in all 3 of the tested nerves:   In the R Median -  Digit II (Antidromic) study  o the peak latency result was increased for Wrist stimulation  o the peak amplitude result was reduced for Wrist stimulation   In the R Ulnar - Digit V (Antidromic) study  o the peak amplitude result was reduced for Wrist stimulation   In the R Radial - Anatomical snuff box (Forearm) study  o the peak amplitude result was reduced for Forearm stimulation    The needle EMG study was normal in all 5 tested muscles: R. Deltoid, R. Biceps brachii, R. Triceps brachii, R. Pronator teres, R. First dorsal interosseous.          Impression:  Abnormal examination.  There is electrodiagnostic evidence of:  1. Moderate right median mononeuropathy at the wrist with demyelinating features only (carpal tunnel syndrome.)  2. There is no evidence of ulnar neuropathy at the elbow on the right  3. There is no evidence of cervical radiculopathy in the muscles tested of the right upper extremity.      Clinical history:  The chief complaint of right-hand numbness and tingling is consistent with the above findings.  There is moderate right carpal tunnel syndrome.  The patient was briefly consult on the diagnosis and treatment options for carpal tunnel syndrome.  I recommend a referral to orthopaedic surgery.      ____________________________  Kyle Glez D.O.  American Board of Physical Medicine and Rehabilitation  American Board of Pain Medicine  American Board of Electrodiagnostic Medicine  Registered in Musculoskeletal Ultrasound

## 2022-06-10 NOTE — TELEPHONE ENCOUNTER
Please see patients EMG results from Dr. Glez. He is leaving [facility]. I pended a referral for orthopedics for you.

## 2022-06-13 ENCOUNTER — TELEPHONE (OUTPATIENT)
Dept: FAMILY MEDICINE | Facility: CLINIC | Age: 57
End: 2022-06-13
Payer: COMMERCIAL

## 2022-06-13 ENCOUNTER — TELEPHONE (OUTPATIENT)
Dept: ORTHOPEDICS | Facility: CLINIC | Age: 57
End: 2022-06-13
Payer: COMMERCIAL

## 2022-06-28 DIAGNOSIS — M79.642 BILATERAL HAND PAIN: Primary | ICD-10-CM

## 2022-06-28 DIAGNOSIS — M79.641 BILATERAL HAND PAIN: Primary | ICD-10-CM

## 2022-06-29 ENCOUNTER — PATIENT MESSAGE (OUTPATIENT)
Dept: PHYSICAL MEDICINE AND REHAB | Facility: CLINIC | Age: 57
End: 2022-06-29
Payer: COMMERCIAL

## 2022-07-06 ENCOUNTER — OFFICE VISIT (OUTPATIENT)
Dept: ORTHOPEDICS | Facility: CLINIC | Age: 57
End: 2022-07-06
Payer: COMMERCIAL

## 2022-07-06 VITALS — HEIGHT: 65 IN | BODY MASS INDEX: 27.63 KG/M2 | WEIGHT: 165.81 LBS

## 2022-07-06 DIAGNOSIS — G56.00 CARPAL TUNNEL SYNDROME, UNSPECIFIED LATERALITY: ICD-10-CM

## 2022-07-06 DIAGNOSIS — G56.01 RIGHT CARPAL TUNNEL SYNDROME: Primary | ICD-10-CM

## 2022-07-06 PROCEDURE — 99999 PR PBB SHADOW E&M-EST. PATIENT-LVL IV: CPT | Mod: PBBFAC,,, | Performed by: PHYSICIAN ASSISTANT

## 2022-07-06 PROCEDURE — 99214 PR OFFICE/OUTPT VISIT, EST, LEVL IV, 30-39 MIN: ICD-10-PCS | Mod: 57,S$GLB,, | Performed by: PHYSICIAN ASSISTANT

## 2022-07-06 PROCEDURE — 4010F ACE/ARB THERAPY RXD/TAKEN: CPT | Mod: CPTII,S$GLB,, | Performed by: PHYSICIAN ASSISTANT

## 2022-07-06 PROCEDURE — 1159F MED LIST DOCD IN RCRD: CPT | Mod: CPTII,S$GLB,, | Performed by: PHYSICIAN ASSISTANT

## 2022-07-06 PROCEDURE — 1159F PR MEDICATION LIST DOCUMENTED IN MEDICAL RECORD: ICD-10-PCS | Mod: CPTII,S$GLB,, | Performed by: PHYSICIAN ASSISTANT

## 2022-07-06 PROCEDURE — 3008F PR BODY MASS INDEX (BMI) DOCUMENTED: ICD-10-PCS | Mod: CPTII,S$GLB,, | Performed by: PHYSICIAN ASSISTANT

## 2022-07-06 PROCEDURE — 3008F BODY MASS INDEX DOCD: CPT | Mod: CPTII,S$GLB,, | Performed by: PHYSICIAN ASSISTANT

## 2022-07-06 PROCEDURE — 4010F PR ACE/ARB THEARPY RXD/TAKEN: ICD-10-PCS | Mod: CPTII,S$GLB,, | Performed by: PHYSICIAN ASSISTANT

## 2022-07-06 PROCEDURE — 99999 PR PBB SHADOW E&M-EST. PATIENT-LVL IV: ICD-10-PCS | Mod: PBBFAC,,, | Performed by: PHYSICIAN ASSISTANT

## 2022-07-06 PROCEDURE — 99214 OFFICE O/P EST MOD 30 MIN: CPT | Mod: 57,S$GLB,, | Performed by: PHYSICIAN ASSISTANT

## 2022-07-06 RX ORDER — CEFAZOLIN SODIUM 1 G/3ML
2 INJECTION, POWDER, FOR SOLUTION INTRAMUSCULAR; INTRAVENOUS
Status: CANCELLED | OUTPATIENT
Start: 2022-07-06

## 2022-07-06 RX ORDER — MUPIROCIN 20 MG/G
OINTMENT TOPICAL
Status: CANCELLED | OUTPATIENT
Start: 2022-07-06

## 2022-07-06 NOTE — PROGRESS NOTES
[facility]  History & Physical  Orthopedics    SUBJECTIVE:      Chief Complaint: Right hand numbness    Referring Provider: Gregoria Robbins MD Dr. Dunbar is the supervising physician for this encounter/patient    History of Present Illness:  Patient is a 56 y.o. right hand dominant female who presents today with complaints of right hand numbness for several years. Numbness will occur during the day with repetitive or extensive activity, unsure if this wakes her at night. Her back issues have taken priority, now she is ready to pursue treatment for this. She has EMG with Dr. Glez performed recently, she wears carpal tunnel brace. No surgical history on the hands. She does ambulate with a walker due to her back issues.     The patient is a/an .    Onset of symptoms/DOI was years.    Symptoms are aggravated by activity and movement.    Symptoms are alleviated by rest and immobilization.    Symptoms consist of numbness/tingling.    The patient rates their pain as a 2/10.    Attempted treatment(s) and/or interventions include activity modifications, rest, immobilization.     The patient denies any fevers, chills, N/V, D/C and presents for evaluation.       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     Past Surgical History:   Procedure Laterality Date    BACK SURGERY      x 5    CHOLECYSTECTOMY      CREATION OF MUSCLE ROTATIONAL FLAP N/A 10/2/2018    Procedure: CREATION, FLAP, MUSCLE ROTATION;  Surgeon: Rommel Cox MD;  Location: [facility] OR [address] [place] [address];  Service: Plastics;  Laterality: N/A;     Review of patient's allergies indicates:   Allergen Reactions    Hydrochlorothiazide Other (See Comments)     Muscle pain    Losartan Other (See Comments)     Elevated b/p with anxiety    Promethazine Other (See Comments)     Other reaction(s): agitation  Other  "reaction(s): Hives     Social History     Social History Narrative    Not on file     Family History   Problem Relation Age of Onset    Breast cancer Maternal Aunt     Anesthesia problems Neg Hx     Clotting disorder Neg Hx          Current Outpatient Medications:     b complex vitamins tablet, Take 1 tablet by mouth once daily., Disp: , Rfl:     celecoxib (CELEBREX) 200 MG capsule, Take 1 capsule (200 mg total) by mouth 2 (two) times daily., Disp: 60 capsule, Rfl: 5    lisinopriL (PRINIVIL,ZESTRIL) 20 MG tablet, Take 1 tablet (20 mg total) by mouth once daily., Disp: 90 tablet, Rfl: 3    oxyCODONE-acetaminophen (PERCOCET)  mg per tablet, Take 1 tablet by mouth every 6 (six) hours as needed for Pain. Greater than 7 day quantity Medically Necessary, Disp: 90 tablet, Rfl: 0      Review of Systems:  Constitutional: no fever or chills  Eyes: no visual changes  ENT: no nasal congestion or sore throat  Respiratory: no cough or shortness of breath  Cardiovascular: no chest pain  Gastrointestinal: no nausea or vomiting, tolerating diet  Musculoskeletal: soreness and numbness/tingling    OBJECTIVE:      Vital Signs (Most Recent):  Vitals:    07/06/22 1438   Weight: 75.2 kg (165 lb 12.6 oz)   Height: 5' 5" (1.651 m)     Body mass index is 27.59 kg/m².      Physical Exam:  Constitutional: The patient appears well-developed and well-nourished. No distress.   Skin: No lesions appreciated  Head: Normocephalic and atraumatic.   Nose: Nose normal.   Ears: No deformities seen  Eyes: Conjunctivae and EOM are normal.   Neck: No tracheal deviation present.   Cardiovascular: Normal rate and intact distal pulses.    Pulmonary/Chest: Effort normal. No respiratory distress.   Abdominal: There is no guarding.   Neurological: The patient is alert.   Psychiatric: The patient has a normal mood and affect.     Right Hand/Wrist Examination:    Observation/Inspection:  Swelling  none    Deformity  none  Discoloration  none "     Scars   none    Atrophy  none    HAND/WRIST EXAMINATION:  Finkelstein's Test   Neg  WHAT Test    Neg  Snuff box tenderness   Neg  Christian's Test    Neg  Hook of Hamate Tenderness  Neg  CMC grind    Neg  Circumduction test   Neg    Neurovascular Exam:  Digits WWP, brisk CR < 3s throughout  NVI motor/LTS to M/R/U nerves, radial pulse 2+  Tinel's Test - Carpal Tunnel  Neg  Tinel's Test - Cubital Tunnel  Neg  Phalen's Test    POS  Median Nerve Compression Test POS    ROM hand full, painless    ROM wrist full, painless    ROM elbow full, painless    Abdomen not guarded  Respirations nonlabored  Perfusion intact    Diagnostic Results:     Imaging - None (Xray down).    EMG - 6/9/22  Impression:  Abnormal examination.  There is electrodiagnostic evidence of:  1. Moderate right median mononeuropathy at the wrist with demyelinating features only (carpal tunnel syndrome.)  2. There is no evidence of ulnar neuropathy at the elbow on the right  3. There is no evidence of cervical radiculopathy in the muscles tested of the right upper extremity.    ASSESSMENT/PLAN:      56 y.o. yo female with Right carpal tunnel syndrome    Plan: The patient and I had a thorough discussion today.  We discussed the working diagnosis as well as several other potential alternative diagnoses.  Treatment options were discussed, both conservative and surgical.  Conservative treatment options would include things such as activity modifications, workplace modifications, a period of rest, oral vs topical OTC and prescription anti-inflammatory medications, occupational therapy, splinting/bracing, immobilization, corticosteroid injections, and others.  Surgical options were discussed as well.     At this time, the patient would like to proceed with surgery. She is consented for Right endoscopic vs open carpal tunnel release with Dr. Rosen on 8/25/22 in [place]. Case ordered. She will need a volar slab splint postop due to ambulating with the walker,  protect her incision.    The patient has not responded to adequate non operative treatment at this time and/or non operative treatment is not indicated. Thus, the risks, benefits and alternatives to surgery were discussed with the patient in detail.  Specific risks include but are not limited to bleeding, infection, vessel and/or nerve damage, pain, numbness, tingling, complex regional pain syndrome, compartment syndrome, failure to return to pre-injury and/or preoperative functional status, scar sensitivity, delayed healing, inability to return to work, pulley injury, tendon injury, bowstringing, partial and/or incomplete relief of symptoms, weakness, persistence of and/or worsening of symptoms, surgical failure, osteomyelitis, amputation, loss of function, stiffness, functional debility, dysfunction, decreased  strength, need for prolonged postoperative rehabilitation, need for further surgery, deep venous thrombosis, pulmonary embolism, arthritis and death.  The patient states an understanding and wishes to proceed with surgery.   All questions were answered.  No guarantees were implied or stated.  Written informed consent was obtained.    Should the patient's symptoms worsen, persist, or fail to improve they should return for reevaluation and I would be happy to see them back anytime.           Please do not hesitate to reach out to us via email, phone, or MyChart with any questions, concerns, or feedback.

## 2022-07-20 ENCOUNTER — PATIENT MESSAGE (OUTPATIENT)
Dept: PHYSICAL MEDICINE AND REHAB | Facility: CLINIC | Age: 57
End: 2022-07-20
Payer: COMMERCIAL

## 2022-07-27 ENCOUNTER — OFFICE VISIT (OUTPATIENT)
Dept: GASTROENTEROLOGY | Facility: CLINIC | Age: 57
End: 2022-07-27
Payer: COMMERCIAL

## 2022-07-27 VITALS
WEIGHT: 164.88 LBS | DIASTOLIC BLOOD PRESSURE: 77 MMHG | HEART RATE: 68 BPM | RESPIRATION RATE: 16 BRPM | HEIGHT: 65 IN | BODY MASS INDEX: 27.47 KG/M2 | SYSTOLIC BLOOD PRESSURE: 167 MMHG

## 2022-07-27 DIAGNOSIS — R07.9 CHEST PAIN IN ADULT: Primary | ICD-10-CM

## 2022-07-27 DIAGNOSIS — R13.19 ESOPHAGEAL DYSPHAGIA: ICD-10-CM

## 2022-07-27 PROCEDURE — 3008F PR BODY MASS INDEX (BMI) DOCUMENTED: ICD-10-PCS | Mod: CPTII,S$GLB,, | Performed by: INTERNAL MEDICINE

## 2022-07-27 PROCEDURE — 3078F DIAST BP <80 MM HG: CPT | Mod: CPTII,S$GLB,, | Performed by: INTERNAL MEDICINE

## 2022-07-27 PROCEDURE — 99204 OFFICE O/P NEW MOD 45 MIN: CPT | Mod: S$GLB,,, | Performed by: INTERNAL MEDICINE

## 2022-07-27 PROCEDURE — 3008F BODY MASS INDEX DOCD: CPT | Mod: CPTII,S$GLB,, | Performed by: INTERNAL MEDICINE

## 2022-07-27 PROCEDURE — 3077F SYST BP >= 140 MM HG: CPT | Mod: CPTII,S$GLB,, | Performed by: INTERNAL MEDICINE

## 2022-07-27 PROCEDURE — 99999 PR PBB SHADOW E&M-EST. PATIENT-LVL III: CPT | Mod: PBBFAC,,, | Performed by: INTERNAL MEDICINE

## 2022-07-27 PROCEDURE — 4010F PR ACE/ARB THEARPY RXD/TAKEN: ICD-10-PCS | Mod: CPTII,S$GLB,, | Performed by: INTERNAL MEDICINE

## 2022-07-27 PROCEDURE — 1159F MED LIST DOCD IN RCRD: CPT | Mod: CPTII,S$GLB,, | Performed by: INTERNAL MEDICINE

## 2022-07-27 PROCEDURE — 3078F PR MOST RECENT DIASTOLIC BLOOD PRESSURE < 80 MM HG: ICD-10-PCS | Mod: CPTII,S$GLB,, | Performed by: INTERNAL MEDICINE

## 2022-07-27 PROCEDURE — 1160F PR REVIEW ALL MEDS BY PRESCRIBER/CLIN PHARMACIST DOCUMENTED: ICD-10-PCS | Mod: CPTII,S$GLB,, | Performed by: INTERNAL MEDICINE

## 2022-07-27 PROCEDURE — 99999 PR PBB SHADOW E&M-EST. PATIENT-LVL III: ICD-10-PCS | Mod: PBBFAC,,, | Performed by: INTERNAL MEDICINE

## 2022-07-27 PROCEDURE — 3077F PR MOST RECENT SYSTOLIC BLOOD PRESSURE >= 140 MM HG: ICD-10-PCS | Mod: CPTII,S$GLB,, | Performed by: INTERNAL MEDICINE

## 2022-07-27 PROCEDURE — 99204 PR OFFICE/OUTPT VISIT, NEW, LEVL IV, 45-59 MIN: ICD-10-PCS | Mod: S$GLB,,, | Performed by: INTERNAL MEDICINE

## 2022-07-27 PROCEDURE — 1159F PR MEDICATION LIST DOCUMENTED IN MEDICAL RECORD: ICD-10-PCS | Mod: CPTII,S$GLB,, | Performed by: INTERNAL MEDICINE

## 2022-07-27 PROCEDURE — 4010F ACE/ARB THERAPY RXD/TAKEN: CPT | Mod: CPTII,S$GLB,, | Performed by: INTERNAL MEDICINE

## 2022-07-27 PROCEDURE — 1160F RVW MEDS BY RX/DR IN RCRD: CPT | Mod: CPTII,S$GLB,, | Performed by: INTERNAL MEDICINE

## 2022-07-27 NOTE — H&P (VIEW-ONLY)
[facility]     CC: Dysphagia, Chest Pain    HPI 56 y.o. female with history of chronic back pain/disorder presents for evaluation of several months of intermittent, moderate, dysphagia primarily to solid foods associated with chest pain. She notes initially her chest discomfort would only occur when she felt as if food was stuck in her chest, however now she has intermittent chest pain that will last for several minutes and resolve without intervention (this pain is not related to PO intake). She has no prior EGD , family history of esophageal/colon cancer or Fry's esophagus. She denies frequent heartburn, indigestion, weight loss or oral thrush. She has not had a prior colonoscopy.       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       Past Surgical History:   Procedure Laterality Date    BACK SURGERY      x 5    CHOLECYSTECTOMY      CREATION OF MUSCLE ROTATIONAL FLAP N/A 10/2/2018    Procedure: CREATION, FLAP, MUSCLE ROTATION;  Surgeon: Rommel Cox MD;  Location: [facility] OR [address] [place] [address];  Service: Plastics;  Laterality: N/A;       Social History     Tobacco Use    Smoking status: Never Smoker    Smokeless tobacco: Never Used   Substance Use Topics    Alcohol use: No     Alcohol/week: 0.0 standard drinks    Drug use: No       Family History   Problem Relation Age of Onset    Breast cancer Maternal Aunt     Anesthesia problems Neg Hx     Clotting disorder Neg Hx        Allergies and Medications reviewed     Review of Systems  General ROS: negative for - chills, fever or weight loss  Psychological ROS: negative for - hallucination, depression or suicidal ideation  Ophthalmic ROS: negative for - blurry vision, photophobia or eye pain  ENT ROS: negative for - epistaxis, sore throat or rhinorrhea  Respiratory ROS: no cough, shortness of breath, or wheezing  Cardiovascular  "ROS: + chest pain, no dyspnea on exertion  Gastrointestinal ROS: + dysphagia, + chest pain, no hematemesis  Genito-Urinary ROS: no dysuria, trouble voiding, or hematuria  Musculoskeletal ROS: positive for chronic- arthralgia, myalgia, weakness  Neurological ROS: no syncope or seizures; + difficulty walking due to back pain (uses walker)  Dermatological ROS: negative for pruritis, rash and jaundice    Physical Examination  BP (!) 167/77 (BP Location: Left arm, Patient Position: Sitting)   Pulse 68   Resp 16   Ht 5' 5" (1.651 m)   Wt 74.8 kg (164 lb 14.5 oz)   BMI 27.44 kg/m²   General appearance: alert, cooperative, no distress  HENT: Normocephalic, atraumatic, neck symmetrical, no nasal discharge   Eyes: conjunctivae/corneas clear, PERRL, EOM's intact, sclera anicteric  Lungs: clear to auscultation bilaterally, no dullness to percussion bilaterally, symmetric expansion, breathing unlabored  Heart: regular rate and rhythm without rub; no displacement of the PMI   Abdomen: soft, nontender,nondistended, BS active  Extremities: extremities symmetric; no clubbing, cyanosis, or edema  Integument: Skin color, texture, turgor normal; no rashes; hair distrubution normal, no jaundice  Neurologic: Alert and oriented X 3, walks with walker, no tremor  Psychiatric: no pressured speech; normal affect; no evidence of impaired cognition, no anxiety/depression     Labs:  Lab Results   Component Value Date    WBC 5.98 08/05/2020    HGB 11.6 (L) 08/05/2020    HCT 37.4 08/05/2020    MCV 87 08/05/2020     08/05/2020       CMP  Sodium   Date Value Ref Range Status   08/05/2020 139 136 - 145 mmol/L Final     Potassium   Date Value Ref Range Status   08/05/2020 4.5 3.5 - 5.1 mmol/L Final     Chloride   Date Value Ref Range Status   08/05/2020 103 95 - 110 mmol/L Final     CO2   Date Value Ref Range Status   08/05/2020 31 (H) 23 - 29 mmol/L Final     Glucose   Date Value Ref Range Status   08/05/2020 96 70 - 110 mg/dL Final "     BUN   Date Value Ref Range Status   08/05/2020 10 6 - 20 mg/dL Final     Creatinine   Date Value Ref Range Status   08/05/2020 0.9 0.5 - 1.4 mg/dL Final     Calcium   Date Value Ref Range Status   08/05/2020 8.9 8.7 - 10.5 mg/dL Final     Total Protein   Date Value Ref Range Status   08/05/2020 7.0 6.0 - 8.4 g/dL Final     Albumin   Date Value Ref Range Status   08/05/2020 3.4 (L) 3.5 - 5.2 g/dL Final     Total Bilirubin   Date Value Ref Range Status   08/05/2020 0.2 0.1 - 1.0 mg/dL Final     Comment:     For infants and newborns, interpretation of results should be based  on gestational age, weight and in agreement with clinical  observations.  Premature Infant recommended reference ranges:  Up to 24 hours.............<8.0 mg/dL  Up to 48 hours............<12.0 mg/dL  3-5 days..................<15.0 mg/dL  6-29 days.................<15.0 mg/dL       Alkaline Phosphatase   Date Value Ref Range Status   08/05/2020 106 55 - 135 U/L Final     AST   Date Value Ref Range Status   08/05/2020 29 10 - 40 U/L Final     ALT   Date Value Ref Range Status   08/05/2020 34 10 - 44 U/L Final     Anion Gap   Date Value Ref Range Status   08/05/2020 5 (L) 8 - 16 mmol/L Final     eGFR if    Date Value Ref Range Status   08/05/2020 >60.0 >60 mL/min/1.73 m^2 Final     eGFR if non    Date Value Ref Range Status   08/05/2020 >60.0 >60 mL/min/1.73 m^2 Final     Comment:     Calculation used to obtain the estimated glomerular filtration  rate (eGFR) is the CKD-EPI equation.            Imaging:  Abdominal ultrasound 2020 was independently visualized and reviewed by me and showed No sonographically evident acute intra-abdominal process.  If there is a clinical concern of sonographically occult pathology, consideration should be given to performing CT of the abdomen and pelvis with.    Assessment:   56 y.o. female presents for evaluation of intermittent dysphagia to solids as well as chest pain, ?  Esophageal spasm ? Esophageal narrowing  Plan:  -Schedule EGD  -Schedule Esophagram  -Check CBC, CMP         Bernadette Bergeron MD  [facility]  [address], [address]  [place] [address]  Office: [phone]  Fax: [phone]

## 2022-07-27 NOTE — PROGRESS NOTES
[facility]     CC: Dysphagia, Chest Pain    HPI 56 y.o. female with history of chronic back pain/disorder presents for evaluation of several months of intermittent, moderate, dysphagia primarily to solid foods associated with chest pain. She notes initially her chest discomfort would only occur when she felt as if food was stuck in her chest, however now she has intermittent chest pain that will last for several minutes and resolve without intervention (this pain is not related to PO intake). She has no prior EGD , family history of esophageal/colon cancer or Fry's esophagus. She denies frequent heartburn, indigestion, weight loss or oral thrush. She has not had a prior colonoscopy.       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       Past Surgical History:   Procedure Laterality Date    BACK SURGERY      x 5    CHOLECYSTECTOMY      CREATION OF MUSCLE ROTATIONAL FLAP N/A 10/2/2018    Procedure: CREATION, FLAP, MUSCLE ROTATION;  Surgeon: Rommel Cox MD;  Location: [facility] OR [address] [place] [address];  Service: Plastics;  Laterality: N/A;       Social History     Tobacco Use    Smoking status: Never Smoker    Smokeless tobacco: Never Used   Substance Use Topics    Alcohol use: No     Alcohol/week: 0.0 standard drinks    Drug use: No       Family History   Problem Relation Age of Onset    Breast cancer Maternal Aunt     Anesthesia problems Neg Hx     Clotting disorder Neg Hx        Allergies and Medications reviewed     Review of Systems  General ROS: negative for - chills, fever or weight loss  Psychological ROS: negative for - hallucination, depression or suicidal ideation  Ophthalmic ROS: negative for - blurry vision, photophobia or eye pain  ENT ROS: negative for - epistaxis, sore throat or rhinorrhea  Respiratory ROS: no cough, shortness of breath, or wheezing  Cardiovascular  "ROS: + chest pain, no dyspnea on exertion  Gastrointestinal ROS: + dysphagia, + chest pain, no hematemesis  Genito-Urinary ROS: no dysuria, trouble voiding, or hematuria  Musculoskeletal ROS: positive for chronic- arthralgia, myalgia, weakness  Neurological ROS: no syncope or seizures; + difficulty walking due to back pain (uses walker)  Dermatological ROS: negative for pruritis, rash and jaundice    Physical Examination  BP (!) 167/77 (BP Location: Left arm, Patient Position: Sitting)   Pulse 68   Resp 16   Ht 5' 5" (1.651 m)   Wt 74.8 kg (164 lb 14.5 oz)   BMI 27.44 kg/m²   General appearance: alert, cooperative, no distress  HENT: Normocephalic, atraumatic, neck symmetrical, no nasal discharge   Eyes: conjunctivae/corneas clear, PERRL, EOM's intact, sclera anicteric  Lungs: clear to auscultation bilaterally, no dullness to percussion bilaterally, symmetric expansion, breathing unlabored  Heart: regular rate and rhythm without rub; no displacement of the PMI   Abdomen: soft, nontender,nondistended, BS active  Extremities: extremities symmetric; no clubbing, cyanosis, or edema  Integument: Skin color, texture, turgor normal; no rashes; hair distrubution normal, no jaundice  Neurologic: Alert and oriented X 3, walks with walker, no tremor  Psychiatric: no pressured speech; normal affect; no evidence of impaired cognition, no anxiety/depression     Labs:  Lab Results   Component Value Date    WBC 5.98 08/05/2020    HGB 11.6 (L) 08/05/2020    HCT 37.4 08/05/2020    MCV 87 08/05/2020     08/05/2020       CMP  Sodium   Date Value Ref Range Status   08/05/2020 139 136 - 145 mmol/L Final     Potassium   Date Value Ref Range Status   08/05/2020 4.5 3.5 - 5.1 mmol/L Final     Chloride   Date Value Ref Range Status   08/05/2020 103 95 - 110 mmol/L Final     CO2   Date Value Ref Range Status   08/05/2020 31 (H) 23 - 29 mmol/L Final     Glucose   Date Value Ref Range Status   08/05/2020 96 70 - 110 mg/dL Final "     BUN   Date Value Ref Range Status   08/05/2020 10 6 - 20 mg/dL Final     Creatinine   Date Value Ref Range Status   08/05/2020 0.9 0.5 - 1.4 mg/dL Final     Calcium   Date Value Ref Range Status   08/05/2020 8.9 8.7 - 10.5 mg/dL Final     Total Protein   Date Value Ref Range Status   08/05/2020 7.0 6.0 - 8.4 g/dL Final     Albumin   Date Value Ref Range Status   08/05/2020 3.4 (L) 3.5 - 5.2 g/dL Final     Total Bilirubin   Date Value Ref Range Status   08/05/2020 0.2 0.1 - 1.0 mg/dL Final     Comment:     For infants and newborns, interpretation of results should be based  on gestational age, weight and in agreement with clinical  observations.  Premature Infant recommended reference ranges:  Up to 24 hours.............<8.0 mg/dL  Up to 48 hours............<12.0 mg/dL  3-5 days..................<15.0 mg/dL  6-29 days.................<15.0 mg/dL       Alkaline Phosphatase   Date Value Ref Range Status   08/05/2020 106 55 - 135 U/L Final     AST   Date Value Ref Range Status   08/05/2020 29 10 - 40 U/L Final     ALT   Date Value Ref Range Status   08/05/2020 34 10 - 44 U/L Final     Anion Gap   Date Value Ref Range Status   08/05/2020 5 (L) 8 - 16 mmol/L Final     eGFR if    Date Value Ref Range Status   08/05/2020 >60.0 >60 mL/min/1.73 m^2 Final     eGFR if non    Date Value Ref Range Status   08/05/2020 >60.0 >60 mL/min/1.73 m^2 Final     Comment:     Calculation used to obtain the estimated glomerular filtration  rate (eGFR) is the CKD-EPI equation.            Imaging:  Abdominal ultrasound 2020 was independently visualized and reviewed by me and showed No sonographically evident acute intra-abdominal process.  If there is a clinical concern of sonographically occult pathology, consideration should be given to performing CT of the abdomen and pelvis with.    Assessment:   56 y.o. female presents for evaluation of intermittent dysphagia to solids as well as chest pain, ?  Esophageal spasm ? Esophageal narrowing  Plan:  -Schedule EGD  -Schedule Esophagram  -Check CBC, CMP         Bernadette Bergeron MD  [facility]  [address], [address]  [place] [address]  Office: [phone]  Fax: [phone]

## 2022-08-02 ENCOUNTER — PATIENT MESSAGE (OUTPATIENT)
Dept: PHYSICAL MEDICINE AND REHAB | Facility: CLINIC | Age: 57
End: 2022-08-02
Payer: COMMERCIAL

## 2022-08-04 ENCOUNTER — LAB VISIT (OUTPATIENT)
Dept: LAB | Facility: CLINIC | Age: 57
End: 2022-08-04
Payer: COMMERCIAL

## 2022-08-04 ENCOUNTER — PATIENT MESSAGE (OUTPATIENT)
Dept: PHYSICAL MEDICINE AND REHAB | Facility: CLINIC | Age: 57
End: 2022-08-04
Payer: COMMERCIAL

## 2022-08-04 DIAGNOSIS — R13.19 ESOPHAGEAL DYSPHAGIA: ICD-10-CM

## 2022-08-04 LAB
ALBUMIN SERPL BCP-MCNC: 3.6 G/DL (ref 3.5–5.2)
ALP SERPL-CCNC: 91 U/L (ref 55–135)
ALT SERPL W/O P-5'-P-CCNC: 15 U/L (ref 10–44)
ANION GAP SERPL CALC-SCNC: 10 MMOL/L (ref 8–16)
AST SERPL-CCNC: 20 U/L (ref 10–40)
BASOPHILS # BLD AUTO: 0.01 K/UL (ref 0–0.2)
BASOPHILS NFR BLD: 0.2 % (ref 0–1.9)
BILIRUB SERPL-MCNC: 0.4 MG/DL (ref 0.1–1)
BUN SERPL-MCNC: 18 MG/DL (ref 6–20)
CALCIUM SERPL-MCNC: 9.4 MG/DL (ref 8.7–10.5)
CHLORIDE SERPL-SCNC: 106 MMOL/L (ref 95–110)
CO2 SERPL-SCNC: 24 MMOL/L (ref 23–29)
CREAT SERPL-MCNC: 0.8 MG/DL (ref 0.5–1.4)
DIFFERENTIAL METHOD: ABNORMAL
EOSINOPHIL # BLD AUTO: 0.2 K/UL (ref 0–0.5)
EOSINOPHIL NFR BLD: 2.8 % (ref 0–8)
ERYTHROCYTE [DISTWIDTH] IN BLOOD BY AUTOMATED COUNT: 14.6 % (ref 11.5–14.5)
EST. GFR  (NO RACE VARIABLE): >60 ML/MIN/1.73 M^2
GLUCOSE SERPL-MCNC: 93 MG/DL (ref 70–110)
HCT VFR BLD AUTO: 36.7 % (ref 37–48.5)
HGB BLD-MCNC: 11.5 G/DL (ref 12–16)
IMM GRANULOCYTES # BLD AUTO: 0.01 K/UL (ref 0–0.04)
IMM GRANULOCYTES NFR BLD AUTO: 0.2 % (ref 0–0.5)
LYMPHOCYTES # BLD AUTO: 1.1 K/UL (ref 1–4.8)
LYMPHOCYTES NFR BLD: 21.2 % (ref 18–48)
MCH RBC QN AUTO: 27.2 PG (ref 27–31)
MCHC RBC AUTO-ENTMCNC: 31.3 G/DL (ref 32–36)
MCV RBC AUTO: 87 FL (ref 82–98)
MONOCYTES # BLD AUTO: 0.3 K/UL (ref 0.3–1)
MONOCYTES NFR BLD: 6.1 % (ref 4–15)
NEUTROPHILS # BLD AUTO: 3.7 K/UL (ref 1.8–7.7)
NEUTROPHILS NFR BLD: 69.5 % (ref 38–73)
NRBC BLD-RTO: 0 /100 WBC
PLATELET # BLD AUTO: 308 K/UL (ref 150–450)
PMV BLD AUTO: 11.2 FL (ref 9.2–12.9)
POTASSIUM SERPL-SCNC: 4.8 MMOL/L (ref 3.5–5.1)
PROT SERPL-MCNC: 7.4 G/DL (ref 6–8.4)
RBC # BLD AUTO: 4.23 M/UL (ref 4–5.4)
SODIUM SERPL-SCNC: 140 MMOL/L (ref 136–145)
WBC # BLD AUTO: 5.38 K/UL (ref 3.9–12.7)

## 2022-08-04 PROCEDURE — 85025 COMPLETE CBC W/AUTO DIFF WBC: CPT | Performed by: INTERNAL MEDICINE

## 2022-08-04 PROCEDURE — 80053 COMPREHEN METABOLIC PANEL: CPT | Performed by: INTERNAL MEDICINE

## 2022-08-10 ENCOUNTER — PATIENT MESSAGE (OUTPATIENT)
Dept: ORTHOPEDICS | Facility: CLINIC | Age: 57
End: 2022-08-10
Payer: COMMERCIAL

## 2022-08-11 ENCOUNTER — PATIENT OUTREACH (OUTPATIENT)
Dept: FAMILY MEDICINE | Facility: CLINIC | Age: 57
End: 2022-08-11
Payer: COMMERCIAL

## 2022-08-11 ENCOUNTER — DOCUMENTATION ONLY (OUTPATIENT)
Dept: FAMILY MEDICINE | Facility: CLINIC | Age: 57
End: 2022-08-11
Payer: COMMERCIAL

## 2022-08-11 NOTE — PROGRESS NOTES
Population Health Outreach.Population Health review. 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 Pt denies ever doing any kind of CRCS in the past. She refuses to do the colonoscopy, but agrees to do the Fit Kit. Educated pt about CRCS. Mailing out a Fit Kit today.

## 2022-08-18 ENCOUNTER — ANESTHESIA EVENT (OUTPATIENT)
Dept: ENDOSCOPY | Facility: HOSPITAL | Age: 57
End: 2022-08-18
Payer: COMMERCIAL

## 2022-08-18 ENCOUNTER — ANESTHESIA (OUTPATIENT)
Dept: ENDOSCOPY | Facility: HOSPITAL | Age: 57
End: 2022-08-18
Payer: COMMERCIAL

## 2022-08-18 ENCOUNTER — HOSPITAL ENCOUNTER (OUTPATIENT)
Facility: HOSPITAL | Age: 57
Discharge: HOME | End: 2022-08-18
Attending: INTERNAL MEDICINE | Admitting: INTERNAL MEDICINE
Payer: COMMERCIAL

## 2022-08-18 DIAGNOSIS — R13.10 DYSPHAGIA: ICD-10-CM

## 2022-08-18 DIAGNOSIS — D64.9 NORMOCYTIC ANEMIA: Primary | ICD-10-CM

## 2022-08-18 LAB
CTP QC/QA: YES
SARS-COV-2 AG RESP QL IA.RAPID: NEGATIVE

## 2022-08-18 PROCEDURE — 25000003 PHARM REV CODE 250: Performed by: INTERNAL MEDICINE

## 2022-08-18 PROCEDURE — 88342 IMHCHEM/IMCYTCHM 1ST ANTB: CPT | Mod: 26,,, | Performed by: PATHOLOGY

## 2022-08-18 PROCEDURE — 25000003 PHARM REV CODE 250: Performed by: NURSE ANESTHETIST, CERTIFIED REGISTERED

## 2022-08-18 PROCEDURE — 88305 TISSUE EXAM BY PATHOLOGIST: ICD-10-PCS | Mod: 26,,, | Performed by: PATHOLOGY

## 2022-08-18 PROCEDURE — D9220A PRA ANESTHESIA: Mod: CRNA,,, | Performed by: NURSE ANESTHETIST, CERTIFIED REGISTERED

## 2022-08-18 PROCEDURE — D9220A PRA ANESTHESIA: Mod: ANES,,, | Performed by: ANESTHESIOLOGY

## 2022-08-18 PROCEDURE — 27201012 HC FORCEPS, HOT/COLD, DISP: Performed by: INTERNAL MEDICINE

## 2022-08-18 PROCEDURE — 63600175 PHARM REV CODE 636 W HCPCS: Performed by: NURSE ANESTHETIST, CERTIFIED REGISTERED

## 2022-08-18 PROCEDURE — 37000008 HC ANESTHESIA 1ST 15 MINUTES: Performed by: INTERNAL MEDICINE

## 2022-08-18 PROCEDURE — C1726 CATH, BAL DIL, NON-VASCULAR: HCPCS | Performed by: INTERNAL MEDICINE

## 2022-08-18 PROCEDURE — 88305 TISSUE EXAM BY PATHOLOGIST: CPT | Mod: 26,,, | Performed by: PATHOLOGY

## 2022-08-18 PROCEDURE — 43239 EGD BIOPSY SINGLE/MULTIPLE: CPT | Mod: 59 | Performed by: INTERNAL MEDICINE

## 2022-08-18 PROCEDURE — D9220A PRA ANESTHESIA: ICD-10-PCS | Mod: ANES,,, | Performed by: ANESTHESIOLOGY

## 2022-08-18 PROCEDURE — 43239 PR EGD, FLEX, W/BIOPSY, SGL/MULTI: ICD-10-PCS | Mod: 59,,, | Performed by: INTERNAL MEDICINE

## 2022-08-18 PROCEDURE — 88342 CHG IMMUNOCYTOCHEMISTRY: ICD-10-PCS | Mod: 26,,, | Performed by: PATHOLOGY

## 2022-08-18 PROCEDURE — 88305 TISSUE EXAM BY PATHOLOGIST: CPT | Performed by: PATHOLOGY

## 2022-08-18 PROCEDURE — 88342 IMHCHEM/IMCYTCHM 1ST ANTB: CPT | Performed by: PATHOLOGY

## 2022-08-18 PROCEDURE — 43249 PR EGD, FLEX, W/BALL DILATION, < 30MM: ICD-10-PCS | Mod: ,,, | Performed by: INTERNAL MEDICINE

## 2022-08-18 PROCEDURE — D9220A PRA ANESTHESIA: ICD-10-PCS | Mod: CRNA,,, | Performed by: NURSE ANESTHETIST, CERTIFIED REGISTERED

## 2022-08-18 PROCEDURE — 43249 ESOPH EGD DILATION <30 MM: CPT | Mod: ,,, | Performed by: INTERNAL MEDICINE

## 2022-08-18 PROCEDURE — 43249 ESOPH EGD DILATION <30 MM: CPT | Performed by: INTERNAL MEDICINE

## 2022-08-18 PROCEDURE — 43239 EGD BIOPSY SINGLE/MULTIPLE: CPT | Mod: 59,,, | Performed by: INTERNAL MEDICINE

## 2022-08-18 RX ORDER — PANTOPRAZOLE SODIUM 40 MG/1
40 TABLET, DELAYED RELEASE ORAL DAILY
Qty: 30 TABLET | Refills: 11 | Status: SHIPPED | OUTPATIENT
Start: 2022-08-18 | End: 2023-04-21

## 2022-08-18 RX ORDER — SODIUM CHLORIDE 9 MG/ML
INJECTION, SOLUTION INTRAVENOUS CONTINUOUS
Status: DISCONTINUED | OUTPATIENT
Start: 2022-08-18 | End: 2022-08-18 | Stop reason: HOSPADM

## 2022-08-18 RX ORDER — PROPOFOL 10 MG/ML
VIAL (ML) INTRAVENOUS
Status: DISCONTINUED | OUTPATIENT
Start: 2022-08-18 | End: 2022-08-18

## 2022-08-18 RX ORDER — LIDOCAINE HCL/PF 100 MG/5ML
SYRINGE (ML) INTRAVENOUS
Status: DISCONTINUED | OUTPATIENT
Start: 2022-08-18 | End: 2022-08-18

## 2022-08-18 RX ADMIN — LIDOCAINE HYDROCHLORIDE 75 MG: 20 INJECTION INTRAVENOUS at 10:08

## 2022-08-18 RX ADMIN — SODIUM CHLORIDE: 0.9 INJECTION, SOLUTION INTRAVENOUS at 09:08

## 2022-08-18 RX ADMIN — PROPOFOL 40 MG: 10 INJECTION, EMULSION INTRAVENOUS at 10:08

## 2022-08-18 RX ADMIN — PROPOFOL 80 MG: 10 INJECTION, EMULSION INTRAVENOUS at 10:08

## 2022-08-18 NOTE — ANESTHESIA POSTPROCEDURE EVALUATION
Anesthesia Post Evaluation    Patient: Genia Mcnulty    Procedure(s) Performed: Procedure(s) (LRB):  EGD (ESOPHAGOGASTRODUODENOSCOPY) (N/A)    Final Anesthesia Type: general      Patient location during evaluation: PACU  Patient participation: Yes- Able to Participate  Level of consciousness: awake and alert  Post-procedure vital signs: reviewed and stable  Pain management: adequate  Airway patency: patent    PONV status at discharge: No PONV  Anesthetic complications: no      Cardiovascular status: hemodynamically stable  Respiratory status: unassisted and room air  Hydration status: euvolemic  Follow-up not needed.          Vitals Value Taken Time   /72 08/18/22 1105   Temp 36.4 °C (97.5 °F) 08/18/22 1040   Pulse 64 08/18/22 1105   Resp 18 08/18/22 1105   SpO2 97 % 08/18/22 1105         Event Time   Out of Recovery 11:28:00         Pain/Sascha Score: Modified Sascha Score: 20 (8/18/2022 11:25 AM)

## 2022-08-18 NOTE — TRANSFER OF CARE
"Anesthesia Transfer of Care Note    Patient: Genia Mcnulty    Procedure(s) Performed: Procedure(s) (LRB):  EGD (ESOPHAGOGASTRODUODENOSCOPY) (N/A)    Patient location: PACU    Anesthesia Type: general    Transport from OR: Transported from OR on 2-3 L/min O2 by NC with adequate spontaneous ventilation    Post pain: adequate analgesia    Post assessment: no apparent anesthetic complications and tolerated procedure well    Post vital signs: stable    Level of consciousness: responds to stimulation and sedated    Nausea/Vomiting: no nausea/vomiting    Complications: none    Transfer of care protocol was followed      Last vitals:   Visit Vitals  BP (!) 177/77 (BP Location: Left arm, Patient Position: Lying)   Pulse 67   Temp 37 °C (98.6 °F) (Skin)   Resp (!) 22   Ht 5' 5" (1.651 m)   Wt 71.2 kg (157 lb)   SpO2 100%   Breastfeeding No   BMI 26.13 kg/m²     "

## 2022-08-18 NOTE — ANESTHESIA PREPROCEDURE EVALUATION
08/18/2022  Genia Mcnulty is a 56 y.o., female.      Pre-op Assessment    I have reviewed the Patient Summary Reports.     I have reviewed the Nursing Notes. I have reviewed the NPO Status.   I have reviewed the Medications.     Review of Systems  Anesthesia Hx:  No problems with previous Anesthesia    Social:  Non-Smoker    Hematology/Oncology:  Hematology Normal   Oncology Normal     EENT/Dental:EENT/Dental Normal   Cardiovascular:   Hypertension    Pulmonary:  Pulmonary Normal    Hepatic/GI:   Dysphagia    Musculoskeletal:   Arthritis     Neurological:   Neuromuscular Disease,    Endocrine:  Endocrine Normal    Dermatological:  Skin Normal    Psych:  Psychiatric Normal           Physical Exam  General: Well nourished, Cooperative, Alert and Oriented    Airway:  Mallampati: II   Mouth Opening: Normal  TM Distance: Normal  Neck ROM: Normal ROM    Dental:  Intact        Anesthesia Plan  Type of Anesthesia, risks & benefits discussed:    Anesthesia Type: Gen Natural Airway  Intra-op Monitoring Plan: Standard ASA Monitors  Induction:  IV  Informed Consent: Informed consent signed with the Patient and all parties understand the risks and agree with anesthesia plan.  All questions answered.   ASA Score: 2  Day of Surgery Review of History & Physical: H&P Update referred to the surgeon/provider.    Ready For Surgery From Anesthesia Perspective.     .

## 2022-08-18 NOTE — PLAN OF CARE
Discharge instructions given to pt/, verbalized understanding.  Tolerating PO fluids.  IV removed.  No c/o nausea.  Voided x1.  Wheeled down to   per RN in no distress.  Pt had her own walker in her possession as well as personal wheelchair when discharging.

## 2022-08-18 NOTE — PROVATION PATIENT INSTRUCTIONS
Discharge Summary/Instructions after an Endoscopic Procedure  Patient Name: Genia Davis  Patient MRN: [id number]  Patient YOB: 1965 Thursday, August 18, 2022  Bernadette Bergeron MD  Dear patient,  As a result of recent federal legislation (The Federal Cures Act), you may   receive lab or pathology results from your procedure in your [facility]   account before your physician is able to contact you. Your physician or   their representative will relay the results to you with their   recommendations at their soonest availability.  Thank you,  RESTRICTIONS:  During your procedure today, you received medications for sedation.  These   medications may affect your judgment, balance and coordination.  Therefore,   for 24 hours, you have the following restrictions:   - DO NOT drive a car, operate machinery, make legal/financial decisions,   sign important papers or drink alcohol.    ACTIVITY:  Today: no heavy lifting, straining or running due to procedural   sedation/anesthesia.  The following day: return to full activity including work.  DIET:  Eat and drink normally unless instructed otherwise.     TREATMENT FOR COMMON SIDE EFFECTS:  - Mild abdominal pain, nausea, belching, bloating or excessive gas:  rest,   eat lightly and use a heating pad.  - Sore Throat: treat with throat lozenges and/or gargle with warm salt   water.  - Because air was used during the procedure, expelling large amounts of air   from your rectum or belching is normal.  - If a bowel prep was taken, you may not have a bowel movement for 1-3 days.    This is normal.  SYMPTOMS TO WATCH FOR AND REPORT TO YOUR PHYSICIAN:  1. Abdominal pain or bloating, other than gas cramps.  2. Chest pain.  3. Back pain.  4. Signs of infection such as: chills or fever occurring within 24 hours   after the procedure.  5. Rectal bleeding, which would show as bright red, maroon, or black stools.   (A tablespoon of blood from the rectum is not serious,  especially if   hemorrhoids are present.)  6. Vomiting.  7. Weakness or dizziness.  GO DIRECTLY TO THE NEAREST EMERGENCY ROOM IF YOU HAVE ANY OF THE FOLLOWING:      Difficulty breathing              Chills and/or fever over 101 F   Persistent vomiting and/or vomiting blood   Severe abdominal pain   Severe chest pain   Black, tarry stools   Bleeding- more than one tablespoon   Any other symptom or condition that you feel may need urgent attention  Your doctor recommends these additional instructions:  If any biopsies were taken, your doctors clinic will contact you in 1 to 2   weeks with any results.  - Await pathology results.   - Discharge patient to home (with escort).   - Patient has a contact number available for emergencies.  The signs and   symptoms of potential delayed complications were discussed with the   patient.  Return to normal activities tomorrow.  Written discharge   instructions were provided to the patient.   - Resume previous diet.   - Continue present medications.   -Begin daily PPI x 12 weeks  -Avoid NSAIDs  -Schedule esophagram  For questions, problems or results please call your physician - Bernadette Bergeron MD at Work:  [phone].  [facility] PHONE NUMBER: [phone]  IF A COMPLICATION OR EMERGENCY SITUATION ARISES AND YOU ARE UNABLE TO REACH   YOUR PHYSICIAN - GO DIRECTLY TO THE EMERGENCY ROOM.  Bernadette Bergeron MD  8/18/2022 10:39:08 AM  This report has been verified and signed electronically.  Dear patient,  As a result of recent federal legislation (The Federal Cures Act), you may   receive lab or pathology results from your procedure in your [facility]   account before your physician is able to contact you. Your physician or   their representative will relay the results to you with their   recommendations at their soonest availability.  Thank you,  PROVATION

## 2022-08-19 VITALS
OXYGEN SATURATION: 97 % | WEIGHT: 157 LBS | TEMPERATURE: 98 F | HEIGHT: 65 IN | BODY MASS INDEX: 26.16 KG/M2 | DIASTOLIC BLOOD PRESSURE: 72 MMHG | HEART RATE: 64 BPM | RESPIRATION RATE: 18 BRPM | SYSTOLIC BLOOD PRESSURE: 164 MMHG

## 2022-08-22 ENCOUNTER — PATIENT MESSAGE (OUTPATIENT)
Dept: GASTROENTEROLOGY | Facility: CLINIC | Age: 57
End: 2022-08-22
Payer: COMMERCIAL

## 2022-08-23 ENCOUNTER — LAB VISIT (OUTPATIENT)
Dept: LAB | Facility: CLINIC | Age: 57
End: 2022-08-23
Payer: COMMERCIAL

## 2022-08-23 ENCOUNTER — PATIENT MESSAGE (OUTPATIENT)
Dept: GASTROENTEROLOGY | Facility: CLINIC | Age: 57
End: 2022-08-23
Payer: COMMERCIAL

## 2022-08-23 DIAGNOSIS — D64.9 NORMOCYTIC ANEMIA: Primary | ICD-10-CM

## 2022-08-23 DIAGNOSIS — D64.9 NORMOCYTIC ANEMIA: ICD-10-CM

## 2022-08-23 LAB
FERRITIN SERPL-MCNC: 119 NG/ML (ref 20–300)
FOLATE SERPL-MCNC: 6.7 NG/ML (ref 4–24)
IRON SERPL-MCNC: 46 UG/DL (ref 30–160)
SATURATED IRON: 14 % (ref 20–50)
TOTAL IRON BINDING CAPACITY: 324 UG/DL (ref 250–450)
TRANSFERRIN SERPL-MCNC: 219 MG/DL (ref 200–375)
VIT B12 SERPL-MCNC: 1382 PG/ML (ref 210–950)

## 2022-08-23 PROCEDURE — 84466 ASSAY OF TRANSFERRIN: CPT | Performed by: INTERNAL MEDICINE

## 2022-08-23 PROCEDURE — 82728 ASSAY OF FERRITIN: CPT | Performed by: INTERNAL MEDICINE

## 2022-08-23 PROCEDURE — 82746 ASSAY OF FOLIC ACID SERUM: CPT | Performed by: INTERNAL MEDICINE

## 2022-08-23 PROCEDURE — 82607 VITAMIN B-12: CPT | Performed by: INTERNAL MEDICINE

## 2022-08-24 ENCOUNTER — ANESTHESIA EVENT (OUTPATIENT)
Dept: SURGERY | Facility: HOSPITAL | Age: 57
End: 2022-08-24
Payer: COMMERCIAL

## 2022-08-24 DIAGNOSIS — G56.01 RIGHT CARPAL TUNNEL SYNDROME: Primary | ICD-10-CM

## 2022-08-24 LAB
FINAL PATHOLOGIC DIAGNOSIS: NORMAL
GROSS: NORMAL
Lab: NORMAL

## 2022-08-24 RX ORDER — IBUPROFEN 200 MG
24 TABLET ORAL
Status: CANCELLED | OUTPATIENT
Start: 2022-08-24

## 2022-08-24 RX ORDER — TRAMADOL HYDROCHLORIDE 50 MG/1
50 TABLET ORAL EVERY 6 HOURS PRN
Qty: 20 TABLET | Refills: 0 | Status: SHIPPED | OUTPATIENT
Start: 2022-08-24 | End: 2023-04-21

## 2022-08-24 RX ORDER — GLUCAGON 1 MG
1 KIT INJECTION
Status: CANCELLED | OUTPATIENT
Start: 2022-08-24

## 2022-08-24 RX ORDER — IBUPROFEN 200 MG
16 TABLET ORAL
Status: CANCELLED | OUTPATIENT
Start: 2022-08-24

## 2022-08-25 ENCOUNTER — ANESTHESIA (OUTPATIENT)
Dept: SURGERY | Facility: HOSPITAL | Age: 57
End: 2022-08-25
Payer: COMMERCIAL

## 2022-08-25 ENCOUNTER — HOSPITAL ENCOUNTER (OUTPATIENT)
Facility: HOSPITAL | Age: 57
Discharge: HOME | End: 2022-08-25
Attending: ORTHOPAEDIC SURGERY | Admitting: ORTHOPAEDIC SURGERY
Payer: COMMERCIAL

## 2022-08-25 VITALS
RESPIRATION RATE: 14 BRPM | BODY MASS INDEX: 25.99 KG/M2 | DIASTOLIC BLOOD PRESSURE: 79 MMHG | OXYGEN SATURATION: 98 % | HEART RATE: 66 BPM | TEMPERATURE: 98 F | SYSTOLIC BLOOD PRESSURE: 177 MMHG | HEIGHT: 65 IN | WEIGHT: 156 LBS

## 2022-08-25 DIAGNOSIS — G56.01 RIGHT CARPAL TUNNEL SYNDROME: ICD-10-CM

## 2022-08-25 DIAGNOSIS — Z01.818 PREOPERATIVE TESTING: ICD-10-CM

## 2022-08-25 LAB
CTP QC/QA: YES
SARS-COV-2 AG RESP QL IA.RAPID: NEGATIVE

## 2022-08-25 PROCEDURE — 93010 ELECTROCARDIOGRAM REPORT: CPT | Mod: ,,, | Performed by: GENERAL PRACTICE

## 2022-08-25 PROCEDURE — 76942 ECHO GUIDE FOR BIOPSY: CPT | Mod: 26,,, | Performed by: ANESTHESIOLOGY

## 2022-08-25 PROCEDURE — 71000015 HC POSTOP RECOV 1ST HR: Performed by: ORTHOPAEDIC SURGERY

## 2022-08-25 PROCEDURE — 63600175 PHARM REV CODE 636 W HCPCS

## 2022-08-25 PROCEDURE — 76942 PR U/S GUIDANCE FOR NEEDLE GUIDANCE: ICD-10-PCS | Mod: 26,,, | Performed by: ANESTHESIOLOGY

## 2022-08-25 PROCEDURE — D9220A PRA ANESTHESIA: Mod: ANES,,, | Performed by: ANESTHESIOLOGY

## 2022-08-25 PROCEDURE — 27201423 OPTIME MED/SURG SUP & DEVICES STERILE SUPPLY: Performed by: ORTHOPAEDIC SURGERY

## 2022-08-25 PROCEDURE — 37000008 HC ANESTHESIA 1ST 15 MINUTES: Performed by: ORTHOPAEDIC SURGERY

## 2022-08-25 PROCEDURE — 64415 PR NERVE BLOCK INJ, ANES/STEROID, BRACHIAL PLEXUS, INCL IMAG GUIDANCE: ICD-10-PCS | Mod: 59,RT,, | Performed by: ANESTHESIOLOGY

## 2022-08-25 PROCEDURE — 63600175 PHARM REV CODE 636 W HCPCS: Performed by: PHYSICIAN ASSISTANT

## 2022-08-25 PROCEDURE — 64415 NJX AA&/STRD BRCH PLXS IMG: CPT | Performed by: ANESTHESIOLOGY

## 2022-08-25 PROCEDURE — 63600175 PHARM REV CODE 636 W HCPCS: Performed by: NURSE ANESTHETIST, CERTIFIED REGISTERED

## 2022-08-25 PROCEDURE — 37000009 HC ANESTHESIA EA ADD 15 MINS: Performed by: ORTHOPAEDIC SURGERY

## 2022-08-25 PROCEDURE — 36000706: Performed by: ORTHOPAEDIC SURGERY

## 2022-08-25 PROCEDURE — D9220A PRA ANESTHESIA: ICD-10-PCS | Mod: CRNA,,, | Performed by: NURSE ANESTHETIST, CERTIFIED REGISTERED

## 2022-08-25 PROCEDURE — 63600175 PHARM REV CODE 636 W HCPCS: Performed by: ANESTHESIOLOGY

## 2022-08-25 PROCEDURE — 36000707: Performed by: ORTHOPAEDIC SURGERY

## 2022-08-25 PROCEDURE — 71000033 HC RECOVERY, INTIAL HOUR: Performed by: ORTHOPAEDIC SURGERY

## 2022-08-25 PROCEDURE — 25000003 PHARM REV CODE 250: Performed by: PHYSICIAN ASSISTANT

## 2022-08-25 PROCEDURE — 64415 NJX AA&/STRD BRCH PLXS IMG: CPT | Mod: 59,RT,, | Performed by: ANESTHESIOLOGY

## 2022-08-25 PROCEDURE — 93005 ELECTROCARDIOGRAM TRACING: CPT

## 2022-08-25 PROCEDURE — 29848 WRIST ENDOSCOPY/SURGERY: CPT | Mod: RT,,, | Performed by: ORTHOPAEDIC SURGERY

## 2022-08-25 PROCEDURE — 25000003 PHARM REV CODE 250: Performed by: ANESTHESIOLOGY

## 2022-08-25 PROCEDURE — 99900103 DSU ONLY-NO CHARGE-INITIAL HR (STAT): Performed by: ORTHOPAEDIC SURGERY

## 2022-08-25 PROCEDURE — C9290 INJ, BUPIVACAINE LIPOSOME: HCPCS | Performed by: ANESTHESIOLOGY

## 2022-08-25 PROCEDURE — 25000003 PHARM REV CODE 250: Performed by: ORTHOPAEDIC SURGERY

## 2022-08-25 PROCEDURE — D9220A PRA ANESTHESIA: ICD-10-PCS | Mod: ANES,,, | Performed by: ANESTHESIOLOGY

## 2022-08-25 PROCEDURE — D9220A PRA ANESTHESIA: Mod: CRNA,,, | Performed by: NURSE ANESTHETIST, CERTIFIED REGISTERED

## 2022-08-25 PROCEDURE — 29848 PR WRIST ARTHROSCOP,RELEASE XVERS LIG: ICD-10-PCS | Mod: RT,,, | Performed by: ORTHOPAEDIC SURGERY

## 2022-08-25 PROCEDURE — 99900104 DSU ONLY-NO CHARGE-EA ADD'L HR (STAT): Performed by: ORTHOPAEDIC SURGERY

## 2022-08-25 PROCEDURE — 27200750 HC INSULATED NEEDLE/ STIMUPLEX: Performed by: ANESTHESIOLOGY

## 2022-08-25 PROCEDURE — 71000039 HC RECOVERY, EACH ADD'L HOUR: Performed by: ORTHOPAEDIC SURGERY

## 2022-08-25 PROCEDURE — 93010 EKG 12-LEAD: ICD-10-PCS | Mod: ,,, | Performed by: GENERAL PRACTICE

## 2022-08-25 PROCEDURE — 25000003 PHARM REV CODE 250: Performed by: NURSE ANESTHETIST, CERTIFIED REGISTERED

## 2022-08-25 RX ORDER — DIPHENHYDRAMINE HYDROCHLORIDE 50 MG/ML
25 INJECTION INTRAMUSCULAR; INTRAVENOUS EVERY 6 HOURS PRN
Status: DISCONTINUED | OUTPATIENT
Start: 2022-08-25 | End: 2022-08-25 | Stop reason: HOSPADM

## 2022-08-25 RX ORDER — MIDAZOLAM HYDROCHLORIDE 1 MG/ML
INJECTION INTRAMUSCULAR; INTRAVENOUS
Status: DISCONTINUED | OUTPATIENT
Start: 2022-08-25 | End: 2022-08-25

## 2022-08-25 RX ORDER — ACETAMINOPHEN 10 MG/ML
INJECTION, SOLUTION INTRAVENOUS
Status: DISCONTINUED | OUTPATIENT
Start: 2022-08-25 | End: 2022-08-25

## 2022-08-25 RX ORDER — MEPERIDINE HYDROCHLORIDE 50 MG/ML
12.5 INJECTION INTRAMUSCULAR; INTRAVENOUS; SUBCUTANEOUS ONCE AS NEEDED
Status: DISCONTINUED | OUTPATIENT
Start: 2022-08-25 | End: 2022-08-25 | Stop reason: HOSPADM

## 2022-08-25 RX ORDER — HYDROMORPHONE HYDROCHLORIDE 2 MG/ML
0.2 INJECTION, SOLUTION INTRAMUSCULAR; INTRAVENOUS; SUBCUTANEOUS EVERY 5 MIN PRN
Status: DISCONTINUED | OUTPATIENT
Start: 2022-08-25 | End: 2022-08-25 | Stop reason: HOSPADM

## 2022-08-25 RX ORDER — SODIUM CHLORIDE 0.9 % (FLUSH) 0.9 %
3 SYRINGE (ML) INJECTION
Status: DISCONTINUED | OUTPATIENT
Start: 2022-08-25 | End: 2022-08-25 | Stop reason: HOSPADM

## 2022-08-25 RX ORDER — BUPIVACAINE HYDROCHLORIDE 5 MG/ML
INJECTION, SOLUTION EPIDURAL; INTRACAUDAL
Status: DISCONTINUED | OUTPATIENT
Start: 2022-08-25 | End: 2022-08-25

## 2022-08-25 RX ORDER — CEFAZOLIN SODIUM 2 G/50ML
2 SOLUTION INTRAVENOUS
Status: COMPLETED | OUTPATIENT
Start: 2022-08-25 | End: 2022-08-25

## 2022-08-25 RX ORDER — MUPIROCIN 20 MG/G
OINTMENT TOPICAL
Status: DISCONTINUED | OUTPATIENT
Start: 2022-08-25 | End: 2022-08-25 | Stop reason: HOSPADM

## 2022-08-25 RX ORDER — SODIUM CHLORIDE 0.9 % (FLUSH) 0.9 %
3 SYRINGE (ML) INJECTION EVERY 8 HOURS
Status: DISCONTINUED | OUTPATIENT
Start: 2022-08-25 | End: 2022-08-25 | Stop reason: HOSPADM

## 2022-08-25 RX ORDER — ONDANSETRON 4 MG/1
8 TABLET, ORALLY DISINTEGRATING ORAL EVERY 8 HOURS PRN
Status: DISCONTINUED | OUTPATIENT
Start: 2022-08-25 | End: 2022-08-25 | Stop reason: HOSPADM

## 2022-08-25 RX ORDER — PROPOFOL 10 MG/ML
INJECTION, EMULSION INTRAVENOUS
Status: DISCONTINUED | OUTPATIENT
Start: 2022-08-25 | End: 2022-08-25

## 2022-08-25 RX ORDER — ONDANSETRON HYDROCHLORIDE 2 MG/ML
INJECTION, SOLUTION INTRAMUSCULAR; INTRAVENOUS
Status: DISCONTINUED | OUTPATIENT
Start: 2022-08-25 | End: 2022-08-25

## 2022-08-25 RX ORDER — FENTANYL CITRATE 50 UG/ML
INJECTION, SOLUTION INTRAMUSCULAR; INTRAVENOUS
Status: DISCONTINUED | OUTPATIENT
Start: 2022-08-25 | End: 2022-08-25

## 2022-08-25 RX ORDER — HYDROMORPHONE HYDROCHLORIDE 2 MG/ML
0.5 INJECTION, SOLUTION INTRAMUSCULAR; INTRAVENOUS; SUBCUTANEOUS
Status: DISCONTINUED | OUTPATIENT
Start: 2022-08-25 | End: 2022-08-25 | Stop reason: HOSPADM

## 2022-08-25 RX ORDER — PROPOFOL 10 MG/ML
VIAL (ML) INTRAVENOUS CONTINUOUS PRN
Status: DISCONTINUED | OUTPATIENT
Start: 2022-08-25 | End: 2022-08-25

## 2022-08-25 RX ORDER — DEXAMETHASONE SODIUM PHOSPHATE 4 MG/ML
INJECTION, SOLUTION INTRA-ARTICULAR; INTRALESIONAL; INTRAMUSCULAR; INTRAVENOUS; SOFT TISSUE
Status: DISCONTINUED | OUTPATIENT
Start: 2022-08-25 | End: 2022-08-25

## 2022-08-25 RX ORDER — OXYCODONE HYDROCHLORIDE 5 MG/1
5 TABLET ORAL
Status: DISCONTINUED | OUTPATIENT
Start: 2022-08-25 | End: 2022-08-25 | Stop reason: HOSPADM

## 2022-08-25 RX ORDER — LIDOCAINE HCL/PF 100 MG/5ML
SYRINGE (ML) INTRAVENOUS
Status: DISCONTINUED | OUTPATIENT
Start: 2022-08-25 | End: 2022-08-25

## 2022-08-25 RX ORDER — ONDANSETRON 2 MG/ML
4 INJECTION INTRAMUSCULAR; INTRAVENOUS DAILY PRN
Status: DISCONTINUED | OUTPATIENT
Start: 2022-08-25 | End: 2022-08-25 | Stop reason: HOSPADM

## 2022-08-25 RX ORDER — OXYCODONE HYDROCHLORIDE 10 MG/1
10 TABLET ORAL EVERY 4 HOURS PRN
Status: DISCONTINUED | OUTPATIENT
Start: 2022-08-25 | End: 2022-08-25 | Stop reason: HOSPADM

## 2022-08-25 RX ORDER — FENTANYL CITRATE 50 UG/ML
25 INJECTION, SOLUTION INTRAMUSCULAR; INTRAVENOUS EVERY 5 MIN PRN
Status: DISCONTINUED | OUTPATIENT
Start: 2022-08-25 | End: 2022-08-25 | Stop reason: HOSPADM

## 2022-08-25 RX ADMIN — PROPOFOL 50 MCG/KG/MIN: 10 INJECTION, EMULSION INTRAVENOUS at 09:08

## 2022-08-25 RX ADMIN — MIDAZOLAM HYDROCHLORIDE 2 MG: 1 INJECTION, SOLUTION INTRAMUSCULAR; INTRAVENOUS at 08:08

## 2022-08-25 RX ADMIN — FENTANYL CITRATE 50 MCG: 50 INJECTION, SOLUTION INTRAMUSCULAR; INTRAVENOUS at 08:08

## 2022-08-25 RX ADMIN — BUPIVACAINE HYDROCHLORIDE 12.5 ML: 5 INJECTION, SOLUTION EPIDURAL; INTRACAUDAL; PERINEURAL at 08:08

## 2022-08-25 RX ADMIN — PROPOFOL 20 MG: 10 INJECTION, EMULSION INTRAVENOUS at 09:08

## 2022-08-25 RX ADMIN — ONDANSETRON 4 MG: 2 INJECTION, SOLUTION INTRAMUSCULAR; INTRAVENOUS at 09:08

## 2022-08-25 RX ADMIN — MUPIROCIN: 20 OINTMENT TOPICAL at 07:08

## 2022-08-25 RX ADMIN — DEXAMETHASONE SODIUM PHOSPHATE 4 MG: 4 INJECTION, SOLUTION INTRA-ARTICULAR; INTRALESIONAL; INTRAMUSCULAR; INTRAVENOUS; SOFT TISSUE at 09:08

## 2022-08-25 RX ADMIN — SODIUM CHLORIDE, SODIUM GLUCONATE, SODIUM ACETATE, POTASSIUM CHLORIDE, MAGNESIUM CHLORIDE, SODIUM PHOSPHATE, DIBASIC, AND POTASSIUM PHOSPHATE: .53; .5; .37; .037; .03; .012; .00082 INJECTION, SOLUTION INTRAVENOUS at 07:08

## 2022-08-25 RX ADMIN — GLYCOPYRROLATE 0.2 MG: 0.2 INJECTION, SOLUTION INTRAMUSCULAR; INTRAVITREAL at 08:08

## 2022-08-25 RX ADMIN — BUPIVACAINE 12.5 ML: 13.3 INJECTION, SUSPENSION, LIPOSOMAL INFILTRATION at 08:08

## 2022-08-25 RX ADMIN — ACETAMINOPHEN 1000 MG: 10 INJECTION, SOLUTION INTRAVENOUS at 09:08

## 2022-08-25 RX ADMIN — LIDOCAINE HYDROCHLORIDE 100 MG: 20 INJECTION INTRAVENOUS at 09:08

## 2022-08-25 RX ADMIN — CEFAZOLIN SODIUM 2 G: 2 SOLUTION INTRAVENOUS at 09:08

## 2022-08-25 NOTE — H&P
[facility]   History & Physical   Orthopedics   SUBJECTIVE:    Chief Complaint: Right hand numbness   Referring Provider: Gregoria Robbins MD Dr. Dunbar is the supervising physician for this encounter/patient   History of Present Illness:   Patient is a 56 y.o. right hand dominant female who presents today with complaints of right hand numbness for several years. Numbness will occur during the day with repetitive or extensive activity, unsure if this wakes her at night. Her back issues have taken priority, now she is ready to pursue treatment for this. She has EMG with Dr. Glez performed recently, she wears carpal tunnel brace. No surgical history on the hands. She does ambulate with a walker due to her back issues.   The patient is a/an .   Onset of symptoms/DOI was years.   Symptoms are aggravated by activity and movement.   Symptoms are alleviated by rest and immobilization.   Symptoms consist of numbness/tingling.   The patient rates their pain as a 2/10.   Attempted treatment(s) and/or interventions include activity modifications, rest, immobilization.   The patient denies any fevers, chills, N/V, D/C and presents for evaluation.        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 OR [address] [place] [address]; Service: Plastics; Laterality: N/A;           Review of patient's allergies indicates:   Allergen Reactions    Hydrochlorothiazide Other (See Comments)     Muscle pain    Losartan Other (See Comments)     Elevated b/p with anxiety    Promethazine Other (See Comments)     Other reaction(s): agitation   Other  reaction(s): Hives     Social History         Social History Narrative    Not on file           Family History   Problem Relation Age of Onset    Breast cancer Maternal Aunt     Anesthesia problems Neg Hx     Clotting disorder Neg Hx        Current Outpatient Medications:    b complex vitamins tablet, Take 1 tablet by mouth once daily., Disp: , Rfl:    celecoxib (CELEBREX) 200 MG capsule, Take 1 capsule (200 mg total) by mouth 2 (two) times daily., Disp: 60 capsule, Rfl: 5    lisinopriL (PRINIVIL,ZESTRIL) 20 MG tablet, Take 1 tablet (20 mg total) by mouth once daily., Disp: 90 tablet, Rfl: 3    oxyCODONE-acetaminophen (PERCOCET)  mg per tablet, Take 1 tablet by mouth every 6 (six) hours as needed for Pain. Greater than 7 day quantity Medically Necessary, Disp: 90 tablet, Rfl: 0   Review of Systems:   Constitutional: no fever or chills   Eyes: no visual changes   ENT: no nasal congestion or sore throat   Respiratory: no cough or shortness of breath   Cardiovascular: no chest pain   Gastrointestinal: no nausea or vomiting, tolerating diet   Musculoskeletal: soreness and numbness/tingling   OBJECTIVE:    Vital Signs (Most Recent):   Vitals                           Body mass index is 27.59 kg/m².   Physical Exam:   Constitutional: The patient appears well-developed and well-nourished. No distress.   Skin: No lesions appreciated   Head: Normocephalic and atraumatic.   Nose: Nose normal.   Ears: No deformities seen   Eyes: Conjunctivae and EOM are normal.   Neck: No tracheal deviation present.   Cardiovascular: Normal rate and intact distal pulses.   Pulmonary/Chest: Effort normal. No respiratory distress.   Abdominal: There is no guarding.   Neurological: The patient is alert.   Psychiatric: The patient has a normal mood and affect.   Right Hand/Wrist Examination:   Observation/Inspection:   Swelling none   Deformity none   Discoloration none   Scars none   Atrophy none   HAND/WRIST EXAMINATION:    Finkelstein's Test Neg   WHAT Test Neg   Snuff box tenderness Neg   Christian's Test Neg   Hook of [place]ate Tenderness Neg   CMC grind Neg   Circumduction test Neg   Neurovascular Exam:   Digits WWP, brisk CR < 3s throughout   NVI motor/LTS to M/R/U nerves, radial pulse 2+   Tinel's Test - Carpal Tunnel Neg   Tinel's Test - Cubital Tunnel Neg   Phalen's Test POS   Median Nerve Compression Test POS   ROM hand full, painless   ROM wrist full, painless     ROM elbow full, painless   Abdomen not guarded   Respirations nonlabored   Perfusion intact   Diagnostic Results:   Imaging - None (Xray down).   EMG - 6/9/22   Impression:   Abnormal examination. There is electrodiagnostic evidence of:   1. Moderate right median mononeuropathy at the wrist with demyelinating features only (carpal tunnel syndrome.)  2. There is no evidence of ulnar neuropathy at the elbow on the right  3. There is no evidence of cervical radiculopathy in the muscles tested of the right upper extremity.  ASSESSMENT/PLAN:    56 y.o. yo female with Right carpal tunnel syndrome   Plan: The patient and I had a thorough discussion today. We discussed the working diagnosis as well as several other potential alternative diagnoses. Treatment options were discussed, both conservative and surgical. Conservative treatment options would include things such as activity modifications, workplace modifications, a period of rest, oral vs topical OTC and prescription anti-inflammatory medications, occupational therapy, splinting/bracing, immobilization, corticosteroid injections, and others. Surgical options were discussed as well.   At this time, the patient would like to proceed with surgery. She is consented for Right endoscopic vs open carpal tunnel release with Dr. Rosen on 8/25/22 in [place]. Case ordered. She will need a volar slab splint postop due to ambulating with the walker, protect her incision.   The patient has not responded to adequate non operative  treatment at this time and/or non operative treatment is not indicated. Thus, the risks, benefits and alternatives to surgery were discussed with the patient in detail. Specific risks include but are not limited to bleeding, infection, vessel and/or nerve damage, pain, numbness, tingling, complex regional pain syndrome, compartment syndrome, failure to return to pre-injury and/or preoperative functional status, scar sensitivity, delayed healing, inability to return to work, pulley injury, tendon injury, bowstringing, partial and/or incomplete relief of symptoms, weakness, persistence of and/or worsening of symptoms, surgical failure, osteomyelitis, amputation, loss of function, stiffness, functional debility, dysfunction, decreased  strength, need for prolonged postoperative rehabilitation, need for further surgery, deep venous thrombosis, pulmonary embolism, arthritis and death. The patient states an understanding and wishes to proceed with surgery. All questions were answered. No guarantees were implied or stated. Written informed consent was obtained.   Should the patient's symptoms worsen, persist, or fail to improve they should return for reevaluation and I would be happy to see them back anytime.

## 2022-08-25 NOTE — BRIEF OP NOTE
[facility]  Brief Operative Note    Surgery Date: 8/25/2022     Surgeon(s) and Role:     * Barron Rosen MD - Primary    Assisting Surgeon: None    Pre-op Diagnosis:  Right carpal tunnel syndrome [G56.01]    Post-op Diagnosis:  Post-Op Diagnosis Codes:     * Right carpal tunnel syndrome [G56.01]    Procedure(s) (LRB):  RELEASE, CARPAL TUNNEL, ENDOSCOPIC - RIGHT (Right)    Anesthesia: General/MAC    Operative Findings: see op note    Estimated Blood Loss: * No values recorded between 8/25/2022  9:12 AM and 8/25/2022  9:28 AM *         Specimens:   Specimen (24h ago, onward)            None            Discharge Note    OUTCOME: Patient tolerated treatment/procedure well without complication and is now ready for discharge.    DISPOSITION: Home or Self Care    FINAL DIAGNOSIS:  <principal problem not specified>    FOLLOWUP: In clinic    DISCHARGE INSTRUCTIONS:    Discharge Procedure Orders   Diet general     Leave dressing on - Keep it clean, dry, and intact until clinic visit     Call MD for:  temperature >100.4     Call MD for:  persistent nausea and vomiting     Call MD for:  severe uncontrolled pain     Call MD for:  difficulty breathing, headache or visual disturbances     Call MD for:  redness, tenderness, or signs of infection (pain, swelling, redness, odor or green/yellow discharge around incision site)     Call MD for:  hives     Call MD for:  persistent dizziness or light-headedness     Call MD for:  extreme fatigue

## 2022-08-25 NOTE — TRANSFER OF CARE
"Anesthesia Transfer of Care Note    Patient: Genia Mcnulty    Procedure(s) Performed: Procedure(s) (LRB):  RELEASE, CARPAL TUNNEL, ENDOSCOPIC - RIGHT (Right)    Patient location: PACU    Anesthesia Type: general    Transport from OR: Transported from OR on 2-3 L/min O2 by NC with adequate spontaneous ventilation    Post pain: adequate analgesia    Post assessment: no apparent anesthetic complications and tolerated procedure well    Post vital signs: stable    Level of consciousness: sedated and responds to stimulation    Nausea/Vomiting: no nausea/vomiting    Complications: none    Transfer of care protocol was followed      Last vitals:   Visit Vitals  BP (!) 173/77 (BP Location: Left arm, Patient Position: Lying)   Pulse 71   Temp 36.8 °C (98.3 °F) (Temporal)   Resp 16   Ht 5' 5" (1.651 m)   Wt 70.8 kg (156 lb)   SpO2 99%   Breastfeeding No   BMI 25.96 kg/m²     "

## 2022-08-25 NOTE — DISCHARGE INSTRUCTIONS
Post op instructions for prevention of DVT  What is deep vein thrombosis?  Deep vein thrombosis (DVT) is the medical term for blood clots in the deep veins of the leg.  These blood clots can be dangerous.  A DVT can block a blood vessel and keep blood from getting where it needs to go.  Another problem is that the clot can travel to other parts of the body such as the lungs.  A clot that travels to the lungs is called a pulmonary embolus (PE) and can cause serious problems with breathing which can lead to death.  Am I at risk for DVT/PE?  If you are not very active, you are at risk of DVT.  Anyone confined to bed, sitting for long periods of time, recovering from surgery, etc. increases the risk of DVT.  Other risk factors are cancer diagnosis, certain medications, estrogen replacement in any form,older age, obesity, pregnancy, smoking, history of clotting disorders, and dehydration.  How will I know if I have a DVT?  Swelling in the lower leg  Pain  Warmth, redness, hardness or bulging of the vein  If you have any of these symptoms, call your doctors office right away.  Some people will not have any symptoms until the clot moves to the lungs.  What are the symptoms of a PE?  Panting, shortness of breath, or trouble breathing  Sharp, knife-like chest pain when you breathe  Coughing or coughing up blood  Rapid heartbeat  If you have any of these symptoms or get worse quickly, call 911 for emergency treatment.  How can I prevent a DVT?  Avoid long periods of inactivity and dont cross your legs--get up and walk around every hour or so.  Stay active--walking after surgery is highly encouraged.  This means you should get out of the house and walk in the neighborhood.  Going up and down stairs will not impair healing (unless advised against such activity by your doctor).    Drink plenty of noncaffeinated, nonalcoholic fluids each day to prevent dehydration.  Wear special support stockings, if they have been advised by  your doctor.  If you travel, stop at least once an hour and walk around.  Avoid smoking (assistance with stopping is available through your healthcare provider)  Always notify your doctor if you are not able to follow the post operative instructions that are given to you at the time of discharge.  It may be necessary to prescribe one of the medications available to prevent DVT. Using an Incentive Spirometer    An incentive spirometer is a device that helps you do deep breathing exercises. These exercises expand your lungs, aid in circulation, and help prevent pneumonia. Deep breathing exercises also help you breathe better and improve the function of your lungs by:  Keeping your lungs clear  Strengthening your breathing muscles  Helping prevent respiratory complications or problems  The incentive spirometer gives you a way to take an active part in recover. A nurse or therapist will teach you breathing exercises. To do these exercises, you will breathe in through your mouth and not your nose. The incentive spirometer only works correctly if you breathe in through your mouth.  Steps to clear lungs  Step 1. Exhale normally. Then, inhale normally.  Relax and breathe out.  Step 2. Place your lips tightly around the mouthpiece.  Make sure the device is upright and not tilted.  Step 3. Inhale as much air as you can through the mouthpiece (don't breath through your nose).  Inhale slowly and deeply.  Hold your breath long enough to keep the balls or disk raised for at least 3 to 5 seconds, or as instructed by your healthcare provider.  Some spirometers have an indicator to let you know that you are breathing in too fast. If the indicator goes off, breathe in more slowly.  Step 4. Repeat the exercise regularly.  Do this exercise every hour while you're awake, or as instructed by your healthcare provider.  If you were taught deep breathing and coughing exercises, do them regularly as instructed by your healthcare provider.

## 2022-08-25 NOTE — ANESTHESIA PROCEDURE NOTES
Peripheral Block    Patient location during procedure: pre-op   Block not for primary anesthetic.  Reason for block: at surgeon's request and post-op pain management   Post-op Pain Location: right arm   Start time: 8/25/2022 8:35 AM  Timeout: 8/25/2022 8:35 AM   End time: 8/25/2022 8:40 AM    Staffing  Authorizing Provider: Patrick Waddell MD  Performing Provider: Patrick Waddell MD    Preanesthetic Checklist  Completed: patient identified, IV checked, site marked, risks and benefits discussed, surgical consent, monitors and equipment checked, pre-op evaluation and timeout performed  Peripheral Block  Patient position: supine  Prep: ChloraPrep  Patient monitoring: heart rate, cardiac monitor, continuous pulse ox, continuous capnometry and frequent blood pressure checks  Block type: supraclavicular  Laterality: right  Injection technique: single shot  Needle  Needle type: Stimuplex   Needle gauge: 22 G  Needle length: 2 in  Needle localization: anatomical landmarks and ultrasound guidance   -ultrasound image captured on disc.  Assessment  Injection assessment: negative aspiration, negative parasthesia and local visualized surrounding nerve  Paresthesia pain: none  Heart rate change: no  Slow fractionated injection: yes    Medications:    Medications: bupivacaine (pf) (MARCAINE) injection 0.5% - Perineural   12.5 mL - 8/25/2022 8:40:00 AM    Additional Notes  VSS.  [facility] RN monitoring vitals throughout procedure.  Patient tolerated procedure well.

## 2022-08-25 NOTE — ANESTHESIA PREPROCEDURE EVALUATION
08/25/2022  Genia Mcnulty is a 56 y.o., female.      Pre-op Assessment    I have reviewed the Patient Summary Reports.     I have reviewed the Nursing Notes. I have reviewed the NPO Status.   I have reviewed the Medications.     Review of Systems  Anesthesia Hx:  No problems with previous Anesthesia    Social:  Non-Smoker    Hematology/Oncology:         -- Cancer in past history:   Cardiovascular:   Hypertension    Musculoskeletal:   Arthritis  Right carpal tunnel syndrome  Spine Disorders: lumbar    Neurological:   Neuromuscular Disease,    Dermatological:  Skin Normal    Psych:  Psychiatric Normal           Physical Exam  General: Well nourished, Cooperative, Alert and Oriented    Airway:  Mallampati: II   Mouth Opening: Normal  TM Distance: Normal  Neck ROM: Normal ROM    Dental:  Intact        Anesthesia Plan  Type of Anesthesia, risks & benefits discussed:    Anesthesia Type: MAC  Intra-op Monitoring Plan: Standard ASA Monitors  Post Op Pain Control Plan: multimodal analgesia, IV/PO Opioids PRN and peripheral nerve block  Informed Consent: Informed consent signed with the Patient and all parties understand the risks and agree with anesthesia plan.  All questions answered.   ASA Score: 2  Day of Surgery Review of History & Physical: H&P Update referred to the surgeon/provider.    Ready For Surgery From Anesthesia Perspective.     .

## 2022-08-25 NOTE — PLAN OF CARE
Patient transferred to postop at this time.  AAOX3.  NAD noted.  Tolerating po intake well with no complaints of nausea/vomiting.  Pain 0/10.  Dressing remains clean, dry and intact to right arm.

## 2022-08-25 NOTE — ANESTHESIA POSTPROCEDURE EVALUATION
Anesthesia Post Evaluation    Patient: Genia Mcnulty    Procedure(s) Performed: Procedure(s) (LRB):  RELEASE, CARPAL TUNNEL, ENDOSCOPIC - RIGHT (Right)    Final Anesthesia Type: MAC      Patient location during evaluation: PACU  Patient participation: Yes- Able to Participate  Level of consciousness: awake and alert  Post-procedure vital signs: reviewed and stable  Pain management: adequate  Airway patency: patent    PONV status at discharge: No PONV  Anesthetic complications: no      Cardiovascular status: hemodynamically stable  Respiratory status: unassisted and room air  Hydration status: euvolemic  Follow-up not needed.          Vitals Value Taken Time   /79 08/25/22 1032   Temp 36.4 °C (97.5 °F) 08/25/22 1026   Pulse 66 08/25/22 1032   Resp 14 08/25/22 1032   SpO2 98 % 08/25/22 1032         Event Time   Out of Recovery 10:32:00         Pain/Sascha Score: Sascha Score: 10 (8/25/2022 10:30 AM)

## 2022-08-25 NOTE — OP NOTE
ORTHOPEDIC SURGERY OPERATIVE NOTE    SERVICE: ORTHOPEDICS     DATE OF PROCEDURE: 8/25/2022     SURGEON: Barron Rosen M.D.    ASSISTANT: SMA Claude    PREOPERATIVE DIAGNOSIS: Right sided carpal tunnel syndrome    POSTOPERATIVE DIAGNOSIS: Right sided carpal tunnel syndrome    PROCEDURE PERFORMED: Endoscopic Right sided carpal tunnel release, CPT code 96581    ANESTHESIA: Regional/MAC    ESTIMATED BLOOD LOSS: Minimal           SPECIMENS: None    COMPLICATIONS: None              CONDITION: Stable    IV FLUIDS: Crystalloid per anesthesia    IMPLANTS: None    TOURNIQUET TIME: 12 minutes at 250 mmHg    INDICATIONS FOR PROCEDURE: Genia Mcnulty is a 56 y.o. female who presented to clinic with findings and workup consistent with carpal tunnel syndrome of the right hand.  The patient had not responded to nonoperative management and wished to proceed with surgical intervention.  The risks, benefits and alternatives to surgery were discussed with the patient at great length and in great detail.  Specific risks discussed include but are not limited to bleeding, infection, vessel damage, nerve damage, pain, numbness, tingling, weakness, stiffness, complex regional pain syndrome, hardware/surgical failure, need for further surgery, DVT, PE, arthritis, scar sensitivity, delayed healing, need for prolonged postoperative rehabilitation, and death amongst others.  The patient stated an understanding of the risks and wished to proceed with surgery.   All questions were answered.  No guarantees were implied or stated.  Informed consent was obtained.    PROCEDURE IN DETAIL: With the patient's participation in the preoperative holding area, the right upper extremity was marked as the surgical site. Preoperative checks were completed and consents were verified.  Regional anesthesia was administered.  The patient was brought to the Operating Room and placed in supine position.  A well-padded nonsterile tourniquet was placed on the  upper arm.  The right arm was then prepped and draped in the usual sterile fashion.  Timeout was called for to verify the correct site, procedure and patient.  All were in agreement and we proceeded.  MAC anesthesia was introduced.  Esmarch was utilized to exsanguinate the arm and tourniquet was insufflated to 250mmHg where it remained for the duration of the procedure.    Next, a small 1 cm transverse incision was made in line with a proximal wrist flexion crease beginning radially at the palmaris longus and extending ulnarly for 1 cm.  Dissection was continued to level of antebrachial fascia.  This was transversely incised in line with the skin incision.  A narrow double skin hook was then utilized to elevate the distal most aspect of the incision to gain access to the carpal tunnel.  A series of small and large dilators were utilized to dilate our planned path with the endoscope.  A synovial elevator was then utilized to free synovium from the undersurface of the transverse carpal ligament.  Washboard effect was confirmed.  At this time, the Arthrex endoscopic carpal tunnel system was introduced into the incision.  Confirmation of placement within the carpal tunnel was confirmed.  The endoscope was advanced the distal aspect of the transverse carpal ligament and the distal fat was visualized.  The median nerve was identified radial to the endoscope and was protected throughout the duration of the procedure.  Excellent visualization of the transverse carpal ligament along its entire length was confirmed with no interposed soft tissue.  I then began my division of the transverse carpal ligament from distally and extending in a proximal direction.  The knife was deployed and complete full-thickness transection of the transverse carpal ligament was performed beginning distally and working my way more proximally.  Complete division of the ligament was performed. Care was taken distally to ensure not to cut past  Kaplans Cardinal Line or injure the superficial palmar arch. The distal fat was visualized at the distalmost aspect of the ligament division. Tension and pressure within the carpal tunnel was dramatically improved and decreased status post ligament division.  The endoscope was then once again advanced more distally and completion of the division was confirmed.  Fat protruded through the divided ligament throughout.  The median nerve was identified along the length of our ligament division and remained intact and uninjured.  The endoscope was then removed from the wound and the antebrachial forearm fascia was divided in line with the ligament division by hand with tenotomy scissors.  The wound was then irrigated with copious amounts of sterile saline.  Skin was closed with 4 0 nylon suture.        Sterile dressings were applied consisting of Xeroform 4 x 4 gauze cast padding and 2 in Ace wrap to the hand.  Tourniquet was deflated and brisk cap refill in [place] throughout the operative upper extremity.  There was no significant bleeding.      DISPOSITION: The patient will be discharged home with followup in approximately 2 weeks for suture removal.  Early active range of motion in the acute postoperative period was discussed and encouraged.  They are to keep the dressing clean, dry, and intact until that time.

## 2022-08-29 ENCOUNTER — TELEPHONE (OUTPATIENT)
Dept: ORTHOPEDICS | Facility: CLINIC | Age: 57
End: 2022-08-29
Payer: COMMERCIAL

## 2022-08-29 ENCOUNTER — PATIENT MESSAGE (OUTPATIENT)
Dept: ORTHOPEDICS | Facility: CLINIC | Age: 57
End: 2022-08-29
Payer: COMMERCIAL

## 2022-08-31 ENCOUNTER — OFFICE VISIT (OUTPATIENT)
Dept: ORTHOPEDICS | Facility: CLINIC | Age: 57
End: 2022-08-31
Payer: COMMERCIAL

## 2022-08-31 VITALS — WEIGHT: 156.06 LBS | HEIGHT: 65 IN | BODY MASS INDEX: 26 KG/M2

## 2022-08-31 DIAGNOSIS — G56.01 RIGHT CARPAL TUNNEL SYNDROME: Primary | ICD-10-CM

## 2022-08-31 DIAGNOSIS — Z98.890 POSTOPERATIVE STATE: ICD-10-CM

## 2022-08-31 DIAGNOSIS — L23.9 ALLERGIC DERMATITIS: ICD-10-CM

## 2022-08-31 PROCEDURE — 1159F PR MEDICATION LIST DOCUMENTED IN MEDICAL RECORD: ICD-10-PCS | Mod: CPTII,S$GLB,, | Performed by: PHYSICIAN ASSISTANT

## 2022-08-31 PROCEDURE — 4010F PR ACE/ARB THEARPY RXD/TAKEN: ICD-10-PCS | Mod: CPTII,S$GLB,, | Performed by: PHYSICIAN ASSISTANT

## 2022-08-31 PROCEDURE — 1159F MED LIST DOCD IN RCRD: CPT | Mod: CPTII,S$GLB,, | Performed by: PHYSICIAN ASSISTANT

## 2022-08-31 PROCEDURE — 3008F PR BODY MASS INDEX (BMI) DOCUMENTED: ICD-10-PCS | Mod: CPTII,S$GLB,, | Performed by: PHYSICIAN ASSISTANT

## 2022-08-31 PROCEDURE — 99024 POSTOP FOLLOW-UP VISIT: CPT | Mod: S$GLB,,, | Performed by: PHYSICIAN ASSISTANT

## 2022-08-31 PROCEDURE — 4010F ACE/ARB THERAPY RXD/TAKEN: CPT | Mod: CPTII,S$GLB,, | Performed by: PHYSICIAN ASSISTANT

## 2022-08-31 PROCEDURE — 3008F BODY MASS INDEX DOCD: CPT | Mod: CPTII,S$GLB,, | Performed by: PHYSICIAN ASSISTANT

## 2022-08-31 PROCEDURE — 99024 PR POST-OP FOLLOW-UP VISIT: ICD-10-PCS | Mod: S$GLB,,, | Performed by: PHYSICIAN ASSISTANT

## 2022-08-31 PROCEDURE — 99999 PR PBB SHADOW E&M-EST. PATIENT-LVL III: ICD-10-PCS | Mod: PBBFAC,,, | Performed by: PHYSICIAN ASSISTANT

## 2022-08-31 PROCEDURE — 99999 PR PBB SHADOW E&M-EST. PATIENT-LVL III: CPT | Mod: PBBFAC,,, | Performed by: PHYSICIAN ASSISTANT

## 2022-08-31 NOTE — PROGRESS NOTES
Dr. Rosen is the supervising physician for this encounter/patient    Genia Mcnulty presents for post-operative evaluation.  The patient is now 6 days s/p Right endoscopic carpal tunnel release with Dr. Rosen on 8/25/22.  Overall the patient reports doing well. She is seen today for rash that developed on the right forearm, she reported heat near the wrist under the dressing. She has been taking Benedryl which has been helpful. Denies any pain, no f/c/s.    PE:    AA&O x 4.  NAD  HEENT:  NCAT, sclera nonicteric  Lungs:  Respirations are equal and unlabored.  CV:  2+ bilateral upper and lower extremity pulses.  MSK: The wound is healing well with no signs of erythema or warmth.  There is no drainage.  No clinical signs or symptoms of infection are present.  Healing ecchymosis present to the volar wrist and thenar. Allergic dermatitis noted to the radial wrist and forearm. Full hand/wrist motion. SILT. DNVI.    A/P: Status post above, doing well  1) Allergic dermatitis, appears to be improving. Continue with benedryl daily, hydrocortisone cream to the rash. No concern for infection, wound is healing well. Keep clean/dry and will use bandaid for coverage at this time.  2) Wound care and signs of infection discussed.  3) F/U next week for suture removal.  4) Call with any questions/concerns in the interim      Jamie Russo-DiGeorge PA-C

## 2022-09-07 ENCOUNTER — PATIENT MESSAGE (OUTPATIENT)
Dept: PHYSICAL MEDICINE AND REHAB | Facility: CLINIC | Age: 57
End: 2022-09-07
Payer: COMMERCIAL

## 2022-09-07 ENCOUNTER — OFFICE VISIT (OUTPATIENT)
Dept: ORTHOPEDICS | Facility: CLINIC | Age: 57
End: 2022-09-07
Payer: COMMERCIAL

## 2022-09-07 VITALS — HEIGHT: 65 IN | WEIGHT: 156.06 LBS | BODY MASS INDEX: 26 KG/M2

## 2022-09-07 DIAGNOSIS — Z98.890 POSTOPERATIVE STATE: ICD-10-CM

## 2022-09-07 DIAGNOSIS — G56.01 RIGHT CARPAL TUNNEL SYNDROME: Primary | ICD-10-CM

## 2022-09-07 PROCEDURE — 99024 PR POST-OP FOLLOW-UP VISIT: ICD-10-PCS | Mod: S$GLB,,, | Performed by: PHYSICIAN ASSISTANT

## 2022-09-07 PROCEDURE — 4010F PR ACE/ARB THEARPY RXD/TAKEN: ICD-10-PCS | Mod: CPTII,S$GLB,, | Performed by: PHYSICIAN ASSISTANT

## 2022-09-07 PROCEDURE — 4010F ACE/ARB THERAPY RXD/TAKEN: CPT | Mod: CPTII,S$GLB,, | Performed by: PHYSICIAN ASSISTANT

## 2022-09-07 PROCEDURE — 99024 POSTOP FOLLOW-UP VISIT: CPT | Mod: S$GLB,,, | Performed by: PHYSICIAN ASSISTANT

## 2022-09-07 PROCEDURE — 3008F BODY MASS INDEX DOCD: CPT | Mod: CPTII,S$GLB,, | Performed by: PHYSICIAN ASSISTANT

## 2022-09-07 PROCEDURE — 1159F PR MEDICATION LIST DOCUMENTED IN MEDICAL RECORD: ICD-10-PCS | Mod: CPTII,S$GLB,, | Performed by: PHYSICIAN ASSISTANT

## 2022-09-07 PROCEDURE — 99999 PR PBB SHADOW E&M-EST. PATIENT-LVL III: ICD-10-PCS | Mod: PBBFAC,,, | Performed by: PHYSICIAN ASSISTANT

## 2022-09-07 PROCEDURE — 99999 PR PBB SHADOW E&M-EST. PATIENT-LVL III: CPT | Mod: PBBFAC,,, | Performed by: PHYSICIAN ASSISTANT

## 2022-09-07 PROCEDURE — 1159F MED LIST DOCD IN RCRD: CPT | Mod: CPTII,S$GLB,, | Performed by: PHYSICIAN ASSISTANT

## 2022-09-07 PROCEDURE — 3008F PR BODY MASS INDEX (BMI) DOCUMENTED: ICD-10-PCS | Mod: CPTII,S$GLB,, | Performed by: PHYSICIAN ASSISTANT

## 2022-09-07 NOTE — PROGRESS NOTES
Dr. Rosen is the supervising physician for this encounter/patient    Genia Mcnulty presents for post-operative evaluation.  The patient is now 2 weeks s/p Right endoscopic carpal tunnel release with Dr. Rosen on 8/25/22.  Overall the patient reports doing well. Her rash has completely resolved. She reports 0/10 pain, denies any f/c/s. She reports full resolve of her numbness/tingling.    PE:    AA&O x 4.  NAD  HEENT:  NCAT, sclera nonicteric  Lungs:  Respirations are equal and unlabored.  CV:  2+ bilateral upper and lower extremity pulses.  MSK: The wound is healing well with no signs of erythema or warmth.  There is no drainage.  No clinical signs or symptoms of infection are present. Full hand/wrist motion. SILT. DNVI.    A/P: Status post above, doing well  1) Allergic dermatitis, resolved. Sutures removed today, wound care and signs of infection discussed.  2) Activity as tolerated, she declines OT referral.  3) F/U as needed.  4) Call with any questions/concerns in the interim      Jamie Russo-DiGeorge PA-C

## 2022-09-15 ENCOUNTER — PATIENT MESSAGE (OUTPATIENT)
Dept: ADMINISTRATIVE | Facility: HOSPITAL | Age: 57
End: 2022-09-15
Payer: COMMERCIAL

## 2022-10-07 ENCOUNTER — HOSPITAL ENCOUNTER (OUTPATIENT)
Dept: RADIOLOGY | Facility: HOSPITAL | Age: 57
Discharge: HOME | End: 2022-10-07
Attending: INTERNAL MEDICINE
Payer: COMMERCIAL

## 2022-10-07 DIAGNOSIS — R13.10 DYSPHAGIA: ICD-10-CM

## 2022-10-07 PROCEDURE — 74220 FL ESOPHAGRAM COMPLETE: ICD-10-PCS | Mod: 26,,, | Performed by: RADIOLOGY

## 2022-10-07 PROCEDURE — 25500020 PHARM REV CODE 255: Performed by: INTERNAL MEDICINE

## 2022-10-07 PROCEDURE — 74220 X-RAY XM ESOPHAGUS 1CNTRST: CPT | Mod: 26,,, | Performed by: RADIOLOGY

## 2022-10-07 PROCEDURE — 74220 X-RAY XM ESOPHAGUS 1CNTRST: CPT | Mod: TC

## 2022-10-07 PROCEDURE — A9698 NON-RAD CONTRAST MATERIALNOC: HCPCS | Performed by: INTERNAL MEDICINE

## 2022-10-07 RX ADMIN — BARIUM SULFATE 60 ML: 0.6 SUSPENSION ORAL at 02:10

## 2022-10-10 ENCOUNTER — PATIENT MESSAGE (OUTPATIENT)
Dept: GASTROENTEROLOGY | Facility: CLINIC | Age: 57
End: 2022-10-10
Payer: COMMERCIAL

## 2022-10-11 ENCOUNTER — PATIENT MESSAGE (OUTPATIENT)
Dept: PHYSICAL MEDICINE AND REHAB | Facility: CLINIC | Age: 57
End: 2022-10-11
Payer: COMMERCIAL

## 2022-10-17 ENCOUNTER — PATIENT MESSAGE (OUTPATIENT)
Dept: ADMINISTRATIVE | Facility: HOSPITAL | Age: 57
End: 2022-10-17
Payer: COMMERCIAL

## 2022-10-31 ENCOUNTER — PES CALL (OUTPATIENT)
Dept: ADMINISTRATIVE | Facility: CLINIC | Age: 57
End: 2022-10-31
Payer: COMMERCIAL

## 2022-11-16 ENCOUNTER — PATIENT MESSAGE (OUTPATIENT)
Dept: PHYSICAL MEDICINE AND REHAB | Facility: CLINIC | Age: 57
End: 2022-11-16
Payer: COMMERCIAL

## 2022-11-18 ENCOUNTER — PATIENT MESSAGE (OUTPATIENT)
Dept: PHYSICAL MEDICINE AND REHAB | Facility: CLINIC | Age: 57
End: 2022-11-18
Payer: COMMERCIAL

## 2022-12-19 ENCOUNTER — PATIENT MESSAGE (OUTPATIENT)
Dept: PHYSICAL MEDICINE AND REHAB | Facility: CLINIC | Age: 57
End: 2022-12-19
Payer: COMMERCIAL

## 2022-12-21 ENCOUNTER — PATIENT MESSAGE (OUTPATIENT)
Dept: PHYSICAL MEDICINE AND REHAB | Facility: CLINIC | Age: 57
End: 2022-12-21
Payer: COMMERCIAL

## 2023-01-27 NOTE — NURSING
Notified MD on call patient complained of sudden chills and body aches after eating. Pt has no complaints of sob or chest pain w/ vss. [facility]    Spoke to patient. MRI scheduled for 2-3-23 at 10:00 a.m. at [facility]. Patient agreed to date and time.

## 2023-01-28 ENCOUNTER — PATIENT MESSAGE (OUTPATIENT)
Dept: PHYSICAL MEDICINE AND REHAB | Facility: CLINIC | Age: 58
End: 2023-01-28
Payer: COMMERCIAL

## 2023-01-30 ENCOUNTER — PATIENT MESSAGE (OUTPATIENT)
Dept: PHYSICAL MEDICINE AND REHAB | Facility: CLINIC | Age: 58
End: 2023-01-30
Payer: COMMERCIAL

## 2023-02-24 ENCOUNTER — PATIENT MESSAGE (OUTPATIENT)
Dept: ADMINISTRATIVE | Facility: HOSPITAL | Age: 58
End: 2023-02-24
Payer: COMMERCIAL

## 2023-03-21 ENCOUNTER — PATIENT OUTREACH (OUTPATIENT)
Dept: ADMINISTRATIVE | Facility: HOSPITAL | Age: 58
End: 2023-03-21
Payer: COMMERCIAL

## 2023-03-21 NOTE — PROGRESS NOTES
Population Health chart review. Working a non-compliant Cervical Cancer Screening report. Attempted to reach pt by phone, no answer, lno VM, sending portal/letter outreach at this time, also sending notification about PCP Change.

## 2023-04-06 ENCOUNTER — PATIENT MESSAGE (OUTPATIENT)
Dept: ORTHOPEDICS | Facility: CLINIC | Age: 58
End: 2023-04-06
Payer: COMMERCIAL

## 2023-04-11 ENCOUNTER — PATIENT MESSAGE (OUTPATIENT)
Dept: ADMINISTRATIVE | Facility: HOSPITAL | Age: 58
End: 2023-04-11
Payer: COMMERCIAL

## 2023-04-19 ENCOUNTER — PATIENT MESSAGE (OUTPATIENT)
Dept: ORTHOPEDICS | Facility: CLINIC | Age: 58
End: 2023-04-19
Payer: COMMERCIAL

## 2023-04-21 ENCOUNTER — HOSPITAL ENCOUNTER (OUTPATIENT)
Dept: RADIOLOGY | Facility: HOSPITAL | Age: 58
Discharge: HOME | End: 2023-04-21
Attending: ORTHOPAEDIC SURGERY
Payer: COMMERCIAL

## 2023-04-21 ENCOUNTER — OFFICE VISIT (OUTPATIENT)
Dept: ORTHOPEDICS | Facility: CLINIC | Age: 58
End: 2023-04-21
Payer: COMMERCIAL

## 2023-04-21 VITALS — HEIGHT: 65 IN | WEIGHT: 155 LBS | BODY MASS INDEX: 25.83 KG/M2

## 2023-04-21 DIAGNOSIS — Z98.890 S/P SPINAL SURGERY: ICD-10-CM

## 2023-04-21 DIAGNOSIS — Z98.1 STATUS POST LUMBAR SPINAL FUSION: Primary | ICD-10-CM

## 2023-04-21 PROCEDURE — 3008F BODY MASS INDEX DOCD: CPT | Mod: CPTII,S$GLB,, | Performed by: ORTHOPAEDIC SURGERY

## 2023-04-21 PROCEDURE — 99999 PR PBB SHADOW E&M-EST. PATIENT-LVL III: ICD-10-PCS | Mod: PBBFAC,,, | Performed by: ORTHOPAEDIC SURGERY

## 2023-04-21 PROCEDURE — 1159F PR MEDICATION LIST DOCUMENTED IN MEDICAL RECORD: ICD-10-PCS | Mod: CPTII,S$GLB,, | Performed by: ORTHOPAEDIC SURGERY

## 2023-04-21 PROCEDURE — 3008F PR BODY MASS INDEX (BMI) DOCUMENTED: ICD-10-PCS | Mod: CPTII,S$GLB,, | Performed by: ORTHOPAEDIC SURGERY

## 2023-04-21 PROCEDURE — 1159F MED LIST DOCD IN RCRD: CPT | Mod: CPTII,S$GLB,, | Performed by: ORTHOPAEDIC SURGERY

## 2023-04-21 PROCEDURE — 99214 PR OFFICE/OUTPT VISIT, EST, LEVL IV, 30-39 MIN: ICD-10-PCS | Mod: S$GLB,,, | Performed by: ORTHOPAEDIC SURGERY

## 2023-04-21 PROCEDURE — 4010F ACE/ARB THERAPY RXD/TAKEN: CPT | Mod: CPTII,S$GLB,, | Performed by: ORTHOPAEDIC SURGERY

## 2023-04-21 PROCEDURE — 4010F PR ACE/ARB THEARPY RXD/TAKEN: ICD-10-PCS | Mod: CPTII,S$GLB,, | Performed by: ORTHOPAEDIC SURGERY

## 2023-04-21 PROCEDURE — 72100 XR LUMBAR SPINE AP AND LATERAL: ICD-10-PCS | Mod: 26,,, | Performed by: RADIOLOGY

## 2023-04-21 PROCEDURE — 99214 OFFICE O/P EST MOD 30 MIN: CPT | Mod: S$GLB,,, | Performed by: ORTHOPAEDIC SURGERY

## 2023-04-21 PROCEDURE — 72100 X-RAY EXAM L-S SPINE 2/3 VWS: CPT | Mod: 26,,, | Performed by: RADIOLOGY

## 2023-04-21 PROCEDURE — 99999 PR PBB SHADOW E&M-EST. PATIENT-LVL III: CPT | Mod: PBBFAC,,, | Performed by: ORTHOPAEDIC SURGERY

## 2023-04-21 PROCEDURE — 72100 X-RAY EXAM L-S SPINE 2/3 VWS: CPT | Mod: TC

## 2023-04-21 RX ORDER — PREGABALIN 75 MG/1
75 CAPSULE ORAL 2 TIMES DAILY
Qty: 60 CAPSULE | Refills: 6 | Status: SHIPPED | OUTPATIENT
Start: 2023-04-21 | End: 2023-09-06 | Stop reason: ALTCHOICE

## 2023-04-21 RX ORDER — METHYLPREDNISOLONE 4 MG/1
TABLET ORAL
Qty: 1 EACH | Refills: 0 | Status: SHIPPED | OUTPATIENT
Start: 2023-04-21 | End: 2024-02-01 | Stop reason: CLARIF

## 2023-04-24 NOTE — PROGRESS NOTES
Date of Surgery: 10/2/2018    Procedure: T10-Pelvis Revision    History: Genia Mcnulty is seen today for follow-up following the above listed procedure.  Unfortunately over the past month she is had new onset left lower extremity radiculopathy and weakness with no inciting event.  This pain is challenging because it is worse even when she is lying down.  She notes subjective left lower extremity weakness.  The patient denies bowel or bladder incontinence.  She does endorse hand clumsiness, but no difficulty with buttons coins.  She is taking Percocet, she is stopped Celebrex secondary to GI related issues.  She is not had any recent steroids.      On physical examination she is diffuse weakness throughout her left lower extremity 4/5.    Radiographs:  Today reviewed new AP and lateral lumbar spine radiographs that demonstrate stable implants, no appearance of hardware failure    Assessment/Plan: I have given her a steroid pack as well as Lyrica, she is not improved in 2 weeks we will consider an MRI of her lumbar spine with and without contrast.

## 2023-04-26 ENCOUNTER — PATIENT MESSAGE (OUTPATIENT)
Dept: FAMILY MEDICINE | Facility: CLINIC | Age: 58
End: 2023-04-26
Payer: COMMERCIAL

## 2023-04-26 ENCOUNTER — TELEPHONE (OUTPATIENT)
Dept: FAMILY MEDICINE | Facility: CLINIC | Age: 58
End: 2023-04-26
Payer: COMMERCIAL

## 2023-05-03 NOTE — TELEPHONE ENCOUNTER
----- Message from Monica Ellis MD sent at 5/2/2018  7:16 AM CDT -----  Contact: Self @ [phone]  Pt has to call PCP, I do not follow up her Blood pressure.  .    ----- Message -----  From: Teodora De La Torre MA  Sent: 4/5/2018  10:55 AM  To: Monica Ellis MD    Said you were suppose to send a rx for lisinopril instead you sent lyrica to her pharmacy and you gave her a rx. Send to [facility] in [place].     ----- Message -----  From: Romero Bello  Sent: 4/5/2018  10:12 AM  To: Rhonda Anderson Staff    Pt statse pharmacy has not received script for lisinopril 10 MG tablet    [facility]/pharm[facility] #[id number] - [place], [place] - [address] AT [facility]  [address]  [place] [address]  Phone: [phone] Fax: [phone]           5

## 2023-05-08 ENCOUNTER — PATIENT MESSAGE (OUTPATIENT)
Dept: ORTHOPEDICS | Facility: CLINIC | Age: 58
End: 2023-05-08
Payer: COMMERCIAL

## 2023-05-09 DIAGNOSIS — M54.9 DORSALGIA, UNSPECIFIED: Primary | ICD-10-CM

## 2023-05-21 ENCOUNTER — PATIENT MESSAGE (OUTPATIENT)
Dept: OTHER | Facility: OTHER | Age: 58
End: 2023-05-21
Payer: COMMERCIAL

## 2023-05-31 ENCOUNTER — PATIENT MESSAGE (OUTPATIENT)
Dept: ORTHOPEDICS | Facility: CLINIC | Age: 58
End: 2023-05-31
Payer: COMMERCIAL

## 2023-06-01 ENCOUNTER — OFFICE VISIT (OUTPATIENT)
Dept: PHYSICAL MEDICINE AND REHAB | Facility: CLINIC | Age: 58
End: 2023-06-01
Payer: COMMERCIAL

## 2023-06-01 VITALS — SYSTOLIC BLOOD PRESSURE: 154 MMHG | HEART RATE: 65 BPM | DIASTOLIC BLOOD PRESSURE: 72 MMHG

## 2023-06-01 DIAGNOSIS — R26.9 GAIT DISORDER: ICD-10-CM

## 2023-06-01 DIAGNOSIS — M54.42 CHRONIC BILATERAL LOW BACK PAIN WITH BILATERAL SCIATICA: Primary | ICD-10-CM

## 2023-06-01 DIAGNOSIS — G89.29 CHRONIC BILATERAL LOW BACK PAIN WITH BILATERAL SCIATICA: Primary | ICD-10-CM

## 2023-06-01 DIAGNOSIS — M54.41 CHRONIC BILATERAL LOW BACK PAIN WITH BILATERAL SCIATICA: Primary | ICD-10-CM

## 2023-06-01 DIAGNOSIS — M17.11 PRIMARY OSTEOARTHRITIS OF RIGHT KNEE: ICD-10-CM

## 2023-06-01 DIAGNOSIS — M47.16 LUMBAR SPONDYLOSIS WITH MYELOPATHY: ICD-10-CM

## 2023-06-01 DIAGNOSIS — Z79.891 CHRONICALLY ON OPIATE THERAPY: ICD-10-CM

## 2023-06-01 DIAGNOSIS — R29.898 WEAKNESS OF BOTH LEGS: ICD-10-CM

## 2023-06-01 DIAGNOSIS — Z98.1 S/P LUMBAR SPINAL FUSION: ICD-10-CM

## 2023-06-01 DIAGNOSIS — M21.371 FOOT DROP, RIGHT: ICD-10-CM

## 2023-06-01 PROCEDURE — 4010F PR ACE/ARB THEARPY RXD/TAKEN: ICD-10-PCS | Mod: CPTII,S$GLB,, | Performed by: PHYSICAL MEDICINE & REHABILITATION

## 2023-06-01 PROCEDURE — 99999 PR PBB SHADOW E&M-EST. PATIENT-LVL III: CPT | Mod: PBBFAC,,, | Performed by: PHYSICAL MEDICINE & REHABILITATION

## 2023-06-01 PROCEDURE — 3078F DIAST BP <80 MM HG: CPT | Mod: CPTII,S$GLB,, | Performed by: PHYSICAL MEDICINE & REHABILITATION

## 2023-06-01 PROCEDURE — 3078F PR MOST RECENT DIASTOLIC BLOOD PRESSURE < 80 MM HG: ICD-10-PCS | Mod: CPTII,S$GLB,, | Performed by: PHYSICAL MEDICINE & REHABILITATION

## 2023-06-01 PROCEDURE — 99214 PR OFFICE/OUTPT VISIT, EST, LEVL IV, 30-39 MIN: ICD-10-PCS | Mod: S$GLB,,, | Performed by: PHYSICAL MEDICINE & REHABILITATION

## 2023-06-01 PROCEDURE — 99214 OFFICE O/P EST MOD 30 MIN: CPT | Mod: S$GLB,,, | Performed by: PHYSICAL MEDICINE & REHABILITATION

## 2023-06-01 PROCEDURE — 3077F SYST BP >= 140 MM HG: CPT | Mod: CPTII,S$GLB,, | Performed by: PHYSICAL MEDICINE & REHABILITATION

## 2023-06-01 PROCEDURE — 3077F PR MOST RECENT SYSTOLIC BLOOD PRESSURE >= 140 MM HG: ICD-10-PCS | Mod: CPTII,S$GLB,, | Performed by: PHYSICAL MEDICINE & REHABILITATION

## 2023-06-01 PROCEDURE — 99999 PR PBB SHADOW E&M-EST. PATIENT-LVL III: ICD-10-PCS | Mod: PBBFAC,,, | Performed by: PHYSICAL MEDICINE & REHABILITATION

## 2023-06-01 PROCEDURE — 1159F MED LIST DOCD IN RCRD: CPT | Mod: CPTII,S$GLB,, | Performed by: PHYSICAL MEDICINE & REHABILITATION

## 2023-06-01 PROCEDURE — 4010F ACE/ARB THERAPY RXD/TAKEN: CPT | Mod: CPTII,S$GLB,, | Performed by: PHYSICAL MEDICINE & REHABILITATION

## 2023-06-01 PROCEDURE — 1159F PR MEDICATION LIST DOCUMENTED IN MEDICAL RECORD: ICD-10-PCS | Mod: CPTII,S$GLB,, | Performed by: PHYSICAL MEDICINE & REHABILITATION

## 2023-06-01 RX ORDER — OXYCODONE AND ACETAMINOPHEN 10; 325 MG/1; MG/1
1 TABLET ORAL EVERY 6 HOURS PRN
Qty: 90 TABLET | Refills: 0 | Status: SHIPPED | OUTPATIENT
Start: 2023-06-01 | End: 2023-06-26 | Stop reason: SDUPTHER

## 2023-06-01 RX ORDER — CELECOXIB 200 MG/1
200 CAPSULE ORAL DAILY
Start: 2023-06-01

## 2023-06-01 NOTE — PROGRESS NOTES
Subjective:       Patient ID: Genia Mcnulty is a 57 y.o. female.    Chief Complaint: patient is having upper body pain and back pain      HPI      Mrs. Mcnulty is a 57-year-old white female with past medical history hypertension and multiple back surgeries with persistent back pain.  She was referred to the Physical Medicine Clinic for help with her pain management.  She was previously a patient of Dr. Ellis from [facility].  Dr. Ellis is not able to see patients any longer and the patient's care is being transferred to my clinic.      The patient has been complaining of low back pain for many years.  She is status post the L1-3 fusion in 2012 complicated by CSF leak.  He subsequently had to undergo T11-pelvis fusion in 11/2015.  She had revision of her fusion surgery with T10-pelvis fusion in 10/2018 by Dr. Herrmann.  She continues to follow-up with spine surgery Clinic.  Her last visit was on 04/21/2023.  Her x-rays showed stable implants.  She was having more radicular symptoms.  She was started on Lyrica.  An MRI was recently done.    The patient continues to complain of back pain.  It is a constant stabbing pain in the lower lumbar spine and across her back.  The pain radiates to both lower extremities down to the feet with sharp sensations.  She also has occasional shooting pain proximally to the thoracic and cervical spine.  Her pain is aggravated by standing or walking and better with sitting down and rest.  Her maximum pain is 10/10 and minimum 1-2/10.  Today it is 1-2/10.  She denies any bowel or bladder incontinence.  She has chronic right foot drop and wears AFOs.  She denies any saddle anesthesia.  She has not been walking long enough to developed claudications.      She is currently taking:  Celebrex 200 mg p.o. once per day as needed few times per month  Pregabalin 75 mg capsules, 1 capsule daily (she had some GI upset with higher dose)   Oxycodone/APAP 10/325 p.r.n.,  usually 2 but occasionally 3 times per day.      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   Eyes:  Negative for visual disturbance.   Respiratory:  Negative for shortness of breath.    Cardiovascular:  Negative for chest pain.   Gastrointestinal:  Negative for constipation, nausea and vomiting.   Genitourinary:  Negative for difficulty urinating.   Musculoskeletal:  Positive for arthralgias, back pain, gait problem and neck pain.   Neurological:  Positive for weakness and headaches. Negative for dizziness.   Psychiatric/Behavioral:  Positive for sleep disturbance. Negative for behavioral problems.            Objective:      Physical Exam  Vitals reviewed.   Constitutional:       Appearance: She is well-developed.   HENT:      Head: Normocephalic and atraumatic.   Eyes:      Extraocular Movements: Extraocular movements intact.   Musculoskeletal:      Cervical back: Normal range of motion. No tenderness.      Comments: BUE:  ROM:   RUE: full.   LUE: full.  Strength:    RUE: 5-/5 at shoulder abduction, 65 elbow flexion, 5 elbow extension, 5 hand .   LUE: 5-/5 at shoulder abduction, 5 elbow flexion, 5 elbow extension, 5 hand .  Sensation to pinprick:   RUE: intact.   LUE: intact.      BLE:  ROM:   RLE: full.   LLE: full.  Knee crepitus:   RLE: -ve.   LLE: -ve.   Strength:    RLE: 1/5 at hip flexion, 1 knee extension, 1 ankle DF, 3 PF.   LLE: 3/5 at hip flexion, 4 knee extension, 3- ankle DF, 4 PF.  Sensation to pinprick:      RLE: intact.      LLE: intact.   SLR (sitting):      RLE: -ve.      LLE: -ve.    -ve tenderness over lumbar spine.       Skin:     General: Skin is warm.   Neurological:      General: No focal deficit present.      Mental Status: She is alert.   Psychiatric:         Behavior: Behavior normal.       Assessment:       1. Chronic bilateral low back pain with bilateral sciatica    2. S/P lumbar spinal fusion (T11 to pelvis, 2018)    3. Gait disorder    4. Weakness of both legs    5. Lumbar spondylosis with myelopathy    6. Primary osteoarthritis of right knee    7. Foot drop, right    8. Chronically on opiate therapy        Plan:       - Continue pregabalin 75 mg capsules; take 1 capsule by mouth once daily.  We discussed going to 50 mg capsule twice per day at the time of next refill of her radicular symptoms are not well relief.  - Continue celecoxib (CELEBREX) 200 MG capsule; Take 1 capsule (200 mg total) by mouth once daily.  - Continue oxyCODONE-acetaminophen (PERCOCET)  mg per tablet; Take 1 tablet by mouth every 6 (six) hours as needed for Pain. Greater than 7 day quantity Medically Necessary  - Follow-up with Dr. Herrmann and spine surgery Clinic after the MRI is done  - A Pain contract was signed and will be scanned into the chart.  - Pain Clinic Drug Screen; Future  - Follow up in about 4 months (around 10/1/2023).    This was a 45 minute visit, 50% of which was spent educating the patient about the diagnosis and the treatment plan.      This note was partly generated with [employer] voice recognition software. I apologize for any possible typographical errors.

## 2023-06-08 ENCOUNTER — PATIENT MESSAGE (OUTPATIENT)
Dept: ADMINISTRATIVE | Facility: HOSPITAL | Age: 58
End: 2023-06-08
Payer: COMMERCIAL

## 2023-06-08 ENCOUNTER — PATIENT OUTREACH (OUTPATIENT)
Dept: ADMINISTRATIVE | Facility: HOSPITAL | Age: 58
End: 2023-06-08
Payer: COMMERCIAL

## 2023-06-08 NOTE — PROGRESS NOTES
Population Health Chart Review & Patient Outreach Details:     Reason for Outreach Encounter:     [x]  Non-Compliant Report   []  Payor Report (Humana, PHN, BCBS, MSSP, MCIP, C, etc.)   []  Pre-Visit Chart Review     Updates Requested / Reviewed:     [x]  Care Everywhere    [x]     [x]  External Sources ([facility], [facility], DIS, etc.)   []  Care Team Updated    Patient Outreach Method:    [x]  Telephone Outreach Completed   [] Successful   [x] Left Voicemail   [] Unable to Contact (wrong number, no voicemail)  [x]  [facility] Portal Outreach Sent  []  Letter Outreach Mailed  []  Fax Sent for External Records  []  External Records Upload    Health Maintenance Topics Addressed and Outreach Outcomes / Actions Taken:        []      Breast Cancer Screening []  Mammo Scheduled      []  External Records Requested     []  Added Reminder to Complete to Upcoming Primary Care Appt Notes     []  Patient Declined     []  Patient Will Call Back to Schedule     []  Patient Will Schedule with External Provider / Order Routed if Applicable             [x]       Cervical Cancer Screening []  Pap Scheduled      []  External Records Requested     []  Added Reminder to Complete to Upcoming Primary Care Appt Notes     []  Patient Declined     []  Patient Will Call Back to Schedule     []  Patient Will Schedule with External Provider               []          Colorectal Cancer Screening []  Colonoscopy Case Request or Referral Placed     []  External Records Requested     []  Added Reminder to Complete to Upcoming Primary Care Appt Notes     []  Patient Declined     []  Patient Will Call Back to Schedule     []  Patient Will Schedule with External Provider     []  Fit Kit Mailed (add the SmartPhrase under additional notes)     []  Reminded Patient to Complete Home Test             []      Diabetic Eye Exam []  Eye Camera Scheduled or Optometry Referral Placed     []  External Records Requested     []  Added Reminder to Complete  to Upcoming Primary Care Appt Notes     []  Patient Declined     []  Patient Will Call Back to Schedule     []  Patient Will Schedule with External Provider             []      Blood Pressure Control []  Primary Care Follow Up Visit Scheduled     []  Remote Blood Pressure Reading Captured     []  Added Reminder to Complete to Upcoming Primary Care Appt Notes     []  Patient Declined     []  Patient Will Call Back / Patient Will Send Portal Message with Reading     []  Patient Will Call Back to Schedule Provider Visit             []       HbA1c & Other Labs []  Lab Appt Scheduled for Due Labs     []  Primary Care Follow Up Visit Scheduled      []  Reminded Patient to Complete Home Test     []  Added Reminder to Complete to Upcoming Primary Care Appt Notes     []  Patient Declined     []  Patient Will Call Back to Schedule     []  Patient Will Schedule with External Provider / Order Routed if Applicable           []    Schedule Primary Care Appt []  Primary Care Appt Scheduled     []  Patient Declined     []  Patient Will Call Back to Schedule     []  Pt Established with External Provider & Updated Care Team             []      Medication Adherence []  Primary Care Appointment Scheduled     []  Added Reminder to Upcoming Primary Care Appt Notes     []  Patient Reminded to  Prescription     []  Patient Declined, Provider Notified if Needed     []  Sent Provider Message to Review and/or Add Exclusion to Problem List             []      Osteoporosis Screening []  DXA Appointment Scheduled     []  External Records Requested     []  Added Reminder to Complete to Upcoming Primary Care Appt Notes     []  Patient Declined     []  Patient Will Call Back to Schedule     []  Patient Will Schedule with External Provider / Order Routed if Applicable     Additional Care Coordinator Notes:  Working a ccs report       Further Action Needed If Patient Returns Outreach:

## 2023-06-14 ENCOUNTER — HOSPITAL ENCOUNTER (OUTPATIENT)
Dept: RADIOLOGY | Facility: HOSPITAL | Age: 58
Discharge: HOME | End: 2023-06-14
Attending: REGISTERED NURSE
Payer: COMMERCIAL

## 2023-06-14 DIAGNOSIS — M54.9 DORSALGIA, UNSPECIFIED: ICD-10-CM

## 2023-06-14 LAB
CREAT SERPL-MCNC: 1.1 MG/DL (ref 0.5–1.4)
SAMPLE: NORMAL

## 2023-06-14 PROCEDURE — 72158 MRI LUMBAR SPINE W WO CONTRAST: ICD-10-PCS | Mod: 26,,, | Performed by: RADIOLOGY

## 2023-06-14 PROCEDURE — A9585 GADOBUTROL INJECTION: HCPCS | Performed by: REGISTERED NURSE

## 2023-06-14 PROCEDURE — 72158 MRI LUMBAR SPINE W/O & W/DYE: CPT | Mod: TC

## 2023-06-14 PROCEDURE — 25500020 PHARM REV CODE 255: Performed by: REGISTERED NURSE

## 2023-06-14 PROCEDURE — 72158 MRI LUMBAR SPINE W/O & W/DYE: CPT | Mod: 26,,, | Performed by: RADIOLOGY

## 2023-06-14 RX ORDER — GADOBUTROL 604.72 MG/ML
7 INJECTION INTRAVENOUS
Status: COMPLETED | OUTPATIENT
Start: 2023-06-14 | End: 2023-06-14

## 2023-06-14 RX ADMIN — GADOBUTROL 7 ML: 604.72 INJECTION INTRAVENOUS at 01:06

## 2023-06-19 ENCOUNTER — PATIENT OUTREACH (OUTPATIENT)
Dept: ADMINISTRATIVE | Facility: HOSPITAL | Age: 58
End: 2023-06-19
Payer: COMMERCIAL

## 2023-06-19 ENCOUNTER — PATIENT MESSAGE (OUTPATIENT)
Dept: ORTHOPEDICS | Facility: CLINIC | Age: 58
End: 2023-06-19
Payer: COMMERCIAL

## 2023-06-19 NOTE — PROGRESS NOTES
Population Health Chart Review & Patient Outreach Details:     Reason for Outreach Encounter:     [x]  Non-Compliant Report   []  Payor Report (Humana, PHN, BCBS, MSSP, MCIP, UHC, etc.)   []  Pre-Visit Chart Review     Updates Requested / Reviewed:     []  Care Everywhere    []     []  External Sources ([facility]Corp, Quest, DIS, etc.)   [x]  Care Team Updated    Patient Outreach Method:    [x]  Telephone Outreach Completed   [x] Successful   [] Left Voicemail   [] Unable to Contact (wrong number, no voicemail)  []  [employer] Portal Outreach Sent  []  Letter Outreach Mailed  []  Fax Sent for External Records  []  External Records Upload    Health Maintenance Topics Addressed and Outreach Outcomes / Actions Taken:        []      Breast Cancer Screening []  Mammo Scheduled      []  External Records Requested     []  Added Reminder to Complete to Upcoming Primary Care Appt Notes     []  Patient Declined     []  Patient Will Call Back to Schedule     []  Patient Will Schedule with External Provider / Order Routed if Applicable             []       Cervical Cancer Screening []  Pap Scheduled      []  External Records Requested     []  Added Reminder to Complete to Upcoming Primary Care Appt Notes     []  Patient Declined     []  Patient Will Call Back to Schedule     []  Patient Will Schedule with External Provider               []          Colorectal Cancer Screening []  Colonoscopy Case Request or Referral Placed     []  External Records Requested     []  Added Reminder to Complete to Upcoming Primary Care Appt Notes     []  Patient Declined     []  Patient Will Call Back to Schedule     []  Patient Will Schedule with External Provider     []  Fit Kit Mailed (add the SmartPhrase under additional notes)     []  Reminded Patient to Complete Home Test             []      Diabetic Eye Exam []  Eye Camera Scheduled or Optometry Referral Placed     []  External Records Requested     []  Added Reminder to Complete to  Upcoming Primary Care Appt Notes     []  Patient Declined     []  Patient Will Call Back to Schedule     []  Patient Will Schedule with External Provider             []      Blood Pressure Control []  Primary Care Follow Up Visit Scheduled     []  Remote Blood Pressure Reading Captured     []  Added Reminder to Complete to Upcoming Primary Care Appt Notes     []  Patient Declined     []  Patient Will Call Back / Patient Will Send Portal Message with Reading     []  Patient Will Call Back to Schedule Provider Visit             []       HbA1c & Other Labs []  Lab Appt Scheduled for Due Labs     []  Primary Care Follow Up Visit Scheduled      []  Reminded Patient to Complete Home Test     []  Added Reminder to Complete to Upcoming Primary Care Appt Notes     []  Patient Declined     []  Patient Will Call Back to Schedule     []  Patient Will Schedule with External Provider / Order Routed if Applicable           [x]    Schedule Primary Care Appt [x]  Primary Care Appt Scheduled     []  Patient Declined     []  Patient Will Call Back to Schedule     []  Pt Established with External Provider & Updated Care Team             []      Medication Adherence []  Primary Care Appointment Scheduled     []  Added Reminder to Upcoming Primary Care Appt Notes     []  Patient Reminded to  Prescription     []  Patient Declined, Provider Notified if Needed     []  Sent Provider Message to Review and/or Add Exclusion to Problem List             []      Osteoporosis Screening []  DXA Appointment Scheduled     []  External Records Requested     []  Added Reminder to Complete to Upcoming Primary Care Appt Notes     []  Patient Declined     []  Patient Will Call Back to Schedule     []  Patient Will Schedule with External Provider / Order Routed if Applicable     Additional Care Coordinator Notes:    Scheduled pcp visit to [facility] with Dr. GILBERTO Meadows on 06/29/2023 at 1pm. Confirmed with pt at this time.    Further Action Needed If  Patient Returns Outreach:

## 2023-06-26 ENCOUNTER — PATIENT MESSAGE (OUTPATIENT)
Dept: ORTHOPEDICS | Facility: CLINIC | Age: 58
End: 2023-06-26
Payer: COMMERCIAL

## 2023-06-26 DIAGNOSIS — M54.42 CHRONIC BILATERAL LOW BACK PAIN WITH BILATERAL SCIATICA: ICD-10-CM

## 2023-06-26 DIAGNOSIS — G89.29 CHRONIC BILATERAL LOW BACK PAIN WITH BILATERAL SCIATICA: ICD-10-CM

## 2023-06-26 DIAGNOSIS — M54.41 CHRONIC BILATERAL LOW BACK PAIN WITH BILATERAL SCIATICA: ICD-10-CM

## 2023-06-28 RX ORDER — OXYCODONE AND ACETAMINOPHEN 10; 325 MG/1; MG/1
1 TABLET ORAL EVERY 6 HOURS PRN
Qty: 90 TABLET | Refills: 0 | Status: SHIPPED | OUTPATIENT
Start: 2023-07-02 | End: 2023-07-27 | Stop reason: SDUPTHER

## 2023-06-29 ENCOUNTER — OFFICE VISIT (OUTPATIENT)
Dept: FAMILY MEDICINE | Facility: CLINIC | Age: 58
End: 2023-06-29
Payer: COMMERCIAL

## 2023-06-29 ENCOUNTER — TELEPHONE (OUTPATIENT)
Dept: PHYSICAL MEDICINE AND REHAB | Facility: CLINIC | Age: 58
End: 2023-06-29
Payer: COMMERCIAL

## 2023-06-29 VITALS
HEIGHT: 65 IN | WEIGHT: 157.44 LBS | SYSTOLIC BLOOD PRESSURE: 128 MMHG | TEMPERATURE: 98 F | BODY MASS INDEX: 26.23 KG/M2 | DIASTOLIC BLOOD PRESSURE: 80 MMHG | HEART RATE: 68 BPM | OXYGEN SATURATION: 95 %

## 2023-06-29 DIAGNOSIS — I10 ESSENTIAL HYPERTENSION: ICD-10-CM

## 2023-06-29 DIAGNOSIS — Z12.39 ENCOUNTER FOR SCREENING FOR MALIGNANT NEOPLASM OF BREAST, UNSPECIFIED SCREENING MODALITY: ICD-10-CM

## 2023-06-29 DIAGNOSIS — Z12.11 COLON CANCER SCREENING: ICD-10-CM

## 2023-06-29 DIAGNOSIS — Z13.6 SCREENING FOR CARDIOVASCULAR CONDITION: ICD-10-CM

## 2023-06-29 DIAGNOSIS — R53.83 FATIGUE, UNSPECIFIED TYPE: ICD-10-CM

## 2023-06-29 DIAGNOSIS — R30.0 DYSURIA: ICD-10-CM

## 2023-06-29 DIAGNOSIS — M81.0 OSTEOPOROSIS, UNSPECIFIED OSTEOPOROSIS TYPE, UNSPECIFIED PATHOLOGICAL FRACTURE PRESENCE: ICD-10-CM

## 2023-06-29 DIAGNOSIS — Z13.1 DIABETES MELLITUS SCREENING: ICD-10-CM

## 2023-06-29 DIAGNOSIS — Z13.29 THYROID DISORDER SCREEN: ICD-10-CM

## 2023-06-29 DIAGNOSIS — Z00.00 ENCOUNTER FOR MEDICAL EXAMINATION TO ESTABLISH CARE: ICD-10-CM

## 2023-06-29 DIAGNOSIS — M47.9 OSTEOARTHRITIS OF SPINE, UNSPECIFIED SPINAL OSTEOARTHRITIS COMPLICATION STATUS, UNSPECIFIED SPINAL REGION: ICD-10-CM

## 2023-06-29 DIAGNOSIS — Z13.220 SCREENING FOR HYPERLIPIDEMIA: Primary | ICD-10-CM

## 2023-06-29 LAB
AMORPH CRY URNS QL MICRO: ABNORMAL
BACTERIA #/AREA URNS HPF: ABNORMAL /HPF
BILIRUB UR QL STRIP: NEGATIVE
CLARITY UR: ABNORMAL
COLOR UR: YELLOW
GLUCOSE UR QL STRIP: NEGATIVE
HGB UR QL STRIP: NEGATIVE
KETONES UR QL STRIP: NEGATIVE
LEUKOCYTE ESTERASE UR QL STRIP: ABNORMAL
MICROSCOPIC COMMENT: ABNORMAL
NITRITE UR QL STRIP: NEGATIVE
PH UR STRIP: 6 [PH] (ref 5–8)
PROT UR QL STRIP: NEGATIVE
RBC #/AREA URNS HPF: 0 /HPF (ref 0–4)
SP GR UR STRIP: 1.01 (ref 1–1.03)
SQUAMOUS #/AREA URNS HPF: 1 /HPF
URN SPEC COLLECT METH UR: ABNORMAL
UROBILINOGEN UR STRIP-ACNC: NEGATIVE EU/DL
WBC #/AREA URNS HPF: 15 /HPF (ref 0–5)

## 2023-06-29 PROCEDURE — 3074F SYST BP LT 130 MM HG: CPT | Mod: ,,, | Performed by: FAMILY MEDICINE

## 2023-06-29 PROCEDURE — 1159F MED LIST DOCD IN RCRD: CPT | Mod: ,,, | Performed by: FAMILY MEDICINE

## 2023-06-29 PROCEDURE — 87088 URINE BACTERIA CULTURE: CPT | Performed by: FAMILY MEDICINE

## 2023-06-29 PROCEDURE — 3074F PR MOST RECENT SYSTOLIC BLOOD PRESSURE < 130 MM HG: ICD-10-PCS | Mod: ,,, | Performed by: FAMILY MEDICINE

## 2023-06-29 PROCEDURE — 81000 URINALYSIS NONAUTO W/SCOPE: CPT | Performed by: FAMILY MEDICINE

## 2023-06-29 PROCEDURE — 99215 OFFICE O/P EST HI 40 MIN: CPT | Mod: ,,, | Performed by: FAMILY MEDICINE

## 2023-06-29 PROCEDURE — 3079F PR MOST RECENT DIASTOLIC BLOOD PRESSURE 80-89 MM HG: ICD-10-PCS | Mod: ,,, | Performed by: FAMILY MEDICINE

## 2023-06-29 PROCEDURE — 4010F ACE/ARB THERAPY RXD/TAKEN: CPT | Mod: ,,, | Performed by: FAMILY MEDICINE

## 2023-06-29 PROCEDURE — 4010F PR ACE/ARB THEARPY RXD/TAKEN: ICD-10-PCS | Mod: ,,, | Performed by: FAMILY MEDICINE

## 2023-06-29 PROCEDURE — 87086 URINE CULTURE/COLONY COUNT: CPT | Performed by: FAMILY MEDICINE

## 2023-06-29 PROCEDURE — 3079F DIAST BP 80-89 MM HG: CPT | Mod: ,,, | Performed by: FAMILY MEDICINE

## 2023-06-29 PROCEDURE — 99215 PR OFFICE/OUTPT VISIT, EST, LEVL V, 40-54 MIN: ICD-10-PCS | Mod: ,,, | Performed by: FAMILY MEDICINE

## 2023-06-29 PROCEDURE — 3008F PR BODY MASS INDEX (BMI) DOCUMENTED: ICD-10-PCS | Mod: ,,, | Performed by: FAMILY MEDICINE

## 2023-06-29 PROCEDURE — 3008F BODY MASS INDEX DOCD: CPT | Mod: ,,, | Performed by: FAMILY MEDICINE

## 2023-06-29 PROCEDURE — 1159F PR MEDICATION LIST DOCUMENTED IN MEDICAL RECORD: ICD-10-PCS | Mod: ,,, | Performed by: FAMILY MEDICINE

## 2023-06-29 NOTE — PROGRESS NOTES
Subjective:       Patient ID: Genia Mcnulty is a 57 y.o. female.    Chief Complaint: Establish Care and Hypertension (Pt here to est. Care/Due labs)    Ms Mcnulty is a 56 yo female who is here for her yearly medical exam. She has chronic back issues and is on a walker for ambulation. She works at [employer], here in [place].She has Htn , and she is on the digital medicine program.     Review of Systems   Constitutional:  Negative for chills and fever.   HENT:  Positive for postnasal drip. Negative for sinus pressure.    Respiratory:  Negative for cough, shortness of breath and wheezing.    Cardiovascular:  Negative for chest pain and leg swelling.   Gastrointestinal:  Positive for diarrhea.        On lyrica and it causes nausea and diarrhea sporadically.   Genitourinary:  Negative for dysuria and flank pain.        Malodorous urine and back pain when bladder is full   Musculoskeletal:  Positive for arthralgias, back pain, gait problem and myalgias.   Allergic/Immunologic: Negative for environmental allergies.   Neurological:  Negative for speech difficulty and headaches.   Psychiatric/Behavioral:  Negative for dysphoric mood. The patient is not nervous/anxious.      Patient Active Problem List   Diagnosis    DJD (degenerative joint disease) of knee    Weakness of right leg    Weakness of both legs    Myelopathy of lumbar region    Bursitis of right hip    Gait disorder    Spondylosis without myelopathy    Degeneration of lumbar or lumbosacral intervertebral disc    Acquired spondylolisthesis    Lumbago    Kyphoscoliosis    Closed pseudoarthrosis of lumbar spine    Lumbar radiculopathy    Sprain and strain    Essential hypertension    Pseudoarthrosis of lumbar spine       Objective:      Physical Exam    Lab Results   Component Value Date    WBC 5.38 08/04/2022    HGB 11.5 (L) 08/04/2022    HCT 36.7 (L) 08/04/2022     08/04/2022    CHOL 207 (H) 06/07/2022    TRIG 84 06/07/2022    HDL 53 06/07/2022  "   ALT 15 08/04/2022    AST 20 08/04/2022     08/04/2022    K 4.8 08/04/2022     08/04/2022    CREATININE 0.8 08/04/2022    BUN 18 08/04/2022    CO2 24 08/04/2022    TSH 2.074 05/02/2018    INR 1.1 11/13/2018     The 10-year ASCVD risk score (Nahomi MUKHERJEE, et al., 2019) is: 4.2%    Values used to calculate the score:      Age: 57 years      Sex: Female      Is Non- : No      Diabetic: No      Tobacco smoker: No      Systolic Blood Pressure: 128 mmHg      Is BP treated: Yes      HDL Cholesterol: 53 mg/dL      Total Cholesterol: 207 mg/dL  Visit Vitals  /80 (BP Location: Right arm, Patient Position: Sitting, BP Method: Medium (Manual))   Pulse 68   Temp 98.3 °F (36.8 °C)   Ht 5' 5" (1.651 m)   Wt 71.4 kg (157 lb 6.5 oz)   SpO2 95%   BMI 26.19 kg/m²      Assessment:       1. Screening for hyperlipidemia    2. Screening for cardiovascular condition    3. Essential hypertension    4. Encounter for medical examination to establish care    5. Dysuria    6. Thyroid disorder screen    7. Diabetes mellitus screening    8. Fatigue, unspecified type    9. Osteoarthritis of spine, unspecified spinal osteoarthritis complication status, unspecified spinal region    10. Encounter for screening for malignant neoplasm of breast, unspecified screening modality    11. Osteoporosis, unspecified osteoporosis type, unspecified pathological fracture presence    12. Colon cancer screening        Plan:       1. Screening for hyperlipidemia  -     Lipid Panel; Future; Expected date: 06/29/2023    2. Screening for cardiovascular condition    3. Essential hypertension  -     CBC Auto Differential; Future; Expected date: 06/29/2023  -     Comprehensive Metabolic Panel; Future; Expected date: 06/29/2023    4. Encounter for medical examination to establish care  -     Lipid Panel; Future; Expected date: 06/29/2023  -     CBC Auto Differential; Future; Expected date: 06/29/2023  -     Comprehensive Metabolic " Panel; Future; Expected date: 06/29/2023  -     Hemoglobin A1C; Future; Expected date: 06/29/2023  -     TSH; Future; Expected date: 06/29/2023  -     Microalbumin/Creatinine Ratio, Urine; Future; Expected date: 06/29/2023    5. Dysuria  -     Urinalysis, Reflex to Urine Culture Urine, Clean Catch    6. Thyroid disorder screen  -     TSH; Future; Expected date: 06/29/2023    7. Diabetes mellitus screening  -     Hemoglobin A1C; Future; Expected date: 06/29/2023    8. Fatigue, unspecified type  -     Vitamin B12; Future; Expected date: 06/29/2023  -     Vitamin D; Future; Expected date: 06/29/2023    9. Osteoarthritis of spine, unspecified spinal osteoarthritis complication status, unspecified spinal region  -     Vitamin D; Future; Expected date: 06/29/2023    10. Encounter for screening for malignant neoplasm of breast, unspecified screening modality  -     Mammo Digital Screening Bilat w/ Juan; Future; Expected date: 06/29/2023    11. Osteoporosis, unspecified osteoporosis type, unspecified pathological fracture presence  -     Mammo Digital Screening Bilat w/ Juan; Future; Expected date: 06/29/2023  -     DXA Bone Density Axial Skeleton 1 or more sites; Future; Expected date: 06/29/2023    12. Colon cancer screening  -     Cologuard Screening (Multitarget Stool DNA); Future; Expected date: 06/29/2023       Follow up in about 1 year (around 6/29/2024), or if symptoms worsen or fail to improve.      Future Appointments       Date Provider Specialty Appt Notes    9/6/2023 Kelley Caceres MD Physical Medicine and Rehabilitation 4mo follow up

## 2023-06-29 NOTE — TELEPHONE ENCOUNTER
----- Message from Judy Calvo sent at 6/29/2023  3:42 AM CDT -----  Regarding: Lab Client Services  Contact: [phone]  Good Morning,     My name is Judy Calvo I work in the Lab Client Services. We had a problem with some lab work on this patient. If someone from your office could call us at [phone] or ext. [phone] that would be great. Anyone in my department can help.      Thank you

## 2023-06-30 ENCOUNTER — PATIENT MESSAGE (OUTPATIENT)
Dept: FAMILY MEDICINE | Facility: CLINIC | Age: 58
End: 2023-06-30
Payer: COMMERCIAL

## 2023-06-30 ENCOUNTER — PATIENT OUTREACH (OUTPATIENT)
Dept: ADMINISTRATIVE | Facility: HOSPITAL | Age: 58
End: 2023-06-30
Payer: COMMERCIAL

## 2023-06-30 ENCOUNTER — LAB VISIT (OUTPATIENT)
Dept: LAB | Facility: CLINIC | Age: 58
End: 2023-06-30
Payer: COMMERCIAL

## 2023-06-30 DIAGNOSIS — M47.9 OSTEOARTHRITIS OF SPINE, UNSPECIFIED SPINAL OSTEOARTHRITIS COMPLICATION STATUS, UNSPECIFIED SPINAL REGION: ICD-10-CM

## 2023-06-30 DIAGNOSIS — Z13.1 DIABETES MELLITUS SCREENING: ICD-10-CM

## 2023-06-30 DIAGNOSIS — Z13.220 SCREENING FOR HYPERLIPIDEMIA: ICD-10-CM

## 2023-06-30 DIAGNOSIS — I10 ESSENTIAL HYPERTENSION: ICD-10-CM

## 2023-06-30 DIAGNOSIS — R53.83 FATIGUE, UNSPECIFIED TYPE: ICD-10-CM

## 2023-06-30 DIAGNOSIS — Z00.00 ENCOUNTER FOR MEDICAL EXAMINATION TO ESTABLISH CARE: ICD-10-CM

## 2023-06-30 DIAGNOSIS — Z13.29 THYROID DISORDER SCREEN: ICD-10-CM

## 2023-06-30 LAB
ALBUMIN SERPL BCP-MCNC: 3.6 G/DL (ref 3.5–5.2)
ALP SERPL-CCNC: 79 U/L (ref 55–135)
ALT SERPL W/O P-5'-P-CCNC: 13 U/L (ref 10–44)
ANION GAP SERPL CALC-SCNC: 8 MMOL/L (ref 8–16)
AST SERPL-CCNC: 16 U/L (ref 10–40)
BASOPHILS # BLD AUTO: 0.01 K/UL (ref 0–0.2)
BASOPHILS NFR BLD: 0.2 % (ref 0–1.9)
BILIRUB SERPL-MCNC: 0.4 MG/DL (ref 0.1–1)
BUN SERPL-MCNC: 17 MG/DL (ref 6–20)
CALCIUM SERPL-MCNC: 9.5 MG/DL (ref 8.7–10.5)
CHLORIDE SERPL-SCNC: 107 MMOL/L (ref 95–110)
CHOLEST SERPL-MCNC: 213 MG/DL (ref 120–199)
CHOLEST/HDLC SERPL: 5.1 {RATIO} (ref 2–5)
CO2 SERPL-SCNC: 26 MMOL/L (ref 23–29)
CREAT SERPL-MCNC: 0.9 MG/DL (ref 0.5–1.4)
DIFFERENTIAL METHOD: ABNORMAL
EOSINOPHIL # BLD AUTO: 0.1 K/UL (ref 0–0.5)
EOSINOPHIL NFR BLD: 2.4 % (ref 0–8)
ERYTHROCYTE [DISTWIDTH] IN BLOOD BY AUTOMATED COUNT: 15.8 % (ref 11.5–14.5)
EST. GFR  (NO RACE VARIABLE): >60 ML/MIN/1.73 M^2
GLUCOSE SERPL-MCNC: 94 MG/DL (ref 70–110)
HCT VFR BLD AUTO: 38.2 % (ref 37–48.5)
HDLC SERPL-MCNC: 42 MG/DL (ref 40–75)
HDLC SERPL: 19.7 % (ref 20–50)
HGB BLD-MCNC: 11.4 G/DL (ref 12–16)
IMM GRANULOCYTES # BLD AUTO: 0.02 K/UL (ref 0–0.04)
IMM GRANULOCYTES NFR BLD AUTO: 0.4 % (ref 0–0.5)
LDLC SERPL CALC-MCNC: 148.8 MG/DL (ref 63–159)
LYMPHOCYTES # BLD AUTO: 1.4 K/UL (ref 1–4.8)
LYMPHOCYTES NFR BLD: 26.5 % (ref 18–48)
MCH RBC QN AUTO: 25.4 PG (ref 27–31)
MCHC RBC AUTO-ENTMCNC: 29.8 G/DL (ref 32–36)
MCV RBC AUTO: 85 FL (ref 82–98)
MONOCYTES # BLD AUTO: 0.3 K/UL (ref 0.3–1)
MONOCYTES NFR BLD: 6.3 % (ref 4–15)
NEUTROPHILS # BLD AUTO: 3.4 K/UL (ref 1.8–7.7)
NEUTROPHILS NFR BLD: 64.2 % (ref 38–73)
NONHDLC SERPL-MCNC: 171 MG/DL
NRBC BLD-RTO: 0 /100 WBC
PLATELET # BLD AUTO: 259 K/UL (ref 150–450)
PMV BLD AUTO: 11.3 FL (ref 9.2–12.9)
POTASSIUM SERPL-SCNC: 4.5 MMOL/L (ref 3.5–5.1)
PROT SERPL-MCNC: 7.8 G/DL (ref 6–8.4)
RBC # BLD AUTO: 4.49 M/UL (ref 4–5.4)
SODIUM SERPL-SCNC: 141 MMOL/L (ref 136–145)
TRIGL SERPL-MCNC: 111 MG/DL (ref 30–150)
TSH SERPL DL<=0.005 MIU/L-ACNC: 1.08 UIU/ML (ref 0.4–4)
VIT B12 SERPL-MCNC: 764 PG/ML (ref 210–950)
WBC # BLD AUTO: 5.36 K/UL (ref 3.9–12.7)

## 2023-06-30 PROCEDURE — 80061 LIPID PANEL: CPT | Performed by: FAMILY MEDICINE

## 2023-06-30 PROCEDURE — 82306 VITAMIN D 25 HYDROXY: CPT | Performed by: FAMILY MEDICINE

## 2023-06-30 PROCEDURE — 85025 COMPLETE CBC W/AUTO DIFF WBC: CPT | Performed by: FAMILY MEDICINE

## 2023-06-30 PROCEDURE — 84443 ASSAY THYROID STIM HORMONE: CPT | Performed by: FAMILY MEDICINE

## 2023-06-30 PROCEDURE — 82607 VITAMIN B-12: CPT | Performed by: FAMILY MEDICINE

## 2023-06-30 PROCEDURE — 83036 HEMOGLOBIN GLYCOSYLATED A1C: CPT | Performed by: FAMILY MEDICINE

## 2023-06-30 PROCEDURE — 82570 ASSAY OF URINE CREATININE: CPT | Performed by: FAMILY MEDICINE

## 2023-06-30 PROCEDURE — 80053 COMPREHEN METABOLIC PANEL: CPT | Performed by: FAMILY MEDICINE

## 2023-06-30 RX ORDER — SULFAMETHOXAZOLE AND TRIMETHOPRIM 400; 80 MG/1; MG/1
1 TABLET ORAL 2 TIMES DAILY
Qty: 14 TABLET | Refills: 0 | Status: SHIPPED | OUTPATIENT
Start: 2023-06-30 | End: 2023-07-07

## 2023-07-01 LAB
25(OH)D3+25(OH)D2 SERPL-MCNC: 30 NG/ML (ref 30–96)
ALBUMIN/CREAT UR: NORMAL UG/MG (ref 0–30)
CREAT UR-MCNC: 40 MG/DL (ref 15–325)
ESTIMATED AVG GLUCOSE: 97 MG/DL (ref 68–131)
HBA1C MFR BLD: 5 % (ref 4–5.6)
MICROALBUMIN UR DL<=1MG/L-MCNC: <5 UG/ML

## 2023-07-02 LAB — BACTERIA UR CULT: ABNORMAL

## 2023-07-05 RX ORDER — ASPIRIN 325 MG
50000 TABLET, DELAYED RELEASE (ENTERIC COATED) ORAL
Qty: 13 CAPSULE | Refills: 3 | Status: SHIPPED | OUTPATIENT
Start: 2023-07-05

## 2023-07-07 ENCOUNTER — PATIENT MESSAGE (OUTPATIENT)
Dept: ORTHOPEDICS | Facility: CLINIC | Age: 58
End: 2023-07-07
Payer: COMMERCIAL

## 2023-07-14 ENCOUNTER — PATIENT MESSAGE (OUTPATIENT)
Dept: FAMILY MEDICINE | Facility: CLINIC | Age: 58
End: 2023-07-14
Payer: COMMERCIAL

## 2023-07-16 ENCOUNTER — PATIENT MESSAGE (OUTPATIENT)
Dept: FAMILY MEDICINE | Facility: CLINIC | Age: 58
End: 2023-07-16
Payer: COMMERCIAL

## 2023-07-19 ENCOUNTER — CLINICAL SUPPORT (OUTPATIENT)
Dept: FAMILY MEDICINE | Facility: CLINIC | Age: 58
End: 2023-07-19
Payer: COMMERCIAL

## 2023-07-19 ENCOUNTER — TELEPHONE (OUTPATIENT)
Dept: FAMILY MEDICINE | Facility: CLINIC | Age: 58
End: 2023-07-19
Payer: COMMERCIAL

## 2023-07-19 DIAGNOSIS — R30.0 DYSURIA: ICD-10-CM

## 2023-07-19 DIAGNOSIS — R30.0 DYSURIA: Primary | ICD-10-CM

## 2023-07-19 PROCEDURE — 81000 URINALYSIS NONAUTO W/SCOPE: CPT | Performed by: FAMILY MEDICINE

## 2023-07-20 DIAGNOSIS — R30.0 DYSURIA: Primary | ICD-10-CM

## 2023-07-20 LAB
BACTERIA #/AREA URNS HPF: ABNORMAL /HPF
BILIRUB UR QL STRIP: NEGATIVE
CLARITY UR: CLEAR
COLOR UR: YELLOW
GLUCOSE UR QL STRIP: NEGATIVE
HGB UR QL STRIP: ABNORMAL
KETONES UR QL STRIP: NEGATIVE
LEUKOCYTE ESTERASE UR QL STRIP: ABNORMAL
MICROSCOPIC COMMENT: ABNORMAL
NITRITE UR QL STRIP: NEGATIVE
PH UR STRIP: 6 [PH] (ref 5–8)
PROT UR QL STRIP: NEGATIVE
RBC #/AREA URNS HPF: 0 /HPF (ref 0–4)
SP GR UR STRIP: <=1.005 (ref 1–1.03)
SQUAMOUS #/AREA URNS HPF: 2 /HPF
URN SPEC COLLECT METH UR: ABNORMAL
UROBILINOGEN UR STRIP-ACNC: NEGATIVE EU/DL
WBC #/AREA URNS HPF: 2 /HPF (ref 0–5)

## 2023-07-20 RX ORDER — SULFAMETHOXAZOLE AND TRIMETHOPRIM 400; 80 MG/1; MG/1
1 TABLET ORAL 2 TIMES DAILY
Qty: 14 TABLET | Refills: 0 | Status: SHIPPED | OUTPATIENT
Start: 2023-07-20 | End: 2023-07-27

## 2023-07-21 ENCOUNTER — PATIENT MESSAGE (OUTPATIENT)
Dept: ORTHOPEDICS | Facility: CLINIC | Age: 58
End: 2023-07-21
Payer: COMMERCIAL

## 2023-07-21 DIAGNOSIS — Z98.1 STATUS POST LUMBAR SPINAL FUSION: Primary | ICD-10-CM

## 2023-07-21 RX ORDER — METHOCARBAMOL 750 MG/1
750 TABLET, FILM COATED ORAL 3 TIMES DAILY
Qty: 60 TABLET | Refills: 0 | Status: SHIPPED | OUTPATIENT
Start: 2023-07-21 | End: 2023-08-10

## 2023-07-21 NOTE — PROGRESS NOTES
The patient reports ongoing left-sided low back pain, and intermittent left lower extremity radiculopathy.  This is severe, and has not responded to current treatment regimen.      Today I reviewed the prior x-rays, as well as MRI of the lumbar spine, there is no acute pathology to explain her dramatic increase in pain.      Today we discussed options, I recommended adding a muscle relaxer, if this fails to provide relief we will consider a repeat steroid injection.

## 2023-07-24 ENCOUNTER — PATIENT MESSAGE (OUTPATIENT)
Dept: ORTHOPEDICS | Facility: CLINIC | Age: 58
End: 2023-07-24
Payer: COMMERCIAL

## 2023-07-25 LAB — NONINV COLON CA DNA+OCC BLD SCRN STL QL: NEGATIVE

## 2023-07-25 NOTE — TELEPHONE ENCOUNTER
SPOKE WITH PATIENT AND SHE STATED THAT SHE WANTED A DIRECT INJECTION CONSULT PUT IN ALREADY, NOT A CONSULT TO GO IN PERSON WHEN SHE ALREADY HAS HAD THESE INJECTIONS BEFORE. MADE AN APPOINTMENT FOR PATIENT AND ALSO PUT ON WAIT LIST. SHE WAS UPSET AND HURT. SHE STILL STATED AN VERBAL UNDERSTANDING AFTER EXPLAINING TO PATIENT THAT SHE CAN CALL PAIN MANAGEMENT AND ASK TO GET SEEN EARLIER IF POSSIBLE.

## 2023-07-26 ENCOUNTER — PATIENT MESSAGE (OUTPATIENT)
Dept: ORTHOPEDICS | Facility: CLINIC | Age: 58
End: 2023-07-26
Payer: COMMERCIAL

## 2023-07-27 ENCOUNTER — TELEPHONE (OUTPATIENT)
Dept: PAIN MEDICINE | Facility: CLINIC | Age: 58
End: 2023-07-27
Payer: COMMERCIAL

## 2023-07-27 ENCOUNTER — PATIENT MESSAGE (OUTPATIENT)
Dept: ORTHOPEDICS | Facility: CLINIC | Age: 58
End: 2023-07-27
Payer: COMMERCIAL

## 2023-07-27 DIAGNOSIS — M54.42 CHRONIC BILATERAL LOW BACK PAIN WITH BILATERAL SCIATICA: ICD-10-CM

## 2023-07-27 DIAGNOSIS — M54.41 CHRONIC BILATERAL LOW BACK PAIN WITH BILATERAL SCIATICA: ICD-10-CM

## 2023-07-27 DIAGNOSIS — G89.29 CHRONIC BILATERAL LOW BACK PAIN WITH BILATERAL SCIATICA: ICD-10-CM

## 2023-07-27 DIAGNOSIS — M54.16 LUMBAR RADICULOPATHY: Primary | ICD-10-CM

## 2023-07-27 DIAGNOSIS — M51.36 DDD (DEGENERATIVE DISC DISEASE), LUMBAR: ICD-10-CM

## 2023-07-27 RX ORDER — OXYCODONE AND ACETAMINOPHEN 10; 325 MG/1; MG/1
1 TABLET ORAL EVERY 6 HOURS PRN
Qty: 90 TABLET | Refills: 0 | Status: SHIPPED | OUTPATIENT
Start: 2023-08-01 | End: 2023-08-29 | Stop reason: SDUPTHER

## 2023-07-27 NOTE — TELEPHONE ENCOUNTER
Types of orders made on 07/27/2023: Procedure Request      Order Date:7/27/2023   Ordering User:ROCKY HERRMANN [[id number]]   Encounter Provider:Rocky Herrmann MD [[id number]]   Franki bruner Provider: Rocky Herrmann MD [[id number]]   Department:[facility][[id number]]      Common Order Information   Procedure -> Epidural Injection (specify level) Cmt: caudal      Order Specific Information   Order: Procedure Order to Pain Management [Custom: [id number]]  Order #:           [id number]: 1 FUTURE     Priority: Routine  Class: Clinic Performed     Future Order Information       Expires on:07/2 7/2024            Expected by:07/27/2023                    Associated Diagnoses       M54.16 Lumbar radiculopathy       Facility Name: -> [facility]               Priority: Routine  Class: Clinic Performed     Future Order Information       Expires on:07/27/2024            Expected by:07/27/2023                    Associated Diagnoses       M54.16 Lumbar radiculopathy       Procedure -> Epidural Injection (specify level) C

## 2023-07-27 NOTE — TELEPHONE ENCOUNTER
----- Message from Destiny Goins sent at 7/27/2023 12:58 PM CDT -----  Regarding: Return call  Type:  Patient Returning Call    Who Called:  Genia  Who Left Message for Patient:  Jessica  Does the patient know what this is regarding?:  No  Best Call Back Number:  [phone]    Additional Information:

## 2023-07-27 NOTE — TELEPHONE ENCOUNTER
Genia would like to schedule the Caudal JENNIFFER ref: Celestre on 8/2/23.  Reviewed pre-procedure instructions with her, as well as provided arrival time of 1:30p for 8/2/23.  All questions answered- verbalized understanding.

## 2023-07-27 NOTE — TELEPHONE ENCOUNTER
----- Message from Ivett Davis LPN sent at 7/27/2023  9:27 AM CDT -----  Regarding: RE: Needs injection asap  Thank you so very much!!!!    April  ----- Message -----  From: Angella Flynn RN  Sent: 7/27/2023   9:26 AM CDT  To: Ivett Davis LPN  Subject: FW: Needs injection asap                         I will contact the patient today to discuss scheduling.  Thanks, April!  ----- Message -----  From: Elijah Aquino Jr., MD  Sent: 7/27/2023   9:21 AM CDT  To: Angella Flynn RN  Subject: RE: Needs injection asap                         OK for the caudal.  ----- Message -----  From: Angella Flynn RN  Sent: 7/27/2023   8:55 AM CDT  To: Elijah Aquino Jr., MD  Subject: FW: Needs injection asap                           ----- Message -----  From: Ivett Davis LPN  Sent: 7/27/2023   8:45 AM CDT  To: Angella Flynn RN  Subject: RE: Needs injection asap                         It is a Caudal JENNIFFER.    April  ----- Message -----  From: Angella Flynn RN  Sent: 7/27/2023   8:23 AM CDT  To: Ivett Davis LPN  Subject: RE: Needs injection asap                         GM April,     We have availability next Wednesday 8/2/23.  What is the procedure Dr. Herrmann would like her to have?  ----- Message -----  From: Ivett Davis LPN  Sent: 7/27/2023   8:06 AM CDT  To: Angella Flynn RN  Subject: Needs injection asap                             Good morning,  The above patient needs an injection and would like it asap. She said she will go to any location that can do it the fastest. The orders are I from Dr. Herrmann, do you know how far out it would be?    Thank you!!    April

## 2023-08-01 NOTE — DISCHARGE INSTRUCTIONS
Home Care Instructions Pain Management:    1. DIET:   You may resume your normal diet today.   2. BATHING:   You may shower with luke warm water.  3. DRESSING:   You may remove your bandage today.   4. ACTIVITY LEVEL:   You may resume your normal activities 24 hrs after your procedure.  5. MEDICATIONS:   You may resume your normal medications today.   6. SPECIAL INSTRUCTIONS:   No heat to the injection site for 24 hrs including, bath or shower, heating pad, moist heat, or hot tubs.    Use ice pack to injection site for any pain or discomfort.  Apply ice packs for 20 minute intervals as needed.   If you have received any sedatives by mouth today you may not drive for 12 hours.    If you have received any sedation through your IV, you may not drive for 24 hrs.     PLEASE CALL YOUR DOCTOR IF:  1. Redness or swelling around the injection site.  2. Fever of 101 degrees  3. Drainage (pus) from the injection site.  4. For any continuous bleeding (some dried blood over the incision is normal.)    FOR EMERGENCIES:   If any unusual problems or difficulties occur during clinic hours, call [phone] or [phone].  Adult Procedural Sedation Instructions    Recovery After Procedural Sedation (Adult)  You have been given medicine by vein to make you sleep during your surgery. This may have included both a pain medicine and sleeping medicine. Most of the effects have worn off. But you may still have some drowsiness for the next 6 to 8 hours.  Home care  Follow these guidelines when you get home:  For the next 8 hours, you should be watched by a responsible adult. This person should make sure your condition is not getting worse.  Don't drink any alcohol for the next 24 hours.  Don't drive, operate dangerous machinery, or make important business or personal decisions during the next 24 hours.  Note: Your healthcare provider may tell you not to take any medicine by mouth for pain or sleep in the next 4 hours. These medicines may react  with the medicines you were given in the hospital. This could cause a much stronger response than usual.  Follow-up care  Follow up with your healthcare provider if you are not alert and back to your usual level of activity within 12 hours.  When to seek medical advice  Call your healthcare provider right away if any of these occur:  Drowsiness gets worse  Weakness or dizziness gets worse  Repeated vomiting  You can't be awakened   Date Last Reviewed: 10/18/2016  © 0271-7648 [employer], [employer]. [address] [place] [address]. All rights reserved. This information is not intended as a substitute for professional medical care. Always follow your healthcare professional's instructions.

## 2023-08-02 ENCOUNTER — HOSPITAL ENCOUNTER (OUTPATIENT)
Facility: HOSPITAL | Age: 58
Discharge: HOME | End: 2023-08-02
Attending: PAIN MEDICINE | Admitting: PAIN MEDICINE
Payer: COMMERCIAL

## 2023-08-02 VITALS
HEART RATE: 72 BPM | DIASTOLIC BLOOD PRESSURE: 69 MMHG | HEIGHT: 65 IN | SYSTOLIC BLOOD PRESSURE: 145 MMHG | OXYGEN SATURATION: 99 % | BODY MASS INDEX: 26.66 KG/M2 | RESPIRATION RATE: 18 BRPM | TEMPERATURE: 98 F | WEIGHT: 160 LBS

## 2023-08-02 DIAGNOSIS — M54.16 LUMBAR RADICULOPATHY: Primary | ICD-10-CM

## 2023-08-02 PROCEDURE — 63600175 PHARM REV CODE 636 W HCPCS: Performed by: PAIN MEDICINE

## 2023-08-02 PROCEDURE — 25000003 PHARM REV CODE 250: Performed by: PAIN MEDICINE

## 2023-08-02 PROCEDURE — 62323 NJX INTERLAMINAR LMBR/SAC: CPT | Performed by: PAIN MEDICINE

## 2023-08-02 PROCEDURE — 62323 NJX INTERLAMINAR LMBR/SAC: CPT | Mod: ,,, | Performed by: PAIN MEDICINE

## 2023-08-02 PROCEDURE — 25500020 PHARM REV CODE 255: Performed by: PAIN MEDICINE

## 2023-08-02 PROCEDURE — 62323 PR INJ LUMBAR/SACRAL, W/IMAGING GUIDANCE: ICD-10-PCS | Mod: ,,, | Performed by: PAIN MEDICINE

## 2023-08-02 RX ORDER — MIDAZOLAM HYDROCHLORIDE 1 MG/ML
INJECTION INTRAMUSCULAR; INTRAVENOUS
Status: DISCONTINUED
Start: 2023-08-02 | End: 2023-08-02 | Stop reason: HOSPADM

## 2023-08-02 RX ORDER — LIDOCAINE HYDROCHLORIDE 10 MG/ML
10 INJECTION INFILTRATION; PERINEURAL ONCE
Status: COMPLETED | OUTPATIENT
Start: 2023-08-02 | End: 2023-08-02

## 2023-08-02 RX ORDER — LIDOCAINE HYDROCHLORIDE 20 MG/ML
10 INJECTION, SOLUTION INFILTRATION; PERINEURAL ONCE
Status: DISCONTINUED | OUTPATIENT
Start: 2023-08-02 | End: 2023-08-02 | Stop reason: ALTCHOICE

## 2023-08-02 RX ORDER — LIDOCAINE HYDROCHLORIDE 10 MG/ML
1 INJECTION, SOLUTION EPIDURAL; INFILTRATION; INTRACAUDAL; PERINEURAL ONCE
Status: COMPLETED | OUTPATIENT
Start: 2023-08-02 | End: 2023-08-02

## 2023-08-02 RX ORDER — SODIUM CHLORIDE 9 MG/ML
INJECTION, SOLUTION INTRAVENOUS CONTINUOUS
Status: DISCONTINUED | OUTPATIENT
Start: 2023-08-02 | End: 2023-08-02 | Stop reason: HOSPADM

## 2023-08-02 RX ORDER — DEXAMETHASONE SODIUM PHOSPHATE 10 MG/ML
INJECTION INTRAMUSCULAR; INTRAVENOUS
Status: DISCONTINUED
Start: 2023-08-02 | End: 2023-08-02 | Stop reason: HOSPADM

## 2023-08-02 RX ORDER — FENTANYL CITRATE 50 UG/ML
100 INJECTION, SOLUTION INTRAMUSCULAR; INTRAVENOUS
Status: DISCONTINUED | OUTPATIENT
Start: 2023-08-02 | End: 2023-08-02 | Stop reason: HOSPADM

## 2023-08-02 RX ORDER — FENTANYL CITRATE 50 UG/ML
INJECTION, SOLUTION INTRAMUSCULAR; INTRAVENOUS
Status: DISCONTINUED
Start: 2023-08-02 | End: 2023-08-02 | Stop reason: HOSPADM

## 2023-08-02 RX ORDER — LIDOCAINE HYDROCHLORIDE 10 MG/ML
INJECTION, SOLUTION EPIDURAL; INFILTRATION; INTRACAUDAL; PERINEURAL
Status: DISCONTINUED
Start: 2023-08-02 | End: 2023-08-02 | Stop reason: HOSPADM

## 2023-08-02 RX ORDER — LIDOCAINE HYDROCHLORIDE 10 MG/ML
INJECTION INFILTRATION; PERINEURAL
Status: DISCONTINUED
Start: 2023-08-02 | End: 2023-08-02 | Stop reason: HOSPADM

## 2023-08-02 RX ORDER — DEXAMETHASONE SODIUM PHOSPHATE 10 MG/ML
10 INJECTION INTRAMUSCULAR; INTRAVENOUS ONCE
Status: COMPLETED | OUTPATIENT
Start: 2023-08-02 | End: 2023-08-02

## 2023-08-02 RX ORDER — MIDAZOLAM HYDROCHLORIDE 1 MG/ML
5 INJECTION INTRAMUSCULAR; INTRAVENOUS
Status: DISCONTINUED | OUTPATIENT
Start: 2023-08-02 | End: 2023-08-02 | Stop reason: HOSPADM

## 2023-08-02 RX ADMIN — SODIUM CHLORIDE: 9 INJECTION, SOLUTION INTRAVENOUS at 02:08

## 2023-08-02 NOTE — OP NOTE
Caudal Epidural Steroid Injection under Fluoroscopy    Time-out taken to identify patient and procedure side prior to starting the procedure.   I attest that I have reviewed the patient's home medications prior to the procedure and no contraindication have been identified. I re-evaluated the patient after the patient was positioned for the procedure in the procedure room immediately before the procedural time-out. The vital signs are current and represent the current state of the patient which has not significantly changed since the preprocedure assessment        Date of Service: 08/02/2023    PCP: Charla Meadows MD    Referring Physician:                                                PROCEDURE:  Caudal epidural steroid injection under fluoroscopy with insertion of flexible catheter    REASON FOR PROCEDURE:  M51.16 Intervertebral disc disorders with radiculopathy, lumbar region    PHYSICIAN: Elijah Aquino MD  ASSISTANTS: None    MEDICATIONS INJECTED:  Preservative-free dexamethasone 10mg, sterile preservative free normal saline 2-4ml and preservative free sterile Lidocaine 1% 5ml.    LOCAL ANESTHETIC GIVEN:  Xylocaine 1% 2ml.   SEDATION MEDICATIONS: Versed 1 mg and fentanyl 25 mcg IV.  Conscious sedation provided by MD and monitored by RN.   (See nurse documentation and case log for sedation time)      ESTIMATED BLOOD LOSS:  None.    COMPLICATIONS:  None.    TECHNIQUE:   Laying in the prone position, the patient was prepped and draped in the usual sterile fashion using ChloraPrep and fenestrated drape.  Appropriate anatomic landmarks were determined including the superior LS-spine and sacral hiatus.  Local anesthetic was given by raising a wheal and going down to the periosteum.  A 3.5in 16-gauge spinal needle was introduced through the sacral hiatus.  Omnipaque was injected to confirm placement in the appropriate area and that there was no vascular runoff.  The flexible catheter threaded through to the  desired levels. Omnipaque was reinjected to confirm placement in the appropriate area. The medication was then injected slowly.  The patient tolerated the procedure well.    PAIN BEFORE THE PROCEDURE:  1/10.    PAIN AFTER THE PROCEDURE: 2/10.    The patient was monitored after the procedure.  Patient was given post procedure and discharge instructions to follow at home.  We will see the patient back in two weeks or the patient may call to inform of status. The patient was discharged in a stable condition.

## 2023-08-02 NOTE — DISCHARGE SUMMARY
[facility] End[facility]  Discharge Note  Short Stay    Procedure(s) (LRB):  Caudal Epidural Steroid Injection (ref: Celestre) (N/A)      OUTCOME: Patient tolerated treatment/procedure well without complication and is now ready for discharge.    DISPOSITION: Home or Self Care    FINAL DIAGNOSIS:  <principal problem not specified>    FOLLOWUP: In clinic    DISCHARGE INSTRUCTIONS:  No discharge procedures on file.       Discharge Diagnosis:Lumbar radiculopathy [M54.16]  DDD (degenerative disc disease), lumbar [M51.36]  Condition on Discharge: Stable.  Diet on Discharge: Same as before.  Activity: as per instruction sheet.  Discharge to: Home with a responsible adult.  Follow up: as per Discharge instructions

## 2023-08-04 ENCOUNTER — PATIENT MESSAGE (OUTPATIENT)
Dept: PHYSICAL MEDICINE AND REHAB | Facility: CLINIC | Age: 58
End: 2023-08-04
Payer: COMMERCIAL

## 2023-08-04 RX ORDER — HYDROCODONE BITARTRATE AND ACETAMINOPHEN 10; 325 MG/1; MG/1
1 TABLET ORAL EVERY 6 HOURS PRN
Qty: 120 TABLET | Refills: 0 | Status: SHIPPED | OUTPATIENT
Start: 2023-08-04 | End: 2023-09-03

## 2023-08-07 ENCOUNTER — TELEPHONE (OUTPATIENT)
Dept: FAMILY MEDICINE | Facility: CLINIC | Age: 58
End: 2023-08-07
Payer: COMMERCIAL

## 2023-08-07 NOTE — TELEPHONE ENCOUNTER
----- Message from Charla Meadows MD sent at 7/20/2023  5:34 PM CDT -----  Culture pending, but I will send antibiotics to the pharmacy

## 2023-08-29 DIAGNOSIS — G89.29 CHRONIC BILATERAL LOW BACK PAIN WITH BILATERAL SCIATICA: ICD-10-CM

## 2023-08-29 DIAGNOSIS — M54.41 CHRONIC BILATERAL LOW BACK PAIN WITH BILATERAL SCIATICA: ICD-10-CM

## 2023-08-29 DIAGNOSIS — M54.42 CHRONIC BILATERAL LOW BACK PAIN WITH BILATERAL SCIATICA: ICD-10-CM

## 2023-08-30 RX ORDER — OXYCODONE AND ACETAMINOPHEN 10; 325 MG/1; MG/1
1 TABLET ORAL EVERY 6 HOURS PRN
Qty: 90 TABLET | Refills: 0 | Status: SHIPPED | OUTPATIENT
Start: 2023-08-30 | End: 2023-09-06 | Stop reason: SDUPTHER

## 2023-09-06 ENCOUNTER — OFFICE VISIT (OUTPATIENT)
Dept: PHYSICAL MEDICINE AND REHAB | Facility: CLINIC | Age: 58
End: 2023-09-06
Payer: COMMERCIAL

## 2023-09-06 VITALS
SYSTOLIC BLOOD PRESSURE: 151 MMHG | WEIGHT: 160.06 LBS | DIASTOLIC BLOOD PRESSURE: 79 MMHG | BODY MASS INDEX: 26.67 KG/M2 | OXYGEN SATURATION: 100 % | HEART RATE: 71 BPM | HEIGHT: 65 IN

## 2023-09-06 DIAGNOSIS — M17.11 PRIMARY OSTEOARTHRITIS OF RIGHT KNEE: ICD-10-CM

## 2023-09-06 DIAGNOSIS — R29.898 WEAKNESS OF BOTH LEGS: ICD-10-CM

## 2023-09-06 DIAGNOSIS — Z79.891 CHRONICALLY ON OPIATE THERAPY: ICD-10-CM

## 2023-09-06 DIAGNOSIS — M54.41 CHRONIC BILATERAL LOW BACK PAIN WITH BILATERAL SCIATICA: Primary | ICD-10-CM

## 2023-09-06 DIAGNOSIS — M47.16 LUMBAR SPONDYLOSIS WITH MYELOPATHY: ICD-10-CM

## 2023-09-06 DIAGNOSIS — G89.29 CHRONIC BILATERAL LOW BACK PAIN WITH BILATERAL SCIATICA: Primary | ICD-10-CM

## 2023-09-06 DIAGNOSIS — Z98.1 S/P LUMBAR SPINAL FUSION: ICD-10-CM

## 2023-09-06 DIAGNOSIS — M21.371 FOOT DROP, RIGHT: ICD-10-CM

## 2023-09-06 DIAGNOSIS — M54.42 CHRONIC BILATERAL LOW BACK PAIN WITH BILATERAL SCIATICA: Primary | ICD-10-CM

## 2023-09-06 DIAGNOSIS — R26.9 GAIT DISORDER: ICD-10-CM

## 2023-09-06 PROCEDURE — 99999 PR PBB SHADOW E&M-EST. PATIENT-LVL III: CPT | Mod: PBBFAC,,, | Performed by: PHYSICAL MEDICINE & REHABILITATION

## 2023-09-06 PROCEDURE — 4010F PR ACE/ARB THEARPY RXD/TAKEN: ICD-10-PCS | Mod: CPTII,S$GLB,, | Performed by: PHYSICAL MEDICINE & REHABILITATION

## 2023-09-06 PROCEDURE — 3044F HG A1C LEVEL LT 7.0%: CPT | Mod: CPTII,S$GLB,, | Performed by: PHYSICAL MEDICINE & REHABILITATION

## 2023-09-06 PROCEDURE — 3077F SYST BP >= 140 MM HG: CPT | Mod: CPTII,S$GLB,, | Performed by: PHYSICAL MEDICINE & REHABILITATION

## 2023-09-06 PROCEDURE — 99215 PR OFFICE/OUTPT VISIT, EST, LEVL V, 40-54 MIN: ICD-10-PCS | Mod: S$GLB,,, | Performed by: PHYSICAL MEDICINE & REHABILITATION

## 2023-09-06 PROCEDURE — 3066F PR DOCUMENTATION OF TREATMENT FOR NEPHROPATHY: ICD-10-PCS | Mod: CPTII,S$GLB,, | Performed by: PHYSICAL MEDICINE & REHABILITATION

## 2023-09-06 PROCEDURE — 3078F DIAST BP <80 MM HG: CPT | Mod: CPTII,S$GLB,, | Performed by: PHYSICAL MEDICINE & REHABILITATION

## 2023-09-06 PROCEDURE — 1159F MED LIST DOCD IN RCRD: CPT | Mod: CPTII,S$GLB,, | Performed by: PHYSICAL MEDICINE & REHABILITATION

## 2023-09-06 PROCEDURE — 99215 OFFICE O/P EST HI 40 MIN: CPT | Mod: S$GLB,,, | Performed by: PHYSICAL MEDICINE & REHABILITATION

## 2023-09-06 PROCEDURE — 3061F NEG MICROALBUMINURIA REV: CPT | Mod: CPTII,S$GLB,, | Performed by: PHYSICAL MEDICINE & REHABILITATION

## 2023-09-06 PROCEDURE — 3066F NEPHROPATHY DOC TX: CPT | Mod: CPTII,S$GLB,, | Performed by: PHYSICAL MEDICINE & REHABILITATION

## 2023-09-06 PROCEDURE — 3008F BODY MASS INDEX DOCD: CPT | Mod: CPTII,S$GLB,, | Performed by: PHYSICAL MEDICINE & REHABILITATION

## 2023-09-06 PROCEDURE — 3008F PR BODY MASS INDEX (BMI) DOCUMENTED: ICD-10-PCS | Mod: CPTII,S$GLB,, | Performed by: PHYSICAL MEDICINE & REHABILITATION

## 2023-09-06 PROCEDURE — 3061F PR NEG MICROALBUMINURIA RESULT DOCUMENTED/REVIEW: ICD-10-PCS | Mod: CPTII,S$GLB,, | Performed by: PHYSICAL MEDICINE & REHABILITATION

## 2023-09-06 PROCEDURE — 99999 PR PBB SHADOW E&M-EST. PATIENT-LVL III: ICD-10-PCS | Mod: PBBFAC,,, | Performed by: PHYSICAL MEDICINE & REHABILITATION

## 2023-09-06 PROCEDURE — 4010F ACE/ARB THERAPY RXD/TAKEN: CPT | Mod: CPTII,S$GLB,, | Performed by: PHYSICAL MEDICINE & REHABILITATION

## 2023-09-06 PROCEDURE — 3077F PR MOST RECENT SYSTOLIC BLOOD PRESSURE >= 140 MM HG: ICD-10-PCS | Mod: CPTII,S$GLB,, | Performed by: PHYSICAL MEDICINE & REHABILITATION

## 2023-09-06 PROCEDURE — 3044F PR MOST RECENT HEMOGLOBIN A1C LEVEL <7.0%: ICD-10-PCS | Mod: CPTII,S$GLB,, | Performed by: PHYSICAL MEDICINE & REHABILITATION

## 2023-09-06 PROCEDURE — 1159F PR MEDICATION LIST DOCUMENTED IN MEDICAL RECORD: ICD-10-PCS | Mod: CPTII,S$GLB,, | Performed by: PHYSICAL MEDICINE & REHABILITATION

## 2023-09-06 PROCEDURE — 3078F PR MOST RECENT DIASTOLIC BLOOD PRESSURE < 80 MM HG: ICD-10-PCS | Mod: CPTII,S$GLB,, | Performed by: PHYSICAL MEDICINE & REHABILITATION

## 2023-09-06 RX ORDER — VENLAFAXINE 37.5 MG/1
37.5 TABLET ORAL DAILY
Qty: 60 TABLET | Refills: 0 | Status: SHIPPED | OUTPATIENT
Start: 2023-09-06 | End: 2024-01-12 | Stop reason: SINTOL

## 2023-09-06 RX ORDER — OXYCODONE AND ACETAMINOPHEN 10; 325 MG/1; MG/1
1 TABLET ORAL EVERY 6 HOURS PRN
Qty: 100 TABLET | Refills: 0 | Status: SHIPPED | OUTPATIENT
Start: 2023-09-06 | End: 2023-10-17 | Stop reason: SDUPTHER

## 2023-09-06 NOTE — PROGRESS NOTES
Subjective:       Patient ID: Genia Mcnulty is a 57 y.o. female.    Chief Complaint: No chief complaint on file.      HPI      Mrs. Mcnulty is a 57-year-old white female with past medical history hypertension and multiple back surgeries with persistent back pain.  She is followed up at the Physical Medicine Clinic for chronic low back pain with lumbar radiculopathy.  She is status post L1-3 fusion in 2012 complicated by CSF leak, T11-pelvis fusion in 11/2015, and revision of her fusion surgery with T10-pelvis fusion in 10/2018 by Dr. Herrmann.  Her last visit to the Physical Medicine Clinic was on 06/01/2023.  She was maintained on celecoxib, pregabalin and p.r.n. oxycodone/APAP.    Since her last visit, she continued to follow-up with Dr. Herrmann at Ortho/ spine surgery Clinic.  Her last visit was on 7/21/2023.  She continues to report left-sided low back pain with left radiculopathy that is severe.  However, her x-rays and MRI did not show pathology.  He was referred to the spine Injection Clinic for repeat steroid injections.  She was seen by Dr. Hernandez and received called epidural steroid injection on 08/02/2023.  She reports good radicular pain and fair relief of her back pain.    The patient is coming to the clinic for follow-up.  Her low back pain has been better since the injection.  It is a constant stabbing pain in the lower lumbar spine and across her back.  The pain radiates to both lower extremities down to the feet with sharp sensations, but the radiating pain has been less.  Her pain is aggravated by standing or walking and better with sitting down and rest.  Her maximum pain is 8-910 and minimum 2/10.  Today it is 2/10.  She denies any bowel or bladder incontinence.  She has chronic right foot drop and wears AFOs.   She has not been walking long enough to developed claudications.      She is currently taking:  Celebrex 200 mg p.o. once per day as needed few times per month.  However, it  occasionally upsets her stomach.  She also indicates that EGD in the past showed gastritis.  Pregabalin 75 mg capsules, 1 capsule  daily (she had some GI upset with higher dose)   Oxycodone/APAP 10/325 p.r.n., usually 3-4 times per day.  She was previously on gabapentin and duloxetine stopped (likely due to the active although she recalls that gabapentin may have caused GI upset).    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   Eyes:  Negative for visual disturbance.   Respiratory:  Negative for shortness of breath.    Cardiovascular:  Negative for chest pain.   Gastrointestinal:  Negative for blood in stool, constipation, nausea and vomiting.   Genitourinary:  Negative for difficulty urinating.   Musculoskeletal:  Positive for arthralgias, back pain, gait problem and neck pain.   Neurological:  Positive for weakness. Negative for dizziness and headaches.   Psychiatric/Behavioral:  Positive for sleep disturbance. Negative for behavioral problems.              Objective:      Physical Exam  Vitals reviewed.   Constitutional:       Appearance: She is well-developed.      Comments: Coming to the clinic in a manual wheelchair.     HENT:      Head: Normocephalic and atraumatic.   Eyes:      Extraocular Movements: Extraocular movements intact.   Musculoskeletal:      Cervical back: Normal range of motion. No tenderness.      Comments: BUE:  ROM:   RUE: full.   LUE: full.  Strength:    RUE: 5-/5 at shoulder abduction, 65 elbow flexion, 5  elbow extension, 5 hand .   LUE: 5-/5 at shoulder abduction, 5 elbow flexion, 5 elbow extension, 5 hand .  Sensation to pinprick:   RUE: intact.   LUE: intact.      BLE:  ROM:   RLE: full.   LLE: full.  Knee crepitus:   RLE: -ve.   LLE: -ve.   Strength:    RLE: 1/5 at hip flexion, 1 knee extension, 1 ankle DF, 3 PF.   LLE: 3/5 at hip flexion, 4 knee extension, 3 ankle DF, 4 PF.  Sensation to pinprick:     RLE: intact.      LLE: intact.   SLR (sitting):      RLE: -ve.      LLE: -ve.    +ve tenderness over mid-lumbar spine.       Skin:     General: Skin is warm.   Neurological:      General: No focal deficit present.      Mental Status: She is alert.   Psychiatric:         Behavior: Behavior normal.       Assessment:       1. Chronic bilateral low back pain with bilateral sciatica    2. S/P lumbar spinal fusion (T11 to pelvis, 2018)    3. Lumbar spondylosis with myelopathy    4. Weakness of both legs    5. Gait disorder    6. Foot drop, right    7. Primary osteoarthritis of right knee    8. Chronically on opiate therapy        Plan:       - Continue celecoxib (CELEBREX) 200 MG capsule; Take 1 capsule (200 mg total) by mouth once daily.  He was asked to take it as infrequently as possible to minimize GI upset.  - Hold pregabalin   - Start venlafaxine (EFFEXOR) 37.5 MG Tab; Take 1 tablet (37.5 mg total) by mouth once daily. In 1 week, if no relief, increase dose to twice per day. In another week, if no relief, increase dose to 3 times per day.  - The dose of oxycodone/APAP was slightly increased to: oxyCODONE-acetaminophen (PERCOCET)  mg per tablet; Take 1 tablet by mouth every 6 (six) hours as needed for Pain (may intermittently take an extra tablet when pain is severe). -  Follow-up with Dr. Herrmann and spine surgery Clinic as needed.  - Up with spine Injection Clinic as needed.  - Follow up in about 4 months (around 1/6/2024).    This was a 45 minute visit, 50% of which was spent educating the patient  about the diagnosis and the treatment plan.      This note was partly generated with [employer] voice recognition software. I apologize for any possible typographical errors.

## 2023-10-17 DIAGNOSIS — M54.41 CHRONIC BILATERAL LOW BACK PAIN WITH BILATERAL SCIATICA: ICD-10-CM

## 2023-10-17 DIAGNOSIS — G89.29 CHRONIC BILATERAL LOW BACK PAIN WITH BILATERAL SCIATICA: ICD-10-CM

## 2023-10-17 DIAGNOSIS — M54.42 CHRONIC BILATERAL LOW BACK PAIN WITH BILATERAL SCIATICA: ICD-10-CM

## 2023-10-20 RX ORDER — OXYCODONE AND ACETAMINOPHEN 10; 325 MG/1; MG/1
1 TABLET ORAL EVERY 6 HOURS PRN
Qty: 100 TABLET | Refills: 0 | Status: SHIPPED | OUTPATIENT
Start: 2023-10-24 | End: 2023-11-13 | Stop reason: SDUPTHER

## 2023-11-13 ENCOUNTER — PATIENT MESSAGE (OUTPATIENT)
Dept: OTHER | Facility: OTHER | Age: 58
End: 2023-11-13
Payer: COMMERCIAL

## 2023-11-13 DIAGNOSIS — M54.41 CHRONIC BILATERAL LOW BACK PAIN WITH BILATERAL SCIATICA: ICD-10-CM

## 2023-11-13 DIAGNOSIS — M54.42 CHRONIC BILATERAL LOW BACK PAIN WITH BILATERAL SCIATICA: ICD-10-CM

## 2023-11-13 DIAGNOSIS — G89.29 CHRONIC BILATERAL LOW BACK PAIN WITH BILATERAL SCIATICA: ICD-10-CM

## 2023-11-13 RX ORDER — OXYCODONE AND ACETAMINOPHEN 10; 325 MG/1; MG/1
1 TABLET ORAL EVERY 6 HOURS PRN
Qty: 100 TABLET | Refills: 0 | Status: SHIPPED | OUTPATIENT
Start: 2023-11-22 | End: 2023-12-14 | Stop reason: SDUPTHER

## 2023-12-14 DIAGNOSIS — M54.42 CHRONIC BILATERAL LOW BACK PAIN WITH BILATERAL SCIATICA: ICD-10-CM

## 2023-12-14 DIAGNOSIS — M54.41 CHRONIC BILATERAL LOW BACK PAIN WITH BILATERAL SCIATICA: ICD-10-CM

## 2023-12-14 DIAGNOSIS — G89.29 CHRONIC BILATERAL LOW BACK PAIN WITH BILATERAL SCIATICA: ICD-10-CM

## 2023-12-14 RX ORDER — OXYCODONE AND ACETAMINOPHEN 10; 325 MG/1; MG/1
1 TABLET ORAL EVERY 6 HOURS PRN
Qty: 100 TABLET | Refills: 0 | Status: SHIPPED | OUTPATIENT
Start: 2023-12-22 | End: 2024-01-12 | Stop reason: SDUPTHER

## 2024-01-12 ENCOUNTER — LAB VISIT (OUTPATIENT)
Dept: LAB | Facility: HOSPITAL | Age: 59
End: 2024-01-12
Attending: PHYSICAL MEDICINE & REHABILITATION
Payer: COMMERCIAL

## 2024-01-12 ENCOUNTER — OFFICE VISIT (OUTPATIENT)
Dept: PHYSICAL MEDICINE AND REHAB | Facility: CLINIC | Age: 59
End: 2024-01-12
Payer: COMMERCIAL

## 2024-01-12 VITALS — OXYGEN SATURATION: 98 % | BODY MASS INDEX: 26.67 KG/M2 | WEIGHT: 160.06 LBS | HEIGHT: 65 IN

## 2024-01-12 DIAGNOSIS — Z79.891 CHRONICALLY ON OPIATE THERAPY: ICD-10-CM

## 2024-01-12 DIAGNOSIS — M54.42 CHRONIC BILATERAL LOW BACK PAIN WITH BILATERAL SCIATICA: Primary | ICD-10-CM

## 2024-01-12 DIAGNOSIS — R29.898 WEAKNESS OF BOTH LEGS: ICD-10-CM

## 2024-01-12 DIAGNOSIS — Z98.1 S/P LUMBAR SPINAL FUSION: ICD-10-CM

## 2024-01-12 DIAGNOSIS — M21.371 FOOT DROP, RIGHT: ICD-10-CM

## 2024-01-12 DIAGNOSIS — M54.41 CHRONIC BILATERAL LOW BACK PAIN WITH BILATERAL SCIATICA: Primary | ICD-10-CM

## 2024-01-12 DIAGNOSIS — R26.9 GAIT DISORDER: ICD-10-CM

## 2024-01-12 DIAGNOSIS — M47.16 LUMBAR SPONDYLOSIS WITH MYELOPATHY: ICD-10-CM

## 2024-01-12 DIAGNOSIS — G89.29 CHRONIC BILATERAL LOW BACK PAIN WITH BILATERAL SCIATICA: Primary | ICD-10-CM

## 2024-01-12 DIAGNOSIS — M17.11 PRIMARY OSTEOARTHRITIS OF RIGHT KNEE: ICD-10-CM

## 2024-01-12 PROCEDURE — 80326 AMPHETAMINES 5 OR MORE: CPT | Performed by: PHYSICAL MEDICINE & REHABILITATION

## 2024-01-12 PROCEDURE — 99215 OFFICE O/P EST HI 40 MIN: CPT | Mod: S$GLB,,, | Performed by: PHYSICAL MEDICINE & REHABILITATION

## 2024-01-12 PROCEDURE — 4010F ACE/ARB THERAPY RXD/TAKEN: CPT | Mod: CPTII,S$GLB,, | Performed by: PHYSICAL MEDICINE & REHABILITATION

## 2024-01-12 PROCEDURE — 1159F MED LIST DOCD IN RCRD: CPT | Mod: CPTII,S$GLB,, | Performed by: PHYSICAL MEDICINE & REHABILITATION

## 2024-01-12 PROCEDURE — 80347 BENZODIAZEPINES 13 OR MORE: CPT | Performed by: PHYSICAL MEDICINE & REHABILITATION

## 2024-01-12 PROCEDURE — 80355 GABAPENTIN NON-BLOOD: CPT | Performed by: PHYSICAL MEDICINE & REHABILITATION

## 2024-01-12 PROCEDURE — 99999 PR PBB SHADOW E&M-EST. PATIENT-LVL III: CPT | Mod: PBBFAC,,, | Performed by: PHYSICAL MEDICINE & REHABILITATION

## 2024-01-12 PROCEDURE — 3008F BODY MASS INDEX DOCD: CPT | Mod: CPTII,S$GLB,, | Performed by: PHYSICAL MEDICINE & REHABILITATION

## 2024-01-12 RX ORDER — OXYCODONE AND ACETAMINOPHEN 10; 325 MG/1; MG/1
1 TABLET ORAL EVERY 6 HOURS PRN
Qty: 100 TABLET | Refills: 0 | Status: SHIPPED | OUTPATIENT
Start: 2024-01-21 | End: 2024-02-16 | Stop reason: SDUPTHER

## 2024-01-12 NOTE — PROGRESS NOTES
Subjective:       Patient ID: Genia Mcnulty is a 58 y.o. female.    Chief Complaint: No chief complaint on file.      HPI      Mrs. Mcnulty is a 58-year-old white female with past medical history hypertension and multiple back surgeries with persistent back pain.  She is followed up at the [facility] for chronic low back pain with lumbar radiculopathy.  She is status post L1-3 fusion in 2012 complicated by CSF leak, T11-pelvis fusion in 11/2015, and revision of her fusion surgery with T10-pelvis fusion in 10/2018 by Dr. Herrmann.  Her last visit to the [facility] Medicine Clinic was on 9/6/2023.  She was maintained on celecoxib and p.r.n. oxycodone/APAP.  She was switched from pregabalin to venlafaxine.    The patient is coming to the clinic for follow-up.  Her low back pain has been stable.  It is a constant stabbing pain in the lower lumbar spine and across her back.  The pain radiates to both lower extremities down to the feet with sharp sensations.  Her pain is aggravated by standing or walking and better with sitting down and rest.  Her maximum pain is 8-910 and minimum 3-4/10.  Today it is 3-4/10.  She denies any bowel or bladder incontinence.  She has chronic right foot drop and wears AFOs.   She has not been walking long enough to developed claudications.      She is currently taking:  Celebrex 200 mg p.o. once per day as needed few times per month.    Oxycodone/APAP 10/325 p.r.n., usually 3-4 times per day.  She was on venlafaxine last visit but stopped due to severe nausea.  Lyrica previously cause GI upset.  She was previously on gabapentin and duloxetine stopped (likely due to the active although she recalls that gabapentin may have caused GI upset).    The patient also side her house with a rolling walker.  She prefers to have a Rollator walker to give her to the ability of easy pushing in sitting down as needed.  She has a right de ankle-foot orthosis that has been helping her walk.   It is a little over 4 years old it is almost broken.  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   Eyes:  Negative for visual disturbance.   Respiratory:  Negative for shortness of breath.    Cardiovascular:  Negative for chest pain.   Gastrointestinal:  Negative for blood in stool, constipation, nausea and vomiting.   Genitourinary:  Negative for difficulty urinating.   Musculoskeletal:  Positive for arthralgias, back pain, gait problem and neck pain.   Neurological:  Positive for weakness. Negative for dizziness and headaches.   Psychiatric/Behavioral:  Positive for sleep disturbance. Negative for behavioral problems.              Objective:      Physical Exam  Vitals reviewed.   Constitutional:       Appearance: She is well-developed.      Comments: Coming to the clinic in a manual wheelchair.     HENT:      Head: Normocephalic and atraumatic.   Eyes:      Extraocular Movements: Extraocular movements intact.   Musculoskeletal:      Cervical back: Normal range of motion. No tenderness.      Comments: BUE:  ROM:   RUE: full.   LUE: full.  Strength:    RUE: 5-/5 at shoulder abduction, 65 elbow flexion, 5 elbow extension, 5 hand .   LUE: 5-/5 at shoulder abduction, 5 elbow flexion, 5 elbow extension, 5 hand .  Sensation to pinprick:   RUE: intact.   LUE: intact.      BLE:  ROM:   RLE: full.   LLE: full.  Knee crepitus:   RLE: -ve.   LLE: -ve.   Strength:    RLE: 1/5 at hip flexion, 1  knee extension, 1 ankle DF, 3 PF.   LLE: 3/5 at hip flexion, 4 knee extension, 3 ankle DF, 4 PF.  Sensation to pinprick:     RLE: intact.      LLE: intact.   SLR (sitting):      RLE: -ve.      LLE: -ve.    -ve tenderness over lumbar spine.       Skin:     General: Skin is warm.   Neurological:      General: No focal deficit present.      Mental Status: She is alert.   Psychiatric:         Behavior: Behavior normal.       Assessment:       1. Chronic bilateral low back pain with bilateral sciatica    2. S/P lumbar spinal fusion (T11 to pelvis, 2018)    3. Lumbar spondylosis with myelopathy    4. Weakness of both legs    5. Gait disorder    6. Foot drop, right    7. Primary osteoarthritis of right knee    8. Chronically on opiate therapy        Plan:       - Continue celecoxib (CELEBREX) 200 MG capsule; Take 1 capsule (200 mg total) by mouth once daily.  He was asked to take it as infrequently as possible to minimize GI upset.  - Continue oxycodone/APAP was slightly increased to: oxyCODONE-acetaminophen (PERCOCET)  mg per tablet; Take 1 tablet by mouth 3 times per day as needed for Pain (may intermittently take an extra tablet when pain is severe).   - A prescription for a rollator walker was given to the patient to help improve gait safety.  - Prescription for a new right knee-ankle-foot orthosis was generated at give it to the patient.  It may be on the bed to her orthotist ([facility])  - Pain Clinic Drug Screen; Future  - Follow up in about 4 months (around 5/12/2024).      This was a 40 minute visit  (including review of records), 50% of which was spent educating the patient about the diagnosis and the treatment plan.    This note was partly generated with [employer] voice recognition software. I apologize for any possible typographical errors.

## 2024-01-16 LAB
6MAM UR QL: NOT DETECTED
7AMINOCLONAZEPAM UR QL: NOT DETECTED
A-OH ALPRAZ UR QL: NOT DETECTED
ALPHA-OH-MIDAZOLAM: NOT DETECTED
ALPRAZ UR QL: NOT DETECTED
AMPHET UR QL SCN: NOT DETECTED
ANNOTATION COMMENT IMP: NORMAL
BARBITURATES UR QL: NEGATIVE
BUPRENORPHINE UR QL: NOT DETECTED
BZE UR QL: NEGATIVE
CARBOXYTHC UR QL: NEGATIVE
CARISOPRODOL UR QL: NEGATIVE
CLONAZEPAM UR QL: NOT DETECTED
CODEINE UR QL: NOT DETECTED
CREAT UR-MCNC: 22.8 MG/DL (ref 20–400)
DIAZEPAM UR QL: NOT DETECTED
ETHYL GLUCURONIDE UR QL: NEGATIVE
FENTANYL UR QL: NOT DETECTED
GABAPENTIN: NOT DETECTED
HYDROCODONE UR QL: NOT DETECTED
HYDROMORPHONE UR QL: NOT DETECTED
LORAZEPAM UR QL: NOT DETECTED
MDA UR QL: NOT DETECTED
MDEA UR QL: NOT DETECTED
MDMA UR QL: NOT DETECTED
ME-PHENIDATE UR QL: NOT DETECTED
METHADONE UR QL: NEGATIVE
METHAMPHET UR QL: NOT DETECTED
MIDAZOLAM UR QL SCN: NOT DETECTED
MORPHINE UR QL: NOT DETECTED
NALOXONE: NOT DETECTED
NORBUPRENORPHINE UR QL CFM: NOT DETECTED
NORDIAZEPAM UR QL: NOT DETECTED
NORFENTANYL UR QL: NOT DETECTED
NORHYDROCODONE UR QL CFM: NOT DETECTED
NORMEPERIDINE UR QL CFM: NOT DETECTED
NOROXYCODONE UR QL CFM: PRESENT
NOROXYMORPHONE UR QL SCN: PRESENT
OXAZEPAM UR QL: NOT DETECTED
OXYCODONE UR QL: PRESENT
OXYMORPHONE UR QL: PRESENT
PATHOLOGY STUDY: NORMAL
PCP UR QL: NEGATIVE
PHENTERMINE UR QL: NOT DETECTED
PREGABALIN: NOT DETECTED
SERVICE CMNT-IMP: NORMAL
TAPENTADOL UR QL SCN: NOT DETECTED
TAPENTADOL UR QL SCN: NOT DETECTED
TEMAZEPAM UR QL: NOT DETECTED
TRAMADOL UR QL: NEGATIVE
ZOLPIDEM METABOLITE: NOT DETECTED
ZOLPIDEM UR QL: NOT DETECTED

## 2024-01-29 ENCOUNTER — PATIENT MESSAGE (OUTPATIENT)
Dept: ORTHOPEDICS | Facility: CLINIC | Age: 59
End: 2024-01-29
Payer: COMMERCIAL

## 2024-02-01 ENCOUNTER — HOSPITAL ENCOUNTER (EMERGENCY)
Facility: HOSPITAL | Age: 59
Discharge: HOME | End: 2024-02-02
Attending: STUDENT IN AN ORGANIZED HEALTH CARE EDUCATION/TRAINING PROGRAM
Payer: COMMERCIAL

## 2024-02-01 VITALS
HEIGHT: 65 IN | RESPIRATION RATE: 16 BRPM | HEART RATE: 72 BPM | DIASTOLIC BLOOD PRESSURE: 68 MMHG | BODY MASS INDEX: 24.99 KG/M2 | OXYGEN SATURATION: 98 % | SYSTOLIC BLOOD PRESSURE: 122 MMHG | TEMPERATURE: 98 F | WEIGHT: 150 LBS

## 2024-02-01 DIAGNOSIS — M54.42 ACUTE BILATERAL LOW BACK PAIN WITH LEFT-SIDED SCIATICA: Primary | ICD-10-CM

## 2024-02-01 DIAGNOSIS — Z98.890 HISTORY OF LUMBAR SURGERY: ICD-10-CM

## 2024-02-01 PROBLEM — Z98.1 STATUS POST LUMBAR SPINAL FUSION: Status: ACTIVE | Noted: 2024-02-01

## 2024-02-01 LAB
ALBUMIN SERPL BCP-MCNC: 3.2 G/DL (ref 3.5–5.2)
ALP SERPL-CCNC: 94 U/L (ref 55–135)
ALT SERPL W/O P-5'-P-CCNC: 27 U/L (ref 10–44)
ANION GAP SERPL CALC-SCNC: 9 MMOL/L (ref 8–16)
AST SERPL-CCNC: 27 U/L (ref 10–40)
BASOPHILS # BLD AUTO: 0.02 K/UL (ref 0–0.2)
BASOPHILS NFR BLD: 0.2 % (ref 0–1.9)
BILIRUB SERPL-MCNC: 0.7 MG/DL (ref 0.1–1)
BUN SERPL-MCNC: 14 MG/DL (ref 6–20)
CALCIUM SERPL-MCNC: 9.8 MG/DL (ref 8.7–10.5)
CHLORIDE SERPL-SCNC: 101 MMOL/L (ref 95–110)
CO2 SERPL-SCNC: 25 MMOL/L (ref 23–29)
CREAT SERPL-MCNC: 0.9 MG/DL (ref 0.5–1.4)
DIFFERENTIAL METHOD BLD: ABNORMAL
EOSINOPHIL # BLD AUTO: 0.1 K/UL (ref 0–0.5)
EOSINOPHIL NFR BLD: 0.8 % (ref 0–8)
ERYTHROCYTE [DISTWIDTH] IN BLOOD BY AUTOMATED COUNT: 15.1 % (ref 11.5–14.5)
EST. GFR  (NO RACE VARIABLE): >60 ML/MIN/1.73 M^2
GLUCOSE SERPL-MCNC: 111 MG/DL (ref 70–110)
HCT VFR BLD AUTO: 29.8 % (ref 37–48.5)
HGB BLD-MCNC: 9.4 G/DL (ref 12–16)
IMM GRANULOCYTES # BLD AUTO: 0.04 K/UL (ref 0–0.04)
IMM GRANULOCYTES NFR BLD AUTO: 0.5 % (ref 0–0.5)
LYMPHOCYTES # BLD AUTO: 0.6 K/UL (ref 1–4.8)
LYMPHOCYTES NFR BLD: 7.4 % (ref 18–48)
MCH RBC QN AUTO: 26.9 PG (ref 27–31)
MCHC RBC AUTO-ENTMCNC: 31.5 G/DL (ref 32–36)
MCV RBC AUTO: 85 FL (ref 82–98)
MONOCYTES # BLD AUTO: 0.5 K/UL (ref 0.3–1)
MONOCYTES NFR BLD: 6 % (ref 4–15)
NEUTROPHILS # BLD AUTO: 7.2 K/UL (ref 1.8–7.7)
NEUTROPHILS NFR BLD: 85.1 % (ref 38–73)
NRBC BLD-RTO: 0 /100 WBC
PLATELET # BLD AUTO: 281 K/UL (ref 150–450)
PMV BLD AUTO: 11.1 FL (ref 9.2–12.9)
POTASSIUM SERPL-SCNC: 3.6 MMOL/L (ref 3.5–5.1)
PROT SERPL-MCNC: 7.6 G/DL (ref 6–8.4)
RBC # BLD AUTO: 3.5 M/UL (ref 4–5.4)
SODIUM SERPL-SCNC: 135 MMOL/L (ref 136–145)
WBC # BLD AUTO: 8.51 K/UL (ref 3.9–12.7)

## 2024-02-01 PROCEDURE — 99285 EMERGENCY DEPT VISIT HI MDM: CPT | Mod: 25

## 2024-02-01 PROCEDURE — 85025 COMPLETE CBC W/AUTO DIFF WBC: CPT | Performed by: PHYSICIAN ASSISTANT

## 2024-02-01 PROCEDURE — 63600175 PHARM REV CODE 636 W HCPCS: Performed by: PHYSICIAN ASSISTANT

## 2024-02-01 PROCEDURE — 80053 COMPREHEN METABOLIC PANEL: CPT | Performed by: PHYSICIAN ASSISTANT

## 2024-02-01 PROCEDURE — 25000003 PHARM REV CODE 250: Performed by: PHYSICIAN ASSISTANT

## 2024-02-01 PROCEDURE — 96374 THER/PROPH/DIAG INJ IV PUSH: CPT

## 2024-02-01 PROCEDURE — 63600175 PHARM REV CODE 636 W HCPCS

## 2024-02-01 PROCEDURE — 96375 TX/PRO/DX INJ NEW DRUG ADDON: CPT

## 2024-02-01 RX ORDER — OXYCODONE AND ACETAMINOPHEN 5; 325 MG/1; MG/1
2 TABLET ORAL
Status: COMPLETED | OUTPATIENT
Start: 2024-02-01 | End: 2024-02-01

## 2024-02-01 RX ORDER — KETOROLAC TROMETHAMINE 30 MG/ML
10 INJECTION, SOLUTION INTRAMUSCULAR; INTRAVENOUS
Status: COMPLETED | OUTPATIENT
Start: 2024-02-01 | End: 2024-02-01

## 2024-02-01 RX ORDER — DEXAMETHASONE SODIUM PHOSPHATE 4 MG/ML
8 INJECTION, SOLUTION INTRA-ARTICULAR; INTRALESIONAL; INTRAMUSCULAR; INTRAVENOUS; SOFT TISSUE ONCE
Status: COMPLETED | OUTPATIENT
Start: 2024-02-01 | End: 2024-02-01

## 2024-02-01 RX ORDER — ACETAMINOPHEN 500 MG
1000 TABLET ORAL
Status: COMPLETED | OUTPATIENT
Start: 2024-02-01 | End: 2024-02-01

## 2024-02-01 RX ADMIN — ACETAMINOPHEN 1000 MG: 500 TABLET ORAL at 12:02

## 2024-02-01 RX ADMIN — OXYCODONE HYDROCHLORIDE AND ACETAMINOPHEN 2 TABLET: 5; 325 TABLET ORAL at 08:02

## 2024-02-01 RX ADMIN — DEXAMETHASONE SODIUM PHOSPHATE 8 MG: 4 INJECTION INTRA-ARTICULAR; INTRALESIONAL; INTRAMUSCULAR; INTRAVENOUS; SOFT TISSUE at 06:02

## 2024-02-01 RX ADMIN — KETOROLAC TROMETHAMINE 10 MG: 30 INJECTION, SOLUTION INTRAMUSCULAR; INTRAVENOUS at 12:02

## 2024-02-01 NOTE — ED PROVIDER NOTES
Encounter Date: 2/1/2024       History     Chief Complaint   Patient presents with    Back Pain     Multiple back surg and cage, felt pop today, denies bowel bladder incontinence     58-year-old female with past medical history of hypertension, ependymoma of the spinal cord who presents ED for lower back pain.  History spinal cord tumor that was removed with spinal fusion done by Neurosurgery.  States she is history of broken hardware in the past.  States 3 days ago she was walking heard a loud pop in her lower back in his now having lower back pain with pain shooting down her left leg.  Denies any fevers/chills, loss of bowel or bladder saddle anesthesia.  Reports chronic weakness in the right leg which is unchanged.      The history is provided by the patient.     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    Hypertension      Past Surgical History:   Procedure Laterality Date    BACK SURGERY      x 5    CHOLECYSTECTOMY      CREATION OF MUSCLE ROTATIONAL FLAP N/A 10/2/2018    Procedure: CREATION, FLAP, MUSCLE ROTATION;  Surgeon: Rommel Cox MD;  Location: [address];  Service: Plastics;  Laterality: N/A;    ENDOSCOPIC CARPAL TUNNEL RELEASE Right 8/25/2022    Procedure: RELEASE, CARPAL TUNNEL, ENDOSCOPIC - RIGHT;  Surgeon: Barron Rosen MD;  Location: [facility] OR;  Service: Orthopedics;  Laterality: Right;    EPIDURAL STEROID INJECTION N/A 8/2/2023    Procedure: Caudal Epidural Steroid Injection (ref: Celestre);  Surgeon: Elijah Aquino Jr., MD;  Location: [facility] ENDO;  Service: Pain Management;   Laterality: N/A;  @1330  No ATC or DM  Needs Consent    ESOPHAGOGASTRODUODENOSCOPY N/A 8/18/2022    Procedure: EGD (ESOPHAGOGASTRODUODENOSCOPY);  Surgeon: Bernadette Bergeron MD;  Location: [facility];  Service: Endoscopy;  Laterality: N/A;     Family History   Problem Relation Age of Onset    Breast cancer Maternal Aunt     Anesthesia problems Neg Hx     Clotting disorder Neg Hx      Social History     Tobacco Use    Smoking status: Never    Smokeless tobacco: Never   Substance Use Topics    Alcohol use: No     Alcohol/week: 0.0 standard drinks of alcohol    Drug use: No     Review of Systems   Constitutional:  Negative for chills and fever.   HENT:  Negative for sore throat.    Respiratory:  Negative for cough and shortness of breath.    Cardiovascular:  Negative for chest pain.   Gastrointestinal:  Negative for abdominal pain.   Genitourinary:  Negative for difficulty urinating and dysuria.   Musculoskeletal:  Positive for back pain.   Skin: Negative.    Psychiatric/Behavioral:  Negative for confusion.        Physical Exam     Initial Vitals [02/01/24 1125]   BP Pulse Resp Temp SpO2   139/67 88 18 98.3 °F (36.8 °C) 98 %      MAP       --         Physical Exam    Nursing note and vitals reviewed.  Constitutional: She appears well-developed and well-nourished.   HENT:   Head: Normocephalic and atraumatic.   Eyes: Conjunctivae are normal.   Neck: Neck supple.   Normal range of motion.  Cardiovascular:  Normal rate.           Pulmonary/Chest: Breath sounds normal.   Abdominal: Abdomen is soft. There is no abdominal tenderness.   Musculoskeletal:      Cervical back: Normal range of motion and neck supple.      Comments: No midline C or T-spine tenderness.  There is midline lumbar tenderness.  Lower extremities are neurovascularly intact.     Neurological: She is alert and oriented to person, place, and time. GCS score is 15. GCS eye subscore is 4. GCS verbal subscore is 5. GCS motor subscore is 6.   Skin: Skin is warm  and dry.         ED Course   Procedures  Labs Reviewed   CBC W/ AUTO DIFFERENTIAL - Abnormal; Notable for the following components:       Result Value    RBC 3.50 (*)     Hemoglobin 9.4 (*)     Hematocrit 29.8 (*)     MCH 26.9 (*)     MCHC 31.5 (*)     RDW 15.1 (*)     Lymph # 0.6 (*)     Gran % 85.1 (*)     Lymph % 7.4 (*)     All other components within normal limits   COMPREHENSIVE METABOLIC PANEL - Abnormal; Notable for the following components:    Sodium 135 (*)     Glucose 111 (*)     Albumin 3.2 (*)     All other components within normal limits          Imaging Results              X-Ray Lumbar Spine Ap And Lateral (Final result)  Result time 02/01/24 13:54:37      Final result by Jacob Mederos MD (02/01/24 13:54:37)                   Impression:      No acute findings.      Electronically signed by: Jacob Mederos MD  Date:    02/01/2024  Time:    13:54               Narrative:    EXAMINATION:  XR LUMBAR SPINE AP AND LATERAL    CLINICAL HISTORY:  low back pain;    TECHNIQUE:  AP, lateral and spot images were performed of the lumbar spine.    COMPARISON:  04/21/2023    FINDINGS:  Thoracolumbar iliac instrumented fusion noted.  Slight lucency around the iliac screws, may represent loosening, similar to the prior exam.  Alignment stable.  No evidence of hardware failure.  No fractures.  No marrow replacement process.                                       Medications   ketorolac injection 9.999 mg (9.999 mg Intravenous Given 2/1/24 1224)   acetaminophen tablet 1,000 mg (1,000 mg Oral Given 2/1/24 1223)     Medical Decision Making  58-year-old female with history of spinal tumor that was removed.  Had extensive spinal surgery infusions with history of hardware failures in the past.  Reports hearing a loud pop while walking a few days ago.  He has not having radicular symptoms down her left leg.  X-ray of the L-spine was obtained which did not show any obvious hardware malfunction.  Given her complicated history  case was discussed with orthopedics.  Patient care handoff given to ED team at shift change will follow-up on orthopedic recommendations.    Amount and/or Complexity of Data Reviewed  Labs: ordered.  Radiology: ordered.    Risk  OTC drugs.  Prescription drug management.                                      Clinical Impression:  Final diagnoses:  [M54.42] Acute bilateral low back pain with left-sided sciatica (Primary)                 Vic Noble PA-C  02/01/24 1546

## 2024-02-01 NOTE — PROVIDER PROGRESS NOTES - EMERGENCY DEPT.
Encounter Date: 2/1/2024    ED Physician Progress Notes          Patient signed out to me by my colleague with instructions to follow-up pending work-up. Please see main ED note for previous ED stay documentation. Patient signed out to me with Ortho recs pending.    Ortho recommending CT T/L spine for further evaluation. No evidence of acute fracture or dislocation. Struck traversing the L2 vertebral body with thoracic and lumbar fusion hardware is as described. Reviewed CT findings with Orthopedicssharon for outpatient follow-up in clinic. Patient expresses understanding and agreeable to the plan. Return to ED precautions given for new, worsening, or concerning symptoms.    ED Diagnosis:  Final diagnoses:  [M54.42] Acute bilateral low back pain with left-sided sciatica (Primary)  [Z98.890] History of lumbar surgery    ED Disposition:   ED Disposition Condition    Discharge Stable

## 2024-02-01 NOTE — ED NOTES
Patient states lower back pain that radiates into foot onset Friday night, has hardware in back, Percocet this am.Uses wheelchair and walker primarily for transportation due to chronic back pain

## 2024-02-02 NOTE — SUBJECTIVE & OBJECTIVE
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    Hypertension        Past Surgical History:   Procedure Laterality Date    BACK SURGERY      x 5    CHOLECYSTECTOMY      CREATION OF MUSCLE ROTATIONAL FLAP N/A 10/2/2018    Procedure: CREATION, FLAP, MUSCLE ROTATION;  Surgeon: Rommel Cox MD;  Location: [address];  Service: Plastics;  Laterality: N/A;    ENDOSCOPIC CARPAL TUNNEL RELEASE Right 8/25/2022    Procedure: RELEASE, CARPAL TUNNEL, ENDOSCOPIC - RIGHT;  Surgeon: Barron Rosen MD;  Location: [facility] OR;  Service: Orthopedics;  Laterality: Right;    EPIDURAL STEROID INJECTION N/A 8/2/2023    Procedure: Caudal Epidural Steroid Injection (ref: Celestre);  Surgeon: Elijah Aquino Jr., MD;  Location: [facility];  Service: Pain Management;  Laterality: N/A;  @1330  No ATC or DM  Needs Consent    ESOPHAGOGASTRODUODENOSCOPY N/A 8/18/2022    Procedure: EGD (ESOPHAGOGASTRODUODENOSCOPY);  Surgeon: Bernadette Bergeron MD;  Location: [facility];  Service: Endoscopy;  Laterality: N/A;       Review of patient's allergies indicates:   Allergen Reactions    Hydrochlorothiazide Other (See Comments)     Muscle pain    Losartan Other (See Comments)     Elevated b/p with anxiety    Promethazine Other (See Comments)     Other reaction(s): agitation  Other reaction(s): Hives       No current facility-administered medications for this encounter.     Current Outpatient Medications   Medication Sig    amLODIPine (NORVASC) 5 MG tablet TAKE 1 TABLET BY MOUTH EVERY DAY    celecoxib (CELEBREX) 200 MG capsule Take 1 capsule (200 mg total) by mouth once daily.    oxyCODONE-acetaminophen (PERCOCET)  mg per tablet Take 1 tablet by mouth every 6 (six) hours as needed for Pain (may intermittently take an extra tablet when pain is severe). Medically necessary for > 7 days due to chronic pain.    b complex vitamins  "tablet Take 1 tablet by mouth once daily.    cholecalciferol, vitamin D3, 1,250 mcg (50,000 unit) capsule Take 1 capsule (50,000 Units total) by mouth every 7 days.    lisinopriL (PRINIVIL,ZESTRIL) 40 MG tablet Take 1 tablet (40 mg total) by mouth every evening.     Family History       Problem Relation (Age of Onset)    Breast cancer Maternal Aunt          Tobacco Use    Smoking status: Never    Smokeless tobacco: Never   Substance and Sexual Activity    Alcohol use: No     Alcohol/week: 0.0 standard drinks of alcohol    Drug use: No    Sexual activity: Not on file     ROS  Constitutional: negative for fevers  Eyes: negative visual changes  ENT: negative for hearing loss  Respiratory: negative for dyspnea  Cardiovascular: negative for chest pain  Gastrointestinal: negative for abdominal pain  Genitourinary: negative for dysuria  Neurological: negative for headaches  Behavioral/Psych: negative for hallucinations  Endocrine: negative for temperature intolerance    Objective:     Vital Signs (Most Recent):  Temp: 98.1 °F (36.7 °C) (02/01/24 1752)  Pulse: 75 (02/01/24 1752)  Resp: 16 (02/01/24 2032)  BP: 125/77 (02/01/24 1752)  SpO2: 100 % (02/01/24 1752) Vital Signs (24h Range):  Temp:  [98.1 °F (36.7 °C)-98.8 °F (37.1 °C)] 98.1 °F (36.7 °C)  Pulse:  [65-88] 75  Resp:  [16-18] 16  SpO2:  [95 %-100 %] 100 %  BP: (104-139)/(49-77) 125/77     Weight: 68 kg (150 lb)  Height: 5' 5" (165.1 cm)  Body mass index is 24.96 kg/m².    No intake or output data in the 24 hours ending 02/01/24 2043     Ortho/SPM Exam  Awake/alert/oriented x3, No acute distress, Afebrile, Vital signs stable  Normocephalic, Atraumatic  Good inspiratory effort with unlaboured breathing    No TTP throughout the C spine  TTP L spine  Incision appears well healed without signs of drainage    Motor            RIGHT* LEFT  Deltoid(C5)        5/5    5/5  Biceps(C5)         5/5    5/5  Brachioradialis(C6)       5/5    5/5   Extensor Carpi Radialis Longus(C6)  "   5/5    5/5   Triceps(C7)       5/5    5/5     Flexor Carpi Radialis(C7)     5/5    5/5  Flexor Digitorum Superficialis(C8)    5/5    5/5  Interossei(T1)       5/5    5/5     Hip Flexion (L3)                                     2/5                  4/5  Knee Extension (L4)                              2*/5                  4/5  Tib Ant (L5)                                            3*/5                  4/5       EHL (L5)                                                 1/5                  4/5  Gastrocs (S1)                                         1/5                  4/5  Peroneals (S1)                                       1/5                  4/5       FHL (S2)                                                1/5                  5/5    Sensation   All dermatomes (C1-S5) at baseline    Reflexes       RIGHT  LEFT  Triceps           2+     2+  Biceps           2+     2+  Brachioradialis          2+           2+  Patella        2+     2+  Achilles       2+     2+  Hoffmans's        Neg    Neg  Babinski      Neg    Neg   Clonus       Neg    Neg     Pulses       RIGHT  LEFT  Radial        2+     2+  Dorsalis Pedis       2+     2+  Post Tib       2+     2+    *Patient states RLE strength is at baseline, she is in brace that assists with dorsiflexion and knee flexion which she states she is unable to do without brace. States RLE neuro exam stable.     Significant Labs: All pertinent labs within the past 24 hours have been reviewed.    Significant Imaging: I have reviewed and interpreted all pertinent imaging results/findings.  XR L spine: Broken rods at the level of L4 on the R and L5 on the L when compared with L spine xray from 4/21/23

## 2024-02-02 NOTE — HPI
Genia Mcnulty is a 58 y.o. female with PMH significant for chronic LBP and LLE radiculopathy s/p T10-pelvis revision PSF with Dr. Herrmann in 2018. She has required multiple spine surgeries from broken rods and pseudoarthrosis, most recently in 2018. She is presenting with Left sided LBP and LLE radiculopathy that has been worsening since Friday. She states she was walking and felt a pop and has had worsening pain since then. She is having significant pain with weightbearing. Patient does states she had a fall in December of this year onto her buttocks and had pain at that time but states the pain improved afterwards. She denies numbness and tingling in the LLE. She has chronic weaknes throughout the RLE but states this is her baseline. Denies any other musculoskeletal pain or injuries. Patient denies bowel or bladder incontinence, saddle anesthesia, or muscle weakness. Walks with walker at baseline.

## 2024-02-02 NOTE — DISCHARGE INSTRUCTIONS
Follow-up with Dr. Herrmann as out patient for further management.     Return to the emergency room for new, worsening, or concerning symptoms.     Future Appointments   Date Time Provider Department Center   2/28/2024  1:15 PM [facility]   2/28/2024  2:15 PM Rocky Herrmann MD [facility] SPINE [facility]   5/9/2024  3:40 PM Kelley Caceres MD [facility] PHYSMED [facility]

## 2024-02-02 NOTE — ASSESSMENT & PLAN NOTE
Genia Mcnulty is a 58 y.o. female with PMH of multiple spine surgeries, most recently T10-pelvis PSF presenting with acute on chronic low back pain after she stated she had a popping sensation while walking on Friday. Patient has had worsening pain with radiculopathy down the left buttock and left knee. She has had pain with ambulation. XR significant for broken rods at L4 on the R and L5 on the L. CT of the T and L spine ordered for better characterization, currently pending. Given decadron for radiculopathy and toradol for pain. Patient would benefit from revision surgery, to be scheduled as outpatient.     FU CT T/L spine  Pain MM on discharge - robaxin, tylenol, ibuprofen limiting narcotics (pt takes percocet 10s at home)  LSO for comfort, patient states she has one, was offered a new one, but declined  Will send message to have patient followup in spine clinic next week (pt will be contacted with appointment details)

## 2024-02-02 NOTE — CONSULTS
"[facility]  Orthopedics  Consult Note    Patient Name: Genia Mcnulty  MRN: [id number]  Admission Date: 2/1/2024  Hospital Length of Stay: 0 days  Attending Provider: Ibeth Rand MD  Primary Care Provider: Charla Meadows MD    Patient information was obtained from {info source:[id number]::"ER records"}.     Consults  Subjective:     Principal Problem:<principal problem not specified>    Chief Complaint:   Chief Complaint   Patient presents with    Back Pain     Multiple back surg and cage, felt pop today, denies bowel bladder incontinence        HPI: Genia Mcnulty is a 58 y.o. female with PMH significant for chronic LBP and LLE radiculopathy s/p T10-pelvis revision PSF with Dr. Herrmann in 2018. She has required multiple spine surgeries from broken rods and pseudoarthrosis, most recently in 2018. She is presenting with Left sided LBP and LLE radiculopathy that has been worsening since Friday. She states she was walking and felt a pop and has had worsening pain since then. She is having significant pain with weightbearing. Patient does states she had a fall in December of this year onto her buttocks and had pain at that time but states the pain improved afterwards. She denies numbness and tingling in the LLE. She has chronic weaknes throughout the RLE but states this is her baseline. Denies any other musculoskeletal pain or injuries. Patient denies bowel or bladder incontinence, saddle anesthesia, or muscle weakness. Walks with walker at baseline.     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    Hypertension        Past Surgical History:   Procedure Laterality Date    BACK SURGERY      x 5    CHOLECYSTECTOMY      CREATION OF MUSCLE ROTATIONAL FLAP N/A 10/2/2018    Procedure: CREATION, FLAP, MUSCLE ROTATION;  Surgeon: Rommel Cox MD;  [facility];  " Service: Plastics;  Laterality: N/A;    ENDOSCOPIC CARPAL TUNNEL RELEASE Right 8/25/2022    Procedure: RELEASE, CARPAL TUNNEL, ENDOSCOPIC - RIGHT;  Surgeon: Barron Rosen MD;  Location: [facility] OR;  Service: Orthopedics;  Laterality: Right;    EPIDURAL STEROID INJECTION N/A 8/2/2023    Procedure: Caudal Epidural Steroid Injection (ref: Celestre);  Surgeon: Elijah Aquino Jr., MD;  Location: [facility];  Service: Pain Management;  Laterality: N/A;  @1330  No ATC or DM  Needs Consent    ESOPHAGOGASTRODUODENOSCOPY N/A 8/18/2022    Procedure: EGD (ESOPHAGOGASTRODUODENOSCOPY);  Surgeon: Bernadette Bergeron MD;  Location: [facility];  Service: Endoscopy;  Laterality: N/A;       Review of patient's allergies indicates:   Allergen Reactions    Hydrochlorothiazide Other (See Comments)     Muscle pain    Losartan Other (See Comments)     Elevated b/p with anxiety    Promethazine Other (See Comments)     Other reaction(s): agitation  Other reaction(s): Hives       No current facility-administered medications for this encounter.     Current Outpatient Medications   Medication Sig    amLODIPine (NORVASC) 5 MG tablet TAKE 1 TABLET BY MOUTH EVERY DAY    celecoxib (CELEBREX) 200 MG capsule Take 1 capsule (200 mg total) by mouth once daily.    oxyCODONE-acetaminophen (PERCOCET)  mg per tablet Take 1 tablet by mouth every 6 (six) hours as needed for Pain (may intermittently take an extra tablet when pain is severe). Medically necessary for > 7 days due to chronic pain.    b complex vitamins tablet Take 1 tablet by mouth once daily.    cholecalciferol, vitamin D3, 1,250 mcg (50,000 unit) capsule Take 1 capsule (50,000 Units total) by mouth every 7 days.    lisinopriL (PRINIVIL,ZESTRIL) 40 MG tablet Take 1 tablet (40 mg total) by mouth every evening.     Family History       Problem Relation (Age of Onset)    Breast cancer Maternal Aunt          Tobacco Use    Smoking status: Never    Smokeless tobacco: Never   Substance and  "Sexual Activity    Alcohol use: No     Alcohol/week: 0.0 standard drinks of alcohol    Drug use: No    Sexual activity: Not on file     ROS  Constitutional: negative for fevers  Eyes: negative visual changes  ENT: negative for hearing loss  Respiratory: negative for dyspnea  Cardiovascular: negative for chest pain  Gastrointestinal: negative for abdominal pain  Genitourinary: negative for dysuria  Neurological: negative for headaches  Behavioral/Psych: negative for hallucinations  Endocrine: negative for temperature intolerance    Objective:     Vital Signs (Most Recent):  Temp: 98.1 °F (36.7 °C) (02/01/24 1752)  Pulse: 75 (02/01/24 1752)  Resp: 16 (02/01/24 2032)  BP: 125/77 (02/01/24 1752)  SpO2: 100 % (02/01/24 1752) Vital Signs (24h Range):  Temp:  [98.1 °F (36.7 °C)-98.8 °F (37.1 °C)] 98.1 °F (36.7 °C)  Pulse:  [65-88] 75  Resp:  [16-18] 16  SpO2:  [95 %-100 %] 100 %  BP: (104-139)/(49-77) 125/77     Weight: 68 kg (150 lb)  Height: 5' 5" (165.1 cm)  Body mass index is 24.96 kg/m².    No intake or output data in the 24 hours ending 02/01/24 2043     Ortho/SPM Exam  Awake/alert/oriented x3, No acute distress, Afebrile, Vital signs stable  Normocephalic, Atraumatic  Good inspiratory effort with unlaboured breathing    No TTP throughout the C spine  TTP L spine  Incision appears well healed without signs of drainage    Motor            RIGHT  LEFT  Deltoid(C5)        5/5    5/5  Biceps(C5)         5/5    5/5  Brachioradialis(C6)       5/5    5/5   Extensor Carpi Radialis Longus(C6)    5/5    5/5   Triceps(C7)       5/5    5/5     Flexor Carpi Radialis(C7)     5/5    5/5  Flexor Digitorum Superficialis(C8)    5/5    5/5  Interossei(T1)       5/5    5/5     Hip Flexion (L3)                                     **/5                  4/5  Knee Extension (L4)                              **/5                  4/5  Tib Ant (L5)                                            **/5                  4/5       [facility] (L5)             "                                     **/5                  4/5  Gastrocs (S1)                                         **/5                  4/5  Peroneals (S1)                                       **/5                  4/5       FHL (S2)                                                **/5                  5/5    Sensation   All dermatomes (C1-S5) normal except for **    Reflexes       RIGHT  LEFT  Triceps           2+     2+  Biceps           2+     2+  Brachioradialis          2+           2+  Patella        2+     2+  Achilles       2+     2+  Hoffmans's        Neg    Neg  Babinski      Neg    Neg   Clonus       Neg    Neg     Pulses       RIGHT  LEFT  Radial        2+     2+  Dorsalis Pedis       2+     2+  Post Tib       2+     2+         Significant Labs: All pertinent labs within the past 24 hours have been reviewed.    Significant Imaging: I have reviewed and interpreted all pertinent imaging results/findings.  XR L spine: Broken rods at the level of L4 on the R and L5 on the L when compared with L spine xray from 4/21/23  Assessment/Plan:     Status post lumbar spinal fusion with broken hardware  Genia Mcnulty is a 58 y.o. female with PMH of multiple spine surgeries, most recently T10-pelvis PSF presenting with acute on chronic low back pain after she stated she had a popping sensation while walking on Friday. Patient has had worsening pain with radiculopathy down the left buttock and left knee. She has had pain with ambulation. XR significant for broken rods at L4 on the R and L5 on the L. CT of the T and L spine ordered for better characterization, currently pending. Given decadron for radiculopathy and toradol for pain. Patient would benefit from revision surgery, to be scheduled as outpatient.     FU CT T/L spine  Pain MM on discharge - robaxin, tylenol, ibuprofen limiting narcotics (pt takes percocet 10s at home)  LSO for comfort, patient states she has one, was offered a new one, but  declined  Will send message to have patient followup in spine clinic next week (pt will be contacted with appointment details)          Thank you for your consult. {SIGN OFF/FOLLOW-UP:74837}    NEETU Burris MD  Orthopedics  [facility] - Emergency Dept

## 2024-02-02 NOTE — CONSULTS
[facility]  Orthopedics  Consult Note    Patient Name: Genia Mcnulty  MRN: [id number]  Admission Date: 2/1/2024  Hospital Length of Stay: 0 days  Attending Provider: Ibeth Rand MD  Primary Care Provider: Charla Meadows MD      Inpatient consult to Orthopedic Surgery  Consult performed by: NEETU Burris MD  Consult ordered by: Vic Noble PA-C        Subjective:     Principal Problem:<principal problem not specified>    Chief Complaint:   Chief Complaint   Patient presents with    Back Pain     Multiple back surg and cage, felt pop today, denies bowel bladder incontinence        HPI: Genia Mcnulty is a 58 y.o. female with PMH significant for chronic LBP and LLE radiculopathy s/p T10-pelvis revision PSF with Dr. Herrmann in 2018. She has required multiple spine surgeries from broken rods and pseudoarthrosis, most recently in 2018. She is presenting with Left sided LBP and LLE radiculopathy that has been worsening since Friday. She states she was walking and felt a pop and has had worsening pain since then. She is having significant pain with weightbearing. Patient does states she had a fall in December of this year onto her buttocks and had pain at that time but states the pain improved afterwards. She denies numbness and tingling in the LLE. She has chronic weaknes throughout the RLE but states this is her baseline. Denies any other musculoskeletal pain or injuries. Patient denies bowel or bladder incontinence, saddle anesthesia, or muscle weakness. Walks with walker at baseline.     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 OR 2ND FLR;  Service: Plastics;  Laterality: N/A;    ENDOSCOPIC CARPAL TUNNEL RELEASE Right 8/25/2022    Procedure: RELEASE, CARPAL TUNNEL, ENDOSCOPIC - RIGHT;  Surgeon: Barron Rosen MD;  Location: [facility] OR;  Service: Orthopedics;  Laterality: Right;    EPIDURAL STEROID INJECTION N/A 8/2/2023    Procedure: Caudal Epidural Steroid Injection (ref: Celestre);  Surgeon: Elijah Aquino Jr., MD;  Location: [facility] ENDO;  Service: Pain Management;  Laterality: N/A;  @1330  No ATC or DM  Needs Consent    ESOPHAGOGASTRODUODENOSCOPY N/A 8/18/2022    Procedure: EGD (ESOPHAGOGASTRODUODENOSCOPY);  Surgeon: Bernadette Bergeron MD;  Location: [facility];  Service: Endoscopy;  Laterality: N/A;       Review of patient's allergies indicates:   Allergen Reactions    Hydrochlorothiazide Other (See Comments)     Muscle pain    Losartan Other (See Comments)     Elevated b/p with anxiety    Promethazine Other (See Comments)     Other reaction(s): agitation  Other reaction(s): Hives       No current facility-administered medications for this encounter.     Current Outpatient Medications   Medication Sig    amLODIPine (NORVASC) 5 MG tablet TAKE 1 TABLET BY MOUTH EVERY DAY    celecoxib (CELEBREX) 200 MG capsule Take 1 capsule (200 mg total) by mouth once daily.    oxyCODONE-acetaminophen (PERCOCET)  mg per tablet Take 1 tablet by mouth every 6 (six) hours as needed for Pain (may intermittently take an extra tablet when pain is severe). Medically necessary for > 7 days due to chronic pain.    b complex vitamins tablet Take 1 tablet by mouth once daily.    cholecalciferol, vitamin D3, 1,250 mcg (50,000 unit) capsule Take 1 capsule (50,000 Units total) by mouth every 7 days.    lisinopriL (PRINIVIL,ZESTRIL) 40 MG tablet Take 1 tablet (40 mg total) by mouth every evening.     Family History       Problem Relation (Age of Onset)    Breast cancer Maternal Aunt          Tobacco Use    Smoking status: Never     "Smokeless tobacco: Never   Substance and Sexual Activity    Alcohol use: No     Alcohol/week: 0.0 standard drinks of alcohol    Drug use: No    Sexual activity: Not on file     ROS  Constitutional: negative for fevers  Eyes: negative visual changes  ENT: negative for hearing loss  Respiratory: negative for dyspnea  Cardiovascular: negative for chest pain  Gastrointestinal: negative for abdominal pain  Genitourinary: negative for dysuria  Neurological: negative for headaches  Behavioral/Psych: negative for hallucinations  Endocrine: negative for temperature intolerance    Objective:     Vital Signs (Most Recent):  Temp: 98.1 °F (36.7 °C) (02/01/24 1752)  Pulse: 75 (02/01/24 1752)  Resp: 16 (02/01/24 2032)  BP: 125/77 (02/01/24 1752)  SpO2: 100 % (02/01/24 1752) Vital Signs (24h Range):  Temp:  [98.1 °F (36.7 °C)-98.8 °F (37.1 °C)] 98.1 °F (36.7 °C)  Pulse:  [65-88] 75  Resp:  [16-18] 16  SpO2:  [95 %-100 %] 100 %  BP: (104-139)/(49-77) 125/77     Weight: 68 kg (150 lb)  Height: 5' 5" (165.1 cm)  Body mass index is 24.96 kg/m².    No intake or output data in the 24 hours ending 02/01/24 2043     Ortho/SPM Exam  Awake/alert/oriented x3, No acute distress, Afebrile, Vital signs stable  Normocephalic, Atraumatic  Good inspiratory effort with unlaboured breathing    No TTP throughout the C spine  TTP L spine  Incision appears well healed without signs of drainage    Motor            RIGHT* LEFT  Deltoid(C5)        5/5    5/5  Biceps(C5)         5/5    5/5  Brachioradialis(C6)       5/5    5/5   Extensor Carpi Radialis Longus(C6)    5/5    5/5   Triceps(C7)       5/5    5/5     Flexor Carpi Radialis(C7)     5/5    5/5  Flexor Digitorum Superficialis(C8)    5/5    5/5  Interossei(T1)       5/5    5/5     Hip Flexion (L3)                                     1/5                  4/5  Knee Extension (L4)                              1/5                  4/5  Tib Ant (L5)                                            1/5           "        4/5       EHL (L5)                                                 1/5                  4/5  Gastrocs (S1)                                         1/5                  4/5  Peroneals (S1)                                       1/5                  4/5       FHL (S2)                                                1/5                  5/5    Sensation   All dermatomes (C1-S5) at baseline    Reflexes       RIGHT  LEFT  Triceps           2+     2+  Biceps           2+     2+  Brachioradialis          2+           2+  Patella        2+     2+  Achilles       2+     2+  Hoffmans's        Neg    Neg  Babinski      Neg    Neg   Clonus       Neg    Neg     Pulses       RIGHT  LEFT  Radial        2+     2+  Dorsalis Pedis       2+     2+  Post Tib       2+     2+    *Patient states RLE strength is at baseline and that she has no motor function of the RLE. States RLE neuro exam stable.     Significant Labs: All pertinent labs within the past 24 hours have been reviewed.    Significant Imaging: I have reviewed and interpreted all pertinent imaging results/findings.  XR L spine: Broken rods at the level of L4 on the R and L5 on the L when compared with L spine xray from 4/21/23  Assessment/Plan:     Status post lumbar spinal fusion with broken hardware  Genia Mcnulty is a 58 y.o. female with PMH of multiple spine surgeries, most recently T10-pelvis PSF presenting with acute on chronic low back pain after she stated she had a popping sensation while walking on Friday. Patient has had worsening pain with radiculopathy down the left buttock and left knee. She has had pain with ambulation. XR significant for broken rods at L4 on the R and L5 on the L. CT of the T and L spine ordered for better characterization, currently pending. Given decadron for radiculopathy and toradol for pain. Patient would benefit from revision surgery, to be scheduled as outpatient.     FU CT T/L spine  Pain MM on discharge - robaxin, tylenol,  ibuprofen limiting narcotics (pt takes percocet 10s at home)  LSO for comfort, patient states she has one, was offered a new one, but declined  Will send message to have patient followup in spine clinic next week (pt will be contacted with appointment details)      NEETU Burris MD  Orthopedics  [facility]

## 2024-02-07 ENCOUNTER — OFFICE VISIT (OUTPATIENT)
Dept: ORTHOPEDICS | Facility: CLINIC | Age: 59
End: 2024-02-07
Payer: COMMERCIAL

## 2024-02-07 ENCOUNTER — PATIENT MESSAGE (OUTPATIENT)
Dept: ORTHOPEDICS | Facility: CLINIC | Age: 59
End: 2024-02-07

## 2024-02-07 DIAGNOSIS — S32.009K CLOSED PSEUDOARTHROSIS OF LUMBAR SPINE: Primary | ICD-10-CM

## 2024-02-07 PROCEDURE — 99999 PR PBB SHADOW E&M-EST. PATIENT-LVL II: CPT | Mod: PBBFAC,,, | Performed by: ORTHOPAEDIC SURGERY

## 2024-02-07 PROCEDURE — 1159F MED LIST DOCD IN RCRD: CPT | Mod: CPTII,S$GLB,, | Performed by: ORTHOPAEDIC SURGERY

## 2024-02-07 PROCEDURE — 99214 OFFICE O/P EST MOD 30 MIN: CPT | Mod: S$GLB,,, | Performed by: ORTHOPAEDIC SURGERY

## 2024-02-07 PROCEDURE — 4010F ACE/ARB THERAPY RXD/TAKEN: CPT | Mod: CPTII,S$GLB,, | Performed by: ORTHOPAEDIC SURGERY

## 2024-02-07 NOTE — PROGRESS NOTES
"Date of Surgery: 10/2/2018    Procedure: T10-Pelvis Revision    History: Genia Mcnulty is seen today for follow-up following the above listed procedure.  Unfortunately, she recently had acute event when she felt a "snap" in her back and had acute onset of pain and radiculopathy. She presented to the ED where XR and CT were obtained, which revealed breakage of the rods bilaterally. She is having continued pain and left sided radiculopathy. The patient denies bowel or bladder incontinence.  She does endorse hand clumsiness, but no difficulty with buttons coins. She is taking Percocet, she stopped Celebrex secondary to GI related issues. Did not tolerate Lyrica due to side effects.    On physical examination she has diffuse weakness throughout her right lower extremity 4/5 at baseline.    Radiographs:  Today reviewed AP and lateral lumbar spine radiographs that demonstrate T10-pelvis fixation with layton breakage bilaterally at lumbar levels.     On CT scan, there is slight lucency surrounding left L5 pedicle screw, suggestive of hardware motion. However, the lumbar fusion mass appears solid. No obvious site of pseudoarthrosis.    Assessment/Plan: We discussed that late failure of hardware is uncommon; however, layton breakage most likely indicates pseudoarthrosis / failure of fusion. Given that there is not gross instability evident on CT scan, there is a chance that this could improve with conservative management. Currently her symptoms are not at an acceptable level, so we will plan for revision surgery with plastic surgery closure and work on getting a date for this. If she begins to experience some improvement in the meantime, she will let us know.            "

## 2024-02-12 ENCOUNTER — PATIENT MESSAGE (OUTPATIENT)
Dept: ORTHOPEDICS | Facility: CLINIC | Age: 59
End: 2024-02-12
Payer: COMMERCIAL

## 2024-02-12 DIAGNOSIS — Z98.890 S/P SPINAL SURGERY: Primary | ICD-10-CM

## 2024-02-16 DIAGNOSIS — G89.29 CHRONIC BILATERAL LOW BACK PAIN WITH BILATERAL SCIATICA: ICD-10-CM

## 2024-02-16 DIAGNOSIS — M54.42 CHRONIC BILATERAL LOW BACK PAIN WITH BILATERAL SCIATICA: ICD-10-CM

## 2024-02-16 DIAGNOSIS — M54.41 CHRONIC BILATERAL LOW BACK PAIN WITH BILATERAL SCIATICA: ICD-10-CM

## 2024-02-19 ENCOUNTER — PATIENT MESSAGE (OUTPATIENT)
Dept: ORTHOPEDICS | Facility: CLINIC | Age: 59
End: 2024-02-19
Payer: COMMERCIAL

## 2024-02-20 RX ORDER — OXYCODONE AND ACETAMINOPHEN 10; 325 MG/1; MG/1
1 TABLET ORAL EVERY 6 HOURS PRN
Qty: 120 TABLET | Refills: 0 | Status: SHIPPED | OUTPATIENT
Start: 2024-02-21 | End: 2024-03-14 | Stop reason: SDUPTHER

## 2024-03-12 ENCOUNTER — PATIENT MESSAGE (OUTPATIENT)
Dept: FAMILY MEDICINE | Facility: CLINIC | Age: 59
End: 2024-03-12
Payer: COMMERCIAL

## 2024-03-13 ENCOUNTER — PATIENT MESSAGE (OUTPATIENT)
Dept: ORTHOPEDICS | Facility: CLINIC | Age: 59
End: 2024-03-13
Payer: COMMERCIAL

## 2024-03-14 ENCOUNTER — TELEPHONE (OUTPATIENT)
Dept: FAMILY MEDICINE | Facility: CLINIC | Age: 59
End: 2024-03-14
Payer: COMMERCIAL

## 2024-03-14 ENCOUNTER — PATIENT MESSAGE (OUTPATIENT)
Dept: FAMILY MEDICINE | Facility: CLINIC | Age: 59
End: 2024-03-14
Payer: COMMERCIAL

## 2024-03-14 ENCOUNTER — TELEPHONE (OUTPATIENT)
Dept: PREADMISSION TESTING | Facility: HOSPITAL | Age: 59
End: 2024-03-14
Payer: COMMERCIAL

## 2024-03-14 DIAGNOSIS — M54.42 CHRONIC BILATERAL LOW BACK PAIN WITH BILATERAL SCIATICA: ICD-10-CM

## 2024-03-14 DIAGNOSIS — G89.29 CHRONIC BILATERAL LOW BACK PAIN WITH BILATERAL SCIATICA: ICD-10-CM

## 2024-03-14 DIAGNOSIS — M79.605 PAIN OF LEFT LOWER EXTREMITY: Primary | ICD-10-CM

## 2024-03-14 DIAGNOSIS — M54.41 CHRONIC BILATERAL LOW BACK PAIN WITH BILATERAL SCIATICA: ICD-10-CM

## 2024-03-14 DIAGNOSIS — Z01.818 PREOPERATIVE TESTING: Primary | ICD-10-CM

## 2024-03-14 RX ORDER — OXYCODONE AND ACETAMINOPHEN 10; 325 MG/1; MG/1
1 TABLET ORAL EVERY 6 HOURS PRN
Qty: 120 TABLET | Refills: 0 | Status: ON HOLD | OUTPATIENT
Start: 2024-03-22 | End: 2024-04-09

## 2024-03-14 NOTE — ANESTHESIA PAT ROS NOTE
03/14/2024  Genia Mcnulty is a 58 y.o., female.      Pre-op Assessment          Review of Systems           Anesthesia Assessment: Preoperative EQUATION    Planned Procedure: Procedure(s) (LRB):  FUSION, SPINE - AIRO - co case with Dr. Bernal, closure by Dr. Cox L3-pelvis revision SNS:SSEp/EMG Depuy (N/A)  CLOSURE, WOUND (N/A)  Requested Anesthesia Type:General  Surgeon: Rocky Herrmann MD  Service: Orthopedics  Known or anticipated Date of Surgery:4/9/2024    Surgeon notes: reviewed    Electronic QUestionnaire Assessment completed via nurse interview with patient.        Triage considerations:     The patient has no apparent active cardiac condition (No unstable coronary Syndrome such as severe unstable angina or recent [<1 month] myocardial infarction, decompensated CHF, severe valvular   disease or significant arrhythmia)    Previous anesthesia records:MAC and No problems  8/25/2022 RELEASE, CARPAL TUNNEL, ENDOSCOPIC - RIGHT (Right: Hand)   Airway:  Mallampati: II   Mouth Opening: Normal  TM Distance: Normal  Neck ROM: Normal ROM    Last PCP note: 6-12 months ago , within [facility]   Subspecialty notes: Ortho, PM&R    Other important co-morbidities: PER EPIC: HTN and LUMBAR RADICULOPATHY        Tests already available:  Available tests,  within 3 months , 6-12 months ago , within [facility] . 2/1/2024 CMP, CBC, CT LUMBAR SPINE W/O CONTRAST, CT THORACIC SPINE W/O CONTRAST, XRAY LUMBAR SPINE AP& LAT, 6/14/2023 MRI LUMBAR SPINE W & W/O CONTRASTS            Instructions given. (See in Nurse's note)    Optimization:  Anesthesia Preop Clinic Assessment  Indicated    Medical Opinion Indicated         Plan:    Testing:  EKG, PT/INR, PTT, T&S, and UA   Pre-anesthesia  visit       Visit focus: concerns in complex and/or prolonged anesthesia     Consultation:Patient's PCP for re-evaluation     Patient  has  previously scheduled Medical Appointment:NONE    Navigation: Tests Scheduled. TBD             Consults scheduled.TBD             Results will be tracked by Preop Clinic.  3/22 UA, EKG, CBC, CMP resulted and noted by Dr. Charla Meadows. Medical clearance given by Linnea Kirk on 3/21: . Pre-op exam  I recommend use of standard pre-op and post-op precautions for this patient.   In my opinion, based on this limited assessment, she is medically optimized for this procedure, and can proceed without further evaluation.   4/2 Labs resulted and noted by Dr. Hammad Harrison.  Shabnam Emerson RN BSN

## 2024-03-14 NOTE — TELEPHONE ENCOUNTER
----- Message from Shabnam Emerson RN sent at 3/14/2024  9:24 AM CDT -----  Patient is scheduled for L3-pelvis revision/fusion on 4/9 with . ( approximately 390 minutes of general anesthesia) She will need medical clearance. Please schedule a preop  clearance appt.  Thanks!

## 2024-03-14 NOTE — PRE-PROCEDURE INSTRUCTIONS
Patient stated has not had any problem with    anesthesia in the past.  Will need medical clearance  from your PCP,Dr.Elizabeth Meadows.  She will make an appt. Will need poc appt, labs, ua, and EKG.  Our  will call to set up these appts.    Preop instructions given. Hold aspirin, aspirin containing products, nsaids(aleve, advil, motrin, ibuprofen, naprosyn, naproxen, voltaren, diclofenac, Celebrex, Celecoxib), vitamins ( B complex, D3) and supplements one week prior to surgery.   May take Tylenol.  ( Also sent to [facility] portal)  Verbalizes understanding.

## 2024-03-14 NOTE — TELEPHONE ENCOUNTER
----- Message from Shabnam Emerson RN sent at 3/14/2024  9:23 AM CDT -----  Surgery 4/9  Please schedule poc, labs, ua and EKG.   Thanks!

## 2024-03-15 ENCOUNTER — PATIENT MESSAGE (OUTPATIENT)
Dept: ORTHOPEDICS | Facility: CLINIC | Age: 59
End: 2024-03-15
Payer: COMMERCIAL

## 2024-03-19 ENCOUNTER — HOSPITAL ENCOUNTER (OUTPATIENT)
Dept: CARDIOLOGY | Facility: HOSPITAL | Age: 59
Discharge: HOME | End: 2024-03-19
Attending: FAMILY MEDICINE
Payer: COMMERCIAL

## 2024-03-19 ENCOUNTER — HOSPITAL ENCOUNTER (OUTPATIENT)
Dept: RADIOLOGY | Facility: HOSPITAL | Age: 59
Discharge: HOME | End: 2024-03-19
Attending: FAMILY MEDICINE
Payer: COMMERCIAL

## 2024-03-19 ENCOUNTER — OFFICE VISIT (OUTPATIENT)
Dept: FAMILY MEDICINE | Facility: CLINIC | Age: 59
End: 2024-03-19
Payer: COMMERCIAL

## 2024-03-19 ENCOUNTER — PATIENT MESSAGE (OUTPATIENT)
Dept: ADMINISTRATIVE | Facility: HOSPITAL | Age: 59
End: 2024-03-19
Payer: COMMERCIAL

## 2024-03-19 VITALS
HEIGHT: 65 IN | WEIGHT: 150 LBS | OXYGEN SATURATION: 98 % | DIASTOLIC BLOOD PRESSURE: 74 MMHG | BODY MASS INDEX: 24.99 KG/M2 | HEART RATE: 68 BPM | SYSTOLIC BLOOD PRESSURE: 140 MMHG

## 2024-03-19 DIAGNOSIS — G95.9 MYELOPATHY: ICD-10-CM

## 2024-03-19 DIAGNOSIS — Z01.818 PRE-OP EXAM: ICD-10-CM

## 2024-03-19 DIAGNOSIS — I70.0 AORTIC ATHEROSCLEROSIS: Primary | ICD-10-CM

## 2024-03-19 PROCEDURE — 93005 ELECTROCARDIOGRAM TRACING: CPT

## 2024-03-19 PROCEDURE — 4010F ACE/ARB THERAPY RXD/TAKEN: CPT | Mod: S$GLB,,, | Performed by: FAMILY MEDICINE

## 2024-03-19 PROCEDURE — 3077F SYST BP >= 140 MM HG: CPT | Mod: S$GLB,,, | Performed by: FAMILY MEDICINE

## 2024-03-19 PROCEDURE — 3008F BODY MASS INDEX DOCD: CPT | Mod: S$GLB,,, | Performed by: FAMILY MEDICINE

## 2024-03-19 PROCEDURE — 99999 PR PBB SHADOW E&M-EST. PATIENT-LVL III: CPT | Mod: PBBFAC,,, | Performed by: FAMILY MEDICINE

## 2024-03-19 PROCEDURE — 71046 X-RAY EXAM CHEST 2 VIEWS: CPT | Mod: TC

## 2024-03-19 PROCEDURE — 71046 X-RAY EXAM CHEST 2 VIEWS: CPT | Mod: 26,,, | Performed by: RADIOLOGY

## 2024-03-19 PROCEDURE — 1159F MED LIST DOCD IN RCRD: CPT | Mod: S$GLB,,, | Performed by: FAMILY MEDICINE

## 2024-03-19 PROCEDURE — 3078F DIAST BP <80 MM HG: CPT | Mod: S$GLB,,, | Performed by: FAMILY MEDICINE

## 2024-03-19 PROCEDURE — 93010 ELECTROCARDIOGRAM REPORT: CPT | Mod: ,,, | Performed by: INTERNAL MEDICINE

## 2024-03-19 PROCEDURE — 99214 OFFICE O/P EST MOD 30 MIN: CPT | Mod: S$GLB,,, | Performed by: FAMILY MEDICINE

## 2024-03-19 RX ORDER — METHOCARBAMOL 500 MG/1
500 TABLET, FILM COATED ORAL 4 TIMES DAILY
Qty: 120 TABLET | Refills: 3 | Status: SHIPPED | OUTPATIENT
Start: 2024-03-19 | End: 2024-03-29

## 2024-03-19 NOTE — PROGRESS NOTES
"[facility] Note    Subjective:    Chief Complaint:   Chief Complaint   Patient presents with    surgical clearance     Pt presenting to clinic for surgical clearance.  EKG completed on 03/14/2024.       274}    58 y.o. female presents for multiple issues.     The HPI and pertinent ROS is included in the Diagnostic Impression Remarks section at the end of the note. Please see below for further details.     The following portions of the patient's history were reviewed and updated as appropriate: allergies, current medications, past family history, past medical history, past social history, past surgical history and problem list.    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  She  has a past surgical history that includes Back surgery; Cholecystectomy; Creation of muscle rotational flap (N/A, 10/2/2018); Esophagogastroduodenoscopy (N/A, 8/18/2022); Endoscopic carpal tunnel release (Right, 8/25/2022); and Epidural steroid injection (N/A, 8/2/2023).    She  reports that she has never smoked. She has never used smokeless tobacco. She reports that she does not drink alcohol and does not use drugs.  She family history includes Breast cancer in her maternal aunt.    Review of patient's allergies indicates:   Allergen Reactions    Hydrochlorothiazide Other (See Comments)     Muscle pain    Losartan Other (See Comments)     Elevated b/p with anxiety    Promethazine Other (See Comments)     Other reaction(s): agitation  Other reaction(s): Hives       Physical Examination  BP (!) 140/74 (BP Location: Left arm, Patient Position: Sitting, BP Method: Medium (Manual))   Pulse 68   Ht 5' 5" (1.651 m)   Wt 68 kg (150 lb)   SpO2 98%   BMI 24.96 kg/m²    274}  Wt Readings from Last 3 Encounters:   03/19/24 68 kg (150 lb)   02/01/24 68 kg (150 lb)   01/12/24 72.6 kg (160 lb 0.9 oz)     BP Readings from Last 3 Encounters: " "  24 (!) 140/74   24 122/68   23 (!) 151/79                Estimated body mass index is 24.96 kg/m² as calculated from the following:    Height as of this encounter: 5' 5" (1.651 m).    Weight as of this encounter: 68 kg (150 lb).     Physical Exam  Constitutional:       Appearance: Normal appearance. She is normal weight.      Comments: Currently ambulating by wheelchair due to spinal issues   HENT:      Head: Normocephalic and atraumatic.      Nose: Nose normal.      Mouth/Throat:      Mouth: Mucous membranes are moist.   Eyes:      Extraocular Movements: Extraocular movements intact.      Pupils: Pupils are equal, round, and reactive to light.   Cardiovascular:      Rate and Rhythm: Normal rate and regular rhythm.      Pulses: Normal pulses.      Heart sounds: Normal heart sounds.   Pulmonary:      Effort: Pulmonary effort is normal.      Breath sounds: Normal breath sounds.   Musculoskeletal:      Comments: Currently in wheelchair secondary to broken spinal rods .  She is in significant pain and can not support her weight ambulate on her own   Skin:     General: Skin is warm.   Neurological:      Mental Status: She is alert and oriented to person, place, and time. Mental status is at baseline.   Psychiatric:         Mood and Affect: Mood normal.         Behavior: Behavior normal.         Thought Content: Thought content normal.      Lab review included the followin. CBC shows mild anemia, no leukocytosis an adequate platelet count  2. CMP within normal limits  3. Chest x-ray showed Chronic calcified granuloma the right upper lobe.The lungs are clear.  No focal consolidation.     Bony thorax intact. Prior posterior fusion of the thoracolumbar junction     Impression:    No acute chest disease.  4. EKG        Narrative  Performed by: [facility]  Test Reason : G95.9,Z01.818,    Vent. Rate : 071 BPM     Atrial Rate : 071 BPM     P-R Int : 134 ms          QRS Dur : 076 ms      QT Int : 386 ms       " P-R-T Axes : 036 033 039 degrees     QTc Int : 419 ms    Normal sinus rhythm  Normal ECG  When compared with ECG of 25-AUG-2022 07:01,  No significant change was found  Confirmed by Ahmet Ramirez MD ([id number]) on 3/20/2024 8:33:06 AM           CT Lumbar Spine Without Contrast  Narrative: EXAMINATION:  CT THORACIC SPINE WITHOUT CONTRAST; CT LUMBAR SPINE WITHOUT CONTRAST    CLINICAL HISTORY:  Mid-back pain;; Low back pain, trauma;    TECHNIQUE:  Axial CT images of the thoracic and lumbar spine were obtained.  Sagittal and reformatted images were performed.  No IV contrast was administered for the exam.    COMPARISON:  MRI of the thoracic spine, 11/13/2020, MRI of the lumbar spine, 11/13/2020, CT of the thoracic spine, 12/06/2018    FINDINGS:  Thoracic spine: Is multilevel thoracic spondylosis with no compression fracture or central canal stenosis.  Pedicular screws with connecting rods at T11 and T12 traverse the L1 and L2 strut is and fixation hardware.  Cervical spine osteophytes at C6-C7 demonstrates 6 central canal stenosis incompletely evaluated.    The surrounding soft tissues and chest appear unremarkable except for calcified nodule in the right upper lobe likely a granuloma or hamartoma.  Atherosclerotic disease in the aorta and coronary arteries are noted.    Lumbar spine: His is L2 strut and lateral plate bridging L1-L2 and L3 with pedicular screws and connecting rods noted.  Is rods traverse to S1 with pedicular screws in L5 and S1 on the left is as well as S2 as well as on the right.  Lucency surrounding the L5 pedicular screw on the is left suggests loosening.  No hardware disruption is evident.    No acute fracture is seen.  The central canal is difficult to evaluate due to beam hardening artifact.    Atherosclerotic disease in the aorta is noted.  The urinary bladder is markedly distended.  Prominent waste material throughout the visualized colon incompletely evaluated.  No inflammatory change mass or  fluid.  Impression: Struck traversing the L2 vertebral body with thoracic and lumbar fusion hardware is as described.    No evidence of acute thoracic or lumbar fracture is or dislocation.    Chronic features in the surrounding soft tissues.    Marked distention of the urinary bladder.  Correlate for neurogenic bladder versus bladder outlet obstruction.    Electronically signed by: Denis Arambula  Date:    02/02/2024  Time:    00:10  CT Thoracic Spine Without Contrast  Narrative: EXAMINATION:  CT THORACIC SPINE WITHOUT CONTRAST; CT LUMBAR SPINE WITHOUT CONTRAST    CLINICAL HISTORY:  Mid-back pain;; Low back pain, trauma;    TECHNIQUE:  Axial CT images of the thoracic and lumbar spine were obtained.  Sagittal and reformatted images were performed.  No IV contrast was administered for the exam.    COMPARISON:  MRI of the thoracic spine, 11/13/2020, MRI of the lumbar spine, 11/13/2020, CT of the thoracic spine, 12/06/2018    FINDINGS:  Thoracic spine: Is multilevel thoracic spondylosis with no compression fracture or central canal stenosis.  Pedicular screws with connecting rods at T11 and T12 traverse the L1 and L2 strut is and fixation hardware.  Cervical spine osteophytes at C6-C7 demonstrates 6 central canal stenosis incompletely evaluated.    The surrounding soft tissues and chest appear unremarkable except for calcified nodule in the right upper lobe likely a granuloma or hamartoma.  Atherosclerotic disease in the aorta and coronary arteries are noted.    Lumbar spine: His is L2 strut and lateral plate bridging L1-L2 and L3 with pedicular screws and connecting rods noted.  Is rods traverse to S1 with pedicular screws in L5 and S1 on the left is as well as S2 as well as on the right.  Lucency surrounding the L5 pedicular screw on the is left suggests loosening.  No hardware disruption is evident.    No acute fracture is seen.  The central canal is difficult to evaluate due to beam hardening  artifact.    Atherosclerotic disease in the aorta is noted.  The urinary bladder is markedly distended.  Prominent waste material throughout the visualized colon incompletely evaluated.  No inflammatory change mass or fluid.  Impression: Struck traversing the L2 vertebral body with thoracic and lumbar fusion hardware is as described.    No evidence of acute thoracic or lumbar fracture is or dislocation.    Chronic features in the surrounding soft tissues.    Marked distention of the urinary bladder.  Correlate for neurogenic bladder versus bladder outlet obstruction.    Electronically signed by: Denis Arambula  Date:    02/02/2024  Time:    00:10      Data reviewed 274}  Previous medical records reviewed and summarized in HPI.   Health Maintenance Due   Topic Date Due    Mammogram  02/24/2023    Influenza Vaccine (1) 09/01/2023    RSV Vaccine (Age 60+ and Pregnant patients) (1 - 1-dose 60+ series) Never done         Laboratory  274}  I have reviewed old labs below:  Lab Results   Component Value Date    WBC 8.51 02/01/2024    HGB 9.4 (L) 02/01/2024    HCT 29.8 (L) 02/01/2024    MCV 85 02/01/2024     02/01/2024     (L) 02/01/2024    K 3.6 02/01/2024     02/01/2024    CALCIUM 9.8 02/01/2024    PHOS 3.4 11/13/2018    CO2 25 02/01/2024     (H) 02/01/2024    BUN 14 02/01/2024    CREATININE 0.9 02/01/2024    ANIONGAP 9 02/01/2024    ESTGFRAFRICA >60.0 08/05/2020    EGFRNONAA >60.0 08/05/2020    PROT 7.6 02/01/2024    ALBUMIN 3.2 (L) 02/01/2024    BILITOT 0.7 02/01/2024    ALKPHOS 94 02/01/2024    ALT 27 02/01/2024    AST 27 02/01/2024    INR 1.1 11/13/2018    CHOL 213 (H) 06/30/2023    TRIG 111 06/30/2023    HDL 42 06/30/2023    LDLCALC 148.8 06/30/2023    TSH 1.082 06/30/2023    HGBA1C 5.0 06/30/2023     Lab reviewed by me: Particular labs of significance that I will monitor, workup, or treat to improve are mentioned below in diagnostic impression remarks.  :[id number]}274}  Imaging/EKG: I have  "reviewed the pertinent results/findings and my personal findings are noted below in diagnostic impression remarks.  274}    Assessment/Plan  Genia Mcnulty is a 58 y.o. female who presents to clinic with:    1. Aortic atherosclerosis    2. Myelopathy    3. Pre-op exam    I recommend use of standard pre-op and post-op precautions for this patient.   In my opinion, based on this limited assessment, she is medically optimized for this procedure, and can proceed without further evaluation.     Diagnostic Impression Remarks + HPI     Documentation entered by me for this encounter may have been done in part using speech-recognition technology. Although I have made an effort to ensure accuracy, "sound like" errors may exist and should be interpreted in context.     Ms Mcnulty is a 57 yo female here for a preop surgical clearance for surgical revision of lumbar hardware that has broken, She had a spinal tumor removed in 1991 and has had multiple revisions since that time. She had been doing well until 01/2024 when she presented to ER with extreme pain. An Xray showed multiple breaks in the rods supporting her spine.     This is the extent of the patient's complaints at this present time. She denies chest pain upon exertion, dyspnea, nausea, vomiting, diaphoresis, and syncope. No pleuritic chest pain, unilateral leg swelling, calf tenderness, or calf pain.     Genia will return to clinic in a few months for further workup and reassessment or sooner as needed. She was instructed to call the clinic or go to the emergency department if her symptoms do not improve, worsens, or if new symptoms develop. As we discussed that symptoms could worsen over the next 24 hours she was advised that if any increased swelling, pain, or numbness arise to go immediately to the ED. Patient knows to call any time if an emergency arises. Shared decision making occurred and she verbalized understanding in agreement with this plan.     274}    BMI " "Goal    Counseled patient on her ideal body weight, health consequences of being obese and current recommendations including weekly exercise and a heart healthy diet.  She is aware that ideal BMI < 25  Estimated body mass index is 24.96 kg/m² as calculated from the following:    Height as of this encounter: 5' 5" (1.651 m).    Weight as of this encounter: 68 kg (150 lb).     She was counseled about the importance of healthy dietary habits as well as routine physical activity and exercise for better health outcomes. I also discussed the importance of cancer screening.     Medication Monitoring    In today's visit, monitoring for drug toxicity was accomplished. Proper use of medications was also discussed.     I discussed imaging findings, diagnosis, possibilities, treatment options, medications, risks, and benefits. She had many questions regarding the options and long-term effects. All questions were answered. She expressed understanding after counseling regarding the diagnosis and recommendations. She was capable and demonstrated competence with understanding of these options. Shared decision making was performed resulting in her choosing the current treatment plan. Patient handout was given with instructions and recommendations. Advised the patient that if they become pregnant to alert us immediately to assess for medication changes.     I also discussed the importance of close follow up to discuss labs, change or modify her medications if needed, monitor side effects, and further evaluation of medical problems.     Additional workup planned: see labs ordered below.    See below for labs and meds ordered with associated diagnosis      1. Aortic atherosclerosis    2. Myelopathy    3. Pre-op exam  I recommend use of standard pre-op and post-op precautions for this patient.   In my opinion, based on this limited assessment, she is medically optimized for this procedure, and can proceed without further evaluation. "     Medication List with Changes/Refills   Current Medications    AMLODIPINE (NORVASC) 5 MG TABLET    Take 1 tablet (5 mg total) by mouth once daily.    B COMPLEX VITAMINS TABLET    Take 1 tablet by mouth once daily.    CELECOXIB (CELEBREX) 200 MG CAPSULE    Take 1 capsule (200 mg total) by mouth once daily.    CHOLECALCIFEROL, VITAMIN D3, 1,250 MCG (50,000 UNIT) CAPSULE    Take 1 capsule (50,000 Units total) by mouth every 7 days.    LISINOPRIL (PRINIVIL,ZESTRIL) 40 MG TABLET    Take 1 tablet (40 mg total) by mouth every evening.    OXYCODONE-ACETAMINOPHEN (PERCOCET)  MG PER TABLET    Take 1 tablet by mouth every 6 (six) hours as needed for Pain (may intermittently take an extra tablet when pain is severe).     Modified Medications    No medications on file       Charla Meadows MD  274}    03/19/2024     If you are due for any health screening(s) below please notify me so we can arrange them to be ordered and scheduled to maintain your health.     Health Maintenance Due   Topic Date Due    Mammogram  02/24/2023    Influenza Vaccine (1) 09/01/2023    RSV Vaccine (Age 60+ and Pregnant patients) (1 - 1-dose 60+ series) Never done

## 2024-03-20 ENCOUNTER — PATIENT MESSAGE (OUTPATIENT)
Dept: FAMILY MEDICINE | Facility: CLINIC | Age: 59
End: 2024-03-20
Payer: COMMERCIAL

## 2024-03-20 ENCOUNTER — TELEPHONE (OUTPATIENT)
Dept: FAMILY MEDICINE | Facility: CLINIC | Age: 59
End: 2024-03-20
Payer: COMMERCIAL

## 2024-03-20 ENCOUNTER — PATIENT MESSAGE (OUTPATIENT)
Dept: ADMINISTRATIVE | Facility: HOSPITAL | Age: 59
End: 2024-03-20
Payer: COMMERCIAL

## 2024-03-20 LAB
OHS QRS DURATION: 76 MS
OHS QTC CALCULATION: 419 MS

## 2024-03-20 NOTE — TELEPHONE ENCOUNTER
See pt was seen in office yesterday for procedure clearance. I do not see that pt was cleared. Please advise

## 2024-03-20 NOTE — TELEPHONE ENCOUNTER
----- Message from Shabnam Emerson RN sent at 3/20/2024  8:18 AM CDT -----  I did not see a statement of optimization or clearance in your note.   Is she cleared for surgery?  Thanks!

## 2024-03-27 ENCOUNTER — TELEPHONE (OUTPATIENT)
Dept: PREADMISSION TESTING | Facility: HOSPITAL | Age: 59
End: 2024-03-27
Payer: COMMERCIAL

## 2024-03-28 ENCOUNTER — HOSPITAL ENCOUNTER (OUTPATIENT)
Dept: PREADMISSION TESTING | Facility: HOSPITAL | Age: 59
Discharge: HOME | End: 2024-03-28
Attending: ORTHOPAEDIC SURGERY
Payer: COMMERCIAL

## 2024-03-28 ENCOUNTER — ANESTHESIA EVENT (OUTPATIENT)
Dept: SURGERY | Facility: HOSPITAL | Age: 59
DRG: 460 | End: 2024-03-28
Payer: COMMERCIAL

## 2024-03-28 VITALS
HEART RATE: 68 BPM | OXYGEN SATURATION: 99 % | TEMPERATURE: 98 F | RESPIRATION RATE: 16 BRPM | WEIGHT: 153 LBS | HEIGHT: 65 IN | BODY MASS INDEX: 25.49 KG/M2 | DIASTOLIC BLOOD PRESSURE: 70 MMHG | SYSTOLIC BLOOD PRESSURE: 139 MMHG

## 2024-03-28 NOTE — ANESTHESIA PREPROCEDURE EVALUATION
[facility]  Anesthesia Pre-Operative Evaluation         Patient Name: Genia Mcnulty  YOB: 1965  MRN: [id number]    SUBJECTIVE:     Pre-operative evaluation for Procedure(s) (LRB):  FUSION, SPINE - AIRO - co case with Dr. Bernal, closure by Dr. Cox L3-pelvis revision SNS:SSEp/EMG Depuy (N/A)  CLOSURE, WOUND (N/A)     03/28/2024    Genia Mcnulty is a 58 y.o. female w/ a significant PMHx of HTN, h/o ependymoma s/p excision (1991), chronic LBP and LLE radiculopathy s/p T10-pelvis revision PSF with Dr. Herrmann in 2018. She has required multiple spine surgeries from broken rods and pseudoarthrosis, most recently in 2018.     Patient now presents for the above procedure(s).    Prev airway:   10/02/18  Mask Ventilation: Easy  Cortés #2 Grade: Grade II  Complicating Factors: Anterior larynx, Small mouth (SHOULDER ROLL USED)      Patient Active Problem List   Diagnosis    DJD (degenerative joint disease) of knee    Weakness of right leg    Weakness of both legs    Myelopathy of lumbar region    Bursitis of right hip    Gait disorder    Spondylosis without myelopathy    Degeneration of lumbar or lumbosacral intervertebral disc    Acquired spondylolisthesis    Lumbago    Kyphoscoliosis    Closed pseudoarthrosis of lumbar spine    Lumbar radiculopathy    Sprain and strain    Essential hypertension    Pseudoarthrosis of lumbar spine    Status post lumbar spinal fusion with broken hardware    Aortic atherosclerosis       Review of patient's allergies indicates:   Allergen Reactions    Hydrochlorothiazide Other (See Comments)     Muscle pain    Losartan Other (See Comments)     Elevated b/p with anxiety    Promethazine Other (See Comments)     Other reaction(s): agitation  Other reaction(s): Hives       Current Inpatient Medications:      Current Outpatient  Medications on File Prior to Encounter   Medication Sig Dispense Refill    amLODIPine (NORVASC) 5 MG tablet Take 1 tablet (5 mg total) by mouth once daily. 30 tablet 1    b complex vitamins tablet Take 1 tablet by mouth once daily.      celecoxib (CELEBREX) 200 MG capsule Take 1 capsule (200 mg total) by mouth once daily.      cholecalciferol, vitamin D3, 1,250 mcg (50,000 unit) capsule Take 1 capsule (50,000 Units total) by mouth every 7 days. 13 capsule 3    lisinopriL (PRINIVIL,ZESTRIL) 40 MG tablet Take 1 tablet (40 mg total) by mouth every evening. 90 tablet 1    methocarbamoL (ROBAXIN) 500 MG Tab Take 1 tablet (500 mg total) by mouth 4 (four) times daily. for 10 days 120 tablet 3    oxyCODONE-acetaminophen (PERCOCET)  mg per tablet Take 1 tablet by mouth every 6 (six) hours as needed for Pain (may intermittently take an extra tablet when pain is severe). 120 tablet 0     No current facility-administered medications on file prior to encounter.       Past Surgical History:   Procedure Laterality Date    BACK SURGERY      x 5    CHOLECYSTECTOMY      CREATION OF MUSCLE ROTATIONAL FLAP N/A 10/2/2018    Procedure: CREATION, FLAP, MUSCLE ROTATION;  Surgeon: Rommel Cox MD;  Location: [address];  Service: Plastics;  Laterality: N/A;    ENDOSCOPIC CARPAL TUNNEL RELEASE Right 8/25/2022    Procedure: RELEASE, CARPAL TUNNEL, ENDOSCOPIC - RIGHT;  Surgeon: Barron Rosen MD;  Location: [facility] OR;  Service: Orthopedics;  Laterality: Right;    EPIDURAL STEROID INJECTION N/A 8/2/2023    Procedure: Caudal Epidural Steroid Injection (ref: Celestre);  Surgeon: Elijah Aquino Jr., MD;  Location: [facility] ENDO;  Service: Pain Management;  Laterality: N/A;  @1330  No ATC or DM  Needs Consent    ESOPHAGOGASTRODUODENOSCOPY N/A 8/18/2022    Procedure: EGD (ESOPHAGOGASTRODUODENOSCOPY);  Surgeon: Bernadette Bergeron MD;  Location: [facility];  Service: Endoscopy;  Laterality: N/A;       Social History      Socioeconomic History    Marital status:    Tobacco Use    Smoking status: Never    Smokeless tobacco: Never   Substance and Sexual Activity    Alcohol use: No     Alcohol/week: 0.0 standard drinks of alcohol    Drug use: No       OBJECTIVE:     Vital Signs Range (Last 24H):  Temp:  [36.8 °C (98.2 °F)]   Pulse:  [68]   Resp:  [16]   BP: (139)/(70)   SpO2:  [99 %]       Significant Labs:  Lab Results   Component Value Date    WBC 8.24 03/19/2024    HGB 10.3 (L) 03/19/2024    HCT 33.0 (L) 03/19/2024     03/19/2024    CHOL 213 (H) 06/30/2023    TRIG 111 06/30/2023    HDL 42 06/30/2023    ALT 15 03/19/2024    AST 18 03/19/2024     03/19/2024    K 4.0 03/19/2024     03/19/2024    CREATININE 1.0 03/19/2024    BUN 17 03/19/2024    CO2 26 03/19/2024    TSH 1.082 06/30/2023    INR 1.1 11/13/2018    HGBA1C 5.0 06/30/2023       Diagnostic Studies: No relevant studies.    EKG:   Results for orders placed or performed during the hospital encounter of 03/19/24   SCHEDULED EKG 12-LEAD (to Muse)    Collection Time: 03/19/24  4:41 PM   Result Value Ref Range    QRS Duration 76 ms    [facility] QTC Calculation 419 ms    Narrative    Test Reason : G95.9,Z01.818,    Vent. Rate : 071 BPM     Atrial Rate : 071 BPM     P-R Int : 134 ms          QRS Dur : 076 ms      QT Int : 386 ms       P-R-T Axes : 036 033 039 degrees     QTc Int : 419 ms    Normal sinus rhythm  Normal ECG  When compared with ECG of 25-AUG-2022 07:01,  No significant change was found  Confirmed by Ahmet Ramirez MD ([id number]) on 3/20/2024 8:33:06 AM    Referred By: DIEUDONNE ACEVES           Confirmed By:Ahmet Ramirez MD       2D ECHO:  TTE:  No results found for this or any previous visit.    ELMA:  No results found for this or any previous visit.    ASSESSMENT/PLAN:           Pre-op Assessment    I have reviewed the Patient Summary Reports.     I have reviewed the Nursing Notes.    I have reviewed the Medications.     Review of Systems  Anesthesia Hx:  No  problems with previous Anesthesia   History of prior surgery of interest to airway management or planning:          Denies Family Hx of Anesthesia complications.    Denies Personal Hx of Anesthesia complications.                    Social:  Non-Smoker, No Alcohol Use       Hematology/Oncology:       -- Denies Anemia:                                  Cardiovascular:  Exercise tolerance: poor   Hypertension, well controlled    Denies CAD.        Denies CHF.    no hyperlipidemia   ECG has been reviewed.                    Hypertension         Pulmonary:    Denies COPD.  Denies Asthma.                    Renal/:   Denies Chronic Renal Disease.                Hepatic/GI:      Denies GERD.             Musculoskeletal:  Denies Arthritis.        Arthritis     Spine Disorders: thoracic and lumbar Chronic Pain           Neurological:    Denies CVA. Neuromuscular Disease,   Denies Seizures.          Chronic Pain Syndrome Arthritis                         Neuromuscular Disease   Endocrine:  Denies Diabetes.           Psych:  Denies Psychiatric History.                Physical Exam  General: Well nourished, Cooperative, Alert and Oriented    Airway:  Mallampati: IV / III  Mouth Opening: Normal  TM Distance: Normal  Tongue: Normal  Neck ROM: Extension Painful    Dental:  Intact        Anesthesia Plan  Type of Anesthesia, risks & benefits discussed:    Anesthesia Type: Gen ETT  Intra-op Monitoring Plan: Standard ASA Monitors and Art Line  Post Op Pain Control Plan: multimodal analgesia and IV/PO Opioids PRN  Induction:  IV  Airway Plan: Direct, Post-Induction  Informed Consent: Informed consent signed with the Patient and all parties understand the risks and agree with anesthesia plan.  All questions answered.   ASA Score: 2  Day of Surgery Review of History & Physical: H&P Update referred to the surgeon/provider.  Anesthesia Plan Notes: E-consent obtained in preop clinic.    Ready For Surgery From Anesthesia Perspective.      .

## 2024-04-05 ENCOUNTER — PATIENT MESSAGE (OUTPATIENT)
Dept: OTHER | Facility: OTHER | Age: 59
End: 2024-04-05
Payer: COMMERCIAL

## 2024-04-09 ENCOUNTER — ANESTHESIA (OUTPATIENT)
Dept: SURGERY | Facility: HOSPITAL | Age: 59
DRG: 460 | End: 2024-04-09
Payer: COMMERCIAL

## 2024-04-09 ENCOUNTER — HOSPITAL ENCOUNTER (INPATIENT)
Facility: HOSPITAL | Age: 59
LOS: 2 days | Discharge: HOME HEALTH | DRG: 460 | End: 2024-04-11
Attending: ORTHOPAEDIC SURGERY | Admitting: ORTHOPAEDIC SURGERY
Payer: COMMERCIAL

## 2024-04-09 DIAGNOSIS — M96.0 PSEUDARTHROSIS AFTER FUSION OR ARTHRODESIS: Primary | ICD-10-CM

## 2024-04-09 PROCEDURE — 15734 MUSCLE-SKIN GRAFT TRUNK: CPT | Mod: ,,, | Performed by: SURGERY

## 2024-04-09 PROCEDURE — 22612 ARTHRD PST TQ 1NTRSPC LUMBAR: CPT | Mod: 22,62,, | Performed by: NEUROLOGICAL SURGERY

## 2024-04-09 PROCEDURE — 0KXG0ZZ TRANSFER LEFT TRUNK MUSCLE, OPEN APPROACH: ICD-10-PCS | Performed by: SURGERY

## 2024-04-09 PROCEDURE — 71000033 HC RECOVERY, INTIAL HOUR: Performed by: ORTHOPAEDIC SURGERY

## 2024-04-09 PROCEDURE — 63600175 PHARM REV CODE 636 W HCPCS

## 2024-04-09 PROCEDURE — 20937 SP BONE AGRFT MORSEL ADD-ON: CPT | Mod: ,,, | Performed by: ORTHOPAEDIC SURGERY

## 2024-04-09 PROCEDURE — D9220A PRA ANESTHESIA: Mod: CRNA,,, | Performed by: STUDENT IN AN ORGANIZED HEALTH CARE EDUCATION/TRAINING PROGRAM

## 2024-04-09 PROCEDURE — 71000015 HC POSTOP RECOV 1ST HR: Performed by: ORTHOPAEDIC SURGERY

## 2024-04-09 PROCEDURE — 25000003 PHARM REV CODE 250: Performed by: STUDENT IN AN ORGANIZED HEALTH CARE EDUCATION/TRAINING PROGRAM

## 2024-04-09 PROCEDURE — 0KXF0ZZ TRANSFER RIGHT TRUNK MUSCLE, OPEN APPROACH: ICD-10-PCS | Performed by: SURGERY

## 2024-04-09 PROCEDURE — 22614 ARTHRD PST TQ 1NTRSPC EA ADD: CPT | Mod: 62,,, | Performed by: ORTHOPAEDIC SURGERY

## 2024-04-09 PROCEDURE — 25000003 PHARM REV CODE 250

## 2024-04-09 PROCEDURE — 0SG1071 FUSION OF 2 OR MORE LUMBAR VERTEBRAL JOINTS WITH AUTOLOGOUS TISSUE SUBSTITUTE, POSTERIOR APPROACH, POSTERIOR COLUMN, OPEN APPROACH: ICD-10-PCS | Performed by: ORTHOPAEDIC SURGERY

## 2024-04-09 PROCEDURE — C1713 ANCHOR/SCREW BN/BN,TIS/BN: HCPCS | Performed by: ORTHOPAEDIC SURGERY

## 2024-04-09 PROCEDURE — 22614 ARTHRD PST TQ 1NTRSPC EA ADD: CPT | Mod: 62,,, | Performed by: NEUROLOGICAL SURGERY

## 2024-04-09 PROCEDURE — 63600175 PHARM REV CODE 636 W HCPCS: Performed by: ORTHOPAEDIC SURGERY

## 2024-04-09 PROCEDURE — 22842 INSERT SPINE FIXATION DEVICE: CPT | Mod: 82,,, | Performed by: NEUROLOGICAL SURGERY

## 2024-04-09 PROCEDURE — 36000710: Performed by: ORTHOPAEDIC SURGERY

## 2024-04-09 PROCEDURE — 37000009 HC ANESTHESIA EA ADD 15 MINS: Performed by: ORTHOPAEDIC SURGERY

## 2024-04-09 PROCEDURE — D9220A PRA ANESTHESIA: Mod: ANES,,, | Performed by: ANESTHESIOLOGY

## 2024-04-09 PROCEDURE — 0SP004Z REMOVAL OF INTERNAL FIXATION DEVICE FROM LUMBAR VERTEBRAL JOINT, OPEN APPROACH: ICD-10-PCS | Performed by: ORTHOPAEDIC SURGERY

## 2024-04-09 PROCEDURE — 0QB20ZZ EXCISION OF RIGHT PELVIC BONE, OPEN APPROACH: ICD-10-PCS | Performed by: ORTHOPAEDIC SURGERY

## 2024-04-09 PROCEDURE — 71000016 HC POSTOP RECOV ADDL HR: Performed by: ORTHOPAEDIC SURGERY

## 2024-04-09 PROCEDURE — 63600175 PHARM REV CODE 636 W HCPCS: Performed by: ANESTHESIOLOGY

## 2024-04-09 PROCEDURE — 14302 TIS TRNFR ADDL 30 SQ CM: CPT | Mod: ,,, | Performed by: SURGERY

## 2024-04-09 PROCEDURE — 36000711: Performed by: ORTHOPAEDIC SURGERY

## 2024-04-09 PROCEDURE — 63600175 PHARM REV CODE 636 W HCPCS: Performed by: NURSE ANESTHETIST, CERTIFIED REGISTERED

## 2024-04-09 PROCEDURE — 22848 INSERT PELV FIXATION DEVICE: CPT | Mod: ,,, | Performed by: ORTHOPAEDIC SURGERY

## 2024-04-09 PROCEDURE — 11000001 HC ACUTE MED/SURG PRIVATE ROOM

## 2024-04-09 PROCEDURE — 27201423 OPTIME MED/SURG SUP & DEVICES STERILE SUPPLY: Performed by: ORTHOPAEDIC SURGERY

## 2024-04-09 PROCEDURE — 0SG3071 FUSION OF LUMBOSACRAL JOINT WITH AUTOLOGOUS TISSUE SUBSTITUTE, POSTERIOR APPROACH, POSTERIOR COLUMN, OPEN APPROACH: ICD-10-PCS | Performed by: ORTHOPAEDIC SURGERY

## 2024-04-09 PROCEDURE — 22612 ARTHRD PST TQ 1NTRSPC LUMBAR: CPT | Mod: 22,62,, | Performed by: ORTHOPAEDIC SURGERY

## 2024-04-09 PROCEDURE — 37000008 HC ANESTHESIA 1ST 15 MINUTES: Performed by: ORTHOPAEDIC SURGERY

## 2024-04-09 PROCEDURE — 88300 SURGICAL PATH GROSS: CPT | Mod: 26,,, | Performed by: STUDENT IN AN ORGANIZED HEALTH CARE EDUCATION/TRAINING PROGRAM

## 2024-04-09 PROCEDURE — 0SP304Z REMOVAL OF INTERNAL FIXATION DEVICE FROM LUMBOSACRAL JOINT, OPEN APPROACH: ICD-10-PCS | Performed by: ORTHOPAEDIC SURGERY

## 2024-04-09 PROCEDURE — 88300 SURGICAL PATH GROSS: CPT | Performed by: STUDENT IN AN ORGANIZED HEALTH CARE EDUCATION/TRAINING PROGRAM

## 2024-04-09 PROCEDURE — 63600175 PHARM REV CODE 636 W HCPCS: Performed by: STUDENT IN AN ORGANIZED HEALTH CARE EDUCATION/TRAINING PROGRAM

## 2024-04-09 PROCEDURE — 94761 N-INVAS EAR/PLS OXIMETRY MLT: CPT

## 2024-04-09 PROCEDURE — 14301 TIS TRNFR ANY 30.1-60 SQ CM: CPT | Mod: 51,,, | Performed by: SURGERY

## 2024-04-09 PROCEDURE — 22842 INSERT SPINE FIXATION DEVICE: CPT | Mod: ,,, | Performed by: ORTHOPAEDIC SURGERY

## 2024-04-09 PROCEDURE — 99900035 HC TECH TIME PER 15 MIN (STAT)

## 2024-04-09 PROCEDURE — 25000003 PHARM REV CODE 250: Performed by: ORTHOPAEDIC SURGERY

## 2024-04-09 PROCEDURE — 22848 INSERT PELV FIXATION DEVICE: CPT | Mod: 82,,, | Performed by: NEUROLOGICAL SURGERY

## 2024-04-09 DEVICE — ROD SPINE MTRX STR 400X80MM: Type: IMPLANTABLE DEVICE | Site: BACK | Status: FUNCTIONAL

## 2024-04-09 DEVICE — SCREW BONE SPINAL 5.5 7 X 40MM: Type: IMPLANTABLE DEVICE | Site: BACK | Status: FUNCTIONAL

## 2024-04-09 DEVICE — SCREW INNER SINGLE SET TITANIU: Type: IMPLANTABLE DEVICE | Site: BACK | Status: FUNCTIONAL

## 2024-04-09 RX ORDER — SODIUM CHLORIDE 9 MG/ML
INJECTION, SOLUTION INTRAVENOUS CONTINUOUS
Status: DISCONTINUED | OUTPATIENT
Start: 2024-04-09 | End: 2024-04-11 | Stop reason: HOSPADM

## 2024-04-09 RX ORDER — ROCURONIUM BROMIDE 10 MG/ML
INJECTION, SOLUTION INTRAVENOUS
Status: DISCONTINUED | OUTPATIENT
Start: 2024-04-09 | End: 2024-04-09

## 2024-04-09 RX ORDER — MUPIROCIN 20 MG/G
OINTMENT TOPICAL
Status: DISCONTINUED | OUTPATIENT
Start: 2024-04-09 | End: 2024-04-09 | Stop reason: HOSPADM

## 2024-04-09 RX ORDER — HYDROMORPHONE HYDROCHLORIDE 1 MG/ML
0.2 INJECTION, SOLUTION INTRAMUSCULAR; INTRAVENOUS; SUBCUTANEOUS EVERY 5 MIN PRN
Status: DISCONTINUED | OUTPATIENT
Start: 2024-04-09 | End: 2024-04-09 | Stop reason: HOSPADM

## 2024-04-09 RX ORDER — SODIUM CHLORIDE 0.9 % (FLUSH) 0.9 %
10 SYRINGE (ML) INJECTION EVERY 6 HOURS PRN
Status: DISCONTINUED | OUTPATIENT
Start: 2024-04-09 | End: 2024-04-11 | Stop reason: HOSPADM

## 2024-04-09 RX ORDER — MUPIROCIN 20 MG/G
OINTMENT TOPICAL 2 TIMES DAILY
Status: DISCONTINUED | OUTPATIENT
Start: 2024-04-09 | End: 2024-04-11 | Stop reason: HOSPADM

## 2024-04-09 RX ORDER — OXYCODONE HYDROCHLORIDE 10 MG/1
10 TABLET ORAL EVERY 4 HOURS PRN
Status: DISCONTINUED | OUTPATIENT
Start: 2024-04-09 | End: 2024-04-11 | Stop reason: HOSPADM

## 2024-04-09 RX ORDER — LIDOCAINE HYDROCHLORIDE AND EPINEPHRINE 10; 10 MG/ML; UG/ML
INJECTION, SOLUTION INFILTRATION; PERINEURAL
Status: DISCONTINUED | OUTPATIENT
Start: 2024-04-09 | End: 2024-04-09 | Stop reason: HOSPADM

## 2024-04-09 RX ORDER — MIDAZOLAM HYDROCHLORIDE 1 MG/ML
INJECTION INTRAMUSCULAR; INTRAVENOUS
Status: DISCONTINUED | OUTPATIENT
Start: 2024-04-09 | End: 2024-04-09

## 2024-04-09 RX ORDER — ONDANSETRON HYDROCHLORIDE 2 MG/ML
INJECTION, SOLUTION INTRAVENOUS
Status: DISCONTINUED | OUTPATIENT
Start: 2024-04-09 | End: 2024-04-09

## 2024-04-09 RX ORDER — METHOCARBAMOL 750 MG/1
750 TABLET, FILM COATED ORAL 3 TIMES DAILY
Status: DISCONTINUED | OUTPATIENT
Start: 2024-04-09 | End: 2024-04-11 | Stop reason: HOSPADM

## 2024-04-09 RX ORDER — ASCORBIC ACID 250 MG
250 TABLET ORAL DAILY
Status: DISCONTINUED | OUTPATIENT
Start: 2024-04-09 | End: 2024-04-11 | Stop reason: HOSPADM

## 2024-04-09 RX ORDER — CHOLECALCIFEROL (VITAMIN D3) 25 MCG
2000 TABLET ORAL DAILY
Status: DISCONTINUED | OUTPATIENT
Start: 2024-04-09 | End: 2024-04-11 | Stop reason: HOSPADM

## 2024-04-09 RX ORDER — FAMOTIDINE 20 MG/1
20 TABLET, FILM COATED ORAL 2 TIMES DAILY
Status: DISCONTINUED | OUTPATIENT
Start: 2024-04-09 | End: 2024-04-11 | Stop reason: HOSPADM

## 2024-04-09 RX ORDER — KETAMINE HCL IN 0.9 % NACL 50 MG/5 ML
SYRINGE (ML) INTRAVENOUS
Status: DISCONTINUED | OUTPATIENT
Start: 2024-04-09 | End: 2024-04-09

## 2024-04-09 RX ORDER — LISINOPRIL 20 MG/1
40 TABLET ORAL NIGHTLY
Status: DISCONTINUED | OUTPATIENT
Start: 2024-04-09 | End: 2024-04-11 | Stop reason: HOSPADM

## 2024-04-09 RX ORDER — BISACODYL 10 MG/1
10 SUPPOSITORY RECTAL DAILY PRN
Status: DISCONTINUED | OUTPATIENT
Start: 2024-04-09 | End: 2024-04-11 | Stop reason: HOSPADM

## 2024-04-09 RX ORDER — HEPARIN SODIUM 5000 [USP'U]/ML
5000 INJECTION, SOLUTION INTRAVENOUS; SUBCUTANEOUS EVERY 8 HOURS
Status: DISCONTINUED | OUTPATIENT
Start: 2024-04-09 | End: 2024-04-11 | Stop reason: HOSPADM

## 2024-04-09 RX ORDER — DOCUSATE SODIUM 100 MG/1
100 CAPSULE, LIQUID FILLED ORAL 2 TIMES DAILY
Status: DISCONTINUED | OUTPATIENT
Start: 2024-04-09 | End: 2024-04-11 | Stop reason: HOSPADM

## 2024-04-09 RX ORDER — SUCCINYLCHOLINE CHLORIDE 20 MG/ML
INJECTION INTRAMUSCULAR; INTRAVENOUS
Status: DISCONTINUED | OUTPATIENT
Start: 2024-04-09 | End: 2024-04-09

## 2024-04-09 RX ORDER — CELECOXIB 200 MG/1
200 CAPSULE ORAL DAILY
Status: DISCONTINUED | OUTPATIENT
Start: 2024-04-09 | End: 2024-04-11 | Stop reason: HOSPADM

## 2024-04-09 RX ORDER — PROPOFOL 10 MG/ML
VIAL (ML) INTRAVENOUS
Status: DISCONTINUED | OUTPATIENT
Start: 2024-04-09 | End: 2024-04-09

## 2024-04-09 RX ORDER — ACETAMINOPHEN 10 MG/ML
INJECTION, SOLUTION INTRAVENOUS
Status: DISCONTINUED | OUTPATIENT
Start: 2024-04-09 | End: 2024-04-09

## 2024-04-09 RX ORDER — FENTANYL CITRATE 50 UG/ML
INJECTION, SOLUTION INTRAMUSCULAR; INTRAVENOUS
Status: DISCONTINUED | OUTPATIENT
Start: 2024-04-09 | End: 2024-04-09

## 2024-04-09 RX ORDER — ONDANSETRON 8 MG/1
8 TABLET, ORALLY DISINTEGRATING ORAL EVERY 8 HOURS PRN
Status: DISCONTINUED | OUTPATIENT
Start: 2024-04-09 | End: 2024-04-11 | Stop reason: HOSPADM

## 2024-04-09 RX ORDER — MORPHINE SULFATE 2 MG/ML
2 INJECTION, SOLUTION INTRAMUSCULAR; INTRAVENOUS
Status: DISCONTINUED | OUTPATIENT
Start: 2024-04-09 | End: 2024-04-11 | Stop reason: HOSPADM

## 2024-04-09 RX ORDER — OXYCODONE HYDROCHLORIDE 5 MG/1
5 TABLET ORAL EVERY 4 HOURS PRN
Status: DISCONTINUED | OUTPATIENT
Start: 2024-04-09 | End: 2024-04-11 | Stop reason: HOSPADM

## 2024-04-09 RX ORDER — PHENYLEPHRINE HYDROCHLORIDE 10 MG/ML
INJECTION INTRAVENOUS CONTINUOUS PRN
Status: DISCONTINUED | OUTPATIENT
Start: 2024-04-09 | End: 2024-04-09

## 2024-04-09 RX ORDER — AMLODIPINE BESYLATE 5 MG/1
5 TABLET ORAL DAILY
Status: DISCONTINUED | OUTPATIENT
Start: 2024-04-10 | End: 2024-04-11 | Stop reason: HOSPADM

## 2024-04-09 RX ORDER — DEXAMETHASONE SODIUM PHOSPHATE 4 MG/ML
INJECTION, SOLUTION INTRA-ARTICULAR; INTRALESIONAL; INTRAMUSCULAR; INTRAVENOUS; SOFT TISSUE
Status: DISCONTINUED | OUTPATIENT
Start: 2024-04-09 | End: 2024-04-09

## 2024-04-09 RX ORDER — HYDROMORPHONE HYDROCHLORIDE 2 MG/ML
INJECTION, SOLUTION INTRAMUSCULAR; INTRAVENOUS; SUBCUTANEOUS
Status: DISCONTINUED | OUTPATIENT
Start: 2024-04-09 | End: 2024-04-09

## 2024-04-09 RX ORDER — GABAPENTIN 300 MG/1
300 CAPSULE ORAL 3 TIMES DAILY
Status: DISCONTINUED | OUTPATIENT
Start: 2024-04-09 | End: 2024-04-11 | Stop reason: HOSPADM

## 2024-04-09 RX ORDER — SODIUM CHLORIDE 0.9 % (FLUSH) 0.9 %
3 SYRINGE (ML) INJECTION
Status: DISCONTINUED | OUTPATIENT
Start: 2024-04-09 | End: 2024-04-09 | Stop reason: HOSPADM

## 2024-04-09 RX ORDER — POLYETHYLENE GLYCOL 3350 17 G/17G
17 POWDER, FOR SOLUTION ORAL DAILY
Status: DISCONTINUED | OUTPATIENT
Start: 2024-04-09 | End: 2024-04-11 | Stop reason: HOSPADM

## 2024-04-09 RX ORDER — TALC
6 POWDER (GRAM) TOPICAL NIGHTLY PRN
Status: DISCONTINUED | OUTPATIENT
Start: 2024-04-09 | End: 2024-04-11 | Stop reason: HOSPADM

## 2024-04-09 RX ORDER — ACETAMINOPHEN 500 MG
1000 TABLET ORAL 3 TIMES DAILY
Status: DISCONTINUED | OUTPATIENT
Start: 2024-04-09 | End: 2024-04-11 | Stop reason: HOSPADM

## 2024-04-09 RX ORDER — LIDOCAINE HYDROCHLORIDE 20 MG/ML
INJECTION INTRAVENOUS
Status: DISCONTINUED | OUTPATIENT
Start: 2024-04-09 | End: 2024-04-09

## 2024-04-09 RX ORDER — LOPERAMIDE HYDROCHLORIDE 2 MG/1
2 CAPSULE ORAL CONTINUOUS PRN
Status: DISCONTINUED | OUTPATIENT
Start: 2024-04-09 | End: 2024-04-11 | Stop reason: HOSPADM

## 2024-04-09 RX ORDER — SODIUM CHLORIDE 0.9 % (FLUSH) 0.9 %
10 SYRINGE (ML) INJECTION
Status: DISCONTINUED | OUTPATIENT
Start: 2024-04-09 | End: 2024-04-09 | Stop reason: HOSPADM

## 2024-04-09 RX ORDER — VANCOMYCIN HYDROCHLORIDE 1 G/20ML
INJECTION, POWDER, LYOPHILIZED, FOR SOLUTION INTRAVENOUS
Status: DISCONTINUED | OUTPATIENT
Start: 2024-04-09 | End: 2024-04-09 | Stop reason: HOSPADM

## 2024-04-09 RX ADMIN — HEPARIN SODIUM 5000 UNITS: 5000 INJECTION INTRAVENOUS; SUBCUTANEOUS at 09:04

## 2024-04-09 RX ADMIN — ONDANSETRON 4 MG: 2 INJECTION INTRAMUSCULAR; INTRAVENOUS at 01:04

## 2024-04-09 RX ADMIN — MUPIROCIN: 20 OINTMENT TOPICAL at 09:04

## 2024-04-09 RX ADMIN — OXYCODONE HYDROCHLORIDE 10 MG: 10 TABLET ORAL at 09:04

## 2024-04-09 RX ADMIN — FENTANYL CITRATE 50 MCG: 50 INJECTION, SOLUTION INTRAMUSCULAR; INTRAVENOUS at 12:04

## 2024-04-09 RX ADMIN — PHENYLEPHRINE HYDROCHLORIDE 0.2 MCG/KG/MIN: 10 INJECTION INTRAVENOUS at 02:04

## 2024-04-09 RX ADMIN — Medication 15 MG: at 02:04

## 2024-04-09 RX ADMIN — METHOCARBAMOL 750 MG: 750 TABLET ORAL at 09:04

## 2024-04-09 RX ADMIN — ACETAMINOPHEN 1000 MG: 10 INJECTION, SOLUTION INTRAVENOUS at 02:04

## 2024-04-09 RX ADMIN — MIDAZOLAM HYDROCHLORIDE 2 MG: 2 INJECTION, SOLUTION INTRAMUSCULAR; INTRAVENOUS at 12:04

## 2024-04-09 RX ADMIN — CELECOXIB 200 MG: 200 CAPSULE ORAL at 04:04

## 2024-04-09 RX ADMIN — CEFAZOLIN 2 G: 2 INJECTION, POWDER, FOR SOLUTION INTRAMUSCULAR; INTRAVENOUS at 09:04

## 2024-04-09 RX ADMIN — GABAPENTIN 300 MG: 300 CAPSULE ORAL at 05:04

## 2024-04-09 RX ADMIN — Medication 20 MG: at 01:04

## 2024-04-09 RX ADMIN — SUCCINYLCHOLINE CHLORIDE 120 MG: 20 INJECTION, SOLUTION INTRAMUSCULAR; INTRAVENOUS at 12:04

## 2024-04-09 RX ADMIN — ACETAMINOPHEN 1000 MG: 500 TABLET ORAL at 09:04

## 2024-04-09 RX ADMIN — HYDROMORPHONE HYDROCHLORIDE 0.2 MG: 1 INJECTION, SOLUTION INTRAMUSCULAR; INTRAVENOUS; SUBCUTANEOUS at 05:04

## 2024-04-09 RX ADMIN — FENTANYL CITRATE 50 MCG: 50 INJECTION, SOLUTION INTRAMUSCULAR; INTRAVENOUS at 01:04

## 2024-04-09 RX ADMIN — SODIUM CHLORIDE: 9 INJECTION, SOLUTION INTRAVENOUS at 05:04

## 2024-04-09 RX ADMIN — ROCURONIUM BROMIDE 10 MG: 10 INJECTION, SOLUTION INTRAVENOUS at 12:04

## 2024-04-09 RX ADMIN — ROCURONIUM BROMIDE 15 MG: 10 INJECTION, SOLUTION INTRAVENOUS at 02:04

## 2024-04-09 RX ADMIN — LISINOPRIL 40 MG: 20 TABLET ORAL at 09:04

## 2024-04-09 RX ADMIN — DEXAMETHASONE SODIUM PHOSPHATE 8 MG: 4 INJECTION, SOLUTION INTRAMUSCULAR; INTRAVENOUS at 01:04

## 2024-04-09 RX ADMIN — CHOLECALCIFEROL TAB 25 MCG (1000 UNIT) 2000 UNITS: 25 TAB at 04:04

## 2024-04-09 RX ADMIN — SODIUM CHLORIDE, SODIUM GLUCONATE, SODIUM ACETATE, POTASSIUM CHLORIDE, MAGNESIUM CHLORIDE, SODIUM PHOSPHATE, DIBASIC, AND POTASSIUM PHOSPHATE: .53; .5; .37; .037; .03; .012; .00082 INJECTION, SOLUTION INTRAVENOUS at 01:04

## 2024-04-09 RX ADMIN — GLYCOPYRROLATE 0.2 MG: 0.2 INJECTION, SOLUTION INTRAMUSCULAR; INTRAVENOUS at 12:04

## 2024-04-09 RX ADMIN — METHOCARBAMOL 750 MG: 750 TABLET ORAL at 04:04

## 2024-04-09 RX ADMIN — MORPHINE SULFATE 2 MG: 2 INJECTION, SOLUTION INTRAMUSCULAR; INTRAVENOUS at 11:04

## 2024-04-09 RX ADMIN — CEFAZOLIN 2 G: 2 INJECTION, POWDER, FOR SOLUTION INTRAMUSCULAR; INTRAVENOUS at 01:04

## 2024-04-09 RX ADMIN — ROCURONIUM BROMIDE 15 MG: 10 INJECTION, SOLUTION INTRAVENOUS at 01:04

## 2024-04-09 RX ADMIN — MORPHINE SULFATE 2 MG: 2 INJECTION, SOLUTION INTRAMUSCULAR; INTRAVENOUS at 07:04

## 2024-04-09 RX ADMIN — HYDROMORPHONE HYDROCHLORIDE 0.5 MG: 2 INJECTION INTRAMUSCULAR; INTRAVENOUS; SUBCUTANEOUS at 03:04

## 2024-04-09 RX ADMIN — ROCURONIUM BROMIDE 40 MG: 10 INJECTION, SOLUTION INTRAVENOUS at 01:04

## 2024-04-09 RX ADMIN — SODIUM CHLORIDE: 0.9 INJECTION, SOLUTION INTRAVENOUS at 12:04

## 2024-04-09 RX ADMIN — THERA TABS 1 TABLET: TAB at 04:04

## 2024-04-09 RX ADMIN — PROPOFOL 200 MG: 10 INJECTION, EMULSION INTRAVENOUS at 12:04

## 2024-04-09 RX ADMIN — OXYCODONE HYDROCHLORIDE 10 MG: 10 TABLET ORAL at 04:04

## 2024-04-09 RX ADMIN — HYDROMORPHONE HYDROCHLORIDE 0.5 MG: 2 INJECTION INTRAMUSCULAR; INTRAVENOUS; SUBCUTANEOUS at 02:04

## 2024-04-09 RX ADMIN — GABAPENTIN 300 MG: 300 CAPSULE ORAL at 09:04

## 2024-04-09 RX ADMIN — FENTANYL CITRATE 100 MCG: 50 INJECTION, SOLUTION INTRAMUSCULAR; INTRAVENOUS at 01:04

## 2024-04-09 RX ADMIN — LIDOCAINE HYDROCHLORIDE 50 MG: 20 INJECTION INTRAVENOUS at 12:04

## 2024-04-09 RX ADMIN — FAMOTIDINE 20 MG: 20 TABLET ORAL at 09:04

## 2024-04-09 NOTE — H&P
"H and P below, plan for revision fusion.       [facility] Note     Subjective:     Chief Complaint:        Chief Complaint   Patient presents with    surgical clearance       Pt presenting to clinic for surgical clearance.  EKG completed on 03/14/2024.                  274}     58 y.o. female presents for multiple issues.      The HPI and pertinent ROS is included in the Diagnostic Impression Remarks section at the end of the note. Please see below for further details.      The following portions of the patient's history were reviewed and updated as appropriate: allergies, current medications, past family history, past medical history, past social history, past surgical history and problem list.     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  She  has a past surgical history that includes Back surgery; Cholecystectomy; Creation of muscle rotational flap (N/A, 10/2/2018); Esophagogastroduodenoscopy (N/A, 8/18/2022); Endoscopic carpal tunnel release (Right, 8/25/2022); and Epidural steroid injection (N/A, 8/2/2023).     She  reports that she has never smoked. She has never used smokeless tobacco. She reports that she does not drink alcohol and does not use drugs.  She family history includes Breast cancer in her maternal aunt.           Review of patient's allergies indicates:   Allergen Reactions    Hydrochlorothiazide Other (See Comments)       Muscle pain    Losartan Other (See Comments)       Elevated b/p with anxiety    Promethazine Other (See Comments)       Other reaction(s): agitation  Other reaction(s): Hives         Physical Examination  BP (!) 140/74 (BP Location: Left arm, Patient Position: Sitting, BP Method: Medium (Manual))   Pulse 68   Ht 5' 5" (1.651 m)   Wt 68 kg (150 lb)   SpO2 98%   BMI 24.96 kg/m²                                    274}      Wt Readings from Last 3 Encounters: " "  24 68 kg (150 lb)   24 68 kg (150 lb)   24 72.6 kg (160 lb 0.9 oz)          BP Readings from Last 3 Encounters:   24 (!) 140/74   24 122/68   23 (!) 151/79                Estimated body mass index is 24.96 kg/m² as calculated from the following:    Height as of this encounter: 5' 5" (1.651 m).    Weight as of this encounter: 68 kg (150 lb).      Physical Exam  Constitutional:       Appearance: Normal appearance. She is normal weight.      Comments: Currently ambulating by wheelchair due to spinal issues   HENT:      Head: Normocephalic and atraumatic.      Nose: Nose normal.      Mouth/Throat:      Mouth: Mucous membranes are moist.   Eyes:      Extraocular Movements: Extraocular movements intact.      Pupils: Pupils are equal, round, and reactive to light.   Cardiovascular:      Rate and Rhythm: Normal rate and regular rhythm.      Pulses: Normal pulses.      Heart sounds: Normal heart sounds.   Pulmonary:      Effort: Pulmonary effort is normal.      Breath sounds: Normal breath sounds.   Musculoskeletal:      Comments: Currently in wheelchair secondary to broken spinal rods .  She is in significant pain and can not support her weight ambulate on her own   Skin:     General: Skin is warm.   Neurological:      Mental Status: She is alert and oriented to person, place, and time. Mental status is at baseline.   Psychiatric:         Mood and Affect: Mood normal.         Behavior: Behavior normal.         Thought Content: Thought content normal.       Lab review included the followin. CBC shows mild anemia, no leukocytosis an adequate platelet count  2. CMP within normal limits  3. Chest x-ray showed Chronic calcified granuloma the right upper lobe.The lungs are clear.  No focal consolidation.     Bony thorax intact. Prior posterior fusion of the thoracolumbar junction     Impression:    No acute chest disease.  4. EKG         Narrative  Performed by: [facility]  Test Reason : " G95.9,Z01.818,    Vent. Rate : 071 BPM     Atrial Rate : 071 BPM     P-R Int : 134 ms          QRS Dur : 076 ms      QT Int : 386 ms       P-R-T Axes : 036 033 039 degrees     QTc Int : 419 ms    Normal sinus rhythm  Normal ECG  When compared with ECG of 25-AUG-2022 07:01,  No significant change was found  Confirmed by Ahmet Ramirez MD ([id number]) on 3/20/2024 8:33:06 AM            CT Lumbar Spine Without Contrast  Narrative: EXAMINATION:  CT THORACIC SPINE WITHOUT CONTRAST; CT LUMBAR SPINE WITHOUT CONTRAST     CLINICAL HISTORY:  Mid-back pain;; Low back pain, trauma;     TECHNIQUE:  Axial CT images of the thoracic and lumbar spine were obtained.  Sagittal and reformatted images were performed.  No IV contrast was administered for the exam.     COMPARISON:  MRI of the thoracic spine, 11/13/2020, MRI of the lumbar spine, 11/13/2020, CT of the thoracic spine, 12/06/2018     FINDINGS:  Thoracic spine: Is multilevel thoracic spondylosis with no compression fracture or central canal stenosis.  Pedicular screws with connecting rods at T11 and T12 traverse the L1 and L2 strut is and fixation hardware.  Cervical spine osteophytes at C6-C7 demonstrates 6 central canal stenosis incompletely evaluated.     The surrounding soft tissues and chest appear unremarkable except for calcified nodule in the right upper lobe likely a granuloma or hamartoma.  Atherosclerotic disease in the aorta and coronary arteries are noted.     Lumbar spine: His is L2 strut and lateral plate bridging L1-L2 and L3 with pedicular screws and connecting rods noted.  Is rods traverse to S1 with pedicular screws in L5 and S1 on the left is as well as S2 as well as on the right.  Lucency surrounding the L5 pedicular screw on the is left suggests loosening.  No hardware disruption is evident.     No acute fracture is seen.  The central canal is difficult to evaluate due to beam hardening artifact.     Atherosclerotic disease in the aorta is noted.  The urinary bladder  is markedly distended.  Prominent waste material throughout the visualized colon incompletely evaluated.  No inflammatory change mass or fluid.  Impression: Struck traversing the L2 vertebral body with thoracic and lumbar fusion hardware is as described.     No evidence of acute thoracic or lumbar fracture is or dislocation.     Chronic features in the surrounding soft tissues.     Marked distention of the urinary bladder.  Correlate for neurogenic bladder versus bladder outlet obstruction.     Electronically signed by:         Denis Arambula  Date:                                        02/02/2024  Time:                                       00:10  CT Thoracic Spine Without Contrast  Narrative: EXAMINATION:  CT THORACIC SPINE WITHOUT CONTRAST; CT LUMBAR SPINE WITHOUT CONTRAST     CLINICAL HISTORY:  Mid-back pain;; Low back pain, trauma;     TECHNIQUE:  Axial CT images of the thoracic and lumbar spine were obtained.  Sagittal and reformatted images were performed.  No IV contrast was administered for the exam.     COMPARISON:  MRI of the thoracic spine, 11/13/2020, MRI of the lumbar spine, 11/13/2020, CT of the thoracic spine, 12/06/2018     FINDINGS:  Thoracic spine: Is multilevel thoracic spondylosis with no compression fracture or central canal stenosis.  Pedicular screws with connecting rods at T11 and T12 traverse the L1 and L2 strut is and fixation hardware.  Cervical spine osteophytes at C6-C7 demonstrates 6 central canal stenosis incompletely evaluated.     The surrounding soft tissues and chest appear unremarkable except for calcified nodule in the right upper lobe likely a granuloma or hamartoma.  Atherosclerotic disease in the aorta and coronary arteries are noted.     Lumbar spine: His is L2 strut and lateral plate bridging L1-L2 and L3 with pedicular screws and connecting rods noted.  Is rods traverse to S1 with pedicular screws in L5 and S1 on the left is as well as S2 as well as on the right.   Lucency surrounding the L5 pedicular screw on the is left suggests loosening.  No hardware disruption is evident.     No acute fracture is seen.  The central canal is difficult to evaluate due to beam hardening artifact.     Atherosclerotic disease in the aorta is noted.  The urinary bladder is markedly distended.  Prominent waste material throughout the visualized colon incompletely evaluated.  No inflammatory change mass or fluid.  Impression: Struck traversing the L2 vertebral body with thoracic and lumbar fusion hardware is as described.     No evidence of acute thoracic or lumbar fracture is or dislocation.     Chronic features in the surrounding soft tissues.     Marked distention of the urinary bladder.  Correlate for neurogenic bladder versus bladder outlet obstruction.     Electronically signed by:         Denis Arambula  Date:                                        02/02/2024  Time:                                       00:10        Data reviewed            274}  Previous medical records reviewed and summarized in HPI.        Health Maintenance Due   Topic Date Due    Mammogram  02/24/2023    Influenza Vaccine (1) 09/01/2023    RSV Vaccine (Age 60+ and Pregnant patients) (1 - 1-dose 60+ series) Never done            Laboratory                 274}  I have reviewed old labs below:        Lab Results   Component Value Date     WBC 8.51 02/01/2024     HGB 9.4 (L) 02/01/2024     HCT 29.8 (L) 02/01/2024     MCV 85 02/01/2024      02/01/2024      (L) 02/01/2024     K 3.6 02/01/2024      02/01/2024     CALCIUM 9.8 02/01/2024     PHOS 3.4 11/13/2018     CO2 25 02/01/2024      (H) 02/01/2024     BUN 14 02/01/2024     CREATININE 0.9 02/01/2024     ANIONGAP 9 02/01/2024     ESTGFRAFRICA >60.0 08/05/2020     EGFRNONAA >60.0 08/05/2020     PROT 7.6 02/01/2024     ALBUMIN 3.2 (L) 02/01/2024     BILITOT 0.7 02/01/2024     ALKPHOS 94 02/01/2024     ALT 27 02/01/2024     AST 27 02/01/2024     INR  "1.1 11/13/2018     CHOL 213 (H) 06/30/2023     TRIG 111 06/30/2023     HDL 42 06/30/2023     LDLCALC 148.8 06/30/2023     TSH 1.082 06/30/2023     HGBA1C 5.0 06/30/2023      Lab reviewed by me: Particular labs of significance that I will monitor, workup, or treat to improve are mentioned below in diagnostic impression remarks.  :[id number]}274}  Imaging/EKG: I have reviewed the pertinent results/findings and my personal findings are noted below in diagnostic impression remarks.  274}     Assessment/Plan  Genia Mcnulty is a 58 y.o. female who presents to clinic with:     1. Aortic atherosclerosis    2. Myelopathy    3. Pre-op exam    I recommend use of standard pre-op and post-op precautions for this patient.   In my opinion, based on this limited assessment, she is medically optimized for this procedure, and can proceed without further evaluation.      Diagnostic Impression Remarks + HPI      Documentation entered by me for this encounter may have been done in part using speech-recognition technology. Although I have made an effort to ensure accuracy, "sound like" errors may exist and should be interpreted in context.      Ms Mcnulty is a 57 yo female here for a preop surgical clearance for surgical revision of lumbar hardware that has broken, She had a spinal tumor removed in 1991 and has had multiple revisions since that time. She had been doing well until 01/2024 when she presented to ER with extreme pain. An Xray showed multiple breaks in the rods supporting her spine.      This is the extent of the patient's complaints at this present time. She denies chest pain upon exertion, dyspnea, nausea, vomiting, diaphoresis, and syncope. No pleuritic chest pain, unilateral leg swelling, calf tenderness, or calf pain.      Genia will return to clinic in a few months for further workup and reassessment or sooner as needed. She was instructed to call the clinic or go to the emergency department if her symptoms do not " "improve, worsens, or if new symptoms develop. As we discussed that symptoms could worsen over the next 24 hours she was advised that if any increased swelling, pain, or numbness arise to go immediately to the ED. Patient knows to call any time if an emergency arises. Shared decision making occurred and she verbalized understanding in agreement with this plan.      274}     BMI Goal     Counseled patient on her ideal body weight, health consequences of being obese and current recommendations including weekly exercise and a heart healthy diet.  She is aware that ideal BMI < 25  Estimated body mass index is 24.96 kg/m² as calculated from the following:    Height as of this encounter: 5' 5" (1.651 m).    Weight as of this encounter: 68 kg (150 lb).     She was counseled about the importance of healthy dietary habits as well as routine physical activity and exercise for better health outcomes. I also discussed the importance of cancer screening.      Medication Monitoring     In today's visit, monitoring for drug toxicity was accomplished. Proper use of medications was also discussed.      I discussed imaging findings, diagnosis, possibilities, treatment options, medications, risks, and benefits. She had many questions regarding the options and long-term effects. All questions were answered. She expressed understanding after counseling regarding the diagnosis and recommendations. She was capable and demonstrated competence with understanding of these options. Shared decision making was performed resulting in her choosing the current treatment plan. Patient handout was given with instructions and recommendations. Advised the patient that if they become pregnant to alert us immediately to assess for medication changes.      I also discussed the importance of close follow up to discuss labs, change or modify her medications if needed, monitor side effects, and further evaluation of medical problems.      Additional workup " planned: see labs ordered below.    See below for labs and meds ordered with associated diagnosis       1. Aortic atherosclerosis     2. Myelopathy     3. Pre-op exam  I recommend use of standard pre-op and post-op precautions for this patient.   In my opinion, based on this limited assessment, she is medically optimized for this procedure, and can proceed without further evaluation.

## 2024-04-09 NOTE — ANESTHESIA PROCEDURE NOTES
Intubation    Date/Time: 4/9/2024 12:54 PM    Performed by: Smooth Dickens CRNA  Authorized by: Hammad Harrison MD    Intubation:     Induction:  Intravenous    Intubated:  Postinduction    Mask Ventilation:  Easy with oral airway    Attempts:  1    Attempted By:  CRNA    Method of Intubation:  Video laryngoscopy    Blade:  Mejia 3    Laryngeal View Grade: Grade I - full view of cords      Difficult Airway Encountered?: No      Complications:  None    Airway Device:  Oral endotracheal tube    Airway Device Size:  7.0    Style/Cuff Inflation:  Cuffed (inflated to minimal occlusive pressure)    Tube secured:  21    Secured at:  The lips    Placement Verified By:  Capnometry    Complicating Factors:  None    Findings Post-Intubation:  BS equal bilateral

## 2024-04-09 NOTE — OP NOTE
Date of surgery 04/09/2024  Preoperative diagnosis spinal wound   Postoperative diagnosis is the same   Procedure performed  1. Bilateral paraspinous muscle flap closure of spinal wound  2. Advancement flap closure of spinal wound measuring 20 cm by 6 cm  Surgeon Michelle  Anesthesia general  Complications none  Blood loss minimal  Drains none     After completion of the spinal portion of the procedure I entered the room.  Evaluating the wound the patient's entire paraspinous muscle and surrounding tissues were covered in thick dense scar.  This is the patient's 4th or 5th spinal operation.  Things were rock solid).  Next, double hooks were then used in the paraspinous muscle was  from the overlying skin.  Should be noted that it was impossible to tell whether or not the latissimus dorsi muscle was merged in width the paraspinous muscle.  We did see evidence of surgery in the past with several sutures.  The dissection over the paraspinous muscle fascia was approximately 7-8 cm.  Fascia was then incised down to muscle.  Dissection proceeded underneath the paraspinous muscle thereby elevating the flap on its lumbar arterial and venous perforators.  An exact procedure was performed on the opposite side.  Flaps were then brought to the midline and closed using 0 PDS suture.  Next, in order to avoid using drains.  Veto powder was then used.  Next a running 2-0 Vicryl suture was used to tack down the lateral tissue on both sides they are flutter prevent seromas.  Next, the superficial fascia was sutured down to the deep fascia using interrupted 2-0 PDS sutures.  Because the skin was tight the advancement flaps were then brought to the midline and closed using interrupted 2-0 nylon vertical mattress sutures and several simple sutures.  There were no complications with this procedure

## 2024-04-09 NOTE — TRANSFER OF CARE
"Anesthesia Transfer of Care Note    Patient: Genia Mcnulty    Procedure(s) Performed: Procedure(s) (LRB):  CLOSURE, WOUND (N/A)  FUSION, SPINE (N/A)    Patient location: PACU    Anesthesia Type: general    Transport from OR: Transported from OR on 6-10 L/min O2 by face mask with adequate spontaneous ventilation    Post pain: adequate analgesia    Post assessment: no apparent anesthetic complications    Post vital signs: stable    Level of consciousness: responds to stimulation    Nausea/Vomiting: no nausea/vomiting    Complications: none    Transfer of care protocol was followed      Last vitals: Visit Vitals  BP (!) 156/69 (BP Location: Left arm, Patient Position: Lying)   Pulse 78   Temp 36.4 °C (97.5 °F) (Temporal)   Resp 12   Ht 5' 5" (1.651 m)   Wt 69.4 kg (153 lb)   LMP 08/02/2018   SpO2 100%   Breastfeeding No   BMI 25.46 kg/m²     "

## 2024-04-10 PROBLEM — D50.0 CHRONIC BLOOD LOSS ANEMIA: Status: ACTIVE | Noted: 2024-04-10

## 2024-04-10 PROBLEM — R79.89 ELEVATED LFTS: Status: ACTIVE | Noted: 2024-04-10

## 2024-04-10 LAB
ALBUMIN SERPL BCP-MCNC: 2.9 G/DL (ref 3.5–5.2)
ALP SERPL-CCNC: 107 U/L (ref 55–135)
ALT SERPL W/O P-5'-P-CCNC: 103 U/L (ref 10–44)
ANION GAP SERPL CALC-SCNC: 7 MMOL/L (ref 8–16)
AST SERPL-CCNC: 94 U/L (ref 10–40)
BILIRUB SERPL-MCNC: 0.3 MG/DL (ref 0.1–1)
BUN SERPL-MCNC: 15 MG/DL (ref 6–20)
CALCIUM SERPL-MCNC: 8.6 MG/DL (ref 8.7–10.5)
CHLORIDE SERPL-SCNC: 107 MMOL/L (ref 95–110)
CO2 SERPL-SCNC: 21 MMOL/L (ref 23–29)
CREAT SERPL-MCNC: 0.7 MG/DL (ref 0.5–1.4)
ERYTHROCYTE [DISTWIDTH] IN BLOOD BY AUTOMATED COUNT: 16.4 % (ref 11.5–14.5)
EST. GFR  (NO RACE VARIABLE): >60 ML/MIN/1.73 M^2
GLUCOSE SERPL-MCNC: 114 MG/DL (ref 70–110)
HCT VFR BLD AUTO: 28.4 % (ref 37–48.5)
HGB BLD-MCNC: 8.7 G/DL (ref 12–16)
MCH RBC QN AUTO: 26.8 PG (ref 27–31)
MCHC RBC AUTO-ENTMCNC: 30.6 G/DL (ref 32–36)
MCV RBC AUTO: 87 FL (ref 82–98)
PLATELET # BLD AUTO: 232 K/UL (ref 150–450)
PMV BLD AUTO: 10.9 FL (ref 9.2–12.9)
POTASSIUM SERPL-SCNC: 4.2 MMOL/L (ref 3.5–5.1)
PROT SERPL-MCNC: 5.8 G/DL (ref 6–8.4)
RBC # BLD AUTO: 3.25 M/UL (ref 4–5.4)
SODIUM SERPL-SCNC: 135 MMOL/L (ref 136–145)
WBC # BLD AUTO: 5.83 K/UL (ref 3.9–12.7)

## 2024-04-10 PROCEDURE — 11000001 HC ACUTE MED/SURG PRIVATE ROOM

## 2024-04-10 PROCEDURE — 97165 OT EVAL LOW COMPLEX 30 MIN: CPT

## 2024-04-10 PROCEDURE — 63600175 PHARM REV CODE 636 W HCPCS

## 2024-04-10 PROCEDURE — 97116 GAIT TRAINING THERAPY: CPT

## 2024-04-10 PROCEDURE — 80053 COMPREHEN METABOLIC PANEL: CPT

## 2024-04-10 PROCEDURE — 97535 SELF CARE MNGMENT TRAINING: CPT

## 2024-04-10 PROCEDURE — 36415 COLL VENOUS BLD VENIPUNCTURE: CPT

## 2024-04-10 PROCEDURE — 25000003 PHARM REV CODE 250

## 2024-04-10 PROCEDURE — 97530 THERAPEUTIC ACTIVITIES: CPT

## 2024-04-10 PROCEDURE — 85027 COMPLETE CBC AUTOMATED: CPT

## 2024-04-10 PROCEDURE — 97161 PT EVAL LOW COMPLEX 20 MIN: CPT

## 2024-04-10 PROCEDURE — 97112 NEUROMUSCULAR REEDUCATION: CPT

## 2024-04-10 RX ADMIN — MUPIROCIN: 20 OINTMENT TOPICAL at 10:04

## 2024-04-10 RX ADMIN — CHOLECALCIFEROL TAB 25 MCG (1000 UNIT) 2000 UNITS: 25 TAB at 08:04

## 2024-04-10 RX ADMIN — OXYCODONE HYDROCHLORIDE 10 MG: 10 TABLET ORAL at 09:04

## 2024-04-10 RX ADMIN — FAMOTIDINE 20 MG: 20 TABLET ORAL at 09:04

## 2024-04-10 RX ADMIN — METHOCARBAMOL 750 MG: 750 TABLET ORAL at 03:04

## 2024-04-10 RX ADMIN — METHOCARBAMOL 750 MG: 750 TABLET ORAL at 09:04

## 2024-04-10 RX ADMIN — GABAPENTIN 300 MG: 300 CAPSULE ORAL at 09:04

## 2024-04-10 RX ADMIN — ACETAMINOPHEN 1000 MG: 500 TABLET ORAL at 09:04

## 2024-04-10 RX ADMIN — OXYCODONE HYDROCHLORIDE 10 MG: 10 TABLET ORAL at 01:04

## 2024-04-10 RX ADMIN — CEFAZOLIN 2 G: 2 INJECTION, POWDER, FOR SOLUTION INTRAMUSCULAR; INTRAVENOUS at 05:04

## 2024-04-10 RX ADMIN — THERA TABS 1 TABLET: TAB at 08:04

## 2024-04-10 RX ADMIN — AMLODIPINE BESYLATE 5 MG: 5 TABLET ORAL at 08:04

## 2024-04-10 RX ADMIN — GABAPENTIN 300 MG: 300 CAPSULE ORAL at 03:04

## 2024-04-10 RX ADMIN — ACETAMINOPHEN 1000 MG: 500 TABLET ORAL at 08:04

## 2024-04-10 RX ADMIN — MUPIROCIN: 20 OINTMENT TOPICAL at 09:04

## 2024-04-10 RX ADMIN — GABAPENTIN 300 MG: 300 CAPSULE ORAL at 08:04

## 2024-04-10 RX ADMIN — DOCUSATE SODIUM 100 MG: 100 CAPSULE, LIQUID FILLED ORAL at 09:04

## 2024-04-10 RX ADMIN — HEPARIN SODIUM 5000 UNITS: 5000 INJECTION INTRAVENOUS; SUBCUTANEOUS at 10:04

## 2024-04-10 RX ADMIN — METHOCARBAMOL 750 MG: 750 TABLET ORAL at 08:04

## 2024-04-10 RX ADMIN — SODIUM CHLORIDE: 9 INJECTION, SOLUTION INTRAVENOUS at 02:04

## 2024-04-10 RX ADMIN — OXYCODONE HYDROCHLORIDE 10 MG: 10 TABLET ORAL at 04:04

## 2024-04-10 RX ADMIN — LISINOPRIL 40 MG: 20 TABLET ORAL at 09:04

## 2024-04-10 RX ADMIN — HEPARIN SODIUM 5000 UNITS: 5000 INJECTION INTRAVENOUS; SUBCUTANEOUS at 03:04

## 2024-04-10 RX ADMIN — OXYCODONE HYDROCHLORIDE 10 MG: 10 TABLET ORAL at 06:04

## 2024-04-10 RX ADMIN — ACETAMINOPHEN 1000 MG: 500 TABLET ORAL at 03:04

## 2024-04-10 RX ADMIN — HEPARIN SODIUM 5000 UNITS: 5000 INJECTION INTRAVENOUS; SUBCUTANEOUS at 05:04

## 2024-04-10 RX ADMIN — Medication 250 MG: at 08:04

## 2024-04-10 RX ADMIN — OXYCODONE 5 MG: 5 TABLET ORAL at 12:04

## 2024-04-10 RX ADMIN — SODIUM CHLORIDE: 9 INJECTION, SOLUTION INTRAVENOUS at 11:04

## 2024-04-10 RX ADMIN — FAMOTIDINE 20 MG: 20 TABLET ORAL at 08:04

## 2024-04-10 NOTE — PT/OT/SLP EVAL
Occupational Therapy   Co-Evaluation  Co-evaluation/treatment performed due to patient's multiple deficits requiring two skilled therapists to appropriately and safely assess patient's strength and endurance while facilitating functional tasks in addition to accommodating for patient's activity tolerance.        Name: Genia Mcnulty  MRN: [id number]  Admitting Diagnosis: Pseudarthrosis after fusion or arthrodesis  Recent Surgery: Procedure(s) (LRB):  CLOSURE, WOUND (N/A)  FUSION, SPINE (N/A) 1 Day Post-Op    Recommendations:     Discharge Recommendations: Low Intensity Therapy  Discharge Equipment Recommendations:  none  Barriers to discharge:  None    Assessment:     Genia Mcnulty is a 58 y.o. female with a medical diagnosis of Pseudarthrosis after fusion or arthrodesis.  She presents with the following performance deficits affecting function: weakness, impaired endurance, impaired self care skills, impaired functional mobility, impaired balance, decreased lower extremity function, pain, orthopedic precautions. Pt agreeable to therapy and tolerated fairly well. However, pt remains limited in ADLs, functional mobility, and functional transfers and is currently not performing tasks at [facility]. Pt would continue to benefit from skilled OT services to maximize functional independence with ADLs and functional mobility, reduce caregiver burden, and facilitate safe discharge in the least restrictive environment.    Rehab Prognosis: Good; patient would benefit from acute skilled OT services to address these deficits and reach maximum level of function.       Plan:     Patient to be seen 4 x/week to address the above listed problems via self-care/home management, therapeutic activities, therapeutic exercises, neuromuscular re-education  Plan of Care Expires: 05/10/24  Plan of Care Reviewed with: patient    Subjective     Chief Complaint: hip/leg pain   Patient/Family Comments/goals: return to [facility]    Occupational  Profile:  Living Environment: pt lives with her  & daughter in a H with 1 NOLBERTO. 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  Patient found HOB elevated with peripheral IV upon OT entry to room.    General Precautions: Standard, fall  Orthopedic Precautions: spinal precautions  Braces: N/A (KAFO)  Respiratory Status: Room air    Occupational Performance:    Bed Mobility:    Patient completed Rolling to Left with  contact guard assistance  Pt required verbal cues to correct utilize log roll technique  Patient completed Left sidelying to Sit with contact guard assistance    Functional Mobility/Transfers:  Patient completed Sit <> Stand Transfer with minimum assistance  with  rolling walker   Pt reported some dizziness at the initiation from sit<->stand  Patient completed Chair Transfer Step Transfer technique with contact guard assistance with  rolling walker    Functional Mobility: pt ambulated from EOB->bathroom sink->chair using rolling walker  No LOB/SOB noted    Activities of Daily Living:  Grooming: stand by assistance to complete oral hygiene while standing at bathroom sink  Upper Body Dressing: minimum assistance to don gown over back  Lower Body Dressing: maximal assistance to don shoes & KAFO  Total assistance to don/dof socks  LBD tasks were all completed with pt  seated EOB    Cognitive/Visual Perceptual:  Cognitive/Psychosocial Skills:     -       Follows Commands/attention:Follows multistep  commands    Physical Exam:  Upper Extremity Range of Motion:     -       Right Upper Extremity: WFL  -       Left Upper Extremity: WFL  Upper Extremity Strength:    -       Right Upper Extremity: WFL  -       Left Upper Extremity: WFL   Strength: -       Right Upper Extremity: WFL  -       Left Upper Extremity: WFL  Fine Motor Coordination:    -       Intact  Left hand thumb/finger opposition skills and Right hand thumb/finger opposition skills  Gross motor coordination:   WFL    AMPAC 6 Click ADL:  AMPAC Total Score: 18    Treatment & Education:  -Pt educated on hand/RW placement for functional transfers/mobility  -Pt educated on proper foot wear s/p surgery  -Pt educated on weightbearing and spinal precautions with pt verbalizing understanding  -Education on task modification to maximize safety and (I) during ADLs and mobility  -Education on importance of OOB activity to improve overall activity tolerance and promote recovery  -Pt educated to call for assistance and to transfer with hospital staff only  -Provided education regarding role of OT & POC with pt  verbalizing understanding. Pt had no further questions & when asked whether there were any concerns pt reported none.     Patient left up in chair with all lines intact, call button in reach, and RN notified    GOALS:   Multidisciplinary Problems       Occupational Therapy Goals          Problem: Occupational Therapy    Goal Priority Disciplines Outcome Interventions   Occupational Therapy Goal     OT, PT/OT Ongoing, Progressing    Description: Goals to be met by: 05/10/2024     Patient will increase functional independence with ADLs by performing:    UE Dressing with Modified [place].  LE Dressing with Minimal Assistance.  Grooming while standing at sink with Modified [place].  Toileting from toilet with  Supervision for hygiene and clothing management.   Rolling to Right, Left with Modified Castro.   Side lying to sit with Modified Castro.  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 OR [address] [place] [address];  Service: Plastics;  Laterality: N/A;    ENDOSCOPIC CARPAL TUNNEL RELEASE Right 8/25/2022    Procedure: RELEASE, CARPAL TUNNEL, ENDOSCOPIC - RIGHT;  Surgeon: Barron Rosen MD;  Location: [facility];  Service: Orthopedics;  Laterality: Right;    EPIDURAL STEROID INJECTION N/A 8/2/2023    Procedure: Caudal Epidural Steroid Injection (ref: Celestrkurt);  Surgeon: Elijah Aquino Jr., MD;  [facility];  Service: Pain Management;  Laterality: N/A;  @1330  No ATC or DM  Needs Consent    ESOPHAGOGASTRODUODENOSCOPY N/A 8/18/2022    Procedure: EGD (ESOPHAGOGASTRODUODENOSCOPY);  Surgeon: Bernadette Bergeron MD;  Location: [facility];  Service: Endoscopy;  Laterality: N/A;    SPINAL FUSION N/A 4/9/2024    Procedure: FUSION, SPINE;  Surgeon: Rocky Herrmann MD;  Location: [address];  Service: Orthopedics;  Laterality: N/A;  L2-pelvis       Time Tracking:     OT Date of Treatment: 04/10/24  OT Start Time: 0858  OT Stop Time: 0933  OT Total Time (min): 35 min    Billable Minutes:Evaluation 10  Self Care/Home Management 15  Therapeutic Activity 10    4/10/2024

## 2024-04-10 NOTE — PLAN OF CARE
Problem: Adult Inpatient Plan of Care  Goal: Plan of Care Review  Outcome: Ongoing, Progressing  Goal: Patient-Specific Goal (Individualized)  Outcome: Ongoing, Progressing  Goal: Absence of Hospital-Acquired Illness or Injury  Outcome: Ongoing, Progressing  Goal: Optimal Comfort and Wellbeing  Outcome: Ongoing, Progressing  Goal: Readiness for Transition of Care  Outcome: Ongoing, Progressing     Problem: Infection  Goal: Absence of Infection Signs and Symptoms  Outcome: Ongoing, Progressing     Problem: Bleeding (Spinal Surgery)  Goal: Absence of Bleeding  Outcome: Ongoing, Progressing     Problem: Bowel Motility Impaired (Spinal Surgery)  Goal: Effective Bowel Elimination  Outcome: Ongoing, Progressing     Problem: Fluid and Electrolyte Imbalance (Spinal Surgery)  Goal: Fluid and Electrolyte Balance  Outcome: Ongoing, Progressing     Problem: Functional Ability Impaired (Spinal Surgery)  Goal: Optimal Functional Ability  Outcome: Ongoing, Progressing     Problem: Infection (Spinal Surgery)  Goal: Absence of Infection Signs and Symptoms  Outcome: Ongoing, Progressing     Problem: Neurologic Impairment (Spinal Surgery)  Goal: Optimal Neurologic Function  Outcome: Ongoing, Progressing     Problem: Ongoing Anesthesia Effects (Spinal Surgery)  Goal: Anesthesia/Sedation Recovery  Outcome: Ongoing, Progressing     Problem: Pain (Spinal Surgery)  Goal: Acceptable Pain Control  Outcome: Ongoing, Progressing     Problem: Postoperative Nausea and Vomiting (Spinal Surgery)  Goal: Nausea and Vomiting Relief  Outcome: Ongoing, Progressing     Problem: Postoperative Urinary Retention (Spinal Surgery)  Goal: Effective Urinary Elimination  Outcome: Ongoing, Progressing     Problem: Respiratory Compromise (Spinal Surgery)  Goal: Effective Oxygenation and Ventilation  Outcome: Ongoing, Progressing     Problem: Pain Acute  Goal: Acceptable Pain Control and Functional Ability  Outcome: Ongoing, Progressing     Problem: Fall Injury  Risk  Goal: Absence of Fall and Fall-Related Injury  Outcome: Ongoing, Progressing

## 2024-04-10 NOTE — HPI
58 year old female s/p spinal fusion and bilateral paraspinous muscle flap closure of spinal wound.

## 2024-04-10 NOTE — PLAN OF CARE
[facility] - [facility]  Initial Discharge Assessment       Primary Care Provider: Charla Meadows MD    Admission Diagnosis: S/P spinal surgery [Z98.890]  Pseudarthrosis after fusion or arthrodesis [M96.0]    Admission Date: 4/9/2024  Expected Discharge Date: 4/11/2024    Transition of Care Barriers: None    Payor: BLUE CROSS BLUE [facility] / Plan: BCBS ALL OUT OF STATE / Product Type: PPO /     Extended Emergency Contact Information  Primary Emergency Contact: Michel Davis  Address: [address] [place] [address]           [place] [address] [facility]  Mobile Phone: [phone]  Relation: Spouse  Preferred language: English   needed? No    Discharge Plan A: Home Health  Discharge Plan B: Home with family      [id number] [place] [place] - [address] AT [facility]  [address]  [place] [address]  Phone: [phone] Fax: [phone]    [facility]  [address]  [facility] [address]  Phone: [phone] Fax: [phone]    [id number] - [place] - [address] AT [facility]  [address] [facility] [address]  Phone: [phone] Fax: [phone]      Initial Assessment (most recent)       Adult Discharge Assessment - 04/10/24 1118          Discharge Assessment    Assessment Type Discharge Planning Assessment     Confirmed/corrected address, phone number and insurance Yes     Confirmed Demographics Correct on Facesheet     Source of Information patient   spouse    Communicated ANDI with patient/caregiver Yes     People in Home spouse;child(tad), adult     Do you expect to return to your current living situation? Yes     Do you have help at home or someone to help you manage your care at home? Yes     Who are your caregiver(s) and their phone number(s)? spouse     Prior to hospitilization cognitive status: Alert/Oriented     Current cognitive status: Alert/Oriented     Home Layout Able to live on 1st  floor     Equipment Currently Used at Home wheelchair;walker, rolling;blood pressure machine     Do you currently have service(s) that help you manage your care at home? No     Do you take prescription medications? Yes     Do you have prescription coverage? Yes     Do you have any problems affording any of your prescribed medications? No     Is the patient taking medications as prescribed? yes     How do you get to doctors appointments? family or friend will provide     Are you on dialysis? No     Do you take coumadin? No     Discharge Plan A Home Health;Home with family     Discharge Plan B Home with family     DME Needed Upon Discharge  none     Discharge Plan discussed with: Patient;Spouse/sig other     Transition of Care Barriers None                   Patient lives with spouse and 28y/o daughter in a single story home with one small entry step - has entry ramp in [facility]. Patient agrees with therapy recommendations for  services. Patient does not have any preference for HH company. Spouse will provide care and transportation home.

## 2024-04-10 NOTE — PT/OT/SLP EVAL
Physical Therapy Co-Evaluation and Treatment    OT present for coeval due to pt's multiple medical comorbidities and functional/cognition deficits requiring two skilled therapists to appropriately progress pt's musculoskeletal strength, neuromuscular control, and endurance while taking into consideration medical acuity and pt safety.    Patient Name: Genia Mcnulty   MRN: [id number]  Recent Surgery: Procedure(s) (LRB):  CLOSURE, WOUND (N/A)  FUSION, SPINE (N/A) 1 Day Post-Op    Recommendations:     Discharge Recommendations: Low Intensity Therapy   Discharge Equipment Recommendations: none   Barriers to discharge: None    Assessment:     Genia Mcnulty is a 58 y.o. female admitted with a medical diagnosis of Pseudarthrosis after fusion or arthrodesis. She presents with the following impairments/functional limitations: weakness, impaired endurance, impaired sensation, impaired functional mobility, gait instability, impaired balance, decreased lower extremity function, orthopedic precautions, impaired self care skills, impaired coordination.     Pt receptive and tolerated PT co-eval with OT well. Pt educated on spinal precautions prior to start of treatment with pt verbalizing understanding. Pt donned person ERIN GOMEZ sitting EOB. Pt amb a total of 20 ft in the room with RW and CGA this session. Patient currently demonstrates a need for low intensity therapy on a scheduled basis secondary to a decline in functional status due to surgical procedure    Rehab Prognosis: Good; patient would benefit from acute PT services to address these deficits and reach maximum level of function.    Plan:     During this hospitalization, patient to be seen 4 x/week to address the above listed problems via gait training, therapeutic activities, therapeutic exercises, neuromuscular re-education    Plan of Care Expires: 05/10/24    Subjective     Chief Complaint: R hip and leg pain  Patient Comments/Goals: Pt agreeable to  PT  Pain/Comfort:  Pain Rating 1: 3/10  Location - Side 1: Right  Location - Orientation 1: generalized  Location 1:  (hip/leg)  Pain Addressed 1: Reposition, Distraction  Pain Rating Post-Intervention 1: 3/10    Patient History:     Living Environment: Pt lives with spouse and daughter in [facility] with a threshold to enter NOLBERTO. Bathroom: walk-in shower with built in bench   Prior Level of Function: Mod I using RW and occasional use of w/c due to increased back pain recently   DME owned: grab bars, w/c, RW  Caregiver Assistance: family    Objective:     Communicated with RN prior to session. Patient found HOB elevated with peripheral IV upon PT entry to room.    General Precautions: Standard, fall   Orthopedic Precautions: spinal precautions   Braces: N/A    Respiratory Status: Room air    Exams:  RLE ROM: WFL  RLE Strength:  grossly 1/5  LLE ROM: WFL  LLE Strength: WFL  Cognitive: Patient is oriented to Person, Place, Time, Situation  Sensation:    -       Impaired  Pt reports decreased sensation to RLE compared to LLE     Functional Mobility:  Gait belt applied - Yes  Bed Mobility  Rolling Left: minimum assistance  Scooting: minimum assistance  Supine to Sit: minimum assistance for log rolling technique  Pt donned RLE KAFO sitting EOB    Transfers  Sit to Stand: minimum assistance with rolling walker and with cues for hand placement    Gait  Patient ambulated 10 ft x2 with rolling walker and contact guard assistance.     Balance  Sitting: FAIR+: Maintains balance through MINIMAL excursions of active trunk motion  Standing: FAIR: Needs CONTACT GUARD during gait  Pt stood with SBA at sink to complete ADLs      Therapeutic Activities and Exercises:  Patient educated on role of acute care PT and PT POC, safety while in hospital including calling nurse for mobility, and call light usage.  Educated on spinal precautions including avoidance of bending, lifting, and twisting. Patient expressed understanding. Educated on log  roll technique, performed to left.  Educated about importance of OOB mobility and remaining up in chair most of the day.  All questions answered within the scope of PT.  White board updated accordingly.      AM-PAC 6 CLICK MOBILITY  Total Score:18    Patient left up in chair with all lines intact and call button in reach.    GOALS:   Multidisciplinary Problems       Physical Therapy Goals          Problem: Physical Therapy    Goal Priority Disciplines Outcome Goal Variances Interventions   Physical Therapy Goal     PT, PT/OT Ongoing, Progressing     Description: Goals to be met by: 5/10/24     Patient will increase functional independence with mobility by performin. Supine to sit with Set-up [place]  2. Rolling to Left and Right with Set-up Assistance.  3. Sit to stand transfer with Modified [place]  4. Bed to chair transfer with Modified [place] using Rolling Walker  5. Gait  x 150 feet with Modified [place] using Rolling Walker.   6. Stand for 5 minutes with Modified [place] using Rolling Walker                         History:     Past Medical History:   Diagnosis Date    Cancer     tumor on back, was removed    Encounter for blood transfusion     Ependymoma of spinal cord 2012    Hypertension        Past Surgical History:   Procedure Laterality Date    BACK SURGERY      x 5    CHOLECYSTECTOMY      CLOSURE OF WOUND N/A 2024    Procedure: CLOSURE, WOUND;  Surgeon: Rommel Cox MD;  Location: [address];  Service: Plastics;  Laterality: N/A;    CREATION OF MUSCLE ROTATIONAL FLAP N/A 10/2/2018    Procedure: CREATION, FLAP, MUSCLE ROTATION;  Surgeon: Rommel Cox MD;  Location: [address];  Service: Plastics;  Laterality: N/A;    ENDOSCOPIC CARPAL TUNNEL RELEASE Right 2022    Procedure: RELEASE, CARPAL TUNNEL, ENDOSCOPIC - RIGHT;  Surgeon: Barron Rosen MD;  [facility];  Service: Orthopedics;  Laterality: Right;    EPIDURAL  STEROID INJECTION N/A 8/2/2023    Procedure: Caudal Epidural Steroid Injection (ref: Celestre);  Surgeon: Elijah Aquino Jr., MD;  Location: [facility];  Service: Pain Management;  Laterality: N/A;  @1330  No ATC or DM  Needs Consent    ESOPHAGOGASTRODUODENOSCOPY N/A 8/18/2022    Procedure: EGD (ESOPHAGOGASTRODUODENOSCOPY);  Surgeon: Bernadette Bergeron MD;  Location: [facility];  Service: Endoscopy;  Laterality: N/A;    SPINAL FUSION N/A 4/9/2024    Procedure: FUSION, SPINE;  Surgeon: Rocky Herrmann MD;  Location: [facility] OR [address] [place] [address];  Service: Orthopedics;  Laterality: N/A;  L2-pelvis       Time Tracking:     PT Received On: 04/10/24  PT Start Time: 0858  PT Stop Time: 0933  PT Total Time (min): 35 min     Billable Minutes: Evaluation 10, Gait Training 15, and Neuromuscular Re-education 10    4/10/2024

## 2024-04-10 NOTE — CARE UPDATE
I have reviewed the chart of Genia Mcnulty and collaborated with Rocky Herrmann MD in the care of the patient who is hospitalized for the following:    Active Hospital Problems    Diagnosis    *Pseudarthrosis after fusion or arthrodesis    Chronic blood loss anemia    Elevated LFTs     monitor      Essential hypertension    Hyponatremia     Monitor labs            I have reviewed Genia Mcnulty with the multidisciplinary team during discharge huddle.       Genia Davis PA-C  Unit [facility]

## 2024-04-10 NOTE — ASSESSMENT & PLAN NOTE
58 year old female s/p spinal fusion and bilateral paraspinous muscle flap closure of spinal wound.     -- MM Pain control  -- Dressing changes daily  -- If multiple dressing changes occur daily, please reach out to plastics   -- Rest of care per primary

## 2024-04-10 NOTE — NURSING TRANSFER
Nursing Transfer Note      4/9/2024   8:15 PM    Transfer To: Room 530    Transfer via bed    Transported by hospital transport    Order for Tele Monitor? No    Additional Lines: Carpio Catheter    4eyes on Skin: yes    Medicines sent: IVF    Any special needs or follow-up needed: None    Patient belongings transferred with patient: No    Chart send with patient: Yes    Notified: spouse    Patient reassessed at: 4/9/2024  20:10 (date, time)

## 2024-04-10 NOTE — SUBJECTIVE & OBJECTIVE
"Principal Problem:Pseudarthrosis after fusion or arthrodesis    Principal Orthopedic Problem: same, s/p revision PSF    Interval History: Pt seen and examined at bedside. MANOJ, VSS, AF. Pain controlled. Denies fevers, chills, chest pain, SOB, N/V/D.   Carpio removed, plan to work with PT today. Pain improved.     Review of patient's allergies indicates:   Allergen Reactions    Hydrochlorothiazide Other (See Comments)     Muscle pain    Losartan Other (See Comments)     Elevated b/p with anxiety    Promethazine Other (See Comments)     Other reaction(s): agitation  Other reaction(s): Hives       Current Facility-Administered Medications   Medication    0.9%  NaCl infusion    acetaminophen tablet 1,000 mg    amLODIPine tablet 5 mg    ascorbic acid (vitamin C) tablet 250 mg    bisacodyL suppository 10 mg    celecoxib capsule 200 mg    docusate sodium capsule 100 mg    famotidine tablet 20 mg    gabapentin capsule 300 mg    heparin (porcine) injection 5,000 Units    lisinopriL tablet 40 mg    loperamide capsule 2 mg    melatonin tablet 6 mg    methocarbamoL tablet 750 mg    morphine injection 2 mg    multivitamin tablet    mupirocin 2 % ointment    ondansetron disintegrating tablet 8 mg    oxyCODONE immediate release tablet 5 mg    oxyCODONE immediate release tablet Tab 10 mg    polyethylene glycol packet 17 g    sodium chloride 0.9% flush 10 mL    vitamin D 1000 units tablet 2,000 Units     Objective:     Vital Signs (Most Recent):  Temp: 98 °F (36.7 °C) (04/10/24 0748)  Pulse: 66 (04/10/24 0748)  Resp: 18 (04/10/24 0748)  BP: (!) 112/53 (04/10/24 0748)  SpO2: 96 % (04/10/24 0748) Vital Signs (24h Range):  Temp:  [97.5 °F (36.4 °C)-98.9 °F (37.2 °C)] 98 °F (36.7 °C)  Pulse:  [60-81] 66  Resp:  [10-21] 18  SpO2:  [95 %-100 %] 96 %  BP: (104-184)/(52-83) 112/53     Weight: 69.4 kg (153 lb)  Height: 5' 5" (165.1 cm)  Body mass index is 25.46 kg/m².      Intake/Output Summary (Last 24 hours) at 4/10/2024 0855  Last data " filed at 4/10/2024 0600  Gross per 24 hour   Intake 2517.65 ml   Output 3175 ml   Net -657.35 ml        Ortho/SPM Exam     Gen: NAD, WDWN  CV: peripherally well perfused  Resp: unlabored respirations, symmetric chest rise  Neck: TM  Neuro: CN 2-12 grossly intact. No FND.      MSK/Lumbar Spine Exam  Bilateral Lower extremities:  EHL/FHL/TA/Gastroc intact with exception of decreased R ankle DF/PF (baseline)  SILT with exception of decreased sensation to right tibia (baseline)  2+DP  Dressings saturated at inferior aspect    Significant Labs: CBC:   Recent Labs   Lab 04/10/24  0439   WBC 5.83   HGB 8.7*   HCT 28.4*        CMP:   Recent Labs   Lab 04/10/24  0440   *   K 4.2      CO2 21*   *   BUN 15   CREATININE 0.7   CALCIUM 8.6*   PROT 5.8*   ALBUMIN 2.9*   BILITOT 0.3   ALKPHOS 107   AST 94*   *   ANIONGAP 7*     All pertinent labs within the past 24 hours have been reviewed.    Significant Imaging: I have reviewed all pertinent imaging results/findings.

## 2024-04-10 NOTE — PLAN OF CARE
Problem: Occupational Therapy  Goal: Occupational Therapy Goal  Description: Goals to be met by: 05/10/2024     Patient will increase functional independence with ADLs by performing:    UE Dressing with Modified [place].  LE Dressing with Minimal Assistance.  Grooming while standing at sink with Modified [place].  Toileting from toilet with Supervision for hygiene and clothing management.   Rolling to Right, Left with Modified [place].   Side lying to sit with Modified [place].  Step transfer with Stand-by Assistance    Outcome: Ongoing, Progressing       OT eval complete & goals established.

## 2024-04-10 NOTE — SUBJECTIVE & OBJECTIVE
Interval History: AFVSS. NAOE. Pain controlled. Working with PT. Dressing with moderate saturation this AM.     Medications:  Continuous Infusions:   sodium chloride 0.9% 100 mL/hr at 04/10/24 0201    loperamide       Scheduled Meds:   acetaminophen  1,000 mg Oral TID    amLODIPine  5 mg Oral Daily    ascorbic acid (vitamin C)  250 mg Oral Daily    celecoxib  200 mg Oral Daily    docusate sodium  100 mg Oral BID    famotidine  20 mg Oral BID    gabapentin  300 mg Oral TID    heparin (porcine)  5,000 Units Subcutaneous Q8H    lisinopriL  40 mg Oral QHS    methocarbamoL  750 mg Oral TID    multivitamin  1 tablet Oral Daily    mupirocin   Nasal BID    polyethylene glycol  17 g Oral Daily    vitamin D  2,000 Units Oral Daily     PRN Meds:bisacodyL, loperamide, melatonin, morphine, ondansetron, oxyCODONE, oxyCODONE, sodium chloride 0.9%     Review of patient's allergies indicates:   Allergen Reactions    Hydrochlorothiazide Other (See Comments)     Muscle pain    Losartan Other (See Comments)     Elevated b/p with anxiety    Promethazine Other (See Comments)     Other reaction(s): agitation  Other reaction(s): Hives     Objective:     Vital Signs (Most Recent):  Temp: 98 °F (36.7 °C) (04/10/24 0748)  Pulse: 66 (04/10/24 0748)  Resp: 18 (04/10/24 0748)  BP: (!) 112/53 (04/10/24 0748)  SpO2: 96 % (04/10/24 0748) Vital Signs (24h Range):  Temp:  [97.5 °F (36.4 °C)-98.9 °F (37.2 °C)] 98 °F (36.7 °C)  Pulse:  [60-81] 66  Resp:  [10-21] 18  SpO2:  [95 %-100 %] 96 %  BP: (104-160)/(52-70) 112/53     Weight: 69.4 kg (153 lb)  Body mass index is 25.46 kg/m².    Intake/Output - Last 3 Shifts         04/08 0700  04/09 0659 04/09 0700  04/10 0659 04/10 0700  04/11 0659    P.O.  360     I.V. (mL/kg)  456 (6.6)     IV Piggyback  1701.7     Total Intake(mL/kg)  2517.7 (36.3)     Urine (mL/kg/hr)  2925     Blood  250     Total Output  3175     Net  -657.4                     Physical Exam  Constitutional:       Appearance: Normal  appearance.   HENT:      Head: Normocephalic and atraumatic.   Cardiovascular:      Rate and Rhythm: Normal rate.   Pulmonary:      Effort: No respiratory distress.   Musculoskeletal:      Comments: [place] dressing and abd pads moderately saturated with serosanguinous fluid.    Neurological:      Mental Status: She is alert and oriented to person, place, and time.          Significant Labs:  I have reviewed all pertinent lab results within the past 24 hours.    Significant Diagnostics:  I have reviewed all pertinent imaging results/findings within the past 24 hours.

## 2024-04-10 NOTE — NURSING
Nurses Note -- 4 Eyes      4/10/2024   12:00 PM      Skin assessed during: Daily Assessment      [x] No Altered Skin Integrity Present    []Prevention Measures Documented      [] Yes- Altered Skin Integrity Present or Discovered   [] LDA Added if Not in Epic (Describe Wound)   [] New Altered Skin Integrity was Present on Admit and Documented in LDA   [] Wound Image Taken    Wound Care Consulted? No    Attending Nurse:  Amna MCKEON    Second RN/Staff Member:  OANH Rios

## 2024-04-10 NOTE — PROGRESS NOTES
[facility] - [facility]  Orthopedics  Progress Note    Patient Name: Genia Mcnulty  MRN: [id number]  Admission Date: 4/9/2024  Hospital Length of Stay: 1 days  Attending Provider: Rocky Hrermann MD  Primary Care Provider: Charla Meadows MD  Follow-up For: Procedure(s) (LRB):  CLOSURE, WOUND (N/A)  FUSION, SPINE (N/A)    Post-Operative Day: 1 Day Post-Op  Subjective:     Principal Problem:Pseudarthrosis after fusion or arthrodesis    Principal Orthopedic Problem: same, s/p revision PSF    Interval History: Pt seen and examined at bedside. NAEON, VSS, AF. Pain controlled. Denies fevers, chills, chest pain, SOB, N/V/D.   Carpio removed, plan to work with PT today. Pain improved.     Review of patient's allergies indicates:   Allergen Reactions    Hydrochlorothiazide Other (See Comments)     Muscle pain    Losartan Other (See Comments)     Elevated b/p with anxiety    Promethazine Other (See Comments)     Other reaction(s): agitation  Other reaction(s): Hives       Current Facility-Administered Medications   Medication    0.9%  NaCl infusion    acetaminophen tablet 1,000 mg    amLODIPine tablet 5 mg    ascorbic acid (vitamin C) tablet 250 mg    bisacodyL suppository 10 mg    celecoxib capsule 200 mg    docusate sodium capsule 100 mg    famotidine tablet 20 mg    gabapentin capsule 300 mg    heparin (porcine) injection 5,000 Units    lisinopriL tablet 40 mg    loperamide capsule 2 mg    melatonin tablet 6 mg    methocarbamoL tablet 750 mg    morphine injection 2 mg    multivitamin tablet    mupirocin 2 % ointment    ondansetron disintegrating tablet 8 mg    oxyCODONE immediate release tablet 5 mg    oxyCODONE immediate release tablet Tab 10 mg    polyethylene glycol packet 17 g    sodium chloride 0.9% flush 10 mL    vitamin D 1000 units tablet 2,000 Units     Objective:     Vital Signs (Most Recent):  Temp: 98 °F (36.7 °C) (04/10/24 0748)  Pulse: 66 (04/10/24 0748)  Resp: 18 (04/10/24 0748)  BP: (!) 112/53 (04/10/24  "0748)  SpO2: 96 % (04/10/24 0748) Vital Signs (24h Range):  Temp:  [97.5 °F (36.4 °C)-98.9 °F (37.2 °C)] 98 °F (36.7 °C)  Pulse:  [60-81] 66  Resp:  [10-21] 18  SpO2:  [95 %-100 %] 96 %  BP: (104-184)/(52-83) 112/53     Weight: 69.4 kg (153 lb)  Height: 5' 5" (165.1 cm)  Body mass index is 25.46 kg/m².      Intake/Output Summary (Last 24 hours) at 4/10/2024 0855  Last data filed at 4/10/2024 0600  Gross per 24 hour   Intake 2517.65 ml   Output 3175 ml   Net -657.35 ml        Ortho/SPM Exam     Gen: NAD, WDWN  CV: peripherally well perfused  Resp: unlabored respirations, symmetric chest rise  Neck: TM  Neuro: CN 2-12 grossly intact. No FND.      MSK/Lumbar Spine Exam  Bilateral Lower extremities:  EHL/FHL/TA/Gastroc intact with exception of decreased R ankle DF/PF (baseline)  SILT with exception of decreased sensation to right tibia (baseline)  2+DP  Dressings saturated at inferior aspect    Significant Labs: CBC:   Recent Labs   Lab 04/10/24  0439   WBC 5.83   HGB 8.7*   HCT 28.4*        CMP:   Recent Labs   Lab 04/10/24  0440   *   K 4.2      CO2 21*   *   BUN 15   CREATININE 0.7   CALCIUM 8.6*   PROT 5.8*   ALBUMIN 2.9*   BILITOT 0.3   ALKPHOS 107   AST 94*   *   ANIONGAP 7*     All pertinent labs within the past 24 hours have been reviewed.    Significant Imaging: I have reviewed all pertinent imaging results/findings.  Assessment/Plan:     * Pseudarthrosis after fusion or arthrodesis  Genia DesaiBlanc is a 58 y.o. female is s/p revision PSF on 4/9/24    Surgical dressing C/D/I  Pain control: multimodal  PT/OT: WBAT BLE, spine precautions  DVT PPx: SQH TID, SCDs at all times when not ambulating   Abx: postop Ancef x 24hrs   Carpio: Removed 4/10  Nursing: Incentive spirometry, Monitor and record drain output each shift     Dispo: plan to dc pending PT/OT        Essential hypertension  - Continue home meds          Palmer Dillon MD  Orthopedics  [facility] - Surgery    "

## 2024-04-10 NOTE — PROGRESS NOTES
VSS. IVF inf R hand. L hand SL intact. SCD on. Balaji dc'd @ 0600. Pure wick in place. R LE very weak. Yan Oxy 10mg for pain. Received a dose of Morphine X1 for breakthrough pain last night. Large back drsg intact--drsg was saturated when returned from surgery-- reinforced with ABD X2. Turns self in bed. Slept some. Pleasant.

## 2024-04-10 NOTE — PROGRESS NOTES
[facility]  Plastic Surgery  Progress Note    Subjective:     History of Present Illness:  58 year old female s/p spinal fusion and bilateral paraspinous muscle flap closure of spinal wound.     Post-Op Info:  Procedure(s) (LRB):  CLOSURE, WOUND (N/A)  FUSION, SPINE (N/A)   1 Day Post-Op     Interval History: AFVSS. NAOE. Pain controlled. Working with PT. Dressing with moderate saturation this AM.     Medications:  Continuous Infusions:   sodium chloride 0.9% 100 mL/hr at 04/10/24 0201    loperamide       Scheduled Meds:   acetaminophen  1,000 mg Oral TID    amLODIPine  5 mg Oral Daily    ascorbic acid (vitamin C)  250 mg Oral Daily    celecoxib  200 mg Oral Daily    docusate sodium  100 mg Oral BID    famotidine  20 mg Oral BID    gabapentin  300 mg Oral TID    heparin (porcine)  5,000 Units Subcutaneous Q8H    lisinopriL  40 mg Oral QHS    methocarbamoL  750 mg Oral TID    multivitamin  1 tablet Oral Daily    mupirocin   Nasal BID    polyethylene glycol  17 g Oral Daily    vitamin D  2,000 Units Oral Daily     PRN Meds:bisacodyL, loperamide, melatonin, morphine, ondansetron, oxyCODONE, oxyCODONE, sodium chloride 0.9%     Review of patient's allergies indicates:   Allergen Reactions    Hydrochlorothiazide Other (See Comments)     Muscle pain    Losartan Other (See Comments)     Elevated b/p with anxiety    Promethazine Other (See Comments)     Other reaction(s): agitation  Other reaction(s): Hives     Objective:     Vital Signs (Most Recent):  Temp: 98 °F (36.7 °C) (04/10/24 0748)  Pulse: 66 (04/10/24 0748)  Resp: 18 (04/10/24 0748)  BP: (!) 112/53 (04/10/24 0748)  SpO2: 96 % (04/10/24 0748) Vital Signs (24h Range):  Temp:  [97.5 °F (36.4 °C)-98.9 °F (37.2 °C)] 98 °F (36.7 °C)  Pulse:  [60-81] 66  Resp:  [10-21] 18  SpO2:  [95 %-100 %] 96 %  BP: (104-160)/(52-70) 112/53     Weight: 69.4 kg (153 lb)  Body mass index is 25.46 kg/m².    Intake/Output - Last 3 Shifts         04/08 0700  04/09 0659 04/09  0700  04/10 0659 04/10 0700  04/11 0659    P.O.  360     I.V. (mL/kg)  456 (6.6)     IV Piggyback  1701.7     Total Intake(mL/kg)  2517.7 (36.3)     Urine (mL/kg/hr)  2925     Blood  250     Total Output  3175     Net  -657.4                     Physical Exam  Constitutional:       Appearance: Normal appearance.   HENT:      Head: Normocephalic and atraumatic.   Cardiovascular:      Rate and Rhythm: Normal rate.   Pulmonary:      Effort: No respiratory distress.   Musculoskeletal:      Comments: [place] dressing and abd pads moderately saturated with serosanguinous fluid.    Neurological:      Mental Status: She is alert and oriented to person, place, and time.          Significant Labs:  I have reviewed all pertinent lab results within the past 24 hours.    Significant Diagnostics:  I have reviewed all pertinent imaging results/findings within the past 24 hours.  Assessment/Plan:     * Pseudarthrosis after fusion or arthrodesis  58 year old female s/p spinal fusion and bilateral paraspinous muscle flap closure of spinal wound.     -- MM Pain control  -- Dressing changes daily  -- If multiple dressing changes occur daily, please reach out to plastics   -- Rest of care per primary        Jeff Love,   Plastic Surgery  [facility]

## 2024-04-10 NOTE — PLAN OF CARE
Problem: Adult Inpatient Plan of Care  Goal: Plan of Care Review  Outcome: Ongoing, Progressing  Goal: Patient-Specific Goal (Individualized)  Outcome: Ongoing, Progressing  Goal: Absence of Hospital-Acquired Illness or Injury  Outcome: Ongoing, Progressing  Goal: Optimal Comfort and Wellbeing  Outcome: Ongoing, Progressing  Goal: Readiness for Transition of Care  Outcome: Ongoing, Progressing     Problem: Infection  Goal: Absence of Infection Signs and Symptoms  Outcome: Ongoing, Progressing     Problem: Bleeding (Spinal Surgery)  Goal: Absence of Bleeding  Outcome: Ongoing, Progressing     Problem: Bowel Motility Impaired (Spinal Surgery)  Goal: Effective Bowel Elimination  Outcome: Ongoing, Progressing     Problem: Fluid and Electrolyte Imbalance (Spinal Surgery)  Goal: Fluid and Electrolyte Balance  Outcome: Ongoing, Progressing     Problem: Functional Ability Impaired (Spinal Surgery)  Goal: Optimal Functional Ability  Outcome: Ongoing, Progressing     Problem: Infection (Spinal Surgery)  Goal: Absence of Infection Signs and Symptoms  Outcome: Ongoing, Progressing     Problem: Neurologic Impairment (Spinal Surgery)  Goal: Optimal Neurologic Function  Outcome: Ongoing, Progressing     Problem: Ongoing Anesthesia Effects (Spinal Surgery)  Goal: Anesthesia/Sedation Recovery  Outcome: Ongoing, Progressing     Problem: Pain (Spinal Surgery)  Goal: Acceptable Pain Control  Outcome: Ongoing, Progressing     Problem: Postoperative Nausea and Vomiting (Spinal Surgery)  Goal: Nausea and Vomiting Relief  Outcome: Ongoing, Progressing     Problem: Postoperative Urinary Retention (Spinal Surgery)  Goal: Effective Urinary Elimination  Outcome: Ongoing, Progressing     Problem: Respiratory Compromise (Spinal Surgery)  Goal: Effective Oxygenation and Ventilation  Outcome: Ongoing, Progressing     Problem: Pain Acute  Goal: Acceptable Pain Control and Functional Ability  Outcome: Ongoing, Progressing     Problem: Fall Injury  Risk  Goal: Absence of Fall and Fall-Related Injury  Outcome: Ongoing, Progressing     Patient sitting up in chair with  at the bedside. No acute distress noted.  Bed lowest and locked position. Call light and bedside table in reach.

## 2024-04-10 NOTE — PLAN OF CARE
PT eval completed- see note for details, goals and POC established.     Problem: Physical Therapy  Goal: Physical Therapy Goal  Description: Goals to be met by: 5/10/24     Patient will increase functional independence with mobility by performin. Supine to sit with Set-up [place]  2. Rolling to Left and Right with Set-up Assistance.  3. Sit to stand transfer with Modified [place]  4. Bed to chair transfer with Modified [place] using Rolling Walker  5. Gait  x 150 feet with Modified [place] using Rolling Walker.   6. Stand for 5 minutes with Modified [place] using Rolling Walker    Outcome: Ongoing, Progressing   4/10/2024

## 2024-04-10 NOTE — ASSESSMENT & PLAN NOTE
Genia DesaiBlanc is a 58 y.o. female is s/p revision PSF on 4/9/24    Surgical dressing C/D/I  Pain control: multimodal  PT/OT: WBAT BLE, spine precautions  DVT PPx: SQH TID, SCDs at all times when not ambulating   Abx: postop Ancef x 24hrs   Carpio: Removed 4/10  Nursing: Incentive spirometry, Monitor and record drain output each shift     Dispo: plan to dc pending PT/OT

## 2024-04-11 VITALS
DIASTOLIC BLOOD PRESSURE: 60 MMHG | TEMPERATURE: 98 F | RESPIRATION RATE: 16 BRPM | HEIGHT: 65 IN | SYSTOLIC BLOOD PRESSURE: 132 MMHG | OXYGEN SATURATION: 97 % | WEIGHT: 153 LBS | HEART RATE: 77 BPM | BODY MASS INDEX: 25.49 KG/M2

## 2024-04-11 LAB
FINAL PATHOLOGIC DIAGNOSIS: NORMAL
GROSS: NORMAL
Lab: NORMAL
MICROSCOPIC EXAM: NORMAL

## 2024-04-11 PROCEDURE — 97530 THERAPEUTIC ACTIVITIES: CPT

## 2024-04-11 PROCEDURE — 63600175 PHARM REV CODE 636 W HCPCS

## 2024-04-11 PROCEDURE — 25000003 PHARM REV CODE 250

## 2024-04-11 PROCEDURE — 97116 GAIT TRAINING THERAPY: CPT

## 2024-04-11 RX ORDER — GABAPENTIN 100 MG/1
100 CAPSULE ORAL 3 TIMES DAILY
Qty: 45 CAPSULE | Refills: 0 | Status: SHIPPED | OUTPATIENT
Start: 2024-04-11 | End: 2024-04-26

## 2024-04-11 RX ORDER — ACETAMINOPHEN 500 MG
1000 TABLET ORAL EVERY 8 HOURS
Qty: 90 TABLET | Refills: 0 | Status: SHIPPED | OUTPATIENT
Start: 2024-04-11

## 2024-04-11 RX ORDER — METHOCARBAMOL 500 MG/1
1000 TABLET, FILM COATED ORAL 3 TIMES DAILY
Qty: 90 TABLET | Refills: 0 | Status: SHIPPED | OUTPATIENT
Start: 2024-04-11 | End: 2024-04-26

## 2024-04-11 RX ORDER — CELECOXIB 100 MG/1
100 CAPSULE ORAL 2 TIMES DAILY
Qty: 28 CAPSULE | Refills: 0 | Status: SHIPPED | OUTPATIENT
Start: 2024-04-11 | End: 2024-05-09

## 2024-04-11 RX ORDER — OXYCODONE HYDROCHLORIDE 5 MG/1
5 TABLET ORAL EVERY 6 HOURS PRN
Qty: 24 TABLET | Refills: 0 | Status: SHIPPED | OUTPATIENT
Start: 2024-04-11 | End: 2024-05-07 | Stop reason: SDUPTHER

## 2024-04-11 RX ADMIN — ACETAMINOPHEN 1000 MG: 500 TABLET ORAL at 09:04

## 2024-04-11 RX ADMIN — ACETAMINOPHEN 1000 MG: 500 TABLET ORAL at 02:04

## 2024-04-11 RX ADMIN — OXYCODONE HYDROCHLORIDE 10 MG: 10 TABLET ORAL at 02:04

## 2024-04-11 RX ADMIN — GABAPENTIN 300 MG: 300 CAPSULE ORAL at 09:04

## 2024-04-11 RX ADMIN — CHOLECALCIFEROL TAB 25 MCG (1000 UNIT) 2000 UNITS: 25 TAB at 09:04

## 2024-04-11 RX ADMIN — GABAPENTIN 300 MG: 300 CAPSULE ORAL at 02:04

## 2024-04-11 RX ADMIN — AMLODIPINE BESYLATE 5 MG: 5 TABLET ORAL at 09:04

## 2024-04-11 RX ADMIN — Medication 250 MG: at 09:04

## 2024-04-11 RX ADMIN — THERA TABS 1 TABLET: TAB at 09:04

## 2024-04-11 RX ADMIN — METHOCARBAMOL 750 MG: 750 TABLET ORAL at 02:04

## 2024-04-11 RX ADMIN — HEPARIN SODIUM 5000 UNITS: 5000 INJECTION INTRAVENOUS; SUBCUTANEOUS at 05:04

## 2024-04-11 RX ADMIN — MUPIROCIN: 20 OINTMENT TOPICAL at 09:04

## 2024-04-11 RX ADMIN — DOCUSATE SODIUM 100 MG: 100 CAPSULE, LIQUID FILLED ORAL at 09:04

## 2024-04-11 RX ADMIN — OXYCODONE HYDROCHLORIDE 10 MG: 10 TABLET ORAL at 09:04

## 2024-04-11 RX ADMIN — METHOCARBAMOL 750 MG: 750 TABLET ORAL at 09:04

## 2024-04-11 RX ADMIN — FAMOTIDINE 20 MG: 20 TABLET ORAL at 09:04

## 2024-04-11 NOTE — ASSESSMENT & PLAN NOTE
58 year old female s/p spinal fusion and bilateral paraspinous muscle flap closure of spinal wound.     -- MM Pain control  -- Dressing changes daily  -- If multiple dressing changes occur daily, please reach out to plastics   -- Ok for discharge from plastics perspective   -- Rest of care per primary

## 2024-04-11 NOTE — DISCHARGE INSTRUCTIONS
DR. TAMMY STEIN'S POSTOPERATIVE INSTRUCTIONS -   LUMBAR FUSION       Antibiotics: You do not need additional antibiotics at home.    NSAIDs: Please refrain from taking ibuprofen (Advil), naproxen (Aleve), and other non steroidal anti-inflammatory medications other than the Celebrex that will be prescribed to you after surgery.    Wound Care: You may remove your dressing and shower 7 days after surgery. Until then please keep your wound clean and dry. Sponge baths are acceptable. Do not go in a pool or hot tub until seen in clinic. Please leave the small steri-strips covering your wound in place until they fall off naturally (2 weeks). You may notice clear suture ends hanging from the sides of your incision after the steri-strips are removed, it is ok to clip these with scissors.    Brace: You may be prescribed a brace, please wear this when up and walking, it is not necessary to wear at night when sleeping.    Pain: We will use a multimodal approach for pain management after your surgery.  You will be given a prescription for pain medicine when you are discharged from the hospital.  You will also be given prescriptions for Robaxin (a muscle relaxer), Gabapentin, Celebrex and Tylenol.  Please note: you will only be given ONE prescription for narcotics when you are discharged from the hospital.  This medication is for breakthrough pain only. This medication will not be refilled.  The other medications given to you may be refilled if needed.      Infection: Signs of infection include increasing wound drainage and redness around the wound, as well as a temperature over 101.5 degrees. It is unnecessary to take your temperature on a routine basis. Please call the below number if you are concerned about an infection.    Driving and Work: It is ok to return to driving and work as long as you are not taking narcotic pain medications and can walk greater than 100 feet. Please do not lift over 10 pounds or participate in  exercise or sports until cleared by Dr. Herrmann    Deep Venous Thrombosis (Blood Clots): Symptoms include swelling in the legs and shortness of breath. Please call the office or proceed to the nearest emergency room if you have any of these symptoms.    Physical Therapy: The best physical therapy immediately after surgery is walking. Please try to walk as much as possible.    Follow-up: You will be scheduled for a follow-up appointment in 4 weeks with either Dr. Herrmann or his physician assistant.    Questions: During business hours please call [phone] for routine questions. For after hours questions please call [phone] and ask to speak with the Orthopaedic resident on call.    Disability: If you submitted short term disability paperwork for us to complete and would like to check the status, please call the Disability Department at [phone].  You may fax any necessary paperwork to [phone].

## 2024-04-11 NOTE — ANESTHESIA POSTPROCEDURE EVALUATION
Anesthesia Post Evaluation    Patient: Genia Mcnulty    Procedure(s) Performed: Procedure(s) (LRB):  CLOSURE, WOUND (N/A)  FUSION, SPINE (N/A)    Final Anesthesia Type: general      Patient location during evaluation: PACU  Patient participation: Yes- Able to Participate  Level of consciousness: awake and alert  Post-procedure vital signs: reviewed and stable  Pain management: adequate  Airway patency: patent    PONV status at discharge: No PONV  Anesthetic complications: no      Cardiovascular status: blood pressure returned to baseline  Respiratory status: unassisted  Hydration status: euvolemic  Follow-up not needed.              Vitals Value Taken Time   /59 04/11/24 0530   Temp 36.4 °C (97.5 °F) 04/11/24 0530   Pulse 74 04/11/24 0530   Resp 18 04/11/24 0530   SpO2 95 % 04/11/24 0530         Event Time   Out of Recovery 16:45:00         Pain/Sascha Score: Pain Rating Prior to Med Admin: 7 (4/10/2024  9:00 PM)  Pain Rating Post Med Admin: 4 (4/10/2024 10:00 PM)

## 2024-04-11 NOTE — PT/OT/SLP PROGRESS
Physical Therapy Treatment    Patient Name:  Genia Mcnulty   MRN:  [id number]    Recommendations:     Discharge Recommendations: Low Intensity Therapy  Discharge Equipment Recommendations: none  Barriers to discharge: None    Assessment:     Genia Mcnulty is a 58 y.o. female admitted with a medical diagnosis of Pseudarthrosis after fusion or arthrodesis.  She presents with the following impairments/functional limitations: weakness, impaired endurance, impaired self care skills, gait instability, impaired balance, decreased lower extremity function, decreased ROM, impaired functional mobility, impaired sensation, orthopedic precautions     Pt shows progress with therapy demonstrated by increased in gait distance this session, amb ~150 ft with RW and SBA.  Patient continues to demonstrate the need for low intensity therapy on a scheduled basis exhibited by decreased independence with self-care and functional mobility.    Rehab Prognosis: Good; patient would benefit from acute skilled PT services to address these deficits and reach maximum level of function.    Recent Surgery: Procedure(s) (LRB):  CLOSURE, WOUND (N/A)  FUSION, SPINE (N/A) 2 Days Post-Op    Plan:     During this hospitalization, patient to be seen 4 x/week to address the identified rehab impairments via gait training, therapeutic activities, therapeutic exercises, neuromuscular re-education and progress toward the following goals:    Plan of Care Expires:  05/10/24    Subjective     Chief Complaint: mild R leg pain  Patient/Family Comments/goals: Pt agreeable to PT  Pain/Comfort:  Pain Rating 1: 3/10  Location - Side 1: Right  Location - Orientation 1: generalized  Location 1: leg  Pain Addressed 1: Reposition, Distraction      Objective:     Communicated with RN prior to session.  Patient found ambulatory in room/grove with  (no active lines) upon PT entry to room.     General Precautions: Standard, fall  Orthopedic Precautions: spinal  precautions  Braces:  (R KAFO)  Respiratory Status: Room air     Functional Mobility:        Transfers:   Ambulatory in room    Balance:   Standing balance:   FAIR+: Takes MINIMAL challenges from all directions  FAIR+: Needs CLOSE SUPERVISION during gait and is able to right self with minor LOB                 Gait:  Distance: ~150 ft    Assistive Device: RW  Assistance Level: stand by assistance  Gait Assessment: Pt amb with decreased dillon and decreased step length using step to gait pattern. Decreased R foot clearance  Pt amb with R KAFO donned    AM-PAC 6 CLICK MOBILITY  Turning over in bed (including adjusting bedclothes, sheets and blankets)?: 4  Sitting down on and standing up from a chair with arms (e.g., wheelchair, bedside commode, etc.): 4  Moving from lying on back to sitting on the side of the bed?: 4  Moving to and from a bed to a chair (including a wheelchair)?: 4  Need to walk in hospital room?: 4  Climbing 3-5 steps with a railing?: 4  Basic Mobility Total Score: 24       Treatment & Education:  Pt educated on tip to reduce fall risk and safety with mobility and using call button for assistance from nursing staff with OOB mobility.  Pt educated on sitting up in chair throughout most of day  Pt educated on amb 2-3x per day with assistance from staff and increased movement during hospital stay.  All questions answered within the scope of PT.  White board updated accordingly.    Patient left ambulatory in room/grove with all lines intact and spouse present..    GOALS:   Multidisciplinary Problems       Physical Therapy Goals          Problem: Physical Therapy    Goal Priority Disciplines Outcome Goal Variances Interventions   Physical Therapy Goal     PT, PT/OT Ongoing, Progressing     Description: Goals to be met by: 5/10/24     Patient will increase functional independence with mobility by performin. Supine to sit with Set-up [place]  2. Rolling to Left and Right with Set-up  Assistance.  3. Sit to stand transfer with Modified [place]  4. Bed to chair transfer with Modified [place] using Rolling Walker  5. Gait  x 150 feet with Modified [place] using Rolling Walker.   6. Stand for 5 minutes with Modified [place] using Rolling Walker                         Time Tracking:     PT Received On: 04/11/24  PT Start Time: 1303     PT Stop Time: 1315  PT Total Time (min): 12 min     Billable Minutes: Gait Training 12    Treatment Type: Treatment  PT/PTA: PT           04/11/2024

## 2024-04-11 NOTE — PLAN OF CARE
Problem: Adult Inpatient Plan of Care  Goal: Plan of Care Review  Outcome: Met  Goal: Patient-Specific Goal (Individualized)  Outcome: Met  Goal: Absence of Hospital-Acquired Illness or Injury  Outcome: Met  Goal: Optimal Comfort and Wellbeing  Outcome: Met  Goal: Readiness for Transition of Care  Outcome: Met     Problem: Infection  Goal: Absence of Infection Signs and Symptoms  Outcome: Met     Problem: Bleeding (Spinal Surgery)  Goal: Absence of Bleeding  Outcome: Met     Problem: Bowel Motility Impaired (Spinal Surgery)  Goal: Effective Bowel Elimination  Outcome: Met     Problem: Fluid and Electrolyte Imbalance (Spinal Surgery)  Goal: Fluid and Electrolyte Balance  Outcome: Met     Problem: Functional Ability Impaired (Spinal Surgery)  Goal: Optimal Functional Ability  Outcome: Met     Problem: Infection (Spinal Surgery)  Goal: Absence of Infection Signs and Symptoms  Outcome: Met     Problem: Neurologic Impairment (Spinal Surgery)  Goal: Optimal Neurologic Function  Outcome: Met     Problem: Ongoing Anesthesia Effects (Spinal Surgery)  Goal: Anesthesia/Sedation Recovery  Outcome: Met     Problem: Pain (Spinal Surgery)  Goal: Acceptable Pain Control  Outcome: Met     Problem: Postoperative Nausea and Vomiting (Spinal Surgery)  Goal: Nausea and Vomiting Relief  Outcome: Met     Problem: Postoperative Urinary Retention (Spinal Surgery)  Goal: Effective Urinary Elimination  Outcome: Met     Problem: Respiratory Compromise (Spinal Surgery)  Goal: Effective Oxygenation and Ventilation  Outcome: Met     Problem: Pain Acute  Goal: Acceptable Pain Control and Functional Ability  Outcome: Met     Problem: Fall Injury Risk  Goal: Absence of Fall and Fall-Related Injury  Outcome: Met

## 2024-04-11 NOTE — PT/OT/SLP PROGRESS
Occupational Therapy   Treatment    Name: Genia Mcnulty  MRN: [id number]  Admitting Diagnosis:  Pseudarthrosis after fusion or arthrodesis  2 Days Post-Op    Recommendations:     Discharge Recommendations: Low Intensity Therapy  Discharge Equipment Recommendations:  none  Barriers to discharge:  None    Assessment:     Genia Mcnulty is a 58 y.o. female with a medical diagnosis of Pseudarthrosis after fusion or arthrodesis.  She presents with the following performance deficits affecting function are weakness, impaired endurance, impaired self care skills, impaired functional mobility, gait instability, impaired balance, pain, decreased lower extremity function, orthopedic precautions.     Pt agreeable to adaptive equipment education/training. Pt demonstrated good understanding regarding the benefits of AE to promote independence with lower body dressing. However,  pt remains limited in ADLs, functional mobility, and functional transfers and is currently not performing tasks at OF. Pt would continue to benefit from skilled OT services to maximize functional independence with ADLs and functional mobility, reduce caregiver burden, and facilitate safe discharge in the least restrictive environment.     Rehab Prognosis:  Good; patient would benefit from acute skilled OT services to address these deficits and reach maximum level of function.       Plan:     Patient to be seen 4 x/week to address the above listed problems via self-care/home management, therapeutic activities, therapeutic exercises, neuromuscular re-education  Plan of Care Expires: 05/10/24  Plan of Care Reviewed with: patient    Subjective     Chief Complaint: none reported  Patient/Family Comments/goals: return home  Pain/Comfort:  Pain Rating 1: 0/10    Objective:     Communicated with: RN prior to session.  Patient found up in chair with  (no active lines) upon OT entry to room.    General Precautions: Standard, fall    Orthopedic  Precautions:spinal precautions  Braces:  (R KAFO)  Respiratory Status: Room air     Occupational Performance:     Bed Mobility:    Pt found in chair and remained in chair for the duration of the OT session    Activities of Daily Living:  Upper Body Dressing: modified independence to dof gown & don shirt    Treatment & Education:  - Education on adaptive equipment benefits & usage to promote independence with LBD while also maintaining spinal precautions. Pt received verbal/visual demonstration on how to use a long handle reacher, long shoe horn, & a sock aid. Pt also educated on how to purchase the items  -Education on task modification to maximize safety and (I) during ADLs and mobility  -Pt educated to dress surgical site first and further dressing techniques s/p surgery  -Pt educated on weightbearing and surgical precautions with pt verbalizing understanding  -Provided education regarding discharge recommendations with pt  verbalizing understanding.  -Pt educated to call for assistance and to transfer with hospital staff only   Pt had no further questions & when asked whether there were any concerns pt reported none.     [facility] 6 Click ADL: 18    Patient left up in chair with call button in reach and RN notified    GOALS:   Multidisciplinary Problems       Occupational Therapy Goals          Problem: Occupational Therapy    Goal Priority Disciplines Outcome Interventions   Occupational Therapy Goal     OT, PT/OT Ongoing, Progressing    Description: Goals to be met by: 05/10/2024     Patient will increase functional independence with ADLs by performing:    UE Dressing with Modified [place].  LE Dressing with Minimal Assistance.  Grooming while standing at sink with Modified [place].  Toileting from toilet with Supervision for hygiene and clothing management.   Rolling to Right, Left with Modified [place].   Side lying to sit with Modified [place].  Step transfer with Stand-by Assistance                          Time Tracking:     OT Date of Treatment: 04/11/24  OT Start Time: 1515  OT Stop Time: 1533  OT Total Time (min): 18 min    Billable Minutes:Therapeutic Activity 18    OT/BEATRICE: OT          4/11/2024

## 2024-04-11 NOTE — SUBJECTIVE & OBJECTIVE
"Principal Problem:Pseudarthrosis after fusion or arthrodesis    Principal Orthopedic Problem: same, s/p revision PSF 4/9/24    Interval History: Pt seen and examined at bedside. MANOJ, VSS, AF. Pain controlled. Denies fevers, chills, chest pain, SOB, N/V/D. Dressing changed 4/10.  PT/OT sessions ongoing. Pt states she would like to discharge today.      Review of patient's allergies indicates:   Allergen Reactions    Hydrochlorothiazide Other (See Comments)     Muscle pain    Losartan Other (See Comments)     Elevated b/p with anxiety    Promethazine Other (See Comments)     Other reaction(s): agitation  Other reaction(s): Hives       Current Facility-Administered Medications   Medication    0.9%  NaCl infusion    acetaminophen tablet 1,000 mg    amLODIPine tablet 5 mg    ascorbic acid (vitamin C) tablet 250 mg    bisacodyL suppository 10 mg    celecoxib capsule 200 mg    docusate sodium capsule 100 mg    famotidine tablet 20 mg    gabapentin capsule 300 mg    heparin (porcine) injection 5,000 Units    lisinopriL tablet 40 mg    loperamide capsule 2 mg    melatonin tablet 6 mg    methocarbamoL tablet 750 mg    morphine injection 2 mg    multivitamin tablet    mupirocin 2 % ointment    ondansetron disintegrating tablet 8 mg    oxyCODONE immediate release tablet 5 mg    oxyCODONE immediate release tablet Tab 10 mg    polyethylene glycol packet 17 g    sodium chloride 0.9% flush 10 mL    vitamin D 1000 units tablet 2,000 Units     Objective:     Vital Signs (Most Recent):  Temp: 97.5 °F (36.4 °C) (04/11/24 0530)  Pulse: 74 (04/11/24 0530)  Resp: 18 (04/11/24 0530)  BP: (!) 129/59 (04/11/24 0530)  SpO2: 95 % (04/11/24 0530) Vital Signs (24h Range):  Temp:  [97.5 °F (36.4 °C)-98.2 °F (36.8 °C)] 97.5 °F (36.4 °C)  Pulse:  [66-76] 74  Resp:  [16-18] 18  SpO2:  [95 %-99 %] 95 %  BP: (112-131)/(53-60) 129/59     Weight: 69.4 kg (153 lb)  Height: 5' 5" (165.1 cm)  Body mass index is 25.46 kg/m².      Intake/Output Summary " (Last 24 hours) at 4/11/2024 0652  Last data filed at 4/11/2024 0530  Gross per 24 hour   Intake --   Output 300 ml   Net -300 ml          Ortho/SPM Exam     Gen: NAD, WDWN  CV: peripherally well perfused  Resp: unlabored respirations, symmetric chest rise  Neck: TM  Neuro: CN 2-12 grossly intact. No FND.      MSK/Lumbar Spine Exam  Bilateral Lower extremities:  EHL/FHL/TA/Gastroc intact with exception of decreased R ankle DF/PF (baseline)  SILT with exception of decreased sensation to right tibia (baseline)  2+DP  Dressing with small area of strikethrough bleed    Significant Labs: CBC:   Recent Labs   Lab 04/10/24  0439   WBC 5.83   HGB 8.7*   HCT 28.4*          CMP:   Recent Labs   Lab 04/10/24  0440   *   K 4.2      CO2 21*   *   BUN 15   CREATININE 0.7   CALCIUM 8.6*   PROT 5.8*   ALBUMIN 2.9*   BILITOT 0.3   ALKPHOS 107   AST 94*   *   ANIONGAP 7*       All pertinent labs within the past 24 hours have been reviewed.    Significant Imaging: I have reviewed all pertinent imaging results/findings.

## 2024-04-11 NOTE — DISCHARGE SUMMARY
[facility] - Surgery  Orthopedics  Discharge Summary      Patient Name: Genia Mcnulty  MRN: [id number]  Admission Date: 4/9/2024  Hospital Length of Stay: 2 days  Discharge Date and Time: No discharge date for patient encounter.  Attending Physician: Rocky Herrmann MD   Discharging Provider: Palmer Dillon MD  Primary Care Provider: Charla Meadows MD    HPI:   Genia Mcnulty is a 58 y.o. female who presents for revision posterior spinal fusion due to pseudoarthrosis.     Procedure(s) (LRB):  CLOSURE, WOUND (N/A)  FUSION, SPINE (N/A)      Hospital Course:  On 4/9/24, the patient arrived to the [facility] for proper pre-operative management.  Upon completion of pre-operative preparation, the patient was taken back to the operative theatre. Revision posterior spinal fusion, L2 to pelvis, was performed without complication and the patient was transported to the post anesthesia care unit in stable condition.  After appropriate recovery from the anaesthetic agents used during the surgery, the patient was then transported to the hospital inpatient floor.  The interim of the hospital stay from arrival on the floor up to discharge has been uncomplicated. The patient has tolerated regular diet.  The patient's pain has been controlled using a multimodal approach. Currently, the patient's pain is well controlled on an oral regimen.  The patient has been voiding without difficulty.  The patient began participation in physical therapy after surgery and has progressed throughout the entire hospital stay.  Currently, the patient's progress is sufficient to allow the them to be discharged to home safely.  The patient agrees with this assessment and desires a discharge today.      Goals of Care Treatment Preferences:  Code Status: Full Code      Consults (From admission, onward)          Status Ordering Provider     IP consult case management/social work  Once        Provider:  (Not yet assigned)     Acknowledged MACARIO OS            Significant Diagnostic Studies: Labs: All labs within the past 24 hours have been reviewed    Pending Diagnostic Studies:       None          Final Active Diagnoses:    Diagnosis Date Noted POA    PRINCIPAL PROBLEM:  Pseudarthrosis after fusion or arthrodesis [M96.0] 04/09/2024 Not Applicable    Chronic blood loss anemia [D50.0] 04/10/2024 Yes    Elevated LFTs [R79.89] 04/10/2024 No    Essential hypertension [I10] 11/11/2016 Yes     Chronic    Hyponatremia [E87.1] 11/21/2015 Yes      Problems Resolved During this Admission:      Discharged Condition: good    Disposition: Home or Self Care    Follow Up:   Follow-up Information       Rocky Herrmann MD Follow up in 4 week(s).    Specialties: Orthopedic Surgery, Spine Surgery  Why: For wound re-check  Contact information:  [address]  [facility] [address]  [phone]                           Patient Instructions:      Notify your health care provider if you experience any of the following:  temperature >100.4     Notify your health care provider if you experience any of the following:  persistent nausea and vomiting or diarrhea     Notify your health care provider if you experience any of the following:  severe uncontrolled pain     Notify your health care provider if you experience any of the following:  redness, tenderness, or signs of infection (pain, swelling, redness, odor or green/yellow discharge around incision site)     Notify your health care provider if you experience any of the following:  difficulty breathing or increased cough     Notify your health care provider if you experience any of the following:  severe persistent headache     Notify your health care provider if you experience any of the following:  worsening rash     Notify your health care provider if you experience any of the following:  persistent dizziness, light-headedness, or visual disturbances     Notify your health care provider if you  experience any of the following:  increased confusion or weakness     Medications:  Reconciled Home Medications:      Medication List        START taking these medications      acetaminophen 500 MG tablet  Commonly known as: TYLENOL  Take 2 tablets (1,000 mg total) by mouth every 8 (eight) hours.     gabapentin 100 MG capsule  Commonly known as: NEURONTIN  Take 1 capsule (100 mg total) by mouth 3 (three) times daily. for 15 days     methocarbamoL 500 MG Tab  Commonly known as: ROBAXIN  Take 2 tablets (1,000 mg total) by mouth 3 (three) times daily. for 15 days     oxyCODONE 5 MG immediate release tablet  Commonly known as: ROXICODONE  Take 1 tablet (5 mg total) by mouth every 6 (six) hours as needed for Pain.            CHANGE how you take these medications      * celecoxib 200 MG capsule  Commonly known as: CeleBREX  Take 1 capsule (200 mg total) by mouth once daily.  What changed: Another medication with the same name was added. Make sure you understand how and when to take each.     * celecoxib 100 MG capsule  Commonly known as: CeleBREX  Take 1 capsule (100 mg total) by mouth 2 (two) times daily.  What changed: You were already taking a medication with the same name, and this prescription was added. Make sure you understand how and when to take each.           * This list has 2 medication(s) that are the same as other medications prescribed for you. Read the directions carefully, and ask your doctor or other care provider to review them with you.                CONTINUE taking these medications      amLODIPine 5 MG tablet  Commonly known as: NORVASC  Take 1 tablet (5 mg total) by mouth once daily.     b complex vitamins tablet  Take 1 tablet by mouth once daily.     cholecalciferol (vitamin D3) 1,250 mcg (50,000 unit) capsule  Take 1 capsule (50,000 Units total) by mouth every 7 days.     lisinopriL 40 MG tablet  Commonly known as: PRINIVIL,ZESTRIL  Take 1 tablet (40 mg total) by mouth every evening.               Palmer Dillon MD  Orthopedics  [facility]

## 2024-04-11 NOTE — PROGRESS NOTES
[facility]  Orthopedics  Progress Note    Patient Name: Genia Mcnulty  MRN: [id number]  Admission Date: 4/9/2024  Hospital Length of Stay: 2 days  Attending Provider: Rocky Herrmann MD  Primary Care Provider: Charla Meadows MD  Follow-up For: Procedure(s) (LRB):  CLOSURE, WOUND (N/A)  FUSION, SPINE (N/A)    Post-Operative Day: 2 Days Post-Op  Subjective:     Principal Problem:Pseudarthrosis after fusion or arthrodesis    Principal Orthopedic Problem: same, s/p revision PSF 4/9/24    Interval History: Pt seen and examined at bedside. NAEON, VSS, AF. Pain controlled. Denies fevers, chills, chest pain, SOB, N/V/D. Dressing changed 4/10.  PT/OT sessions ongoing. Pt states she would like to discharge today.      Review of patient's allergies indicates:   Allergen Reactions    Hydrochlorothiazide Other (See Comments)     Muscle pain    Losartan Other (See Comments)     Elevated b/p with anxiety    Promethazine Other (See Comments)     Other reaction(s): agitation  Other reaction(s): Hives       Current Facility-Administered Medications   Medication    0.9%  NaCl infusion    acetaminophen tablet 1,000 mg    amLODIPine tablet 5 mg    ascorbic acid (vitamin C) tablet 250 mg    bisacodyL suppository 10 mg    celecoxib capsule 200 mg    docusate sodium capsule 100 mg    famotidine tablet 20 mg    gabapentin capsule 300 mg    heparin (porcine) injection 5,000 Units    lisinopriL tablet 40 mg    loperamide capsule 2 mg    melatonin tablet 6 mg    methocarbamoL tablet 750 mg    morphine injection 2 mg    multivitamin tablet    mupirocin 2 % ointment    ondansetron disintegrating tablet 8 mg    oxyCODONE immediate release tablet 5 mg    oxyCODONE immediate release tablet Tab 10 mg    polyethylene glycol packet 17 g    sodium chloride 0.9% flush 10 mL    vitamin D 1000 units tablet 2,000 Units     Objective:     Vital Signs (Most Recent):  Temp: 97.5 °F (36.4 °C) (04/11/24 0530)  Pulse: 74 (04/11/24 0530)  Resp:  "18 (04/11/24 0530)  BP: (!) 129/59 (04/11/24 0530)  SpO2: 95 % (04/11/24 0530) Vital Signs (24h Range):  Temp:  [97.5 °F (36.4 °C)-98.2 °F (36.8 °C)] 97.5 °F (36.4 °C)  Pulse:  [66-76] 74  Resp:  [16-18] 18  SpO2:  [95 %-99 %] 95 %  BP: (112-131)/(53-60) 129/59     Weight: 69.4 kg (153 lb)  Height: 5' 5" (165.1 cm)  Body mass index is 25.46 kg/m².      Intake/Output Summary (Last 24 hours) at 4/11/2024 0652  Last data filed at 4/11/2024 0530  Gross per 24 hour   Intake --   Output 300 ml   Net -300 ml         Ortho/SPM Exam     Gen: NAD, WDWN  CV: peripherally well perfused  Resp: unlabored respirations, symmetric chest rise  Neck: TM  Neuro: CN 2-12 grossly intact. No FND.      MSK/Lumbar Spine Exam  Bilateral Lower extremities:  EHL/FHL/TA/Gastroc intact with exception of decreased R ankle DF/PF (baseline)  SILT with exception of decreased sensation to right tibia (baseline)  2+DP  Dressing with small area of strikethrough bleed    Significant Labs: CBC:   Recent Labs   Lab 04/10/24  0439   WBC 5.83   HGB 8.7*   HCT 28.4*          CMP:   Recent Labs   Lab 04/10/24  0440   *   K 4.2      CO2 21*   *   BUN 15   CREATININE 0.7   CALCIUM 8.6*   PROT 5.8*   ALBUMIN 2.9*   BILITOT 0.3   ALKPHOS 107   AST 94*   *   ANIONGAP 7*       All pertinent labs within the past 24 hours have been reviewed.    Significant Imaging: I have reviewed all pertinent imaging results/findings.  Assessment/Plan:     * Pseudarthrosis after fusion or arthrodesis  Genia DesaiBlanc is a 58 y.o. female is s/p revision PSF on 4/9/24    Surgical dressing C/D/I  Pain control: multimodal  PT/OT: WBAT BLE, spine precautions  DVT PPx: SQH TID, SCDs at all times when not ambulating   Nursing: Incentive spirometry, Monitor and record drain output each shift     Dispo: potential discharge today        Essential hypertension  - Continue home meds          Palmer Dillon MD  Orthopedics  [facility] - Surgery    "

## 2024-04-11 NOTE — NURSING
Nurses Note -- 4 Eyes      4/11/2024   6:27 AM      Skin assessed during: Q Shift Change      [x] No Altered Skin Integrity Present    []Prevention Measures Documented      [] Yes- Altered Skin Integrity Present or Discovered   [] LDA Added if Not in Epic (Describe Wound)   [] New Altered Skin Integrity was Present on Admit and Documented in LDA   [] Wound Image Taken    Wound Care Consulted? No    Attending Nurse:  Garth Berry RN/Staff Member:  Trupti

## 2024-04-11 NOTE — PLAN OF CARE
[facility]  Discharge Final Note    Primary Care Provider: Charla Meadows MD    Expected Discharge Date: 4/11/2024    Final Discharge Note (most recent)       Final Note - 04/11/24 1446          Final Note    Assessment Type Final Discharge Note     Anticipated Discharge Disposition Planned Readmission - Home or self care   MsLinnea POOLE    Hospital Resources/Appts/Education Provided Provided patient/caregiver with written discharge plan information;Provided education on problems/symptoms using teachback                   Future Appointments   Date Time Provider Department Center   5/7/2024 10:30 AM [facility] OI EOS [facility] EOS [facility]tr   5/7/2024 11:30 AM Gregoria Reyes PA-C [facility] SPINE [facility]   5/9/2024  3:40 PM Kelley Caceres MD [facility] PHYSMED [facility]     Contact Info       Rocky Herrmann MD   Specialty: Orthopedic Surgery, Spine Surgery    [address]  [facility] [address]   Phone: [phone]       Next Steps: Follow up in 4 week(s)    Instructions: For wound re-check

## 2024-04-11 NOTE — SUBJECTIVE & OBJECTIVE
Interval History: AFVSS. NAEO. Pain controlled. Dressing with minimal strikethrough.     Medications:  Continuous Infusions:   sodium chloride 0.9% 100 mL/hr at 04/10/24 1111    loperamide       Scheduled Meds:   acetaminophen  1,000 mg Oral TID    amLODIPine  5 mg Oral Daily    ascorbic acid (vitamin C)  250 mg Oral Daily    celecoxib  200 mg Oral Daily    docusate sodium  100 mg Oral BID    famotidine  20 mg Oral BID    gabapentin  300 mg Oral TID    heparin (porcine)  5,000 Units Subcutaneous Q8H    lisinopriL  40 mg Oral QHS    methocarbamoL  750 mg Oral TID    multivitamin  1 tablet Oral Daily    mupirocin   Nasal BID    polyethylene glycol  17 g Oral Daily    vitamin D  2,000 Units Oral Daily     PRN Meds:bisacodyL, loperamide, melatonin, morphine, ondansetron, oxyCODONE, oxyCODONE, sodium chloride 0.9%     Review of patient's allergies indicates:   Allergen Reactions    Hydrochlorothiazide Other (See Comments)     Muscle pain    Losartan Other (See Comments)     Elevated b/p with anxiety    Promethazine Other (See Comments)     Other reaction(s): agitation  Other reaction(s): Hives     Objective:     Vital Signs (Most Recent):  Temp: 97.5 °F (36.4 °C) (04/11/24 0530)  Pulse: 74 (04/11/24 0530)  Resp: 18 (04/11/24 0530)  BP: (!) 129/59 (04/11/24 0530)  SpO2: 95 % (04/11/24 0530) Vital Signs (24h Range):  Temp:  [97.5 °F (36.4 °C)-98.2 °F (36.8 °C)] 97.5 °F (36.4 °C)  Pulse:  [66-76] 74  Resp:  [16-18] 18  SpO2:  [95 %-99 %] 95 %  BP: (112-131)/(53-60) 129/59     Weight: 69.4 kg (153 lb)  Body mass index is 25.46 kg/m².    Intake/Output - Last 3 Shifts         04/09 0700  04/10 0659 04/10 0700 04/11 0659 04/11 0700 04/12 0659    P.O. 360      I.V. (mL/kg) 456 (6.6)      IV Piggyback 1701.7      Total Intake(mL/kg) 2517.7 (36.3)      Urine (mL/kg/hr) 2925 300 (0.2)     Stool  0     Blood 250      Total Output 3175 300     Net -657.4 -300            Urine Occurrence  2 x     Stool Occurrence  0 x               Physical Exam  Constitutional:       Appearance: Normal appearance.   HENT:      Head: Normocephalic and atraumatic.   Cardiovascular:      Rate and Rhythm: Normal rate.   Pulmonary:      Effort: No respiratory distress.   Musculoskeletal:      Comments:     Island dressing and abd pads moderately saturated with serosanguinous fluid.     Incision c/d/i   Neurological:      Mental Status: She is alert and oriented to person, place, and time.          Significant Labs:  I have reviewed all pertinent lab results within the past 24 hours.    Significant Diagnostics:  I have reviewed all pertinent imaging results/findings within the past 24 hours.

## 2024-04-11 NOTE — PROGRESS NOTES
[facility]  Plastic Surgery  Progress Note    Subjective:     History of Present Illness:  58 year old female s/p spinal fusion and bilateral paraspinous muscle flap closure of spinal wound.     Post-Op Info:  Procedure(s) (LRB):  CLOSURE, WOUND (N/A)  FUSION, SPINE (N/A)   2 Days Post-Op     Interval History: AFVSS. NAEO. Pain controlled. Dressing with minimal strikethrough.     Medications:  Continuous Infusions:   sodium chloride 0.9% 100 mL/hr at 04/10/24 1111    loperamide       Scheduled Meds:   acetaminophen  1,000 mg Oral TID    amLODIPine  5 mg Oral Daily    ascorbic acid (vitamin C)  250 mg Oral Daily    celecoxib  200 mg Oral Daily    docusate sodium  100 mg Oral BID    famotidine  20 mg Oral BID    gabapentin  300 mg Oral TID    heparin (porcine)  5,000 Units Subcutaneous Q8H    lisinopriL  40 mg Oral QHS    methocarbamoL  750 mg Oral TID    multivitamin  1 tablet Oral Daily    mupirocin   Nasal BID    polyethylene glycol  17 g Oral Daily    vitamin D  2,000 Units Oral Daily     PRN Meds:bisacodyL, loperamide, melatonin, morphine, ondansetron, oxyCODONE, oxyCODONE, sodium chloride 0.9%     Review of patient's allergies indicates:   Allergen Reactions    Hydrochlorothiazide Other (See Comments)     Muscle pain    Losartan Other (See Comments)     Elevated b/p with anxiety    Promethazine Other (See Comments)     Other reaction(s): agitation  Other reaction(s): Hives     Objective:     Vital Signs (Most Recent):  Temp: 97.5 °F (36.4 °C) (04/11/24 0530)  Pulse: 74 (04/11/24 0530)  Resp: 18 (04/11/24 0530)  BP: (!) 129/59 (04/11/24 0530)  SpO2: 95 % (04/11/24 0530) Vital Signs (24h Range):  Temp:  [97.5 °F (36.4 °C)-98.2 °F (36.8 °C)] 97.5 °F (36.4 °C)  Pulse:  [66-76] 74  Resp:  [16-18] 18  SpO2:  [95 %-99 %] 95 %  BP: (112-131)/(53-60) 129/59     Weight: 69.4 kg (153 lb)  Body mass index is 25.46 kg/m².    Intake/Output - Last 3 Shifts         04/09 0700  04/10 0659 04/10 0700  04/11 0659 04/11  0700  04/12 0659    P.O. 360      I.V. (mL/kg) 456 (6.6)      IV Piggyback 1701.7      Total Intake(mL/kg) 2517.7 (36.3)      Urine (mL/kg/hr) 2925 300 (0.2)     Stool  0     Blood 250      Total Output 3175 300     Net -657.4 -300            Urine Occurrence  2 x     Stool Occurrence  0 x              Physical Exam  Constitutional:       Appearance: Normal appearance.   HENT:      Head: Normocephalic and atraumatic.   Cardiovascular:      Rate and Rhythm: Normal rate.   Pulmonary:      Effort: No respiratory distress.   Musculoskeletal:      Comments:     Island dressing and abd pads moderately saturated with serosanguinous fluid.     Incision c/d/i   Neurological:      Mental Status: She is alert and oriented to person, place, and time.          Significant Labs:  I have reviewed all pertinent lab results within the past 24 hours.    Significant Diagnostics:  I have reviewed all pertinent imaging results/findings within the past 24 hours.  Assessment/Plan:     * Pseudarthrosis after fusion or arthrodesis  58 year old female s/p spinal fusion and bilateral paraspinous muscle flap closure of spinal wound.     -- MM Pain control  -- Dressing changes daily  -- If multiple dressing changes occur daily, please reach out to plastics   -- Ok for discharge from plastics perspective   -- Rest of care per primary        Jeff Love DO  Plastic Surgery  [facility]

## 2024-04-11 NOTE — PLAN OF CARE
[facility]      HOME HEALTH ORDERS  FACE TO FACE ENCOUNTER    Patient Name: Genia Mcnulty  YOB: 1965    PCP: Charla Meadows MD   PCP Address: [address] [place] [id number]  PCP Phone Number: [phone]  PCP Fax: [phone]    Encounter Date: 2/12/24    Admit to Home Health    Diagnoses:  Active Hospital Problems    Diagnosis  POA    *Pseudarthrosis after fusion or arthrodesis [M96.0]  Not Applicable    Chronic blood loss anemia [D50.0]  Yes    Elevated LFTs [R79.89]  No     monitor      Essential hypertension [I10]  Yes     Chronic    Hyponatremia [E87.1]  Yes     Monitor labs        Resolved Hospital Problems   No resolved problems to display.       Follow Up Appointments:  Future Appointments   Date Time Provider Department Center   5/7/2024 10:30 AM [facility] EOS [facility] EOS IC Imaging Ctr   5/7/2024 11:30 AM Gregoria Reyes PA-C [facility] SPINE [facility]   5/9/2024  3:40 PM Kelley Caceres MD [facility] PHYSMED [facility]       Allergies:  Review of patient's allergies indicates:   Allergen Reactions    Hydrochlorothiazide Other (See Comments)     Muscle pain    Losartan Other (See Comments)     Elevated b/p with anxiety    Promethazine Other (See Comments)     Other reaction(s): agitation  Other reaction(s): Hives       Medications: Review discharge medications with patient and family and provide education.    Current Facility-Administered Medications   Medication Dose Route Frequency Provider Last Rate Last Admin    0.9%  NaCl infusion   Intravenous Continuous Palmer Dillon  mL/hr at 04/10/24 1111 New Bag at 04/10/24 1111    acetaminophen tablet 1,000 mg  1,000 mg Oral TID Palmer Dillon MD   1,000 mg at 04/11/24 0937    amLODIPine tablet 5 mg  5 mg Oral Daily Palmer Dillon MD   5 mg at 04/11/24 0942    ascorbic acid (vitamin C) tablet 250 mg  250 mg Oral Daily Palmer Dillon MD   250 mg at 04/11/24 0939    bisacodyL suppository 10 mg  10 mg  Rectal Daily PRN Palmer Dillon MD        celecoxib capsule 200 mg  200 mg Oral Daily Palmer Dillon MD   200 mg at 04/09/24 1631    docusate sodium capsule 100 mg  100 mg Oral BID Palmer Dillon MD   100 mg at 04/11/24 0937    famotidine tablet 20 mg  20 mg Oral BID Palmer Dillon MD   20 mg at 04/11/24 0937    gabapentin capsule 300 mg  300 mg Oral TID Palmer Dillon MD   300 mg at 04/11/24 0939    heparin (porcine) injection 5,000 Units  5,000 Units Subcutaneous Q8H Palmer Dillon MD   5,000 Units at 04/11/24 0536    lisinopriL tablet 40 mg  40 mg Oral QHS Palmer Dillon MD   40 mg at 04/10/24 2101    loperamide capsule 2 mg  2 mg Oral Continuous PRN Palmer Dillon MD        melatonin tablet 6 mg  6 mg Oral Nightly PRN Palmer Dillon MD        methocarbamoL tablet 750 mg  750 mg Oral TID Palmer Dillon MD   750 mg at 04/11/24 0938    morphine injection 2 mg  2 mg Intravenous Q3H PRN Palmer Dillon MD   2 mg at 04/09/24 2329    multivitamin tablet  1 tablet Oral Daily Palmer Dillon MD   1 tablet at 04/11/24 0937    mupirocin 2 % ointment   Nasal BID Palmer Dillon MD   Given at 04/11/24 0941    ondansetron disintegrating tablet 8 mg  8 mg Oral Q8H PRN Palmer Dillon MD        oxyCODONE immediate release tablet 5 mg  5 mg Oral Q4H PRN Palmer Dillon MD   5 mg at 04/10/24 1254    oxyCODONE immediate release tablet Tab 10 mg  10 mg Oral Q4H PRN Palmer Dillon MD   10 mg at 04/11/24 0939    polyethylene glycol packet 17 g  17 g Oral Daily Palmer Dillon MD        sodium chloride 0.9% flush 10 mL  10 mL Intravenous Q6H PRN Palmer Dillon MD        vitamin D 1000 units tablet 2,000 Units  2,000 Units Oral Daily Palmer Dillon MD   2,000 Units at 04/11/24 0938     Current Discharge Medication List        CONTINUE these medications which have NOT CHANGED    Details   amLODIPine (NORVASC) 5 MG tablet Take 1 tablet (5 mg total) by mouth once daily.  Qty: 30 tablet, Refills: 1     Comments: .  Associated Diagnoses: Essential hypertension      b complex vitamins tablet Take 1 tablet by mouth once daily.      celecoxib (CELEBREX) 200 MG capsule Take 1 capsule (200 mg total) by mouth once daily.    Associated Diagnoses: Chronic bilateral low back pain with bilateral sciatica      cholecalciferol, vitamin D3, 1,250 mcg (50,000 unit) capsule Take 1 capsule (50,000 Units total) by mouth every 7 days.  Qty: 13 capsule, Refills: 3      lisinopriL (PRINIVIL,ZESTRIL) 40 MG tablet Take 1 tablet (40 mg total) by mouth every evening.  Qty: 90 tablet, Refills: 1    Comments: .  Associated Diagnoses: Essential hypertension               I have seen and examined this patient within the last 30 days. My clinical findings that support the need for the home health skilled services and home bound status are the following:no   Weakness/numbness causing balance and gait disturbance due to Surgery making it taxing to leave home.     Diet:   regular diet    Labs:  Report Lab results to PCP.    Referrals/ Consults  Physical Therapy to evaluate and treat. Evaluate for home safety and equipment needs; Establish/upgrade home exercise program. Perform / instruct on therapeutic exercises, gait training, transfer training, and Range of Motion.  Occupational Therapy to evaluate and treat. Evaluate home environment for safety and equipment needs. Perform/Instruct on transfers, ADL training, ROM, and therapeutic exercises.    Activities:   other WBAT with spine precautions    Nursing:   Agency to admit patient within 24 hours of hospital discharge unless specified on physician order or at patient request    SN to complete comprehensive assessment including routine vital signs. Instruct on disease process and s/s of complications to report to MD. Review/verify medication list sent home with the patient at time of discharge  and instruct patient/caregiver as needed. Frequency may be adjusted depending on start of care date.      Skilled nurse to perform up to 3 visits PRN for symptoms related to diagnosis    Notify MD if SBP > 160 or < 90; DBP > 90 or < 50; HR > 120 or < 50; Temp > 101; O2 < 88%    Ok to schedule additional visits based on staff availability and patient request on consecutive days within the home health episode.    When multiple disciplines ordered:    Start of Care occurs on Sunday - Wednesday schedule remaining discipline evaluations as ordered on separate consecutive days following the start of care.    Thursday SOC -schedule subsequent evaluations Friday and Monday the following week.     Friday - Saturday SOC - schedule subsequent discipline evaluations on consecutive days starting Monday of the following week.    For all post-discharge communication and subsequent orders please contact patient's primary care physician. If unable to reach primary care physician or do not receive response within 30 minutes, please contact [facility] for clinical staff order clarification    Miscellaneous   Routine Skin for Bedridden Patients: Instruct patient/caregiver to apply moisture barrier cream to all skin folds and wet areas in perineal area daily and after baths and all bowel movements.    Home Health Aide:  Physical Therapy Three times weekly and Occupational Therapy Three times weekly    Wound Care Orders  yes:  Surgical Wound:  Location: Back    Wound Care: You may remove your dressing and shower 7 days after surgery. Until then please keep your wound clean and dry. Sponge baths are acceptable. Do not go in a pool or hot tub until seen in clinic. Please leave the small steri-strips covering your wound in place until they fall off naturally (2 weeks). You may notice clear suture ends hanging from the sides of your incision after the steri-strips are removed, it is ok to clip these with scissors.    I certify that this patient is confined to her home and needs physical therapy and occupational  therapy.

## 2024-04-11 NOTE — HOSPITAL COURSE
On 4/9/24, the patient arrived to the [facility] for proper pre-operative management.  Upon completion of pre-operative preparation, the patient was taken back to the operative theatre. Revision posterior spinal fusion, L2 to pelvis, was performed without complication and the patient was transported to the post anesthesia care unit in stable condition.  After appropriate recovery from the anaesthetic agents used during the surgery, the patient was then transported to the hospital inpatient floor.  The interim of the hospital stay from arrival on the floor up to discharge has been uncomplicated. The patient has tolerated regular diet.  The patient's pain has been controlled using a multimodal approach. Currently, the patient's pain is well controlled on an oral regimen.  The patient has been voiding without difficulty.  The patient began participation in physical therapy after surgery and has progressed throughout the entire hospital stay.  Currently, the patient's progress is sufficient to allow the them to be discharged to home safely.  The patient agrees with this assessment and desires a discharge today.

## 2024-04-11 NOTE — PLAN OF CARE
Problem: Adult Inpatient Plan of Care  Goal: Plan of Care Review  Outcome: Ongoing, Not Progressing  Goal: Patient-Specific Goal (Individualized)  Outcome: Ongoing, Not Progressing  Goal: Absence of Hospital-Acquired Illness or Injury  Outcome: Ongoing, Not Progressing  Goal: Optimal Comfort and Wellbeing  Outcome: Ongoing, Not Progressing  Goal: Readiness for Transition of Care  Outcome: Ongoing, Not Progressing     Problem: Infection  Goal: Absence of Infection Signs and Symptoms  Outcome: Ongoing, Not Progressing     Problem: Bleeding (Spinal Surgery)  Goal: Absence of Bleeding  Outcome: Ongoing, Not Progressing     Problem: Bowel Motility Impaired (Spinal Surgery)  Goal: Effective Bowel Elimination  Outcome: Ongoing, Not Progressing     Problem: Fluid and Electrolyte Imbalance (Spinal Surgery)  Goal: Fluid and Electrolyte Balance  Outcome: Ongoing, Not Progressing     Problem: Functional Ability Impaired (Spinal Surgery)  Goal: Optimal Functional Ability  Outcome: Ongoing, Not Progressing     Problem: Infection (Spinal Surgery)  Goal: Absence of Infection Signs and Symptoms  Outcome: Ongoing, Not Progressing     Problem: Neurologic Impairment (Spinal Surgery)  Goal: Optimal Neurologic Function  Outcome: Ongoing, Not Progressing     Problem: Ongoing Anesthesia Effects (Spinal Surgery)  Goal: Anesthesia/Sedation Recovery  Outcome: Ongoing, Not Progressing     Problem: Pain (Spinal Surgery)  Goal: Acceptable Pain Control  Outcome: Ongoing, Not Progressing     Problem: Postoperative Nausea and Vomiting (Spinal Surgery)  Goal: Nausea and Vomiting Relief  Outcome: Ongoing, Not Progressing     Problem: Postoperative Urinary Retention (Spinal Surgery)  Goal: Effective Urinary Elimination  Outcome: Ongoing, Not Progressing     Problem: Respiratory Compromise (Spinal Surgery)  Goal: Effective Oxygenation and Ventilation  Outcome: Ongoing, Not Progressing     Problem: Pain Acute  Goal: Acceptable Pain Control and  Functional Ability  Outcome: Ongoing, Not Progressing     Problem: Fall Injury Risk  Goal: Absence of Fall and Fall-Related Injury  Outcome: Ongoing, Not Progressing          No acute events overnight Pt has lower back incision with island dressing. Pt has BL leg braces and she ambulate to the bathroom with the walker. Pt has PRN for pain control. Pt has the call light within reach and the bed in the lowest position.

## 2024-04-11 NOTE — HPI
Genia Mcnulty is a 58 y.o. female who presents for revision posterior spinal fusion due to pseudoarthrosis.

## 2024-04-11 NOTE — ASSESSMENT & PLAN NOTE
Genia [place] is a 58 y.o. female is s/p revision PSF on 4/9/24    Surgical dressing C/D/I  Pain control: multimodal  PT/OT: WBAT BLE, spine precautions  DVT PPx: SQH TID, SCDs at all times when not ambulating   Nursing: Incentive spirometry, Monitor and record drain output each shift     Dispo: potential discharge today

## 2024-04-12 ENCOUNTER — PATIENT MESSAGE (OUTPATIENT)
Dept: ORTHOPEDICS | Facility: CLINIC | Age: 59
End: 2024-04-12
Payer: COMMERCIAL

## 2024-04-12 PROCEDURE — G0180 MD CERTIFICATION HHA PATIENT: HCPCS | Mod: ,,, | Performed by: ORTHOPAEDIC SURGERY

## 2024-04-12 NOTE — OP NOTE
DATE OF PROCEDURE:  4/9/24     SURGEON:  Rocky Herrmann M.D.     CO-SURGEON:  Ahmet Samano M.D. of the Neurosurgery Service.     PREOPERATIVE DIAGNOSES:  Lumbosacral pseudoarthrosis status post T11 to pelvis   posterior spinal fusion.  History of L2 ependymoma status post corpectomy.     POSTOPERATIVE DIAGNOSES:  Lumbosacral pseudoarthrosis status post T11 to pelvis   posterior spinal fusion.  History of L2 ependymoma status post corpectomy.     PROCEDURES PERFORMED:  1.  Revision posterior spinal fusion, L2 to pelvis.  2.  Removal and reinsertion of posterior segmental instrumentation.  3.  Iliac crest bone graft, harvested through a separate fascial incision     ANESTHESIA:  General endotracheal anesthesia.     ESTIMATED BLOOD LOSS:  150 cc     IMPLANTS:  DePuy Stubmaticium      SPONGE AND NEEDLE COUNT:  Correct x2.     FINDINGS:  Pseudoarthrosis at the L3-4 level and bilaterally     DRAINS:  Per Plastic Surgery.     REASON FOR OPERATION/BRIEF HISTORY AND PHYSICAL: See Dr. Herrmann's note.     DESCRIPTION OF PROCEDURE:      For details about patient intubation, anesthesia induction, positioning, and localization please see Dr. Herrmann's separate operative note.  My involvement in this case began at the time of the incision.     A full timeout was then closed identifying the patient, the procedural site and   levels, the availability of all instruments and implants, no specific nursing,   surgical, anesthetic or neurological monitoring concerns.  Finding that it was   safe to use proceed with surgery, the patient was given weight appropriate dose   of Ancef and I infiltrated the planned incision with 10 mL of 1% lidocaine with   epinephrine.  Next, I made a midline lumbar skin incision, and performed a super fascial dissection out to the patient's right-sided posterior superior iliac spine.  There we made a fascial incision overlying the PSIS, and performed a right-sided iliac crest bone graft harvest in a  standard fashion.  The iliac crest bone graft harvest site was then copiously irrigated, packed with Gelfoam, and closed with a 1. Vicryl in a running fashion.  We then performed a   revision exposure of the patient's hardware essentially from the level of the   crosslink down.  Great care was taken of the patient's known wide central   laminectomy.  We then exposed the patient's hardware bilaterally from L2 to the pelvis, as well as the patient's bony fusion mass from L2 to the pelvis.  On the right side we removed the layton from L 2 to the pelvis.  The right-sided S1 screw was loose, it was removed and upsized.  On the left side we removed the layton from L2 to the pelvis.  The left-sided L5 pedicle screw was removed and not replaced.  With the rods removed we exposed the patient's pseudoarthrosis, and decorticate with a high-speed drill, and packed it with her iliac crest bone graft as well as local bone graft.  Rods were then measured contoured and locked into place with set plugs, torque wrench, and inline connector.  The  wound was turned over to the   Plastic Surgery team for closure.     JUSTIFICATION OF MARIANNA, 22 MODIFIER:  Plastic Surgery closure:  This is a   complex revision operation.  The patient is with a history of multiple prior   spinal operations as well as radiation.  All aspects of this patient's care more   difficult from preoperative decision making to intraoperative dissection,   revision, instrumentation, laminectomy and interbody fusion.  The combined   efforts of two attending surgeons indicated to help expedite surgery, decrease   blood loss and improve outcomes.

## 2024-04-15 ENCOUNTER — PATIENT OUTREACH (OUTPATIENT)
Dept: ADMINISTRATIVE | Facility: CLINIC | Age: 59
End: 2024-04-15
Payer: COMMERCIAL

## 2024-04-15 ENCOUNTER — TELEPHONE (OUTPATIENT)
Dept: PLASTIC SURGERY | Facility: CLINIC | Age: 59
End: 2024-04-15
Payer: COMMERCIAL

## 2024-04-15 ENCOUNTER — PATIENT MESSAGE (OUTPATIENT)
Dept: ADMINISTRATIVE | Facility: CLINIC | Age: 59
End: 2024-04-15
Payer: COMMERCIAL

## 2024-04-15 NOTE — PROGRESS NOTES
C3 nurse attempted to contact Genia Mcnulty for a TCC post hospital discharge follow up call.     No answer, left voicemail with callback information. The patient does not have a scheduled HOSFU appointment with her PCP, Charla Meadows MD within the first 5-7 days post discharge date of 4/11/24. No messages routed at this time.

## 2024-04-16 ENCOUNTER — PATIENT MESSAGE (OUTPATIENT)
Dept: FAMILY MEDICINE | Facility: CLINIC | Age: 59
End: 2024-04-16
Payer: COMMERCIAL

## 2024-04-16 NOTE — PROGRESS NOTES
C3 nurse attempted to contact Genia Mcnulty for a TCC post hospital discharge follow up call. No answer, left voicemail with callback information.     The patient does not have a scheduled HOSFU appointment with her PCP, Charla Meadows MD within the first 5-7 days post discharge date of 4/11/24. Message routed to Charla Meadows MD requesting appointment assistance.

## 2024-04-23 ENCOUNTER — PATIENT MESSAGE (OUTPATIENT)
Dept: FAMILY MEDICINE | Facility: CLINIC | Age: 59
End: 2024-04-23
Payer: COMMERCIAL

## 2024-04-24 ENCOUNTER — OFFICE VISIT (OUTPATIENT)
Dept: PLASTIC SURGERY | Facility: CLINIC | Age: 59
End: 2024-04-24
Payer: COMMERCIAL

## 2024-04-24 VITALS
SYSTOLIC BLOOD PRESSURE: 132 MMHG | HEIGHT: 65 IN | DIASTOLIC BLOOD PRESSURE: 60 MMHG | BODY MASS INDEX: 25.49 KG/M2 | WEIGHT: 153 LBS | HEART RATE: 77 BPM

## 2024-04-24 DIAGNOSIS — Z09 SURGERY FOLLOW-UP EXAMINATION: Primary | ICD-10-CM

## 2024-04-24 PROCEDURE — 99024 POSTOP FOLLOW-UP VISIT: CPT | Mod: S$GLB,,, | Performed by: SURGERY

## 2024-04-24 PROCEDURE — 99999 PR PBB SHADOW E&M-EST. PATIENT-LVL III: CPT | Mod: PBBFAC,,, | Performed by: SURGERY

## 2024-04-24 PROCEDURE — 3078F DIAST BP <80 MM HG: CPT | Mod: CPTII,S$GLB,, | Performed by: SURGERY

## 2024-04-24 PROCEDURE — 3075F SYST BP GE 130 - 139MM HG: CPT | Mod: CPTII,S$GLB,, | Performed by: SURGERY

## 2024-04-24 PROCEDURE — 4010F ACE/ARB THERAPY RXD/TAKEN: CPT | Mod: CPTII,S$GLB,, | Performed by: SURGERY

## 2024-04-24 PROCEDURE — 1159F MED LIST DOCD IN RCRD: CPT | Mod: CPTII,S$GLB,, | Performed by: SURGERY

## 2024-04-24 NOTE — PROGRESS NOTES
Plastic Surgery Clinic Postop Visit    Subjective:      Genia Mcnulty is a 58 y.o. year old female who presents to the Plastic Surgery Clinic on 04/24/2024 for follow up visit status post bilateral paraspinous muscle flap closure on 4.9.2024. Pt states that when she was discharged, she had minimal fluid from the incision. She reports that lately, she has soaked through 1 pad over a 2 day period.   Denies fever, chills, nausea, vomiting, or other systemic signs of infection.    Date of surgery 04/09/2024  Preoperative diagnosis spinal wound   Postoperative diagnosis is the same   Procedure performed  1. Bilateral paraspinous muscle flap closure of spinal wound  2. Advancement flap closure of spinal wound measuring 20 cm by 6 cm  Surgeon Kenny  Anesthesia general  Complications none  Blood loss minimal  Drains none         ROS:  Negative unless otherwise stated above in HPI    Objective:     Physical Exam:  Vitals:    04/24/24 1451   BP: 132/60   Pulse: 77       WD WN NAD  VSS  Normal resp effort  Back: nylons in place along incision. Small amount of SS fluid draining from incision. No fluid collection palpated.         Assessment:       1. Surgery follow-up examination        Plan:   58 y.o. female status post bilateral paraspinous muscle flap closure.   - Pt was seen and evaluated by myself and Dr. Rommel Cox.   - Placed Dermabond along incision. Pt tolerated well.   - Advised pt that drainage should slow down. Pt acknowledged understanding.   - Return to clinic in 2 weeks. Staff to schedule.      All questions were answered. The patient was advised to call the clinic with any questions or concerns prior to their next visit.       Cinthia Houston PA-C  Plastic and Reconstructive Surgery  [phone]     
Statement Selected

## 2024-04-25 ENCOUNTER — PATIENT MESSAGE (OUTPATIENT)
Dept: PLASTIC SURGERY | Facility: CLINIC | Age: 59
End: 2024-04-25
Payer: COMMERCIAL

## 2024-04-28 ENCOUNTER — PATIENT MESSAGE (OUTPATIENT)
Dept: PHYSICAL MEDICINE AND REHAB | Facility: CLINIC | Age: 59
End: 2024-04-28
Payer: COMMERCIAL

## 2024-05-06 ENCOUNTER — EXTERNAL HOME HEALTH (OUTPATIENT)
Dept: HOME HEALTH SERVICES | Facility: HOSPITAL | Age: 59
End: 2024-05-06
Payer: COMMERCIAL

## 2024-05-07 ENCOUNTER — OFFICE VISIT (OUTPATIENT)
Dept: ORTHOPEDICS | Facility: CLINIC | Age: 59
End: 2024-05-07
Payer: COMMERCIAL

## 2024-05-07 ENCOUNTER — HOSPITAL ENCOUNTER (OUTPATIENT)
Dept: RADIOLOGY | Facility: HOSPITAL | Age: 59
Discharge: HOME | End: 2024-05-07
Attending: ORTHOPAEDIC SURGERY
Payer: COMMERCIAL

## 2024-05-07 VITALS — WEIGHT: 155 LBS | BODY MASS INDEX: 25.83 KG/M2 | HEIGHT: 65 IN

## 2024-05-07 DIAGNOSIS — Z98.1 S/P LUMBAR FUSION: Primary | ICD-10-CM

## 2024-05-07 DIAGNOSIS — Z98.890 S/P SPINAL SURGERY: ICD-10-CM

## 2024-05-07 DIAGNOSIS — M51.36 DDD (DEGENERATIVE DISC DISEASE), LUMBAR: Primary | ICD-10-CM

## 2024-05-07 PROCEDURE — 99999 PR PBB SHADOW E&M-EST. PATIENT-LVL III: CPT | Mod: PBBFAC,,, | Performed by: PHYSICIAN ASSISTANT

## 2024-05-07 PROCEDURE — 4010F ACE/ARB THERAPY RXD/TAKEN: CPT | Mod: CPTII,S$GLB,, | Performed by: PHYSICIAN ASSISTANT

## 2024-05-07 PROCEDURE — 1159F MED LIST DOCD IN RCRD: CPT | Mod: CPTII,S$GLB,, | Performed by: PHYSICIAN ASSISTANT

## 2024-05-07 PROCEDURE — 72100 X-RAY EXAM L-S SPINE 2/3 VWS: CPT | Mod: 26,,, | Performed by: RADIOLOGY

## 2024-05-07 PROCEDURE — 99024 POSTOP FOLLOW-UP VISIT: CPT | Mod: S$GLB,,, | Performed by: PHYSICIAN ASSISTANT

## 2024-05-07 PROCEDURE — 72100 X-RAY EXAM L-S SPINE 2/3 VWS: CPT | Mod: TC

## 2024-05-07 RX ORDER — ONDANSETRON 4 MG/1
4 TABLET, ORALLY DISINTEGRATING ORAL 2 TIMES DAILY
Qty: 30 TABLET | Refills: 0 | Status: SHIPPED | OUTPATIENT
Start: 2024-05-07

## 2024-05-07 RX ORDER — OXYCODONE HYDROCHLORIDE 5 MG/1
5 TABLET ORAL EVERY 6 HOURS PRN
Qty: 12 TABLET | Refills: 0 | Status: SHIPPED | OUTPATIENT
Start: 2024-05-07 | End: 2024-05-09 | Stop reason: ALTCHOICE

## 2024-05-07 NOTE — PROGRESS NOTES
Date: 05/08/2024    Supervising Physician: Rocky Herrmann M.D.    Date of Surgery: 4/9/2024    Procedure: Revision L2-pelvis    History: Genia Mcnulty is seen today for follow-up following the above listed procedure. Overall the patient is doing well but today notes the pain she was having before surgery is improved.  She does still have some back pain.   Pain is well controlled with current pain medication.  she denies fever, chills, and sweats since the time of the surgery.       Exam:  Incision is healing well, clean, dry and intact. There is no sign of infection. Neuro exam is stable. No signs of DVT.    Radiographs: imaging today shows hardware in place, no evidence of failure.    Assessment/Plan: 4 weeks post op.    Doing well postoperatively. She has an appointment with Dr. Cox tomorrow for wound check.     I will plan to see the patient back for the next postop visit in 2 months with imaging.     Thank you for the opportunity to participate in this patient's care. Please give me a call if there are any concerns or questions.

## 2024-05-08 ENCOUNTER — OFFICE VISIT (OUTPATIENT)
Dept: PLASTIC SURGERY | Facility: CLINIC | Age: 59
End: 2024-05-08
Payer: COMMERCIAL

## 2024-05-08 ENCOUNTER — DOCUMENT SCAN (OUTPATIENT)
Dept: HOME HEALTH SERVICES | Facility: HOSPITAL | Age: 59
End: 2024-05-08
Payer: COMMERCIAL

## 2024-05-08 VITALS
HEIGHT: 65 IN | WEIGHT: 155 LBS | SYSTOLIC BLOOD PRESSURE: 148 MMHG | HEART RATE: 81 BPM | BODY MASS INDEX: 25.83 KG/M2 | DIASTOLIC BLOOD PRESSURE: 85 MMHG

## 2024-05-08 DIAGNOSIS — S32.009K PSEUDOARTHROSIS OF LUMBAR SPINE: ICD-10-CM

## 2024-05-08 DIAGNOSIS — Z09 SURGERY FOLLOW-UP EXAMINATION: Primary | ICD-10-CM

## 2024-05-08 DIAGNOSIS — G95.9 MYELOPATHY OF LUMBAR REGION: Primary | ICD-10-CM

## 2024-05-08 DIAGNOSIS — Z98.1 STATUS POST LUMBAR SPINAL FUSION: ICD-10-CM

## 2024-05-08 PROCEDURE — 4010F ACE/ARB THERAPY RXD/TAKEN: CPT | Mod: CPTII,S$GLB,, | Performed by: SURGERY

## 2024-05-08 PROCEDURE — 99499 UNLISTED E&M SERVICE: CPT | Mod: ,,, | Performed by: ORTHOPAEDIC SURGERY

## 2024-05-08 PROCEDURE — 99999 PR PBB SHADOW E&M-EST. PATIENT-LVL III: CPT | Mod: PBBFAC,,, | Performed by: SURGERY

## 2024-05-08 PROCEDURE — 3077F SYST BP >= 140 MM HG: CPT | Mod: CPTII,S$GLB,, | Performed by: SURGERY

## 2024-05-08 PROCEDURE — 99024 POSTOP FOLLOW-UP VISIT: CPT | Mod: S$GLB,,, | Performed by: SURGERY

## 2024-05-08 PROCEDURE — 3079F DIAST BP 80-89 MM HG: CPT | Mod: CPTII,S$GLB,, | Performed by: SURGERY

## 2024-05-08 RX ORDER — CIPROFLOXACIN 500 MG/1
500 TABLET ORAL EVERY 8 HOURS
Qty: 21 TABLET | Refills: 0 | Status: SHIPPED | OUTPATIENT
Start: 2024-05-08 | End: 2024-05-15

## 2024-05-08 RX ORDER — SULFAMETHOXAZOLE AND TRIMETHOPRIM 800; 160 MG/1; MG/1
1 TABLET ORAL 2 TIMES DAILY
Qty: 14 TABLET | Refills: 0 | Status: SHIPPED | OUTPATIENT
Start: 2024-05-08 | End: 2024-05-15

## 2024-05-08 NOTE — PROGRESS NOTES
Patient presents Plastic surgery Clinic after having bilateral paraspinous muscle flaps for a spinal wound.  Patient has had a I believe about 7 previous surgeries.  Patient had some initial drainage through the incision.  She has never had any evidence in even today has no evidence of any cellulitis or any type of infection.  She did however show me photographs that her  took of some drainage which appeared green from her incision.  This was collected in the bandage.  I spent a long time trying to express fluid from the wound.  I did express a little bit of clear serous fluid from the wound without was probably less than 1 cc.  I could not find any superficial pocket whatsoever.  I saw no evidence of any cellulitis or infection.  I had a long talk with the patient I think we should go ahead and proceed with an MRI scan.  To see if there is any type of deep fluid collection.  This will be ordered today.

## 2024-05-09 ENCOUNTER — PATIENT MESSAGE (OUTPATIENT)
Dept: ORTHOPEDICS | Facility: CLINIC | Age: 59
End: 2024-05-09
Payer: COMMERCIAL

## 2024-05-09 ENCOUNTER — OFFICE VISIT (OUTPATIENT)
Dept: PHYSICAL MEDICINE AND REHAB | Facility: CLINIC | Age: 59
End: 2024-05-09
Payer: COMMERCIAL

## 2024-05-09 VITALS — BODY MASS INDEX: 25.83 KG/M2 | OXYGEN SATURATION: 98 % | WEIGHT: 155 LBS | HEIGHT: 65 IN

## 2024-05-09 DIAGNOSIS — M21.371 FOOT DROP, RIGHT: ICD-10-CM

## 2024-05-09 DIAGNOSIS — M17.11 PRIMARY OSTEOARTHRITIS OF RIGHT KNEE: ICD-10-CM

## 2024-05-09 DIAGNOSIS — R29.898 WEAKNESS OF BOTH LEGS: ICD-10-CM

## 2024-05-09 DIAGNOSIS — M47.16 LUMBAR SPONDYLOSIS WITH MYELOPATHY: ICD-10-CM

## 2024-05-09 DIAGNOSIS — Z98.1 S/P LUMBAR SPINAL FUSION: ICD-10-CM

## 2024-05-09 DIAGNOSIS — M54.42 CHRONIC BILATERAL LOW BACK PAIN WITH BILATERAL SCIATICA: Primary | ICD-10-CM

## 2024-05-09 DIAGNOSIS — R26.9 GAIT DISORDER: ICD-10-CM

## 2024-05-09 DIAGNOSIS — Z98.1 STATUS POST LUMBAR SPINAL FUSION: ICD-10-CM

## 2024-05-09 DIAGNOSIS — G56.01 CARPAL TUNNEL SYNDROME OF RIGHT WRIST: ICD-10-CM

## 2024-05-09 DIAGNOSIS — M54.41 CHRONIC BILATERAL LOW BACK PAIN WITH BILATERAL SCIATICA: Primary | ICD-10-CM

## 2024-05-09 DIAGNOSIS — G89.29 CHRONIC BILATERAL LOW BACK PAIN WITH BILATERAL SCIATICA: Primary | ICD-10-CM

## 2024-05-09 PROCEDURE — 99214 OFFICE O/P EST MOD 30 MIN: CPT | Mod: S$GLB,,, | Performed by: PHYSICAL MEDICINE & REHABILITATION

## 2024-05-09 PROCEDURE — 4010F ACE/ARB THERAPY RXD/TAKEN: CPT | Mod: CPTII,S$GLB,, | Performed by: PHYSICAL MEDICINE & REHABILITATION

## 2024-05-09 PROCEDURE — 3008F BODY MASS INDEX DOCD: CPT | Mod: CPTII,S$GLB,, | Performed by: PHYSICAL MEDICINE & REHABILITATION

## 2024-05-09 PROCEDURE — 1111F DSCHRG MED/CURRENT MED MERGE: CPT | Mod: CPTII,S$GLB,, | Performed by: PHYSICAL MEDICINE & REHABILITATION

## 2024-05-09 PROCEDURE — 1159F MED LIST DOCD IN RCRD: CPT | Mod: CPTII,S$GLB,, | Performed by: PHYSICAL MEDICINE & REHABILITATION

## 2024-05-09 PROCEDURE — 99999 PR PBB SHADOW E&M-EST. PATIENT-LVL III: CPT | Mod: PBBFAC,,, | Performed by: PHYSICAL MEDICINE & REHABILITATION

## 2024-05-09 RX ORDER — OXYCODONE AND ACETAMINOPHEN 10; 325 MG/1; MG/1
1 TABLET ORAL EVERY 6 HOURS PRN
Qty: 120 TABLET | Refills: 0 | Status: SHIPPED | OUTPATIENT
Start: 2024-05-09 | End: 2024-06-03 | Stop reason: SDUPTHER

## 2024-05-09 RX ORDER — CELECOXIB 100 MG/1
100 CAPSULE ORAL DAILY PRN
Start: 2024-05-09

## 2024-05-09 NOTE — PROGRESS NOTES
Subjective:       Patient ID: Genia Mcnulty is a 58 y.o. female.    Chief Complaint: No chief complaint on file.      HPI      Mrs. Mcnulty is a 58-year-old white female with past medical history hypertension and multiple back surgeries with persistent back pain.  She is followed up at the Physical Medicine Clinic for chronic low back pain with lumbar radiculopathy.  She is status post L1-3 fusion in 2012 complicated by CSF leak, T11-pelvis fusion in 11/2015, and revision of her fusion surgery with T10-pelvis fusion in 10/2018 by Dr. Herrmann.  Her last visit to the Physical Medicine Clinic was on on 12/20 4th.  She was maintained on celecoxib and p.r.n. oxycodone/APAP.  A Pain Clinic Drug Screen was obtained and was positive as expected for oxycodone.    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  On 4/9/2024, she underwent revision posterior spinal fusion at L2-pelvis by Dr. Herrmann and Dr. Samano.  Was discharged home in stable condition with home health on 4/11/2024.  She reports she has been medically stable.  However she was seen by Plastic surgery yesterday and was noted to have drainage.  He was started on ciprofloxacin and Bactrim.  Lumbar spine was ordered for 5/14/2024.    The patient is coming to the clinic for follow-up.  Her low back pain has been stable with occasional flare ups.  It is a constant aching pain in the lower lumbar spine and across her back.  The pain used to radiate to both lower extremities down to the feet with sharp sensations but since the surgery only to the left knee.  Her pain is aggravated by standing or walking and better with sitting down and rest.  Her maximum pain is 8-910 and minimum 2-3/10.  Today it is 7/10.  She denies any bowel or bladder incontinence.      She is currently taking:  Celebrex 100 mg p.o. once per day as needed about once per month.    Oxycodone 5 mg  tablets p.r.n. q.6 hours. Her pain has been worse and she prefers to go back to the previous regimen of oxycodone/APAP 10/325 p.o. q.6 hours p.r.n..  She previously failed gabapentin, pregabalin, duloxetine and venlafaxine for side effects mostly GI upset.    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 Negative for fever.   Eyes:  Negative for visual disturbance.   Respiratory:  Negative for shortness of breath.    Cardiovascular:  Negative for chest pain.   Gastrointestinal:  Positive for constipation and nausea. Negative for blood in stool and vomiting.   Genitourinary:  Negative for difficulty urinating.   Musculoskeletal:  Positive for arthralgias, back pain, gait problem and neck pain.   Neurological:  Positive for syncope and weakness. Negative for dizziness and headaches.   Psychiatric/Behavioral:  Positive for sleep disturbance. Negative for behavioral problems.              Objective:      Physical Exam  Vitals reviewed.   Constitutional:       Appearance: She is well-developed.      Comments: Coming to the clinic in a manual wheelchair.     HENT:      Head: Normocephalic and atraumatic.   Eyes:      Extraocular Movements: Extraocular movements intact.   Musculoskeletal:      Cervical back: Normal range of motion. No tenderness.      Comments: BUE:  ROM:   RUE: full.   LUE: full.  Strength:    RUE: 5-/5 at shoulder abduction, 65 elbow flexion, 5 elbow extension, 5 hand .   LUE: 5-/5 at shoulder abduction, 5 elbow flexion, 5 elbow  extension, 5 hand .  Sensation to pinprick:   RUE: intact.   LUE: intact.      BLE:  ROM:   RLE: full.   LLE: full.  Knee crepitus:   RLE: -ve.   LLE: -ve.   Strength:    RLE: 1/5 at hip flexion, 1 knee extension, 1 ankle DF, 3 PF.   LLE: 3/5 at hip flexion, 4 knee extension, 3 ankle DF, 4 PF.  Sensation to pinprick:     RLE: intact.      LLE: intact.   SLR (sitting):      RLE: -ve.      LLE: -ve.     Skin:     General: Skin is warm.   Neurological:      General: No focal deficit present.      Mental Status: She is alert.   Psychiatric:         Behavior: Behavior normal.       Assessment:       1. Chronic bilateral low back pain with bilateral sciatica    2. S/P lumbar spinal fusion (T11 to pelvis, 2018)    3. S/P post revision lumbar spinal fusion (L2-pelvis, 4/12/24)    4. Lumbar spondylosis with myelopathy    5. Weakness of both legs    6. Gait disorder    7. Foot drop, right    8. Primary osteoarthritis of right knee    9. Carpal tunnel syndrome of right wrist        Plan:       - Continue celecoxib (CELEBREX) 100 MG capsule; Take 1 capsule (100 mg total) by mouth daily as needed for Pain.  - Restart oxyCODONE-acetaminophen (PERCOCET)  mg per tablet; Take 1 tablet by mouth every 6 (six) hours as needed for Pain.  - Follow-up with Neurosurgery.    - Follow-up with Plastic surgery.    - Follow up in about 3 months (around 8/9/2024).      This was a 30 minute visit  (including review of records), 50% of which was spent educating the patient about the diagnosis and the treatment plan.    This note was partly generated with [employer] voice recognition software. I apologize for any possible typographical errors.

## 2024-05-10 ENCOUNTER — DOCUMENT SCAN (OUTPATIENT)
Dept: HOME HEALTH SERVICES | Facility: HOSPITAL | Age: 59
End: 2024-05-10
Payer: COMMERCIAL

## 2024-05-14 ENCOUNTER — HOSPITAL ENCOUNTER (OUTPATIENT)
Dept: RADIOLOGY | Facility: HOSPITAL | Age: 59
Discharge: HOME | End: 2024-05-14
Attending: SURGERY
Payer: COMMERCIAL

## 2024-05-14 DIAGNOSIS — G95.9 MYELOPATHY OF LUMBAR REGION: ICD-10-CM

## 2024-05-14 DIAGNOSIS — S32.009K PSEUDOARTHROSIS OF LUMBAR SPINE: ICD-10-CM

## 2024-05-14 DIAGNOSIS — Z98.1 STATUS POST LUMBAR SPINAL FUSION: ICD-10-CM

## 2024-05-14 PROCEDURE — 72158 MRI LUMBAR SPINE W/O & W/DYE: CPT | Mod: 26,,, | Performed by: RADIOLOGY

## 2024-05-14 PROCEDURE — 72158 MRI LUMBAR SPINE W/O & W/DYE: CPT | Mod: TC

## 2024-05-14 PROCEDURE — 25500020 PHARM REV CODE 255: Performed by: SURGERY

## 2024-05-14 PROCEDURE — A9585 GADOBUTROL INJECTION: HCPCS | Performed by: SURGERY

## 2024-05-14 RX ORDER — GADOBUTROL 604.72 MG/ML
7 INJECTION INTRAVENOUS
Status: COMPLETED | OUTPATIENT
Start: 2024-05-14 | End: 2024-05-14

## 2024-05-14 RX ADMIN — GADOBUTROL 7 ML: 604.72 INJECTION INTRAVENOUS at 04:05

## 2024-05-20 ENCOUNTER — PATIENT MESSAGE (OUTPATIENT)
Dept: PLASTIC SURGERY | Facility: CLINIC | Age: 59
End: 2024-05-20
Payer: COMMERCIAL

## 2024-05-22 ENCOUNTER — TELEPHONE (OUTPATIENT)
Dept: FAMILY MEDICINE | Facility: CLINIC | Age: 59
End: 2024-05-22
Payer: COMMERCIAL

## 2024-05-22 ENCOUNTER — TELEPHONE (OUTPATIENT)
Dept: PLASTIC SURGERY | Facility: CLINIC | Age: 59
End: 2024-05-22
Payer: COMMERCIAL

## 2024-05-22 ENCOUNTER — PATIENT MESSAGE (OUTPATIENT)
Dept: ADMINISTRATIVE | Facility: HOSPITAL | Age: 59
End: 2024-05-22
Payer: COMMERCIAL

## 2024-05-22 VITALS — SYSTOLIC BLOOD PRESSURE: 128 MMHG | DIASTOLIC BLOOD PRESSURE: 81 MMHG

## 2024-05-22 DIAGNOSIS — Z98.1 STATUS POST LUMBAR SPINAL FUSION: Primary | ICD-10-CM

## 2024-05-22 NOTE — TELEPHONE ENCOUNTER
Dr. Cox had a long conversation with the pt regarding her MRI results. Dr. Cox has requested an ultrasound of the lower back to look for seromas. The ultrasound must be standing. Orders were placed. We will schedule a pt for a visit next week.

## 2024-05-28 ENCOUNTER — HOSPITAL ENCOUNTER (OUTPATIENT)
Dept: RADIOLOGY | Facility: HOSPITAL | Age: 59
Discharge: HOME | End: 2024-05-28
Payer: COMMERCIAL

## 2024-05-28 DIAGNOSIS — Z98.1 STATUS POST LUMBAR SPINAL FUSION: ICD-10-CM

## 2024-05-28 PROCEDURE — 76705 ECHO EXAM OF ABDOMEN: CPT | Mod: 26,,, | Performed by: RADIOLOGY

## 2024-05-28 PROCEDURE — 76705 ECHO EXAM OF ABDOMEN: CPT | Mod: TC

## 2024-05-29 ENCOUNTER — OFFICE VISIT (OUTPATIENT)
Dept: PLASTIC SURGERY | Facility: CLINIC | Age: 59
End: 2024-05-29
Payer: COMMERCIAL

## 2024-05-29 VITALS
WEIGHT: 155 LBS | BODY MASS INDEX: 25.83 KG/M2 | SYSTOLIC BLOOD PRESSURE: 145 MMHG | HEART RATE: 70 BPM | HEIGHT: 65 IN | DIASTOLIC BLOOD PRESSURE: 63 MMHG | OXYGEN SATURATION: 99 %

## 2024-05-29 DIAGNOSIS — Z09 SURGERY FOLLOW-UP EXAMINATION: Primary | ICD-10-CM

## 2024-05-29 PROCEDURE — 3078F DIAST BP <80 MM HG: CPT | Mod: CPTII,S$GLB,,

## 2024-05-29 PROCEDURE — 3077F SYST BP >= 140 MM HG: CPT | Mod: CPTII,S$GLB,,

## 2024-05-29 PROCEDURE — 99024 POSTOP FOLLOW-UP VISIT: CPT | Mod: S$GLB,,,

## 2024-05-29 PROCEDURE — 99999 PR PBB SHADOW E&M-EST. PATIENT-LVL III: CPT | Mod: PBBFAC,,,

## 2024-05-29 PROCEDURE — 4010F ACE/ARB THERAPY RXD/TAKEN: CPT | Mod: CPTII,S$GLB,,

## 2024-05-29 NOTE — PROGRESS NOTES
Plastic Surgery Clinic Postop Visit    Subjective:      Genia Mcnulty is a 58 y.o. year old female who presents to the Plastic Surgery Clinic on 5.29.2024 for follow up visit status post bilateral paraspinous muscle flap closure on 4.9.2024. Pt was previously seen by Dr. Cox and found to have some drainage from her incision. She reported she was placed on antibiotics and an MRI was obtained. Then, an ultrasound was scheduled. Ultrasound revealed a very small seroma. Pt states that since she saw Dr. Cox, the drainage from her incision site has decreased.   Denies fever, chills, nausea, vomiting, or other systemic signs of infection.    Date of surgery 04/09/2024  Preoperative diagnosis spinal wound   Postoperative diagnosis is the same   Procedure performed  1. Bilateral paraspinous muscle flap closure of spinal wound  2. Advancement flap closure of spinal wound measuring 20 cm by 6 cm  Surgeon Kenny  Anesthesia general  Complications none  Blood loss minimal  Drains none         ROS:  Negative unless otherwise stated above in HPI    Objective:     Physical Exam:  Vitals:    05/29/24 1132   BP: (!) 145/63   Pulse: 70       WD WN NAD  VSS  Normal resp effort  Spine: very small <1mm opening in the incision with minimal serous fluid. No signs of infection. Nylon sutures inferiorly.     Ultrasound 5.29.2024:  Tiny intra dermal fluid collection consistent with a seroma.     Assessment:       1. Surgery follow-up examination        Plan:   58 y.o. female status post spinal closure.   - Attempted to express any drainage from the very small opening in the incision. <1cc of fluid came out.   - Nylon sutures removed today with no complications.   - Return to clinic in 1 week. Staff to schedule.      All questions were answered. The patient was advised to call the clinic with any questions or concerns prior to their next visit.       Cinthia Houston PA-C  Plastic and Reconstructive Surgery  [phone]

## 2024-06-04 ENCOUNTER — TELEPHONE (OUTPATIENT)
Dept: NEUROLOGY | Facility: CLINIC | Age: 59
End: 2024-06-04
Payer: COMMERCIAL

## 2024-06-04 RX ORDER — OXYCODONE AND ACETAMINOPHEN 10; 325 MG/1; MG/1
1 TABLET ORAL EVERY 6 HOURS PRN
Qty: 120 TABLET | Refills: 0 | Status: SHIPPED | OUTPATIENT
Start: 2024-06-08 | End: 2024-07-08

## 2024-06-04 NOTE — TELEPHONE ENCOUNTER
Called and spoke to pt to schedule a 4 month f/u. Offered soonest in Sept int he afternoon--Tuesday, Sept 10th @ 3:40pm. Pt verbalized understanding and confirmed appt details.     ----- Message from Linda Brewer sent at 5/31/2024  2:51 PM CDT -----  Name of Who is Calling:    Pt called       What is the request in detail:    The pt was told that they would be receiving a call for a follow up . Please advise       Can the clinic reply by [facility]:  Yes   What Number to Call Back [facility]: Telephone Information:  Mobile          [phone]

## 2024-06-05 ENCOUNTER — OFFICE VISIT (OUTPATIENT)
Dept: PLASTIC SURGERY | Facility: CLINIC | Age: 59
End: 2024-06-05
Payer: COMMERCIAL

## 2024-06-05 VITALS
SYSTOLIC BLOOD PRESSURE: 166 MMHG | HEART RATE: 69 BPM | HEIGHT: 65 IN | DIASTOLIC BLOOD PRESSURE: 71 MMHG | OXYGEN SATURATION: 99 % | BODY MASS INDEX: 25.83 KG/M2 | WEIGHT: 155 LBS

## 2024-06-05 DIAGNOSIS — Z09 SURGERY FOLLOW-UP EXAMINATION: Primary | ICD-10-CM

## 2024-06-05 PROCEDURE — 4010F ACE/ARB THERAPY RXD/TAKEN: CPT | Mod: CPTII,S$GLB,, | Performed by: SURGERY

## 2024-06-05 PROCEDURE — 1159F MED LIST DOCD IN RCRD: CPT | Mod: CPTII,S$GLB,, | Performed by: SURGERY

## 2024-06-05 PROCEDURE — 99999 PR PBB SHADOW E&M-EST. PATIENT-LVL III: CPT | Mod: PBBFAC,,, | Performed by: SURGERY

## 2024-06-05 PROCEDURE — 3078F DIAST BP <80 MM HG: CPT | Mod: CPTII,S$GLB,, | Performed by: SURGERY

## 2024-06-05 PROCEDURE — 99024 POSTOP FOLLOW-UP VISIT: CPT | Mod: S$GLB,,, | Performed by: SURGERY

## 2024-06-05 PROCEDURE — 3077F SYST BP >= 140 MM HG: CPT | Mod: CPTII,S$GLB,, | Performed by: SURGERY

## 2024-06-05 NOTE — LETTER
[facility] 2nd Floor  [address] [facility] [address] [address]  Phone: [phone]  Fax: [phone] June 24, 2024      Charla Meadows MD  [address] [place] [address] [place] [id number]    Patient: Genia Mcnulty   MR Number: [id number]   YOB: 1965   Date of Visit: 6/5/2024     Dear Dr. Meadows:    Thank you for referring Genia Mcnulty to me for evaluation. Attached are the relevant portions of my assessment and plan of care.    If you have questions, please do not hesitate to call me. I look forward to following Genia along with you.    Sincerely,    Rommel Cox MD   Section of Plastic Surgery  Department of Surgery  [facility]    CRB/hcr

## 2024-06-05 NOTE — PROGRESS NOTES
Plastic Surgery Clinic Postop Visit    Subjective:      Genia Mcnulty is a 58 y.o. year old female who presents to the Plastic Surgery Clinic on 5.29.2024 for follow up visit status post bilateral paraspinous muscle flap closure on 4.9.2024. Pt was previously seen by Dr. Cox and found to have some drainage from her incision. She reported she was placed on antibiotics and an MRI was obtained. Then, an ultrasound was scheduled. Ultrasound revealed a very small seroma. Pt states that since she saw Dr. Cox, the drainage from her incision site has decreased.   Denies fever, chills, nausea, vomiting, or other systemic signs of infection.    Interval History 6.5.2024:  Pt reports she believes the drainage has significantly subsided.     Date of surgery 04/09/2024  Preoperative diagnosis spinal wound   Postoperative diagnosis is the same   Procedure performed  1. Bilateral paraspinous muscle flap closure of spinal wound  2. Advancement flap closure of spinal wound measuring 20 cm by 6 cm  Surgeon Kenny  Anesthesia general  Complications none  Blood loss minimal  Drains none         ROS:  Negative unless otherwise stated above in HPI    Objective:     Physical Exam:  Vitals:    06/05/24 1530   BP: (!) 166/71   Pulse: 69       WD WN NAD  VSS  Normal resp effort  Spine: very small <1mm opening in the incision with minimal serous fluid. No signs of infection.    Ultrasound 5.29.2024:  Tiny intra dermal fluid collection consistent with a seroma.     Assessment:       1. Surgery follow-up examination        Plan:   58 y.o. female status post spinal closure.   - Attempted to express any drainage from the very small opening in the incision. <1cc of fluid came out.   - Pt was seen and evaluated by myself and Dr. Rommel Cox.   - Pt can cover with a bandaid and local wound care.   - Return to clinic in 4-6. Staff to schedule.      All questions were answered. The patient was advised to call the clinic  with any questions or concerns prior to their next visit.       Cinthia Houston PA-C  Plastic and Reconstructive Surgery  [phone]

## 2024-06-06 ENCOUNTER — PATIENT MESSAGE (OUTPATIENT)
Dept: PLASTIC SURGERY | Facility: CLINIC | Age: 59
End: 2024-06-06
Payer: COMMERCIAL

## 2024-06-08 ENCOUNTER — PATIENT MESSAGE (OUTPATIENT)
Dept: PHYSICAL MEDICINE AND REHAB | Facility: CLINIC | Age: 59
End: 2024-06-08
Payer: COMMERCIAL

## 2024-06-10 RX ORDER — HYDROCODONE BITARTRATE AND ACETAMINOPHEN 10; 325 MG/1; MG/1
1 TABLET ORAL EVERY 6 HOURS PRN
Qty: 120 TABLET | Refills: 0 | Status: SHIPPED | OUTPATIENT
Start: 2024-06-10 | End: 2024-07-10

## 2024-06-11 ENCOUNTER — PATIENT OUTREACH (OUTPATIENT)
Dept: ADMINISTRATIVE | Facility: HOSPITAL | Age: 59
End: 2024-06-11
Payer: COMMERCIAL

## 2024-06-11 NOTE — PROGRESS NOTES
Population Health Chart Review & Patient Outreach Details      Additional [facility] Health Notes:               Updates Requested / Reviewed:      Updated Care Coordination Note, Care Everywhere, and Immunizations Reconciliation Completed or Queried: [facility] Topics Overdue:      VB Score: 2     Mammogram  Uncontrolled BP    RSV Vaccine                  Health Maintenance Topic(s) Outreach Outcomes & Actions Taken:    Breast Cancer Screening - Outreach Outcomes & Actions Taken  : Patient outreach

## 2024-06-26 ENCOUNTER — PATIENT MESSAGE (OUTPATIENT)
Dept: PLASTIC SURGERY | Facility: CLINIC | Age: 59
End: 2024-06-26
Payer: COMMERCIAL

## 2024-07-09 ENCOUNTER — HOSPITAL ENCOUNTER (OUTPATIENT)
Dept: RADIOLOGY | Facility: HOSPITAL | Age: 59
Discharge: HOME | End: 2024-07-09
Attending: PHYSICIAN ASSISTANT
Payer: COMMERCIAL

## 2024-07-09 ENCOUNTER — OFFICE VISIT (OUTPATIENT)
Dept: ORTHOPEDICS | Facility: CLINIC | Age: 59
End: 2024-07-09
Payer: COMMERCIAL

## 2024-07-09 ENCOUNTER — OFFICE VISIT (OUTPATIENT)
Dept: PLASTIC SURGERY | Facility: CLINIC | Age: 59
End: 2024-07-09
Payer: COMMERCIAL

## 2024-07-09 VITALS
SYSTOLIC BLOOD PRESSURE: 166 MMHG | HEART RATE: 69 BPM | DIASTOLIC BLOOD PRESSURE: 71 MMHG | HEIGHT: 65 IN | BODY MASS INDEX: 24.98 KG/M2 | WEIGHT: 149.94 LBS

## 2024-07-09 VITALS — HEIGHT: 65 IN | BODY MASS INDEX: 24.99 KG/M2 | WEIGHT: 150 LBS

## 2024-07-09 DIAGNOSIS — M54.9 DORSALGIA, UNSPECIFIED: ICD-10-CM

## 2024-07-09 DIAGNOSIS — Z09 SURGERY FOLLOW-UP EXAMINATION: Primary | ICD-10-CM

## 2024-07-09 DIAGNOSIS — Z98.1 S/P LUMBAR FUSION: Primary | ICD-10-CM

## 2024-07-09 DIAGNOSIS — M51.36 DDD (DEGENERATIVE DISC DISEASE), LUMBAR: ICD-10-CM

## 2024-07-09 PROCEDURE — 72100 X-RAY EXAM L-S SPINE 2/3 VWS: CPT | Mod: 26,,, | Performed by: RADIOLOGY

## 2024-07-09 PROCEDURE — 4010F ACE/ARB THERAPY RXD/TAKEN: CPT | Mod: CPTII,S$GLB,, | Performed by: SURGERY

## 2024-07-09 PROCEDURE — 1160F RVW MEDS BY RX/DR IN RCRD: CPT | Mod: CPTII,S$GLB,, | Performed by: PHYSICIAN ASSISTANT

## 2024-07-09 PROCEDURE — 1159F MED LIST DOCD IN RCRD: CPT | Mod: CPTII,S$GLB,, | Performed by: PHYSICIAN ASSISTANT

## 2024-07-09 PROCEDURE — 99024 POSTOP FOLLOW-UP VISIT: CPT | Mod: S$GLB,,, | Performed by: PHYSICIAN ASSISTANT

## 2024-07-09 PROCEDURE — 3077F SYST BP >= 140 MM HG: CPT | Mod: CPTII,S$GLB,, | Performed by: SURGERY

## 2024-07-09 PROCEDURE — 99999 PR PBB SHADOW E&M-EST. PATIENT-LVL III: CPT | Mod: PBBFAC,,, | Performed by: PHYSICIAN ASSISTANT

## 2024-07-09 PROCEDURE — 99999 PR PBB SHADOW E&M-EST. PATIENT-LVL III: CPT | Mod: PBBFAC,,, | Performed by: SURGERY

## 2024-07-09 PROCEDURE — 1159F MED LIST DOCD IN RCRD: CPT | Mod: CPTII,S$GLB,, | Performed by: SURGERY

## 2024-07-09 PROCEDURE — 4010F ACE/ARB THERAPY RXD/TAKEN: CPT | Mod: CPTII,S$GLB,, | Performed by: PHYSICIAN ASSISTANT

## 2024-07-09 PROCEDURE — 72100 X-RAY EXAM L-S SPINE 2/3 VWS: CPT | Mod: TC

## 2024-07-09 PROCEDURE — 3078F DIAST BP <80 MM HG: CPT | Mod: CPTII,S$GLB,, | Performed by: SURGERY

## 2024-07-09 PROCEDURE — 99024 POSTOP FOLLOW-UP VISIT: CPT | Mod: S$GLB,,, | Performed by: SURGERY

## 2024-07-09 RX ORDER — OXYCODONE AND ACETAMINOPHEN 10; 325 MG/1; MG/1
1 TABLET ORAL EVERY 6 HOURS PRN
Qty: 120 TABLET | Refills: 0 | Status: SHIPPED | OUTPATIENT
Start: 2024-07-09 | End: 2024-08-08

## 2024-07-09 RX ORDER — CIPROFLOXACIN 500 MG/1
500 TABLET ORAL EVERY 12 HOURS
Qty: 20 TABLET | Refills: 0 | Status: SHIPPED | OUTPATIENT
Start: 2024-07-09 | End: 2024-07-19

## 2024-07-09 NOTE — PROGRESS NOTES
Date: 07/09/2024    Supervising Physician: Rocky Herrmann M.D.    Date of Surgery: 4/9/2024    Procedure: Revision L2-pelvis    History: Genia Mcnulty is seen today for follow-up following the above listed procedure. Overall the patient is doing well but today notes the pain she was having before surgery is improved.  She does still have some back pain.   She says yesterday she noticed some blistering on the incision and she reports continued drainage from that area.       Exam:  Incision is healing well, clean, dry and intact. There is a small area in the middle of the incision with blistering.  No active drainage in clinic.     Radiographs: imaging today shows hardware in place, no evidence of failure.    Assessment/Plan: 3 months post op.  I will get an MRI.     I will call with results.    Thank you for the opportunity to participate in this patient's care. Please give me a call if there are any concerns or questions.

## 2024-07-09 NOTE — PROGRESS NOTES
Patient presents Plastic surgery Clinic after having muscle flap closure of spinal wound.  She presents today with a 1-1/2 cm swelling along the incision.  This was probed it did not track deep at least with a Q-tip.  Patient does give history that she had some greenish clear fluid come which she has had in the past.  She is due to get an MRI next week.  I will cover her with Cipro until she gets the MRI.  I will discuss this with spine surgery.

## 2024-07-17 ENCOUNTER — PATIENT MESSAGE (OUTPATIENT)
Dept: OTHER | Facility: OTHER | Age: 59
End: 2024-07-17
Payer: COMMERCIAL

## 2024-07-23 ENCOUNTER — HOSPITAL ENCOUNTER (OUTPATIENT)
Dept: RADIOLOGY | Facility: HOSPITAL | Age: 59
Discharge: HOME | End: 2024-07-23
Attending: PHYSICIAN ASSISTANT
Payer: COMMERCIAL

## 2024-07-23 DIAGNOSIS — M54.9 DORSALGIA, UNSPECIFIED: ICD-10-CM

## 2024-07-23 LAB
CREAT SERPL-MCNC: 1.1 MG/DL (ref 0.5–1.4)
SAMPLE: NORMAL

## 2024-07-23 PROCEDURE — 72158 MRI LUMBAR SPINE W/O & W/DYE: CPT | Mod: TC,PO

## 2024-07-23 PROCEDURE — 72158 MRI LUMBAR SPINE W/O & W/DYE: CPT | Mod: 26,,, | Performed by: RADIOLOGY

## 2024-07-23 PROCEDURE — 25500020 PHARM REV CODE 255: Mod: PO | Performed by: PHYSICIAN ASSISTANT

## 2024-07-23 PROCEDURE — A9585 GADOBUTROL INJECTION: HCPCS | Mod: PO | Performed by: PHYSICIAN ASSISTANT

## 2024-07-23 RX ORDER — GADOBUTROL 604.72 MG/ML
7.5 INJECTION INTRAVENOUS
Status: COMPLETED | OUTPATIENT
Start: 2024-07-23 | End: 2024-07-23

## 2024-07-23 RX ADMIN — GADOBUTROL 7.5 ML: 604.72 INJECTION INTRAVENOUS at 03:07

## 2024-07-24 ENCOUNTER — PATIENT OUTREACH (OUTPATIENT)
Dept: ADMINISTRATIVE | Facility: HOSPITAL | Age: 59
End: 2024-07-24
Payer: COMMERCIAL

## 2024-07-24 NOTE — PROGRESS NOTES
Population Health Chart Review & Patient Outreach Details      Additional [facility] Health Notes:               Updates Requested / Reviewed:      Updated Care Coordination Note, Care Everywhere, , External Sources: [facility] and [facility], Care Team Updated, and Immunizations Reconciliation Completed or Queried: [facility] Topics Overdue:      VBHM Score: 2     Mammogram  Uncontrolled BP    Shingles/Zoster Vaccine  RSV Vaccine                  Health Maintenance Topic(s) Outreach Outcomes & Actions Taken:    Breast Cancer Screening - Outreach Outcomes & Actions Taken  : Patient outreach    Cervical Cancer Screening - Outreach Outcomes & Actions Taken  : Patient outreach    Medication Adherence / Statins - Outreach Outcomes & Actions Taken  : Patient outreach

## 2024-07-29 ENCOUNTER — OFFICE VISIT (OUTPATIENT)
Dept: ORTHOPEDICS | Facility: CLINIC | Age: 59
End: 2024-07-29
Payer: COMMERCIAL

## 2024-07-29 DIAGNOSIS — Z98.1 S/P LUMBAR FUSION: Primary | ICD-10-CM

## 2024-07-29 PROCEDURE — 4010F ACE/ARB THERAPY RXD/TAKEN: CPT | Mod: CPTII,95,, | Performed by: PHYSICIAN ASSISTANT

## 2024-07-29 PROCEDURE — 99213 OFFICE O/P EST LOW 20 MIN: CPT | Mod: 95,,, | Performed by: PHYSICIAN ASSISTANT

## 2024-07-29 NOTE — PROGRESS NOTES
Established Patient - Audio Only Telehealth Visit     The patient location is: [place]  The chief complaint leading to consultation is: MRI results  Visit type: Virtual visit with audio only (telephone)  Total time spent with patient: 10 minutes       The reason for the audio only service rather than synchronous audio and video virtual visit was related to technical difficulties or patient preference/necessity.     Each patient to whom I provide medical services by telemedicine is:  (1) informed of the relationship between the physician and patient and the respective role of any other health care provider with respect to management of the patient; and (2) notified that they may decline to receive medical services by telemedicine and may withdraw from such care at any time. Patient verbally consented to receive this service via voice-only telephone call.       HPI: the patient presents for MRI results.    MRI lumbar spine demonstrates no evidence of infection.     Assessment and plan:  Discussed with Dr. Herrmann. There is no sign of infection on MRI. We will get her in with Dr. Cox for follow up.                        This service was not originating from a related E/M service provided within the previous 7 days nor will  to an E/M service or procedure within the next 24 hours or my soonest available appointment.  Prevailing standard of care was able to be met in this audio-only visit.

## 2024-07-31 ENCOUNTER — OFFICE VISIT (OUTPATIENT)
Dept: PLASTIC SURGERY | Facility: CLINIC | Age: 59
End: 2024-07-31
Payer: COMMERCIAL

## 2024-07-31 VITALS
BODY MASS INDEX: 24.98 KG/M2 | HEIGHT: 65 IN | DIASTOLIC BLOOD PRESSURE: 70 MMHG | WEIGHT: 149.94 LBS | SYSTOLIC BLOOD PRESSURE: 152 MMHG

## 2024-07-31 DIAGNOSIS — Z09 SURGERY FOLLOW-UP EXAMINATION: ICD-10-CM

## 2024-07-31 DIAGNOSIS — Z98.1 STATUS POST LUMBAR SPINAL FUSION: Primary | ICD-10-CM

## 2024-07-31 LAB
GRAM STN SPEC: NORMAL
GRAM STN SPEC: NORMAL

## 2024-07-31 PROCEDURE — 87070 CULTURE OTHR SPECIMN AEROBIC: CPT

## 2024-07-31 PROCEDURE — 87075 CULTR BACTERIA EXCEPT BLOOD: CPT

## 2024-07-31 PROCEDURE — 87205 SMEAR GRAM STAIN: CPT

## 2024-07-31 PROCEDURE — 3077F SYST BP >= 140 MM HG: CPT | Mod: CPTII,S$GLB,, | Performed by: SURGERY

## 2024-07-31 PROCEDURE — 99999 PR PBB SHADOW E&M-EST. PATIENT-LVL III: CPT | Mod: PBBFAC,,, | Performed by: SURGERY

## 2024-07-31 PROCEDURE — 1159F MED LIST DOCD IN RCRD: CPT | Mod: CPTII,S$GLB,, | Performed by: SURGERY

## 2024-07-31 PROCEDURE — 99024 POSTOP FOLLOW-UP VISIT: CPT | Mod: S$GLB,,, | Performed by: SURGERY

## 2024-07-31 PROCEDURE — 87186 SC STD MICRODIL/AGAR DIL: CPT

## 2024-07-31 PROCEDURE — 4010F ACE/ARB THERAPY RXD/TAKEN: CPT | Mod: CPTII,S$GLB,, | Performed by: SURGERY

## 2024-07-31 PROCEDURE — 3078F DIAST BP <80 MM HG: CPT | Mod: CPTII,S$GLB,, | Performed by: SURGERY

## 2024-08-02 ENCOUNTER — TELEPHONE (OUTPATIENT)
Dept: INFECTIOUS DISEASES | Facility: CLINIC | Age: 59
End: 2024-08-02
Payer: COMMERCIAL

## 2024-08-02 ENCOUNTER — PATIENT MESSAGE (OUTPATIENT)
Dept: INFECTIOUS DISEASES | Facility: CLINIC | Age: 59
End: 2024-08-02
Payer: COMMERCIAL

## 2024-08-02 ENCOUNTER — PATIENT MESSAGE (OUTPATIENT)
Dept: PLASTIC SURGERY | Facility: CLINIC | Age: 59
End: 2024-08-02
Payer: COMMERCIAL

## 2024-08-02 RX ORDER — CIPROFLOXACIN 500 MG/1
500 TABLET ORAL EVERY 12 HOURS
Qty: 14 TABLET | Refills: 0 | Status: SHIPPED | OUTPATIENT
Start: 2024-08-02 | End: 2024-08-09

## 2024-08-02 NOTE — TELEPHONE ENCOUNTER
----- Message from MIKE Simons, ANP sent at 8/2/2024  1:49 PM CDT -----  See below:  Any open spots with anyone next week?  If not, can I fit in on Thursday or Friday?  ----- Message -----  From: Cinthia Houston PA-C  Sent: 8/2/2024   1:23 PM CDT  To: MIKE Simons, ANP    Hi Sahil!     Dr. Cox would like ID to see this pt. She grew pseudomonas from her cultures. I've placed her on cipro. Can you please get her in? She's coming from [place].     Thank you! <3

## 2024-08-02 NOTE — TELEPHONE ENCOUNTER
----- Message from Whitney Valero MA sent at 8/2/2024  2:49 PM CDT -----  Regarding: FW: Missed call  Contact: [phone]    ----- Message -----  From: Edin Oleary  Sent: 8/2/2024   2:12 PM CDT  To: #  Subject: Missed call                                      Caller is returning a missed called from Whitney Valero MA in the  office. Please call back as soon as possible.

## 2024-08-03 LAB — BACTERIA SPEC AEROBE CULT: ABNORMAL

## 2024-08-05 LAB — BACTERIA SPEC ANAEROBE CULT: NORMAL

## 2024-08-09 ENCOUNTER — LAB VISIT (OUTPATIENT)
Dept: LAB | Facility: HOSPITAL | Age: 59
End: 2024-08-09
Attending: NURSE PRACTITIONER
Payer: COMMERCIAL

## 2024-08-09 ENCOUNTER — OFFICE VISIT (OUTPATIENT)
Dept: INFECTIOUS DISEASES | Facility: CLINIC | Age: 59
End: 2024-08-09
Payer: COMMERCIAL

## 2024-08-09 VITALS
SYSTOLIC BLOOD PRESSURE: 124 MMHG | HEART RATE: 64 BPM | WEIGHT: 155.44 LBS | HEIGHT: 65 IN | TEMPERATURE: 98 F | DIASTOLIC BLOOD PRESSURE: 74 MMHG | BODY MASS INDEX: 25.9 KG/M2

## 2024-08-09 DIAGNOSIS — T14.8XXD DELAYED WOUND HEALING: Primary | ICD-10-CM

## 2024-08-09 DIAGNOSIS — Z79.2 LONG TERM CURRENT USE OF ANTIBIOTICS: ICD-10-CM

## 2024-08-09 DIAGNOSIS — T14.8XXA WOUND INFECTION: ICD-10-CM

## 2024-08-09 DIAGNOSIS — L08.9 WOUND INFECTION: ICD-10-CM

## 2024-08-09 LAB
ALBUMIN SERPL BCP-MCNC: 3.8 G/DL (ref 3.5–5.2)
ALP SERPL-CCNC: 89 U/L (ref 55–135)
ALT SERPL W/O P-5'-P-CCNC: 28 U/L (ref 10–44)
ANION GAP SERPL CALC-SCNC: 9 MMOL/L (ref 8–16)
AST SERPL-CCNC: 48 U/L (ref 10–40)
BASOPHILS # BLD AUTO: 0.01 K/UL (ref 0–0.2)
BASOPHILS NFR BLD: 0.2 % (ref 0–1.9)
BILIRUB SERPL-MCNC: 0.3 MG/DL (ref 0.1–1)
BUN SERPL-MCNC: 14 MG/DL (ref 6–20)
CALCIUM SERPL-MCNC: 9.4 MG/DL (ref 8.7–10.5)
CHLORIDE SERPL-SCNC: 103 MMOL/L (ref 95–110)
CO2 SERPL-SCNC: 26 MMOL/L (ref 23–29)
CREAT SERPL-MCNC: 0.8 MG/DL (ref 0.5–1.4)
CRP SERPL-MCNC: 11.1 MG/L (ref 0–8.2)
DIFFERENTIAL METHOD BLD: ABNORMAL
EOSINOPHIL # BLD AUTO: 0.2 K/UL (ref 0–0.5)
EOSINOPHIL NFR BLD: 3.1 % (ref 0–8)
ERYTHROCYTE [DISTWIDTH] IN BLOOD BY AUTOMATED COUNT: 18 % (ref 11.5–14.5)
EST. GFR  (NO RACE VARIABLE): >60 ML/MIN/1.73 M^2
GLUCOSE SERPL-MCNC: 74 MG/DL (ref 70–110)
HCT VFR BLD AUTO: 34.1 % (ref 37–48.5)
HGB BLD-MCNC: 10.6 G/DL (ref 12–16)
IMM GRANULOCYTES # BLD AUTO: 0.01 K/UL (ref 0–0.04)
IMM GRANULOCYTES NFR BLD AUTO: 0.2 % (ref 0–0.5)
LYMPHOCYTES # BLD AUTO: 1.3 K/UL (ref 1–4.8)
LYMPHOCYTES NFR BLD: 23 % (ref 18–48)
MCH RBC QN AUTO: 25.7 PG (ref 27–31)
MCHC RBC AUTO-ENTMCNC: 31.1 G/DL (ref 32–36)
MCV RBC AUTO: 83 FL (ref 82–98)
MONOCYTES # BLD AUTO: 0.3 K/UL (ref 0.3–1)
MONOCYTES NFR BLD: 6.2 % (ref 4–15)
NEUTROPHILS # BLD AUTO: 3.7 K/UL (ref 1.8–7.7)
NEUTROPHILS NFR BLD: 67.3 % (ref 38–73)
NRBC BLD-RTO: 0 /100 WBC
PLATELET # BLD AUTO: 288 K/UL (ref 150–450)
PMV BLD AUTO: 11.9 FL (ref 9.2–12.9)
POTASSIUM SERPL-SCNC: 4.2 MMOL/L (ref 3.5–5.1)
PROT SERPL-MCNC: 7.8 G/DL (ref 6–8.4)
RBC # BLD AUTO: 4.12 M/UL (ref 4–5.4)
SODIUM SERPL-SCNC: 138 MMOL/L (ref 136–145)
WBC # BLD AUTO: 5.47 K/UL (ref 3.9–12.7)

## 2024-08-09 PROCEDURE — 4010F ACE/ARB THERAPY RXD/TAKEN: CPT | Mod: CPTII,S$GLB,, | Performed by: NURSE PRACTITIONER

## 2024-08-09 PROCEDURE — 1160F RVW MEDS BY RX/DR IN RCRD: CPT | Mod: CPTII,S$GLB,, | Performed by: NURSE PRACTITIONER

## 2024-08-09 PROCEDURE — 3074F SYST BP LT 130 MM HG: CPT | Mod: CPTII,S$GLB,, | Performed by: NURSE PRACTITIONER

## 2024-08-09 PROCEDURE — 99999 PR PBB SHADOW E&M-EST. PATIENT-LVL V: CPT | Mod: PBBFAC,,, | Performed by: NURSE PRACTITIONER

## 2024-08-09 PROCEDURE — 3008F BODY MASS INDEX DOCD: CPT | Mod: CPTII,S$GLB,, | Performed by: NURSE PRACTITIONER

## 2024-08-09 PROCEDURE — 3078F DIAST BP <80 MM HG: CPT | Mod: CPTII,S$GLB,, | Performed by: NURSE PRACTITIONER

## 2024-08-09 PROCEDURE — 80053 COMPREHEN METABOLIC PANEL: CPT | Performed by: NURSE PRACTITIONER

## 2024-08-09 PROCEDURE — 36415 COLL VENOUS BLD VENIPUNCTURE: CPT | Performed by: NURSE PRACTITIONER

## 2024-08-09 PROCEDURE — 99203 OFFICE O/P NEW LOW 30 MIN: CPT | Mod: S$GLB,,, | Performed by: NURSE PRACTITIONER

## 2024-08-09 PROCEDURE — 85025 COMPLETE CBC W/AUTO DIFF WBC: CPT | Performed by: NURSE PRACTITIONER

## 2024-08-09 PROCEDURE — 1159F MED LIST DOCD IN RCRD: CPT | Mod: CPTII,S$GLB,, | Performed by: NURSE PRACTITIONER

## 2024-08-09 PROCEDURE — 86140 C-REACTIVE PROTEIN: CPT | Performed by: NURSE PRACTITIONER

## 2024-08-09 RX ORDER — METHOCARBAMOL 500 MG/1
TABLET, FILM COATED ORAL
COMMUNITY
Start: 2024-07-17

## 2024-08-09 RX ORDER — CIPROFLOXACIN 750 MG/1
750 TABLET, FILM COATED ORAL EVERY 12 HOURS
Qty: 14 TABLET | Refills: 0 | Status: SHIPPED | OUTPATIENT
Start: 2024-08-09 | End: 2024-08-16

## 2024-08-09 RX ORDER — OXYCODONE AND ACETAMINOPHEN 10; 325 MG/1; MG/1
1 TABLET ORAL EVERY 6 HOURS PRN
Qty: 120 TABLET | Refills: 0 | Status: SHIPPED | OUTPATIENT
Start: 2024-08-12 | End: 2024-09-11

## 2024-08-09 NOTE — PATIENT INSTRUCTIONS
Ciprofloxacin 750 mg every 12 hours for 7 days only.  Do not take with dairy products or vitamins/minerals/antacids.  Be sure to separate by at least 2 hours.    Labs today - cbd, cmp, crp    Will consider Wound Care referral.  Will discuss Dr. Cox   We'll do a virtual follow up in 7-10 days.

## 2024-08-10 ENCOUNTER — PATIENT MESSAGE (OUTPATIENT)
Dept: INFECTIOUS DISEASES | Facility: CLINIC | Age: 59
End: 2024-08-10
Payer: COMMERCIAL

## 2024-08-12 ENCOUNTER — TELEPHONE (OUTPATIENT)
Dept: INFECTIOUS DISEASES | Facility: HOSPITAL | Age: 59
End: 2024-08-12
Payer: COMMERCIAL

## 2024-08-12 DIAGNOSIS — T14.8XXD DELAYED WOUND HEALING: ICD-10-CM

## 2024-08-12 DIAGNOSIS — L08.9 WOUND INFECTION: ICD-10-CM

## 2024-08-12 DIAGNOSIS — T14.8XXA WOUND INFECTION: ICD-10-CM

## 2024-08-12 DIAGNOSIS — Z79.2 LONG TERM (CURRENT) USE OF ANTIBIOTICS: Primary | ICD-10-CM

## 2024-08-12 RX ORDER — CIPROFLOXACIN 750 MG/1
750 TABLET, FILM COATED ORAL EVERY 12 HOURS
Qty: 60 TABLET | Refills: 0 | Status: SHIPPED | OUTPATIENT
Start: 2024-08-12 | End: 2024-09-11

## 2024-08-12 NOTE — TELEPHONE ENCOUNTER
Discussed with Dr. Cox this morning.   He is concerned for deeper infection, though imaging without fluid collections.   Concerned with tracking.   Will extend duration of ciprofloxacin   ? Consider Tagged wbc scan?   Will discuss with ID staff.   Called patient.  No answer.   Will call later to discuss.       Per patient, there seems to be less drainage, but still tinted green.   Discussed above.   Will set up virtual follow up with me in 14 days.   Will check labs in 14 days.   Set up Plastics follow up as well.

## 2024-08-13 ENCOUNTER — PATIENT MESSAGE (OUTPATIENT)
Dept: INFECTIOUS DISEASES | Facility: CLINIC | Age: 59
End: 2024-08-13
Payer: COMMERCIAL

## 2024-08-20 ENCOUNTER — LAB VISIT (OUTPATIENT)
Dept: LAB | Facility: HOSPITAL | Age: 59
End: 2024-08-20
Attending: NURSE PRACTITIONER
Payer: COMMERCIAL

## 2024-08-20 DIAGNOSIS — T14.8XXA WOUND INFECTION: ICD-10-CM

## 2024-08-20 DIAGNOSIS — T14.8XXD DELAYED WOUND HEALING: ICD-10-CM

## 2024-08-20 DIAGNOSIS — Z79.2 LONG TERM (CURRENT) USE OF ANTIBIOTICS: ICD-10-CM

## 2024-08-20 DIAGNOSIS — L08.9 WOUND INFECTION: ICD-10-CM

## 2024-08-20 PROCEDURE — 85025 COMPLETE CBC W/AUTO DIFF WBC: CPT | Performed by: NURSE PRACTITIONER

## 2024-08-20 PROCEDURE — 86140 C-REACTIVE PROTEIN: CPT | Performed by: NURSE PRACTITIONER

## 2024-08-20 PROCEDURE — 80053 COMPREHEN METABOLIC PANEL: CPT | Performed by: NURSE PRACTITIONER

## 2024-08-20 PROCEDURE — 36415 COLL VENOUS BLD VENIPUNCTURE: CPT | Mod: PO | Performed by: NURSE PRACTITIONER

## 2024-08-21 LAB
ALBUMIN SERPL BCP-MCNC: 3.6 G/DL (ref 3.5–5.2)
ALP SERPL-CCNC: 73 U/L (ref 55–135)
ALT SERPL W/O P-5'-P-CCNC: 11 U/L (ref 10–44)
ANION GAP SERPL CALC-SCNC: 8 MMOL/L (ref 8–16)
AST SERPL-CCNC: 20 U/L (ref 10–40)
BASOPHILS # BLD AUTO: 0.02 K/UL (ref 0–0.2)
BASOPHILS NFR BLD: 0.3 % (ref 0–1.9)
BILIRUB SERPL-MCNC: 0.3 MG/DL (ref 0.1–1)
BUN SERPL-MCNC: 17 MG/DL (ref 6–20)
CALCIUM SERPL-MCNC: 9.3 MG/DL (ref 8.7–10.5)
CHLORIDE SERPL-SCNC: 102 MMOL/L (ref 95–110)
CO2 SERPL-SCNC: 24 MMOL/L (ref 23–29)
CREAT SERPL-MCNC: 1 MG/DL (ref 0.5–1.4)
CRP SERPL-MCNC: 8.2 MG/L (ref 0–8.2)
DIFFERENTIAL METHOD BLD: ABNORMAL
EOSINOPHIL # BLD AUTO: 0.2 K/UL (ref 0–0.5)
EOSINOPHIL NFR BLD: 3.1 % (ref 0–8)
ERYTHROCYTE [DISTWIDTH] IN BLOOD BY AUTOMATED COUNT: 18.4 % (ref 11.5–14.5)
EST. GFR  (NO RACE VARIABLE): >60 ML/MIN/1.73 M^2
GLUCOSE SERPL-MCNC: 97 MG/DL (ref 70–110)
HCT VFR BLD AUTO: 36.1 % (ref 37–48.5)
HGB BLD-MCNC: 10.7 G/DL (ref 12–16)
IMM GRANULOCYTES # BLD AUTO: 0 K/UL (ref 0–0.04)
IMM GRANULOCYTES NFR BLD AUTO: 0 % (ref 0–0.5)
LYMPHOCYTES # BLD AUTO: 1.2 K/UL (ref 1–4.8)
LYMPHOCYTES NFR BLD: 19.3 % (ref 18–48)
MCH RBC QN AUTO: 25.4 PG (ref 27–31)
MCHC RBC AUTO-ENTMCNC: 29.6 G/DL (ref 32–36)
MCV RBC AUTO: 86 FL (ref 82–98)
MONOCYTES # BLD AUTO: 0.3 K/UL (ref 0.3–1)
MONOCYTES NFR BLD: 5.2 % (ref 4–15)
NEUTROPHILS # BLD AUTO: 4.5 K/UL (ref 1.8–7.7)
NEUTROPHILS NFR BLD: 72.1 % (ref 38–73)
NRBC BLD-RTO: 0 /100 WBC
PLATELET # BLD AUTO: 278 K/UL (ref 150–450)
PMV BLD AUTO: 11.2 FL (ref 9.2–12.9)
POTASSIUM SERPL-SCNC: 4.1 MMOL/L (ref 3.5–5.1)
PROT SERPL-MCNC: 7.2 G/DL (ref 6–8.4)
RBC # BLD AUTO: 4.21 M/UL (ref 4–5.4)
SODIUM SERPL-SCNC: 134 MMOL/L (ref 136–145)
WBC # BLD AUTO: 6.18 K/UL (ref 3.9–12.7)

## 2024-08-23 ENCOUNTER — OFFICE VISIT (OUTPATIENT)
Dept: INFECTIOUS DISEASES | Facility: CLINIC | Age: 59
End: 2024-08-23
Payer: COMMERCIAL

## 2024-08-23 ENCOUNTER — PATIENT MESSAGE (OUTPATIENT)
Dept: INFECTIOUS DISEASES | Facility: CLINIC | Age: 59
End: 2024-08-23

## 2024-08-23 DIAGNOSIS — T14.8XXD DELAYED WOUND HEALING: Primary | ICD-10-CM

## 2024-08-23 DIAGNOSIS — Z79.2 LONG TERM (CURRENT) USE OF ANTIBIOTICS: ICD-10-CM

## 2024-08-23 NOTE — PROGRESS NOTES
The patient location is:  In her car, ? [place]  The chief complaint leading to consultation is: Follow up surgical wound infection    Visit type: audiovisual    Face to Face time with patient: 12  25 minutes of total time spent on the encounter, which includes face to face time and non-face to face time preparing to see the patient (eg, review of tests), Obtaining and/or reviewing separately obtained history, Documenting clinical information in the electronic or other health record, Independently interpreting results (not separately reported) and communicating results to the patient/family/caregiver, or Care coordination (not separately reported).         Each patient to whom he or she provides medical services by telemedicine is:  (1) informed of the relationship between the physician and patient and the respective role of any other health care provider with respect to management of the patient; and (2) notified that he or she may decline to receive medical services by telemedicine and may withdraw from such care at any time.    Notes:     Ms. Genia Mcnulty is seen today virtually for follow up of surgical wound infection.       In review, she is a  58 year old woman with history of multiple spinal surgeries arising from a lumbar ependymoma as a child.  Most recently, on 4/9/24, she underwent a revision of her posterior spinal fusion L2-pelvis for pseudoarthrosis by Ortho Spine and bilateral paraspinous muscle flap closure by Dr. Cox.  She has a very small area along the incision that has not healed completely and has been persistently draining, though somewhat improved recently.  She was referred to ID by Plastic Surgery.      Because of persistent drainage, she had an MRI spine 5/8.  There was no MR imaging evidence of underlying fluid collection or acute infectious or inflammatory process.       On 5/28 she had a soft tissue US which showed a tiny collection of fluid in the skin underlying the surgical  site measuring 6 x 6 x 7 mm. It was not noted to have a deeper component or extension into the spinal canal to indicate a CSF leak. No other fluid collections were identified.      In July she developed a fluid filled blister over area of concern. This was opened by Plastic Surgery and drained. It did not track deeply with Qtip.  Yellowish greenish drainage. She was put on course of cipro.      She had a repeat MRI on 7/23 - The paraspinous soft tissues were noted to be unremarkable with no gross changes or acute process compared to prior MRI.  MRI imaging was evaluated by Ortho Spine who felt there were no signs of infection.        She saw Dr. Cox on 8/6 who noted very small skin covered fluctuant area with clear fluid obtained when probed.  Cultures + for pan S Pseudomonas.  Resumed cipro 500 q 12 and referred to ID.      At our initial visit she stated that since the initial blister in July, she has had two small blisters pop up at the same site and then spontaneously drain. She reported drainage is light yellowish, occasionally greenish drainage noted on dressing.  She is dressing the area with bandage and not changing every day. It does not become saturated.  She does shower, but drys area immediately and does not soak in tub.  No hot but or pool water exposure.  There is a small hole - sometimes bigger, sometimes smaller.      I put her on ciprofloxacin 750 mg q 12 hours (pseudomonal dose) at last visit.   I discussed with Dr. Cox who was concerned with persistent infection/deeper infection despite reassuring imaging.  He was leaning toward long course of treatment.       Component      Latest Ref Rng 8/9/2024 8/20/2024   CRP      0.0 - 8.2 mg/L 11.1 (H)  8.2       CRP mildly elevated at initial visit - repeat 8/20 show it has normalized.       Today she reports that the last 3 dressing changes (changing about 3 X per week) the dressing was completely dry.  No drainage.  There is still a tiny hole  present.  She took picture last night and will send to portal.       Her back pain is at baseline.  Denies fevers, chills.  Tolerating the ciprofloxacin.  She does have intermittent aching - sometimes mild, sometimes more intense - across the shoulders and upper arms and sometimes down into the wrist that is relieved with celebrex.       Going out of town 8/31 to 9/8.      Plan:   Cbc, cmp, crp and CK next week   Continue ciprofloxacin 750 mg q 12 hours.  Plan on continuing until wound heals completely.    If wound fails to heal completely and small hole fails to close, will consider further imaging with PET CT/FDG   Follow up week of 9/10.  Will try to coordinate with appointment with pain management.    She will send me photo of wound    Call with any worsening of wound or any concerns.      Discussed with ID Staff.    Message sent to Plastic Surgery

## 2024-08-23 NOTE — Clinical Note
Hi there!  I ordered cbc, cmp, crp, CK.  Will you schedule for late afternoon in [place] for Wednesday 8/28?  Thanks.

## 2024-08-28 ENCOUNTER — LAB VISIT (OUTPATIENT)
Dept: LAB | Facility: HOSPITAL | Age: 59
End: 2024-08-28
Attending: NURSE PRACTITIONER
Payer: COMMERCIAL

## 2024-08-28 DIAGNOSIS — T14.8XXD DELAYED WOUND HEALING: ICD-10-CM

## 2024-08-28 DIAGNOSIS — Z79.2 LONG TERM (CURRENT) USE OF ANTIBIOTICS: ICD-10-CM

## 2024-08-28 LAB
ALBUMIN SERPL BCP-MCNC: 3.6 G/DL (ref 3.5–5.2)
ALP SERPL-CCNC: 66 U/L (ref 55–135)
ALT SERPL W/O P-5'-P-CCNC: 13 U/L (ref 10–44)
ANION GAP SERPL CALC-SCNC: 9 MMOL/L (ref 8–16)
AST SERPL-CCNC: 22 U/L (ref 10–40)
BASOPHILS # BLD AUTO: 0.03 K/UL (ref 0–0.2)
BASOPHILS NFR BLD: 0.6 % (ref 0–1.9)
BILIRUB SERPL-MCNC: 0.3 MG/DL (ref 0.1–1)
BUN SERPL-MCNC: 20 MG/DL (ref 6–20)
CALCIUM SERPL-MCNC: 8.9 MG/DL (ref 8.7–10.5)
CHLORIDE SERPL-SCNC: 104 MMOL/L (ref 95–110)
CK SERPL-CCNC: 163 U/L (ref 20–180)
CO2 SERPL-SCNC: 24 MMOL/L (ref 23–29)
CREAT SERPL-MCNC: 0.9 MG/DL (ref 0.5–1.4)
CRP SERPL-MCNC: 5.6 MG/L (ref 0–8.2)
DIFFERENTIAL METHOD BLD: ABNORMAL
EOSINOPHIL # BLD AUTO: 0.2 K/UL (ref 0–0.5)
EOSINOPHIL NFR BLD: 3.9 % (ref 0–8)
ERYTHROCYTE [DISTWIDTH] IN BLOOD BY AUTOMATED COUNT: 18.3 % (ref 11.5–14.5)
EST. GFR  (NO RACE VARIABLE): >60 ML/MIN/1.73 M^2
GLUCOSE SERPL-MCNC: 122 MG/DL (ref 70–110)
HCT VFR BLD AUTO: 33.2 % (ref 37–48.5)
HGB BLD-MCNC: 10.3 G/DL (ref 12–16)
IMM GRANULOCYTES # BLD AUTO: 0.01 K/UL (ref 0–0.04)
IMM GRANULOCYTES NFR BLD AUTO: 0.2 % (ref 0–0.5)
LYMPHOCYTES # BLD AUTO: 1.3 K/UL (ref 1–4.8)
LYMPHOCYTES NFR BLD: 23.5 % (ref 18–48)
MCH RBC QN AUTO: 25.9 PG (ref 27–31)
MCHC RBC AUTO-ENTMCNC: 31 G/DL (ref 32–36)
MCV RBC AUTO: 83 FL (ref 82–98)
MONOCYTES # BLD AUTO: 0.3 K/UL (ref 0.3–1)
MONOCYTES NFR BLD: 5.4 % (ref 4–15)
NEUTROPHILS # BLD AUTO: 3.6 K/UL (ref 1.8–7.7)
NEUTROPHILS NFR BLD: 66.4 % (ref 38–73)
NRBC BLD-RTO: 0 /100 WBC
PLATELET # BLD AUTO: 240 K/UL (ref 150–450)
PMV BLD AUTO: 11.6 FL (ref 9.2–12.9)
POTASSIUM SERPL-SCNC: 4 MMOL/L (ref 3.5–5.1)
PROT SERPL-MCNC: 7 G/DL (ref 6–8.4)
RBC # BLD AUTO: 3.98 M/UL (ref 4–5.4)
SODIUM SERPL-SCNC: 137 MMOL/L (ref 136–145)
WBC # BLD AUTO: 5.37 K/UL (ref 3.9–12.7)

## 2024-08-28 PROCEDURE — 80053 COMPREHEN METABOLIC PANEL: CPT | Performed by: NURSE PRACTITIONER

## 2024-08-28 PROCEDURE — 82550 ASSAY OF CK (CPK): CPT | Performed by: NURSE PRACTITIONER

## 2024-08-28 PROCEDURE — 86140 C-REACTIVE PROTEIN: CPT | Performed by: NURSE PRACTITIONER

## 2024-08-28 PROCEDURE — 85025 COMPLETE CBC W/AUTO DIFF WBC: CPT | Performed by: NURSE PRACTITIONER

## 2024-08-28 PROCEDURE — 36415 COLL VENOUS BLD VENIPUNCTURE: CPT | Mod: PO | Performed by: NURSE PRACTITIONER

## 2024-08-30 ENCOUNTER — TELEPHONE (OUTPATIENT)
Dept: ADMINISTRATIVE | Facility: HOSPITAL | Age: 59
End: 2024-08-30
Payer: COMMERCIAL

## 2024-08-30 DIAGNOSIS — Z13.220 SCREENING CHOLESTEROL LEVEL: Primary | ICD-10-CM

## 2024-08-30 NOTE — TELEPHONE ENCOUNTER
Called patient and LVM to return call to schedule her f/u with Dr Meadows, lab work, mammo and dexa. See if pt want orders faxed to [place]?

## 2024-08-31 ENCOUNTER — PATIENT MESSAGE (OUTPATIENT)
Dept: OTHER | Facility: OTHER | Age: 59
End: 2024-08-31
Payer: COMMERCIAL

## 2024-08-31 ENCOUNTER — PATIENT MESSAGE (OUTPATIENT)
Dept: INFECTIOUS DISEASES | Facility: CLINIC | Age: 59
End: 2024-08-31
Payer: COMMERCIAL

## 2024-09-10 ENCOUNTER — LAB VISIT (OUTPATIENT)
Dept: LAB | Facility: HOSPITAL | Age: 59
End: 2024-09-10
Payer: COMMERCIAL

## 2024-09-10 ENCOUNTER — OFFICE VISIT (OUTPATIENT)
Dept: PHYSICAL MEDICINE AND REHAB | Facility: CLINIC | Age: 59
End: 2024-09-10
Payer: COMMERCIAL

## 2024-09-10 ENCOUNTER — OFFICE VISIT (OUTPATIENT)
Dept: INFECTIOUS DISEASES | Facility: CLINIC | Age: 59
End: 2024-09-10
Payer: COMMERCIAL

## 2024-09-10 VITALS
HEIGHT: 65 IN | TEMPERATURE: 98 F | OXYGEN SATURATION: 98 % | BODY MASS INDEX: 25.9 KG/M2 | DIASTOLIC BLOOD PRESSURE: 75 MMHG | HEIGHT: 65 IN | BODY MASS INDEX: 25.9 KG/M2 | SYSTOLIC BLOOD PRESSURE: 155 MMHG | HEART RATE: 70 BPM | WEIGHT: 155.44 LBS | WEIGHT: 155.44 LBS

## 2024-09-10 DIAGNOSIS — R26.9 GAIT DISORDER: ICD-10-CM

## 2024-09-10 DIAGNOSIS — T14.8XXD DELAYED WOUND HEALING: Primary | ICD-10-CM

## 2024-09-10 DIAGNOSIS — G89.29 CHRONIC BILATERAL LOW BACK PAIN WITH BILATERAL SCIATICA: Primary | ICD-10-CM

## 2024-09-10 DIAGNOSIS — Z79.891 CHRONICALLY ON OPIATE THERAPY: ICD-10-CM

## 2024-09-10 DIAGNOSIS — M47.16 LUMBAR SPONDYLOSIS WITH MYELOPATHY: ICD-10-CM

## 2024-09-10 DIAGNOSIS — Z79.2 LONG TERM CURRENT USE OF ANTIBIOTICS: ICD-10-CM

## 2024-09-10 DIAGNOSIS — L08.9 WOUND INFECTION: ICD-10-CM

## 2024-09-10 DIAGNOSIS — M21.371 FOOT DROP, RIGHT: ICD-10-CM

## 2024-09-10 DIAGNOSIS — Z98.1 STATUS POST LUMBAR SPINAL FUSION: ICD-10-CM

## 2024-09-10 DIAGNOSIS — R29.898 WEAKNESS OF BOTH LEGS: ICD-10-CM

## 2024-09-10 DIAGNOSIS — Z98.1 S/P LUMBAR SPINAL FUSION: ICD-10-CM

## 2024-09-10 DIAGNOSIS — M17.11 PRIMARY OSTEOARTHRITIS OF RIGHT KNEE: ICD-10-CM

## 2024-09-10 DIAGNOSIS — M54.42 CHRONIC BILATERAL LOW BACK PAIN WITH BILATERAL SCIATICA: Primary | ICD-10-CM

## 2024-09-10 DIAGNOSIS — T14.8XXA WOUND INFECTION: ICD-10-CM

## 2024-09-10 DIAGNOSIS — M54.41 CHRONIC BILATERAL LOW BACK PAIN WITH BILATERAL SCIATICA: Primary | ICD-10-CM

## 2024-09-10 DIAGNOSIS — G56.01 CARPAL TUNNEL SYNDROME OF RIGHT WRIST: ICD-10-CM

## 2024-09-10 LAB
ALBUMIN SERPL BCP-MCNC: 3.9 G/DL (ref 3.5–5.2)
ALP SERPL-CCNC: 69 U/L (ref 55–135)
ALT SERPL W/O P-5'-P-CCNC: 14 U/L (ref 10–44)
ANION GAP SERPL CALC-SCNC: 10 MMOL/L (ref 8–16)
AST SERPL-CCNC: 21 U/L (ref 10–40)
BASOPHILS # BLD AUTO: 0.02 K/UL (ref 0–0.2)
BASOPHILS NFR BLD: 0.4 % (ref 0–1.9)
BILIRUB SERPL-MCNC: 0.3 MG/DL (ref 0.1–1)
BUN SERPL-MCNC: 18 MG/DL (ref 6–20)
CALCIUM SERPL-MCNC: 9.3 MG/DL (ref 8.7–10.5)
CHLORIDE SERPL-SCNC: 103 MMOL/L (ref 95–110)
CO2 SERPL-SCNC: 25 MMOL/L (ref 23–29)
CREAT SERPL-MCNC: 0.9 MG/DL (ref 0.5–1.4)
CRP SERPL-MCNC: 5 MG/L (ref 0–8.2)
DIFFERENTIAL METHOD BLD: ABNORMAL
EOSINOPHIL # BLD AUTO: 0.2 K/UL (ref 0–0.5)
EOSINOPHIL NFR BLD: 3.6 % (ref 0–8)
ERYTHROCYTE [DISTWIDTH] IN BLOOD BY AUTOMATED COUNT: 17.7 % (ref 11.5–14.5)
EST. GFR  (NO RACE VARIABLE): >60 ML/MIN/1.73 M^2
GLUCOSE SERPL-MCNC: 90 MG/DL (ref 70–110)
HCT VFR BLD AUTO: 36.1 % (ref 37–48.5)
HGB BLD-MCNC: 10.7 G/DL (ref 12–16)
IMM GRANULOCYTES # BLD AUTO: 0.02 K/UL (ref 0–0.04)
IMM GRANULOCYTES NFR BLD AUTO: 0.4 % (ref 0–0.5)
LYMPHOCYTES # BLD AUTO: 1.2 K/UL (ref 1–4.8)
LYMPHOCYTES NFR BLD: 22.5 % (ref 18–48)
MCH RBC QN AUTO: 25.2 PG (ref 27–31)
MCHC RBC AUTO-ENTMCNC: 29.6 G/DL (ref 32–36)
MCV RBC AUTO: 85 FL (ref 82–98)
MONOCYTES # BLD AUTO: 0.3 K/UL (ref 0.3–1)
MONOCYTES NFR BLD: 6.1 % (ref 4–15)
NEUTROPHILS # BLD AUTO: 3.5 K/UL (ref 1.8–7.7)
NEUTROPHILS NFR BLD: 67 % (ref 38–73)
NRBC BLD-RTO: 0 /100 WBC
PLATELET # BLD AUTO: 262 K/UL (ref 150–450)
PMV BLD AUTO: 10.7 FL (ref 9.2–12.9)
POTASSIUM SERPL-SCNC: 3.8 MMOL/L (ref 3.5–5.1)
PROT SERPL-MCNC: 7.4 G/DL (ref 6–8.4)
RBC # BLD AUTO: 4.24 M/UL (ref 4–5.4)
SODIUM SERPL-SCNC: 138 MMOL/L (ref 136–145)
WBC # BLD AUTO: 5.21 K/UL (ref 3.9–12.7)

## 2024-09-10 PROCEDURE — 80053 COMPREHEN METABOLIC PANEL: CPT | Performed by: NURSE PRACTITIONER

## 2024-09-10 PROCEDURE — 99999 PR PBB SHADOW E&M-EST. PATIENT-LVL IV: CPT | Mod: PBBFAC,,, | Performed by: NURSE PRACTITIONER

## 2024-09-10 PROCEDURE — 3078F DIAST BP <80 MM HG: CPT | Mod: CPTII,S$GLB,, | Performed by: NURSE PRACTITIONER

## 2024-09-10 PROCEDURE — 36415 COLL VENOUS BLD VENIPUNCTURE: CPT | Performed by: NURSE PRACTITIONER

## 2024-09-10 PROCEDURE — 1159F MED LIST DOCD IN RCRD: CPT | Mod: CPTII,S$GLB,, | Performed by: NURSE PRACTITIONER

## 2024-09-10 PROCEDURE — 85025 COMPLETE CBC W/AUTO DIFF WBC: CPT | Performed by: NURSE PRACTITIONER

## 2024-09-10 PROCEDURE — 3077F SYST BP >= 140 MM HG: CPT | Mod: CPTII,S$GLB,, | Performed by: NURSE PRACTITIONER

## 2024-09-10 PROCEDURE — 86140 C-REACTIVE PROTEIN: CPT | Performed by: NURSE PRACTITIONER

## 2024-09-10 PROCEDURE — 1159F MED LIST DOCD IN RCRD: CPT | Mod: CPTII,S$GLB,, | Performed by: PHYSICAL MEDICINE & REHABILITATION

## 2024-09-10 PROCEDURE — 99213 OFFICE O/P EST LOW 20 MIN: CPT | Mod: S$GLB,,, | Performed by: PHYSICAL MEDICINE & REHABILITATION

## 2024-09-10 PROCEDURE — 99999 PR PBB SHADOW E&M-EST. PATIENT-LVL III: CPT | Mod: PBBFAC,,, | Performed by: PHYSICAL MEDICINE & REHABILITATION

## 2024-09-10 PROCEDURE — 4010F ACE/ARB THERAPY RXD/TAKEN: CPT | Mod: CPTII,S$GLB,, | Performed by: NURSE PRACTITIONER

## 2024-09-10 PROCEDURE — 99213 OFFICE O/P EST LOW 20 MIN: CPT | Mod: S$GLB,,, | Performed by: NURSE PRACTITIONER

## 2024-09-10 PROCEDURE — 3008F BODY MASS INDEX DOCD: CPT | Mod: CPTII,S$GLB,, | Performed by: NURSE PRACTITIONER

## 2024-09-10 PROCEDURE — 4010F ACE/ARB THERAPY RXD/TAKEN: CPT | Mod: CPTII,S$GLB,, | Performed by: PHYSICAL MEDICINE & REHABILITATION

## 2024-09-10 PROCEDURE — 3008F BODY MASS INDEX DOCD: CPT | Mod: CPTII,S$GLB,, | Performed by: PHYSICAL MEDICINE & REHABILITATION

## 2024-09-10 PROCEDURE — 1160F RVW MEDS BY RX/DR IN RCRD: CPT | Mod: CPTII,S$GLB,, | Performed by: NURSE PRACTITIONER

## 2024-09-10 RX ORDER — CIPROFLOXACIN 750 MG/1
750 TABLET, FILM COATED ORAL EVERY 12 HOURS
Qty: 28 TABLET | Refills: 0 | Status: SHIPPED | OUTPATIENT
Start: 2024-09-10 | End: 2024-09-24

## 2024-09-10 RX ORDER — CELECOXIB 100 MG/1
100 CAPSULE ORAL DAILY PRN
Qty: 30 CAPSULE | Refills: 1 | Status: SHIPPED | OUTPATIENT
Start: 2024-09-10

## 2024-09-10 RX ORDER — OXYCODONE AND ACETAMINOPHEN 10; 325 MG/1; MG/1
1 TABLET ORAL EVERY 6 HOURS PRN
Qty: 120 TABLET | Refills: 0 | Status: SHIPPED | OUTPATIENT
Start: 2024-09-11 | End: 2024-10-11

## 2024-09-10 NOTE — PROGRESS NOTES
On neck Subjective:       Patient ID: Genia Mcnulty is a 58 y.o. female.    Chief Complaint: No chief complaint on file.      HPI      Mrs. Mcnulty is a 58-year-old white female with past medical history hypertension and multiple back surgeries with persistent back pain.  She is followed up [facility] for chronic low back pain with lumbar radiculopathy.  She is status post L1-3 fusion in 2012 complicated by CSF leak, T11-pelvis fusion in 11/2015, revision of her fusion surgery with T10-pelvis fusion in 10/2018 by Dr. Herrmann, and  revision posterior spinal fusion at L2-pelvis by Dr. Herrmann and Dr. Samano on 4/9/2024.  She had persistent drainage he has been followed up by-6 surgery.  Her last visit to the [facility] was on on 5/6/2024.  She was maintained on celecoxib and p.r.n. oxycodone/APAP.      Since her last visit, the patient has been followed up by Neurosurgery.  He was last seen on 7/29/2024.  MRI was reviewed, showing no evidence of infection.  She was referred to Dr. Cox from Plastic surgery and was seen on 8/6/2024.  There was still fluid at the flap closure side.  It was cultured and she was started on ciprofloxacin.  She was also referred to infectious disease.  She has been followed up by Infectious Disease since 8/9/2024.  She was last seen earlier today.  He is still maintained on ciprofloxacin.    The patient is coming to the clinic for follow-up.  Her low back pain has been stable with occasional flare ups.  It is a constant aching pain in the lower lumbar spine and across her back.  The pain used to radiate to both lower extremities down to the feet with sharp sensations but since the surgery only to the left knee.  Her pain is aggravated by standing or walking and better with sitting down and rest.  Her maximum pain is 8-910 and minimum 3-4/10.  Today it is 4-5/10.  She denies any bowel or bladder incontinence.      She is currently  taking:  Celebrex 100 mg p.o. once per day as needed couple times per month.    Oxycodone 5 mg tablets p.r.n. q.6 hours. Her pain has been worse and she prefers to go back to the previous regimen of oxycodone/APAP 10/325 p.o. q.6 hours p.r.n..  She previously failed gabapentin, pregabalin, duloxetine and venlafaxine for side effects mostly GI upset.    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 Negative for fever.   Eyes:  Negative for visual disturbance.   Respiratory:  Negative for shortness of breath.    Cardiovascular:  Negative for chest pain.   Gastrointestinal:  Positive for constipation and nausea. Negative for blood in stool and vomiting.   Genitourinary:  Negative for difficulty urinating.   Musculoskeletal:  Positive for arthralgias, back pain, gait problem and neck pain.   Neurological:  Positive for syncope and weakness. Negative for dizziness and headaches.   Psychiatric/Behavioral:  Positive for sleep disturbance. Negative for behavioral problems.              Objective:      Physical Exam  Vitals reviewed.   Constitutional:       Appearance: She is well-developed.      Comments: Coming to the clinic in a manual wheelchair.     HENT:      Head: Normocephalic and atraumatic.   Eyes:      Extraocular Movements: Extraocular movements intact.   Musculoskeletal:      Cervical back: Normal range of motion. No tenderness.      Comments: BUE:  ROM:   RUE: full.   LUE: full.  Strength:    RUE: 5-/5 at shoulder abduction, 65 elbow  flexion, 5 elbow extension, 5 hand .   LUE: 5-/5 at shoulder abduction, 5 elbow flexion, 5 elbow extension, 5 hand .  Sensation to pinprick:   RUE: intact.   LUE: intact.      BLE:  ROM:   RLE: full.   LLE: full.  Knee crepitus:   RLE: -ve.   LLE: -ve.   Strength:    RLE: 1/5 at hip flexion, 1 knee extension, 1 ankle DF, 3 PF.   LLE: 3/5 at hip flexion, 4 knee extension, 3 ankle DF, 4 PF.  Sensation to pinprick:     RLE: intact.      LLE: intact.   SLR (sitting):      RLE: -ve.      LLE: -ve.     Skin:     General: Skin is warm.   Neurological:      General: No focal deficit present.      Mental Status: She is alert.   Psychiatric:         Behavior: Behavior normal.       Assessment:       1. Chronic bilateral low back pain with bilateral sciatica    2. S/P lumbar spinal fusion (T11 to pelvis, 2018)    3. S/P post revision lumbar spinal fusion (L2-pelvis, 4/12/24)    4. Lumbar spondylosis with myelopathy    5. Weakness of both legs    6. Foot drop, right    7. Gait disorder    8. Primary osteoarthritis of right knee    9. Carpal tunnel syndrome of right wrist    10. Chronically on opiate therapy        Plan:       - Continue celecoxib (CELEBREX) 100 MG capsule; Take 1 capsule (100 mg total) by mouth daily as needed for Pain.  - Continue oxyCODONE-acetaminophen (PERCOCET)  mg per tablet; Take 1 tablet by mouth every 6 (six) hours as needed for Pain.  - Follow-up with Plastic surgery and Infectious Disease.    - Follow up in about 4 months (around 1/10/2025).      This note was partly generated with [employer] voice recognition software. I apologize for any possible typographical errors.

## 2024-09-10 NOTE — PROGRESS NOTES
Subjective:      Patient ID: Genia Mcnulty is a 58 y.o. female.    Chief Complaint:Follow-up and Wound Infection      History of Present Illness    Ms. Genia Mcnulty is seen today for follow up of surgical wound infection.       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  Most recently, on 4/9/24, she underwent a revision of her posterior spinal fusion L2-pelvis for pseudoarthrosis by Ortho Spine and bilateral paraspinous muscle flap closure by Dr. Cox.  She has a very small area along the incision that has not healed completely and has been persistently draining, though somewhat improved recently.  She was referred to ID by Plastic Surgery.      Because of persistent drainage, she had an MRI spine 5/8.  There was no MR imaging evidence of underlying fluid collection or acute infectious or inflammatory process.       On 5/28 she had a soft tissue US which showed a tiny collection of fluid in the skin underlying the surgical site measuring 6 x 6 x 7 mm. It was not noted to have a deeper component or extension into the spinal canal to indicate a CSF leak. No other fluid collections were identified.      In July she developed a fluid filled blister over area of concern. This was opened by Plastic Surgery and drained. It did not track deeply with Qtip.  Yellowish greenish drainage. She was put on course of cipro 500 mg     She had a repeat MRI on 7/23 - The paraspinous soft tissues were noted to be unremarkable with no gross changes or acute process compared to prior MRI.  MRI imaging was evaluated by Ortho Spine who felt there were no signs of infection.        She saw Dr. Cox on 8/6 who noted very small skin covered fluctuant area with clear fluid obtained when probed.  Cultures + for pan S Pseudomonas.  Resumed cipro 500 q 12 and referred to ID.  See initial note of 8/9/24.  I put her on ciprofloxacin 750 mg q 12 hours  (pseudomonal dose) on 8/9.  I discussed with Dr. Cox who was concerned with persistent infection/deeper infection despite reassuring imaging.  He was leaning toward long course of treatment.     She has now been on ciprofloxacin for approx 4 weeks.  She has had no drainage from the area for approx 2 weeks and small hole appears to have closed.  Denies fevers, chills, worsening back pain  Just came back from trip to [place].     Labs have been wnl.  CRP normalized    Review of Systems   Constitutional: Positive for night sweats.   Musculoskeletal:  Positive for back pain, joint pain and neck pain.   Neurological:  Positive for numbness.   Psychiatric/Behavioral:  Positive for depression. The patient is nervous/anxious.    All other systems reviewed and are negative.    Objective:   Physical Exam  Constitutional:       General: She is not in acute distress.     Appearance: She is not ill-appearing, toxic-appearing or diaphoretic.      Comments: Presents in wheelchair.    HENT:      Head: Normocephalic and atraumatic.   Cardiovascular:      Rate and Rhythm: Normal rate.   Pulmonary:      Effort: Pulmonary effort is normal. No respiratory distress.   Musculoskeletal:      Right lower leg: No edema.      Left lower leg: No edema.   Skin:     General: Skin is warm and dry.      Comments: Lumbar incision appears to be healed.  No opening.  No drainage.  No underlying fluctuance.  Mild kalyn-incisional erythema patient states has been present since prior surgeries.      Neurological:      Mental Status: She is alert and oriented to person, place, and time.   Psychiatric:         Mood and Affect: Mood normal.         Behavior: Behavior normal.         Thought Content: Thought content normal.         Judgment: Judgment normal.       Assessment:     1. Delayed wound healing    2. Wound infection    3. Long term current use of antibiotics       On week 4 of ciprofloxacin.  Wound appears to have closed.  No drainage X 2  weeks.  CRP normalized   Plan:    Patient also seen and examined by Dr. Cox at visit today   Continue ciprofloxacin X 14 more days.    Check cbc, cmp, crp today   Call for any  increase in pain, swelling, drainage from wound area/surgical site     Follow up TBD        20 minutes of total time was spent on this encounter, which includes face to face time and non-face to face time preparing to see the patient (eg, review of tests), Obtaining and/or reviewing separately obtained history, documenting clinical information in the electronic or other health record, independently interpreting results (not separately reported) and communicating results to the patient/family/caregiver, or care coordination (not separately reported).

## 2024-09-26 ENCOUNTER — PATIENT MESSAGE (OUTPATIENT)
Dept: INFECTIOUS DISEASES | Facility: HOSPITAL | Age: 59
End: 2024-09-26
Payer: COMMERCIAL

## 2024-10-11 DIAGNOSIS — M51.369 DEGENERATION OF INTERVERTEBRAL DISC OF LUMBAR REGION, UNSPECIFIED WHETHER PAIN PRESENT: Primary | ICD-10-CM

## 2024-10-11 RX ORDER — OXYCODONE AND ACETAMINOPHEN 10; 325 MG/1; MG/1
1 TABLET ORAL EVERY 6 HOURS PRN
Qty: 120 TABLET | Refills: 0 | Status: SHIPPED | OUTPATIENT
Start: 2024-10-12 | End: 2024-11-11

## 2024-10-14 ENCOUNTER — PATIENT MESSAGE (OUTPATIENT)
Dept: ADMINISTRATIVE | Facility: HOSPITAL | Age: 59
End: 2024-10-14
Payer: COMMERCIAL

## 2024-10-14 ENCOUNTER — PATIENT MESSAGE (OUTPATIENT)
Dept: ORTHOPEDICS | Facility: CLINIC | Age: 59
End: 2024-10-14

## 2024-10-14 DIAGNOSIS — Z98.1 S/P LUMBAR FUSION: Primary | ICD-10-CM

## 2024-11-04 DIAGNOSIS — I10 ESSENTIAL HYPERTENSION: ICD-10-CM

## 2024-11-04 RX ORDER — AMLODIPINE BESYLATE 5 MG/1
5 TABLET ORAL DAILY
Qty: 90 TABLET | Refills: 0 | Status: SHIPPED | OUTPATIENT
Start: 2024-11-04

## 2024-11-07 RX ORDER — CELECOXIB 100 MG/1
100 CAPSULE ORAL DAILY PRN
Qty: 30 CAPSULE | Refills: 0 | Status: SHIPPED | OUTPATIENT
Start: 2024-11-07

## 2024-11-08 ENCOUNTER — TELEPHONE (OUTPATIENT)
Dept: FAMILY MEDICINE | Facility: CLINIC | Age: 59
End: 2024-11-08
Payer: COMMERCIAL

## 2024-11-08 NOTE — TELEPHONE ENCOUNTER
Attempted to contact patient to schedule appointment since patient has not been seen by provider since March. No answer, left message for patient to return call to schedule a appointment.

## 2024-11-09 DIAGNOSIS — M54.42 CHRONIC BILATERAL LOW BACK PAIN WITH BILATERAL SCIATICA: Primary | ICD-10-CM

## 2024-11-09 DIAGNOSIS — M54.41 CHRONIC BILATERAL LOW BACK PAIN WITH BILATERAL SCIATICA: Primary | ICD-10-CM

## 2024-11-09 DIAGNOSIS — G89.29 CHRONIC BILATERAL LOW BACK PAIN WITH BILATERAL SCIATICA: Primary | ICD-10-CM

## 2024-11-11 RX ORDER — OXYCODONE AND ACETAMINOPHEN 10; 325 MG/1; MG/1
1 TABLET ORAL EVERY 6 HOURS PRN
Qty: 120 TABLET | Refills: 0 | Status: SHIPPED | OUTPATIENT
Start: 2024-11-12 | End: 2024-12-12

## 2024-12-08 DIAGNOSIS — M54.41 CHRONIC BILATERAL LOW BACK PAIN WITH BILATERAL SCIATICA: Primary | ICD-10-CM

## 2024-12-08 DIAGNOSIS — M54.42 CHRONIC BILATERAL LOW BACK PAIN WITH BILATERAL SCIATICA: Primary | ICD-10-CM

## 2024-12-08 DIAGNOSIS — G89.29 CHRONIC BILATERAL LOW BACK PAIN WITH BILATERAL SCIATICA: Primary | ICD-10-CM

## 2024-12-09 ENCOUNTER — PATIENT MESSAGE (OUTPATIENT)
Dept: PHYSICAL MEDICINE AND REHAB | Facility: CLINIC | Age: 59
End: 2024-12-09
Payer: COMMERCIAL

## 2024-12-09 DIAGNOSIS — M54.42 CHRONIC BILATERAL LOW BACK PAIN WITH BILATERAL SCIATICA: ICD-10-CM

## 2024-12-09 DIAGNOSIS — G89.29 CHRONIC BILATERAL LOW BACK PAIN WITH BILATERAL SCIATICA: ICD-10-CM

## 2024-12-09 DIAGNOSIS — M54.41 CHRONIC BILATERAL LOW BACK PAIN WITH BILATERAL SCIATICA: ICD-10-CM

## 2024-12-09 RX ORDER — CELECOXIB 100 MG/1
100 CAPSULE ORAL DAILY PRN
Qty: 30 CAPSULE | Refills: 2 | Status: SHIPPED | OUTPATIENT
Start: 2024-12-09

## 2024-12-09 RX ORDER — OXYCODONE AND ACETAMINOPHEN 10; 325 MG/1; MG/1
1 TABLET ORAL EVERY 6 HOURS PRN
Qty: 120 TABLET | Refills: 0 | Status: SHIPPED | OUTPATIENT
Start: 2024-12-12 | End: 2025-01-11

## 2024-12-15 ENCOUNTER — PATIENT MESSAGE (OUTPATIENT)
Dept: INFECTIOUS DISEASES | Facility: CLINIC | Age: 59
End: 2024-12-15
Payer: COMMERCIAL

## 2024-12-15 DIAGNOSIS — A49.8 PSEUDOMONAS INFECTION: ICD-10-CM

## 2024-12-15 DIAGNOSIS — L08.9 WOUND INFECTION: Primary | ICD-10-CM

## 2024-12-15 DIAGNOSIS — T14.8XXA WOUND INFECTION: Primary | ICD-10-CM

## 2024-12-16 ENCOUNTER — TELEPHONE (OUTPATIENT)
Dept: INFECTIOUS DISEASES | Facility: CLINIC | Age: 59
End: 2024-12-16
Payer: COMMERCIAL

## 2024-12-16 DIAGNOSIS — T14.8XXA WOUND INFECTION: Primary | ICD-10-CM

## 2024-12-16 DIAGNOSIS — T14.8XXD DELAYED WOUND HEALING: ICD-10-CM

## 2024-12-16 DIAGNOSIS — L08.9 WOUND INFECTION: Primary | ICD-10-CM

## 2024-12-16 RX ORDER — CIPROFLOXACIN 750 MG/1
750 TABLET, FILM COATED ORAL EVERY 12 HOURS
Qty: 28 TABLET | Refills: 0 | Status: SHIPPED | OUTPATIENT
Start: 2024-12-16 | End: 2024-12-30

## 2024-12-16 NOTE — TELEPHONE ENCOUNTER
"Received My Chart message from patient regarding back pain and appearance of small "bump" at surgical sit.  Denies fevers, chills.  Concern for possible recurrence of  Pseudomonas infection  Patient started some old 500 mg ciprofloxacin tablets she had at home with reported improvement.   Given prior recurrent pseudomonas, will send in rx for 750 mg ciprofloxacin q 12 hours with cbc, cmp, crp.   Will discuss further with ID staff. May need repeat imaging (MRI vs. PET CT?).  Previously no evidence of deeper infection on imaging, but concern if this is recurrence.   Set up ID follow up  ? Plastics/Orthopedics follow up     "

## 2024-12-17 ENCOUNTER — LAB VISIT (OUTPATIENT)
Dept: LAB | Facility: HOSPITAL | Age: 59
End: 2024-12-17
Attending: NURSE PRACTITIONER
Payer: COMMERCIAL

## 2024-12-17 DIAGNOSIS — L08.9 WOUND INFECTION: ICD-10-CM

## 2024-12-17 DIAGNOSIS — T14.8XXA WOUND INFECTION: ICD-10-CM

## 2024-12-17 LAB
ALBUMIN SERPL BCP-MCNC: 3.7 G/DL (ref 3.5–5.2)
ALP SERPL-CCNC: 81 U/L (ref 40–150)
ALT SERPL W/O P-5'-P-CCNC: 12 U/L (ref 10–44)
ANION GAP SERPL CALC-SCNC: 9 MMOL/L (ref 8–16)
AST SERPL-CCNC: 21 U/L (ref 10–40)
BASOPHILS # BLD AUTO: 0.03 K/UL (ref 0–0.2)
BASOPHILS NFR BLD: 0.7 % (ref 0–1.9)
BILIRUB SERPL-MCNC: 0.3 MG/DL (ref 0.1–1)
BUN SERPL-MCNC: 12 MG/DL (ref 8–23)
CALCIUM SERPL-MCNC: 9 MG/DL (ref 8.7–10.5)
CHLORIDE SERPL-SCNC: 105 MMOL/L (ref 95–110)
CO2 SERPL-SCNC: 24 MMOL/L (ref 23–29)
CREAT SERPL-MCNC: 0.9 MG/DL (ref 0.5–1.4)
CRP SERPL-MCNC: 13.5 MG/L (ref 0–8.2)
DIFFERENTIAL METHOD BLD: ABNORMAL
EOSINOPHIL # BLD AUTO: 0.2 K/UL (ref 0–0.5)
EOSINOPHIL NFR BLD: 3.6 % (ref 0–8)
ERYTHROCYTE [DISTWIDTH] IN BLOOD BY AUTOMATED COUNT: 15.5 % (ref 11.5–14.5)
EST. GFR  (NO RACE VARIABLE): >60 ML/MIN/1.73 M^2
GLUCOSE SERPL-MCNC: 73 MG/DL (ref 70–110)
HCT VFR BLD AUTO: 36.2 % (ref 37–48.5)
HGB BLD-MCNC: 11.5 G/DL (ref 12–16)
IMM GRANULOCYTES # BLD AUTO: 0.01 K/UL (ref 0–0.04)
IMM GRANULOCYTES NFR BLD AUTO: 0.2 % (ref 0–0.5)
LYMPHOCYTES # BLD AUTO: 1.4 K/UL (ref 1–4.8)
LYMPHOCYTES NFR BLD: 30.7 % (ref 18–48)
MCH RBC QN AUTO: 28.6 PG (ref 27–31)
MCHC RBC AUTO-ENTMCNC: 31.8 G/DL (ref 32–36)
MCV RBC AUTO: 90 FL (ref 82–98)
MONOCYTES # BLD AUTO: 0.4 K/UL (ref 0.3–1)
MONOCYTES NFR BLD: 8.4 % (ref 4–15)
NEUTROPHILS # BLD AUTO: 2.5 K/UL (ref 1.8–7.7)
NEUTROPHILS NFR BLD: 56.4 % (ref 38–73)
NRBC BLD-RTO: 0 /100 WBC
PLATELET # BLD AUTO: 279 K/UL (ref 150–450)
PMV BLD AUTO: 11.2 FL (ref 9.2–12.9)
POTASSIUM SERPL-SCNC: 3.8 MMOL/L (ref 3.5–5.1)
PROT SERPL-MCNC: 7.5 G/DL (ref 6–8.4)
RBC # BLD AUTO: 4.02 M/UL (ref 4–5.4)
SODIUM SERPL-SCNC: 138 MMOL/L (ref 136–145)
WBC # BLD AUTO: 4.4 K/UL (ref 3.9–12.7)

## 2024-12-17 PROCEDURE — 80053 COMPREHEN METABOLIC PANEL: CPT | Performed by: NURSE PRACTITIONER

## 2024-12-17 PROCEDURE — 36415 COLL VENOUS BLD VENIPUNCTURE: CPT | Mod: PO | Performed by: NURSE PRACTITIONER

## 2024-12-17 PROCEDURE — 86140 C-REACTIVE PROTEIN: CPT | Performed by: NURSE PRACTITIONER

## 2024-12-17 PROCEDURE — 85025 COMPLETE CBC W/AUTO DIFF WBC: CPT | Performed by: NURSE PRACTITIONER

## 2024-12-18 ENCOUNTER — PATIENT MESSAGE (OUTPATIENT)
Dept: INFECTIOUS DISEASES | Facility: CLINIC | Age: 59
End: 2024-12-18
Payer: COMMERCIAL

## 2024-12-18 DIAGNOSIS — T14.8XXD DELAYED WOUND HEALING: ICD-10-CM

## 2024-12-18 DIAGNOSIS — A49.8 PSEUDOMONAS INFECTION: Primary | ICD-10-CM

## 2025-01-02 ENCOUNTER — PATIENT MESSAGE (OUTPATIENT)
Dept: INFECTIOUS DISEASES | Facility: CLINIC | Age: 60
End: 2025-01-02
Payer: COMMERCIAL

## 2025-01-03 ENCOUNTER — TELEPHONE (OUTPATIENT)
Dept: INFECTIOUS DISEASES | Facility: CLINIC | Age: 60
End: 2025-01-03
Payer: COMMERCIAL

## 2025-01-03 NOTE — TELEPHONE ENCOUNTER
Follow up call to patient and to follow up [facility] follow up message.   No answer.  Left detailed voicemail to respond to my message.

## 2025-01-07 ENCOUNTER — LAB VISIT (OUTPATIENT)
Dept: LAB | Facility: HOSPITAL | Age: 60
End: 2025-01-07
Attending: NURSE PRACTITIONER
Payer: COMMERCIAL

## 2025-01-07 DIAGNOSIS — A49.8 PSEUDOMONAS INFECTION: ICD-10-CM

## 2025-01-07 DIAGNOSIS — T14.8XXD DELAYED WOUND HEALING: ICD-10-CM

## 2025-01-07 PROCEDURE — 36415 COLL VENOUS BLD VENIPUNCTURE: CPT | Mod: PO | Performed by: NURSE PRACTITIONER

## 2025-01-07 PROCEDURE — 87040 BLOOD CULTURE FOR BACTERIA: CPT | Performed by: NURSE PRACTITIONER

## 2025-01-09 DIAGNOSIS — M54.42 CHRONIC BILATERAL LOW BACK PAIN WITH BILATERAL SCIATICA: ICD-10-CM

## 2025-01-09 DIAGNOSIS — G89.29 CHRONIC BILATERAL LOW BACK PAIN WITH BILATERAL SCIATICA: ICD-10-CM

## 2025-01-09 DIAGNOSIS — M54.41 CHRONIC BILATERAL LOW BACK PAIN WITH BILATERAL SCIATICA: ICD-10-CM

## 2025-01-09 RX ORDER — OXYCODONE AND ACETAMINOPHEN 10; 325 MG/1; MG/1
1 TABLET ORAL EVERY 6 HOURS PRN
Qty: 120 TABLET | Refills: 0 | Status: SHIPPED | OUTPATIENT
Start: 2025-01-11 | End: 2025-02-10

## 2025-01-11 LAB
BACTERIA BLD CULT: NORMAL

## 2025-01-12 LAB
BACTERIA BLD CULT: NORMAL
BACTERIA BLD CULT: NORMAL

## 2025-01-14 ENCOUNTER — HOSPITAL ENCOUNTER (OUTPATIENT)
Dept: RADIOLOGY | Facility: HOSPITAL | Age: 60
Discharge: HOME | End: 2025-01-14
Attending: NURSE PRACTITIONER
Payer: COMMERCIAL

## 2025-01-14 ENCOUNTER — PATIENT MESSAGE (OUTPATIENT)
Dept: ADMINISTRATIVE | Facility: HOSPITAL | Age: 60
End: 2025-01-14
Payer: COMMERCIAL

## 2025-01-14 ENCOUNTER — PATIENT OUTREACH (OUTPATIENT)
Dept: ADMINISTRATIVE | Facility: HOSPITAL | Age: 60
End: 2025-01-14
Payer: COMMERCIAL

## 2025-01-14 VITALS — BODY MASS INDEX: 26.66 KG/M2 | WEIGHT: 160 LBS | HEIGHT: 65 IN

## 2025-01-14 DIAGNOSIS — R92.1 BREAST CALCIFICATION SEEN ON MAMMOGRAM: Primary | ICD-10-CM

## 2025-01-14 DIAGNOSIS — Z00.00 ENCOUNTER FOR MEDICARE ANNUAL WELLNESS EXAM: ICD-10-CM

## 2025-01-14 DIAGNOSIS — T14.8XXA WOUND INFECTION: ICD-10-CM

## 2025-01-14 DIAGNOSIS — T14.8XXD DELAYED WOUND HEALING: ICD-10-CM

## 2025-01-14 DIAGNOSIS — Z12.31 ENCOUNTER FOR SCREENING MAMMOGRAM FOR MALIGNANT NEOPLASM OF BREAST: Primary | ICD-10-CM

## 2025-01-14 DIAGNOSIS — L08.9 WOUND INFECTION: ICD-10-CM

## 2025-01-14 LAB — POCT GLUCOSE: 134 MG/DL (ref 70–110)

## 2025-01-14 PROCEDURE — 78815 PET IMAGE W/CT SKULL-THIGH: CPT | Mod: TC,PO

## 2025-01-14 PROCEDURE — 78815 PET IMAGE W/CT SKULL-THIGH: CPT | Mod: 26,PI,, | Performed by: RADIOLOGY

## 2025-01-14 PROCEDURE — A9552 F18 FDG: HCPCS | Mod: PO | Performed by: NURSE PRACTITIONER

## 2025-01-14 RX ORDER — FLUDEOXYGLUCOSE F18 500 MCI/ML
13 INJECTION INTRAVENOUS
Status: COMPLETED | OUTPATIENT
Start: 2025-01-14 | End: 2025-01-14

## 2025-01-14 RX ADMIN — FLUDEOXYGLUCOSE F-18 13 MILLICURIE: 500 INJECTION INTRAVENOUS at 12:01

## 2025-01-15 ENCOUNTER — PATIENT MESSAGE (OUTPATIENT)
Dept: INFECTIOUS DISEASES | Facility: CLINIC | Age: 60
End: 2025-01-15
Payer: COMMERCIAL

## 2025-01-15 ENCOUNTER — PATIENT OUTREACH (OUTPATIENT)
Dept: ADMINISTRATIVE | Facility: HOSPITAL | Age: 60
End: 2025-01-15
Payer: COMMERCIAL

## 2025-01-15 DIAGNOSIS — Z12.31 ENCOUNTER FOR SCREENING MAMMOGRAM FOR MALIGNANT NEOPLASM OF BREAST: Primary | ICD-10-CM

## 2025-01-15 DIAGNOSIS — R92.8 ABNORMAL MAMMOGRAM: Primary | ICD-10-CM

## 2025-01-20 ENCOUNTER — TELEPHONE (OUTPATIENT)
Dept: ADMINISTRATIVE | Facility: OTHER | Age: 60
End: 2025-01-20
Payer: COMMERCIAL
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
## 2025-01-21 ENCOUNTER — PATIENT MESSAGE (OUTPATIENT)
Dept: INFECTIOUS DISEASES | Facility: CLINIC | Age: 60
End: 2025-01-21
Payer: COMMERCIAL

## 2025-01-22 ENCOUNTER — OFFICE VISIT (OUTPATIENT)
Dept: PHYSICAL MEDICINE AND REHAB | Facility: CLINIC | Age: 60
End: 2025-01-22
Payer: COMMERCIAL

## 2025-01-22 DIAGNOSIS — Z98.1 STATUS POST LUMBAR SPINAL FUSION: ICD-10-CM

## 2025-01-22 DIAGNOSIS — R29.898 WEAKNESS OF BOTH LEGS: ICD-10-CM

## 2025-01-22 DIAGNOSIS — G56.01 CARPAL TUNNEL SYNDROME OF RIGHT WRIST: ICD-10-CM

## 2025-01-22 DIAGNOSIS — M17.11 PRIMARY OSTEOARTHRITIS OF RIGHT KNEE: ICD-10-CM

## 2025-01-22 DIAGNOSIS — Z79.891 CHRONICALLY ON OPIATE THERAPY: ICD-10-CM

## 2025-01-22 DIAGNOSIS — Z98.1 S/P LUMBAR SPINAL FUSION: ICD-10-CM

## 2025-01-22 DIAGNOSIS — R26.9 GAIT DISORDER: ICD-10-CM

## 2025-01-22 DIAGNOSIS — M54.41 CHRONIC BILATERAL LOW BACK PAIN WITH BILATERAL SCIATICA: Primary | ICD-10-CM

## 2025-01-22 DIAGNOSIS — G89.29 CHRONIC BILATERAL LOW BACK PAIN WITH BILATERAL SCIATICA: Primary | ICD-10-CM

## 2025-01-22 DIAGNOSIS — M54.42 CHRONIC BILATERAL LOW BACK PAIN WITH BILATERAL SCIATICA: Primary | ICD-10-CM

## 2025-01-22 DIAGNOSIS — M21.371 FOOT DROP, RIGHT: ICD-10-CM

## 2025-01-22 DIAGNOSIS — M47.16 LUMBAR SPONDYLOSIS WITH MYELOPATHY: ICD-10-CM

## 2025-01-22 PROCEDURE — 1159F MED LIST DOCD IN RCRD: CPT | Mod: CPTII,95,, | Performed by: PHYSICAL MEDICINE & REHABILITATION

## 2025-01-22 PROCEDURE — 98005 SYNCH AUDIO-VIDEO EST LOW 20: CPT | Mod: 95,,, | Performed by: PHYSICAL MEDICINE & REHABILITATION
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
## 2025-01-30 ENCOUNTER — PATIENT MESSAGE (OUTPATIENT)
Dept: INFECTIOUS DISEASES | Facility: CLINIC | Age: 60
End: 2025-01-30
Payer: COMMERCIAL

## 2025-01-31 ENCOUNTER — PATIENT MESSAGE (OUTPATIENT)
Dept: INFECTIOUS DISEASES | Facility: CLINIC | Age: 60
End: 2025-01-31
Payer: COMMERCIAL

## 2025-01-31 NOTE — TELEPHONE ENCOUNTER
----- Message from Major Birmingham sent at 9/10/2020 12:08 PM CDT -----  Contact: Pt @ [phone]  Rx Refill/Request     Is this a Refill or New Rx:  Yes   Rx Name and Strength:  oxyCODONE-acetaminophen (PERCOCET)  mg per tablet  Preferred Pharmacy with phone number:     [facility]/pharmacy #[id number] - [place], [place] - [address] AT [facility]  [address]  [place] [address]  Phone: [phone] Fax: [phone]      
Last visit 2/13/20  Next visit 2/11/21  Last refill 7/26/20  
Birth Control Pills Pregnancy And Lactation Text: This medication should be avoided if pregnant and for the first 30 days post-partum.
Include Pregnancy/Lactation Warning?: No
Tazorac Counseling:  Patient advised that medication is irritating and drying.  Patient may need to apply sparingly and wash off after an hour before eventually leaving it on overnight.  The patient verbalized understanding of the proper use and possible adverse effects of tazorac.  All of the patient's questions and concerns were addressed.
Birth Control Pills Counseling: Birth Control Pill Counseling: I discussed with the patient the potential side effects of OCPs including but not limited to increased risk of stroke, heart attack, thrombophlebitis, deep venous thrombosis, hepatic adenomas, breast changes, GI upset, headaches, and depression.  The patient verbalized understanding of the proper use and possible adverse effects of OCPs. All of the patient's questions and concerns were addressed.
Spironolactone Pregnancy And Lactation Text: This medication can cause feminization of the male fetus and should be avoided during pregnancy. The active metabolite is also found in breast milk.
Sarecycline Pregnancy And Lactation Text: This medication is Pregnancy Category D and not consider safe during pregnancy. It is also excreted in breast milk.
Topical Retinoid Pregnancy And Lactation Text: This medication is Pregnancy Category C. It is unknown if this medication is excreted in breast milk.
High Dose Vitamin A Counseling: Side effects reviewed, pt to contact office should one occur.
Isotretinoin Counseling: Patient should get monthly blood tests, not donate blood, not drive at night if vision affected, not share medication, and not undergo elective surgery for 6 months after tx completed. Side effects reviewed, pt to contact office should one occur.
Topical Sulfur Applications Counseling: Topical Sulfur Counseling: Patient counseled that this medication may cause skin irritation or allergic reactions.  In the event of skin irritation, the patient was advised to reduce the amount of the drug applied or use it less frequently.   The patient verbalized understanding of the proper use and possible adverse effects of topical sulfur application.  All of the patient's questions and concerns were addressed.
Dapsone Counseling: I discussed with the patient the risks of dapsone including but not limited to hemolytic anemia, agranulocytosis, rashes, methemoglobinemia, kidney failure, peripheral neuropathy, headaches, GI upset, and liver toxicity.  Patients who start dapsone require monitoring including baseline LFTs and weekly CBCs for the first month, then every month thereafter.  The patient verbalized understanding of the proper use and possible adverse effects of dapsone.  All of the patient's questions and concerns were addressed.
Bactrim Counseling:  I discussed with the patient the risks of sulfa antibiotics including but not limited to GI upset, allergic reaction, drug rash, diarrhea, dizziness, photosensitivity, and yeast infections.  Rarely, more serious reactions can occur including but not limited to aplastic anemia, agranulocytosis, methemoglobinemia, blood dyscrasias, liver or kidney failure, lung infiltrates or desquamative/blistering drug rashes.
Benzoyl Peroxide Pregnancy And Lactation Text: This medication is Pregnancy Category C. It is unknown if benzoyl peroxide is excreted in breast milk.
Azelaic Acid Pregnancy And Lactation Text: This medication is considered safe during pregnancy and breast feeding.
Sarecycline Counseling: Patient advised regarding possible photosensitivity and discoloration of the teeth, skin, lips, tongue and gums.  Patient instructed to avoid sunlight, if possible.  When exposed to sunlight, patients should wear protective clothing, sunglasses, and sunscreen.  The patient was instructed to call the office immediately if the following severe adverse effects occur:  hearing changes, easy bruising/bleeding, severe headache, or vision changes.  The patient verbalized understanding of the proper use and possible adverse effects of sarecycline.  All of the patient's questions and concerns were addressed.
Doxycycline Pregnancy And Lactation Text: This medication is Pregnancy Category D and not consider safe during pregnancy. It is also excreted in breast milk but is considered safe for shorter treatment courses.
Tazorac Pregnancy And Lactation Text: This medication is not safe during pregnancy. It is unknown if this medication is excreted in breast milk.
Aklief Pregnancy And Lactation Text: It is unknown if this medication is safe to use during pregnancy.  It is unknown if this medication is excreted in breast milk.  Breastfeeding women should use the topical cream on the smallest area of the skin for the shortest time needed while breastfeeding.  Do not apply to nipple and areola.
Topical Clindamycin Pregnancy And Lactation Text: This medication is Pregnancy Category B and is considered safe during pregnancy. It is unknown if it is excreted in breast milk.
Benzoyl Peroxide Counseling: Patient counseled that medicine may cause skin irritation and bleach clothing.  In the event of skin irritation, the patient was advised to reduce the amount of the drug applied or use it less frequently.   The patient verbalized understanding of the proper use and possible adverse effects of benzoyl peroxide.  All of the patient's questions and concerns were addressed.
Azithromycin Pregnancy And Lactation Text: This medication is considered safe during pregnancy and is also secreted in breast milk.
Azithromycin Counseling:  I discussed with the patient the risks of azithromycin including but not limited to GI upset, allergic reaction, drug rash, diarrhea, and yeast infections.
Winlevi Pregnancy And Lactation Text: This medication is considered safe during pregnancy and breastfeeding.
Topical Clindamycin Counseling: Patient counseled that this medication may cause skin irritation or allergic reactions.  In the event of skin irritation, the patient was advised to reduce the amount of the drug applied or use it less frequently.   The patient verbalized understanding of the proper use and possible adverse effects of clindamycin.  All of the patient's questions and concerns were addressed.
Doxycycline Counseling:  Patient counseled regarding possible photosensitivity and increased risk for sunburn.  Patient instructed to avoid sunlight, if possible.  When exposed to sunlight, patients should wear protective clothing, sunglasses, and sunscreen.  The patient was instructed to call the office immediately if the following severe adverse effects occur:  hearing changes, easy bruising/bleeding, severe headache, or vision changes.  The patient verbalized understanding of the proper use and possible adverse effects of doxycycline.  All of the patient's questions and concerns were addressed.
Azelaic Acid Counseling: Patient counseled that medicine may cause skin irritation and to avoid applying near the eyes.  In the event of skin irritation, the patient was advised to reduce the amount of the drug applied or use it less frequently.   The patient verbalized understanding of the proper use and possible adverse effects of azelaic acid.  All of the patient's questions and concerns were addressed.
Erythromycin Counseling:  I discussed with the patient the risks of erythromycin including but not limited to GI upset, allergic reaction, drug rash, diarrhea, increase in liver enzymes, and yeast infections.
Detail Level: Zone
Aklief counseling:  Patient advised to apply a pea-sized amount only at bedtime and wait 30 minutes after washing their face before applying.  If too drying, patient may add a non-comedogenic moisturizer.  The most commonly reported side effects including irritation, redness, scaling, dryness, stinging, burning, itching, and increased risk of sunburn.  The patient verbalized understanding of the proper use and possible adverse effects of retinoids.  All of the patient's questions and concerns were addressed.
Bactrim Pregnancy And Lactation Text: This medication is Pregnancy Category D and is known to cause fetal risk.  It is also excreted in breast milk.
Spironolactone Counseling: Patient advised regarding risks of diarrhea, abdominal pain, hyperkalemia, birth defects (for female patients), liver toxicity and renal toxicity. The patient may need blood work to monitor liver and kidney function and potassium levels while on therapy. The patient verbalized understanding of the proper use and possible adverse effects of spironolactone.  All of the patient's questions and concerns were addressed.
Minocycline Counseling: Patient advised regarding possible photosensitivity and discoloration of the teeth, skin, lips, tongue and gums.  Patient instructed to avoid sunlight, if possible.  When exposed to sunlight, patients should wear protective clothing, sunglasses, and sunscreen.  The patient was instructed to call the office immediately if the following severe adverse effects occur:  hearing changes, easy bruising/bleeding, severe headache, or vision changes.  The patient verbalized understanding of the proper use and possible adverse effects of minocycline.  All of the patient's questions and concerns were addressed.
Tetracycline Counseling: Patient counseled regarding possible photosensitivity and increased risk for sunburn.  Patient instructed to avoid sunlight, if possible.  When exposed to sunlight, patients should wear protective clothing, sunglasses, and sunscreen.  The patient was instructed to call the office immediately if the following severe adverse effects occur:  hearing changes, easy bruising/bleeding, severe headache, or vision changes.  The patient verbalized understanding of the proper use and possible adverse effects of tetracycline.  All of the patient's questions and concerns were addressed. Patient understands to avoid pregnancy while on therapy due to potential birth defects.
Erythromycin Pregnancy And Lactation Text: This medication is Pregnancy Category B and is considered safe during pregnancy. It is also excreted in breast milk.
Winlevi Counseling:  I discussed with the patient the risks of topical clascoterone including but not limited to erythema, scaling, itching, and stinging. Patient voiced their understanding.
Dapsone Pregnancy And Lactation Text: This medication is Pregnancy Category C and is not considered safe during pregnancy or breast feeding.
High Dose Vitamin A Pregnancy And Lactation Text: High dose vitamin A therapy is contraindicated during pregnancy and breast feeding.
Isotretinoin Pregnancy And Lactation Text: This medication is Pregnancy Category X and is considered extremely dangerous during pregnancy. It is unknown if it is excreted in breast milk.
Topical Sulfur Applications Pregnancy And Lactation Text: This medication is Pregnancy Category C and has an unknown safety profile during pregnancy. It is unknown if this topical medication is excreted in breast milk.
Topical Retinoid counseling:  Patient advised to apply a pea-sized amount only at bedtime and wait 30 minutes after washing their face before applying.  If too drying, patient may add a non-comedogenic moisturizer. The patient verbalized understanding of the proper use and possible adverse effects of retinoids.  All of the patient's questions and concerns were addressed.

## 2025-02-03 ENCOUNTER — OFFICE VISIT (OUTPATIENT)
Dept: INFECTIOUS DISEASES | Facility: CLINIC | Age: 60
End: 2025-02-03
Payer: COMMERCIAL

## 2025-02-03 VITALS
HEIGHT: 65 IN | WEIGHT: 160.06 LBS | HEART RATE: 71 BPM | DIASTOLIC BLOOD PRESSURE: 67 MMHG | TEMPERATURE: 99 F | SYSTOLIC BLOOD PRESSURE: 133 MMHG | BODY MASS INDEX: 26.67 KG/M2

## 2025-02-03 DIAGNOSIS — A49.8 PSEUDOMONAS INFECTION: Primary | ICD-10-CM

## 2025-02-03 DIAGNOSIS — Z98.1 STATUS POST LUMBAR SPINAL FUSION: ICD-10-CM

## 2025-02-03 PROCEDURE — 1159F MED LIST DOCD IN RCRD: CPT | Mod: CPTII,S$GLB,, | Performed by: INTERNAL MEDICINE

## 2025-02-03 PROCEDURE — 3008F BODY MASS INDEX DOCD: CPT | Mod: CPTII,S$GLB,, | Performed by: INTERNAL MEDICINE

## 2025-02-03 PROCEDURE — 99215 OFFICE O/P EST HI 40 MIN: CPT | Mod: S$GLB,,, | Performed by: INTERNAL MEDICINE

## 2025-02-03 PROCEDURE — 3078F DIAST BP <80 MM HG: CPT | Mod: CPTII,S$GLB,, | Performed by: INTERNAL MEDICINE

## 2025-02-03 PROCEDURE — 99999 PR PBB SHADOW E&M-EST. PATIENT-LVL III: CPT | Mod: PBBFAC,,, | Performed by: INTERNAL MEDICINE

## 2025-02-03 PROCEDURE — 3075F SYST BP GE 130 - 139MM HG: CPT | Mod: CPTII,S$GLB,, | Performed by: INTERNAL MEDICINE

## 2025-02-03 NOTE — PROGRESS NOTES
Subjective     Patient ID: Genia Mcnulty is a 59 y.o. female.    Chief Complaint:Follow-up      History of Present Illness    Follow up from [facility] Brewer NP.  Had recent area of drainage which was treated with a 2 weeks course of cipro.  Wound stopped draining and has not had any recurrence since that time.  Last revision 4/24.    Review of Systems   Musculoskeletal:  Positive for back pain, joint pain, muscle cramps, muscle weakness and neck pain.      Objective   Physical Exam       Assessment and Plan     1. Pseudomonas infection    2. Status post lumbar spinal fusion with broken hardware        Plan:  Doing well, wound closed, no further drainage. No further antibiotics or need for lab at this time.  She will report any changes.      Time: 45 minutes   50% of time spent on face-to-face counseling and coordination of care. Counseling included review of test results, diagnosis, and treatment plan with patient and/or family.

## 2025-02-10 DIAGNOSIS — M54.41 CHRONIC BILATERAL LOW BACK PAIN WITH BILATERAL SCIATICA: ICD-10-CM

## 2025-02-10 DIAGNOSIS — M54.42 CHRONIC BILATERAL LOW BACK PAIN WITH BILATERAL SCIATICA: ICD-10-CM

## 2025-02-10 DIAGNOSIS — G89.29 CHRONIC BILATERAL LOW BACK PAIN WITH BILATERAL SCIATICA: ICD-10-CM

## 2025-02-11 RX ORDER — OXYCODONE AND ACETAMINOPHEN 10; 325 MG/1; MG/1
1 TABLET ORAL EVERY 6 HOURS PRN
Qty: 120 TABLET | Refills: 0 | Status: SHIPPED | OUTPATIENT
Start: 2025-02-11 | End: 2025-03-13

## 2025-02-12 DIAGNOSIS — I10 ESSENTIAL HYPERTENSION: ICD-10-CM

## 2025-02-12 RX ORDER — AMLODIPINE BESYLATE 5 MG/1
5 TABLET ORAL
Qty: 30 TABLET | Refills: 0 | Status: SHIPPED | OUTPATIENT
Start: 2025-02-12

## 2025-02-12 NOTE — TELEPHONE ENCOUNTER
Care Due:                  Date            Visit Type   Department     Provider  --------------------------------------------------------------------------------                                EP -                              PRIMARY      [facility] 2ND FLOOR  Last Visit: 03-      [facility]  Next Visit: None Scheduled  None         None Found                                                            Last  Test          Frequency    Reason                     Performed    Due Date  --------------------------------------------------------------------------------    Office Visit  12 months..  cholecalciferol,.........  03- 03-    Vitamin D...  12 months..  cholecalciferol,.........  07- 06-    [employer] Embedded Care Due Messages. Reference number: [id number].   2/12/2025 12:05:56 AM CST

## 2025-03-08 DIAGNOSIS — M54.42 CHRONIC BILATERAL LOW BACK PAIN WITH BILATERAL SCIATICA: ICD-10-CM

## 2025-03-08 DIAGNOSIS — M54.41 CHRONIC BILATERAL LOW BACK PAIN WITH BILATERAL SCIATICA: ICD-10-CM

## 2025-03-08 DIAGNOSIS — G89.29 CHRONIC BILATERAL LOW BACK PAIN WITH BILATERAL SCIATICA: ICD-10-CM

## 2025-03-09 DIAGNOSIS — G89.29 CHRONIC BILATERAL LOW BACK PAIN WITH BILATERAL SCIATICA: ICD-10-CM

## 2025-03-09 DIAGNOSIS — M54.41 CHRONIC BILATERAL LOW BACK PAIN WITH BILATERAL SCIATICA: ICD-10-CM

## 2025-03-09 DIAGNOSIS — M54.42 CHRONIC BILATERAL LOW BACK PAIN WITH BILATERAL SCIATICA: ICD-10-CM

## 2025-03-10 RX ORDER — CELECOXIB 100 MG/1
100 CAPSULE ORAL DAILY PRN
Qty: 30 CAPSULE | Refills: 2 | Status: SHIPPED | OUTPATIENT
Start: 2025-03-10

## 2025-03-10 RX ORDER — OXYCODONE AND ACETAMINOPHEN 10; 325 MG/1; MG/1
1 TABLET ORAL EVERY 6 HOURS PRN
Qty: 120 TABLET | Refills: 0 | Status: SHIPPED | OUTPATIENT
Start: 2025-03-13 | End: 2025-04-12

## 2025-03-27 ENCOUNTER — PATIENT MESSAGE (OUTPATIENT)
Dept: OTHER | Facility: OTHER | Age: 60
End: 2025-03-27
Payer: COMMERCIAL

## 2025-04-06 ENCOUNTER — PATIENT MESSAGE (OUTPATIENT)
Dept: OTHER | Facility: OTHER | Age: 60
End: 2025-04-06
Payer: COMMERCIAL

## 2025-04-07 DIAGNOSIS — G89.29 CHRONIC BILATERAL LOW BACK PAIN WITH BILATERAL SCIATICA: ICD-10-CM

## 2025-04-07 DIAGNOSIS — M54.42 CHRONIC BILATERAL LOW BACK PAIN WITH BILATERAL SCIATICA: ICD-10-CM

## 2025-04-07 DIAGNOSIS — M54.41 CHRONIC BILATERAL LOW BACK PAIN WITH BILATERAL SCIATICA: ICD-10-CM

## 2025-04-08 RX ORDER — OXYCODONE AND ACETAMINOPHEN 10; 325 MG/1; MG/1
1 TABLET ORAL EVERY 6 HOURS PRN
Qty: 120 TABLET | Refills: 0 | Status: SHIPPED | OUTPATIENT
Start: 2025-04-12 | End: 2025-05-12

## 2025-05-09 DIAGNOSIS — G89.29 CHRONIC BILATERAL LOW BACK PAIN WITH BILATERAL SCIATICA: ICD-10-CM

## 2025-05-09 DIAGNOSIS — M54.42 CHRONIC BILATERAL LOW BACK PAIN WITH BILATERAL SCIATICA: ICD-10-CM

## 2025-05-09 DIAGNOSIS — M54.41 CHRONIC BILATERAL LOW BACK PAIN WITH BILATERAL SCIATICA: ICD-10-CM

## 2025-05-09 RX ORDER — OXYCODONE AND ACETAMINOPHEN 10; 325 MG/1; MG/1
1 TABLET ORAL EVERY 6 HOURS PRN
Qty: 120 TABLET | Refills: 0 | Status: SHIPPED | OUTPATIENT
Start: 2025-05-12 | End: 2025-06-11

## 2025-05-15 ENCOUNTER — OFFICE VISIT (OUTPATIENT)
Dept: PHYSICAL MEDICINE AND REHAB | Facility: CLINIC | Age: 60
End: 2025-05-15
Payer: COMMERCIAL

## 2025-05-15 VITALS — HEART RATE: 75 BPM | DIASTOLIC BLOOD PRESSURE: 70 MMHG | SYSTOLIC BLOOD PRESSURE: 129 MMHG

## 2025-05-15 DIAGNOSIS — G56.01 CARPAL TUNNEL SYNDROME OF RIGHT WRIST: ICD-10-CM

## 2025-05-15 DIAGNOSIS — G89.29 CHRONIC BILATERAL LOW BACK PAIN WITH BILATERAL SCIATICA: Primary | ICD-10-CM

## 2025-05-15 DIAGNOSIS — M21.371 FOOT DROP, RIGHT: ICD-10-CM

## 2025-05-15 DIAGNOSIS — M54.42 CHRONIC BILATERAL LOW BACK PAIN WITH BILATERAL SCIATICA: Primary | ICD-10-CM

## 2025-05-15 DIAGNOSIS — R29.898 WEAKNESS OF BOTH LEGS: ICD-10-CM

## 2025-05-15 DIAGNOSIS — Z98.1 S/P LUMBAR SPINAL FUSION: ICD-10-CM

## 2025-05-15 DIAGNOSIS — M54.41 CHRONIC BILATERAL LOW BACK PAIN WITH BILATERAL SCIATICA: Primary | ICD-10-CM

## 2025-05-15 DIAGNOSIS — R26.9 GAIT DISORDER: ICD-10-CM

## 2025-05-15 DIAGNOSIS — M47.16 LUMBAR SPONDYLOSIS WITH MYELOPATHY: ICD-10-CM

## 2025-05-15 DIAGNOSIS — M17.11 PRIMARY OSTEOARTHRITIS OF RIGHT KNEE: ICD-10-CM

## 2025-05-15 DIAGNOSIS — Z98.1 STATUS POST LUMBAR SPINAL FUSION: ICD-10-CM

## 2025-05-15 DIAGNOSIS — Z79.891 CHRONICALLY ON OPIATE THERAPY: ICD-10-CM

## 2025-05-15 PROCEDURE — 99999 PR PBB SHADOW E&M-EST. PATIENT-LVL III: CPT | Mod: PBBFAC,,, | Performed by: PHYSICAL MEDICINE & REHABILITATION

## 2025-05-15 PROCEDURE — 1159F MED LIST DOCD IN RCRD: CPT | Mod: CPTII,S$GLB,, | Performed by: PHYSICAL MEDICINE & REHABILITATION

## 2025-05-15 PROCEDURE — 3074F SYST BP LT 130 MM HG: CPT | Mod: CPTII,S$GLB,, | Performed by: PHYSICAL MEDICINE & REHABILITATION

## 2025-05-15 PROCEDURE — 4010F ACE/ARB THERAPY RXD/TAKEN: CPT | Mod: CPTII,S$GLB,, | Performed by: PHYSICAL MEDICINE & REHABILITATION

## 2025-05-15 PROCEDURE — 99213 OFFICE O/P EST LOW 20 MIN: CPT | Mod: S$GLB,,, | Performed by: PHYSICAL MEDICINE & REHABILITATION

## 2025-05-15 PROCEDURE — 3078F DIAST BP <80 MM HG: CPT | Mod: CPTII,S$GLB,, | Performed by: PHYSICAL MEDICINE & REHABILITATION

## 2025-05-15 NOTE — PROGRESS NOTES
On neck Subjective:       Patient ID: Genia Mcnulty is a 59 y.o. female.      Chief Complaint: Follow-up      HPI      Mrs. Mcnulty is a 59-year-old white female with past medical history hypertension and multiple back surgeries with persistent back pain.  She is followed up at [facility] for chronic low back pain with lumbar radiculopathy.  She is status post L1-3 fusion in 2012 complicated by CSF leak, T11-pelvis fusion in 11/2015, revision of her fusion surgery with T10-pelvis fusion in 10/2018 by Dr. Herrmann, and  revision posterior spinal fusion at L2-pelvis by Dr. Herrmann and Dr. Samano on 4/9/2024.  She had persistent drainage. She has been followed up by Neurosurgery.  Her last visit to the [facility] Medicine Clinic was on on 1/22/2025 (a telemedicine video visit).  She was maintained on celecoxib and p.r.n. oxycodone/APAP.      The patient is coming to the clinic for follow-up.  Review of the chart shows that she was last seen by infectious disease on 2/3/2025.  No more need for antibiotics was noted.    Her low back pain has been stable with occasional flare ups.  It is a constant aching pain in the lower lumbar spine and across her back.  The pain radiates infrequently to both lower extremities down to the feet with sharp sensations.  Her pain is aggravated by standing or walking and better with sitting down and rest.  Her maximum pain is 7-8/10 and minimum 5/10.  Today it is 5/10.  She still has bilateral leg weakness. She denies any bowel or bladder incontinence.      She is currently taking:  Celebrex 100 mg p.o. once per day 2-3 times per month.    Oxycodone/apap 10/325 tablets p.r.n., usually 1 tablet every 6 hours.   She previously failed gabapentin, pregabalin, duloxetine and venlafaxine for side effects mostly GI upset.    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 Negative for fever.   Eyes:  Negative for visual disturbance.   Respiratory:  Negative for shortness of breath.    Cardiovascular:  Negative for chest pain.   Gastrointestinal:  Negative for blood in stool, constipation, nausea and vomiting.   Genitourinary:  Negative for difficulty urinating.   Musculoskeletal:  Positive for arthralgias, back pain, gait problem and neck pain.   Neurological:  Positive for weakness. Negative for dizziness and headaches.   Psychiatric/Behavioral:  Positive for sleep disturbance. Negative for behavioral problems.              Objective:      Physical Exam  Vitals reviewed.   Constitutional:       Appearance: She is well-developed.      Comments: Coming to the clinic in a manual wheelchair.     HENT:      Head: Normocephalic and atraumatic.   Eyes:      Extraocular Movements: Extraocular movements intact.   Musculoskeletal:      Cervical back: Normal range of motion. No tenderness.      Comments: BUE:  ROM:   RUE: full.   LUE: full.  Strength:    RUE: 5/5 at shoulder abduction, 5 elbow flexion, 5 elbow extension, 5 hand .   LUE: 5/5 at shoulder abduction, 5 elbow flexion, 5 elbow extension, 5 hand .  Sensation to pinprick:   RUE: intact.   LUE: intact.      BLE:  ROM:   RLE: full.   LLE: full.  Knee crepitus:   RLE: -ve.   LLE: -ve.   Strength:    RLE: 1/5 at hip flexion, 1 knee extension, 1 ankle DF, 3 PF.   LLE: 3/5 at hip flexion, 4 knee extension, 3 ankle DF, 4 PF.  Sensation to pinprick:     RLE: intact.      LLE: intact.   SLR (sitting):      RLE: -ve.      LLE: -ve.     Skin:     General: Skin is  warm.   Neurological:      General: No focal deficit present.      Mental Status: She is alert.   Psychiatric:         Behavior: Behavior normal.       Assessment:       1. Chronic bilateral low back pain with bilateral sciatica    2. S/P lumbar spinal fusion (T11 to pelvis, 2018)    3. S/P post revision lumbar spinal fusion (L2-pelvis, 4/12/24)    4. Lumbar spondylosis with myelopathy    5. Weakness of both legs    6. Foot drop, right    7. Gait disorder    8. Primary osteoarthritis of right knee    9. Carpal tunnel syndrome of right wrist    10. Chronically on opiate therapy        Plan:       - Continue celecoxib (CELEBREX) 100 MG capsule; Take 1 capsule (100 mg total) by mouth daily as needed for Pain.  - Continue oxyCODONE-acetaminophen (PERCOCET)  mg per tablet; Take 1 tablet by mouth every 6 (six) hours as needed for Pain.  - Follow up in about 4 months (around 9/15/2025).      This note was partly generated with [employer] voice recognition software. I apologize for any possible typographical errors.

## 2025-05-19 NOTE — PROGRESS NOTES
05/19/25 1503   C-SSRS (Frequent Screen)   2. Have you actually had any thoughts of killing yourself? Yes  (denies intent.)   3. Have you been thinking about how you might do this? No   4. Have you had these thoughts and had some intention of acting on them? No   5. Have you started to work out or worked out the details of how to kill yourself? Do you intend to carry out this plan?  No   6. Have you done anything, started to do anything, or prepared to do anything to end your life? No   Suicide Evaluation Low Risk - Green     Nursing Suicide Assessment Note - PHP    Current assessment:    Current C-SSRS score: (P) Low Risk - Green     Protective Factors / Reason for Living:  support system.     Interventions:    -   Maintain current plan of care    Patient denying any plan or intent with SI and GISELLA. Acknowledging medication change. Stated propranolol helps with anxiety,  Has increase anxiety at bedtime. Addressed goals for self tonight. To seek out support as needed. Patient verbal on 1:1. Support given with treatment. Reinforced availability of writer.    Subjective:      Patient ID: Genia Mcnulty is a 58 y.o. female.    Chief Complaint:Wound Infection      History of Present Illness     Ms. 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  Most recently, on 4/9/24, she underwent a revision of her posterior spinal fusion L2-pelvis for pseudoarthrosis by Ortho Spine and bilateral paraspinous muscle flap closure by Dr. Cox.  She has a very small area along the incision that will not heal and has been persistently draining, though somewhat improved recently.  She was referred to ID by Plastic Surgery.     Because of persistent drainage, she had an MRI spine 5/8.  There was no MR imaging evidence of underlying fluid collection or acute infectious or inflammatory process.      On 5/28 she had a soft tissue US which showed a tiny collection of fluid in the skin underlying the surgical site measuring 6 x 6 x 7 mm. It was not noted to have a deeper component or extension into the spinal canal to indicate a CSF leak. No other fluid collections were identified.     In July she developed a fluid filled blister over area of concern. This was opened by Plastic Surgery and drained. It did not track deeply with Qtip.  Yellowish greenish drainage. She was put on course of cipro.     She had a repeat MRI on 7/23 - MRI 7/9  The paraspinous soft tissues were noted to be unremarkable with no gross changes or acute process compared to prior MRI.  MRI imaging was evaluated by Ortho Spine and there were no signs of infection.       She saw Dr. Cox on 8/6 who noted very small skin covered fluctuant area with clear fluid obtained when probed.  Cultures + for pan S Pseudomonas.  Resumed cipro 500 q 12 and referred to ID.     She states that since initial blister in July, she has had two small blisters pop up at the same site and then spontaneously drain. She reports drainage is light yellowish,  occasionally greenish drainage noted on dressing.  She is dressing the area with bandage and not changing every day. It does not become saturated.  She does shower, but drys area immediately and does not soak in tub.  No hot but or pool water exposure.  There is a small hole - sometimes bigger, sometimes smaller.     See photos below.     Denies fevers, chills, sweats.  No significant increase from baseline back pain.  She has had some recent dizziness and headache at bottom part of head and top of neck when she wakes up in the morning and is supine.  She had an episode of dizziness yesterday while shopping when she turned head to look at something with some associated nausea.  This has now resolved, but was new occurrence.         ABX history  - cipro 500/bactrim 5/8 - 5/15  7/9 - 7/19 - cipro 500 q 12  8/2 to today - cipro 500 q 12        wondering if this could be related to some sort of metal interaction involving underlying hardware.    Review of Systems   Constitutional: Positive for night sweats.   Musculoskeletal:  Positive for back pain and joint pain (recently).   Neurological:  Positive for dizziness and headaches.   Psychiatric/Behavioral:  The patient has insomnia and is nervous/anxious.    All other systems reviewed and are negative.    Objective:   Physical Exam  Constitutional:       General: She is not in acute distress.     Appearance: She is not ill-appearing, toxic-appearing or diaphoretic.      Comments: Presents in wheelchair.    HENT:      Head: Normocephalic and atraumatic.   Cardiovascular:      Rate and Rhythm: Normal rate.   Pulmonary:      Effort: Pulmonary effort is normal. No respiratory distress.   Musculoskeletal:      Right lower leg: No edema.      Left lower leg: No edema.   Skin:     General: Skin is warm and dry.      Comments: Lumbar incision with very small hole/area of dehiscence. Shallow. Does not probe deeply with Qtip. No tunneling found.  Small amount of serous  drainage on dressing.  Unable to express fluid.  No fluctuance.  Some mild kalyn-wound erythema -not warm  No evidence of adhesive related dermatitis from bandage   Neurological:      Mental Status: She is alert and oriented to person, place, and time.   Psychiatric:         Mood and Affect: Mood normal.         Behavior: Behavior normal.           Aug 3        Patient photo of blister July 8 July 9       Assessment:     1. Delayed wound healing    2. Wound infection    3. Long term current use of antibiotics       Delayed wound healing after paraspinous flap closure of lumbar surgical incision after spinal hardware revision - very small area along lumbar incision that intermittently drains. All imaging (MRI X2 and US ) has been re-assuring  - no evidence of deeper, underlying fluid collections or hardware involvement.  At time of visit today, there is no active drainage.  Unable to express any drainage.  Does not probe deeply or track that I can appreciate with probing with Q-tip.  No systemic signs of infection.   Plan:    1.  Continue ciprofloxacin but change to pseudomonal dose - 750 mg q 12 hours X 7 additional days only. Patient reports she tolerates cipro well.  No hx of QTc prolongation or AAA. She is not diabetic. We discussed potential adverse effects of FQs.   Avoid taking with dairy products, vitamins, minerals.   2.   Will check cbc, cmp, crp today  3.   May benefit from Wound Care referral.  Will discuss with Dr. Cox  4.   Virtual follow up in 7-10 days  5.   Message me if any worsening, associated fevers, chills, worsening back pain.      40 minutes of total time was spent on this encounter, which includes face to face time and non-face to face time preparing to see the patient (eg, review of tests), Obtaining and/or reviewing separately obtained history, documenting clinical information in the electronic or other health record, independently interpreting results (not separately  reported) and communicating results to the patient/family/caregiver, or care coordination (not separately reported).

## 2025-05-22 ENCOUNTER — TELEPHONE (OUTPATIENT)
Dept: OBSTETRICS AND GYNECOLOGY | Facility: CLINIC | Age: 60
End: 2025-05-22
Payer: COMMERCIAL

## 2025-06-02 ENCOUNTER — PATIENT MESSAGE (OUTPATIENT)
Dept: ADMINISTRATIVE | Facility: HOSPITAL | Age: 60
End: 2025-06-02
Payer: COMMERCIAL

## 2025-06-02 DIAGNOSIS — M54.42 CHRONIC BILATERAL LOW BACK PAIN WITH BILATERAL SCIATICA: ICD-10-CM

## 2025-06-02 DIAGNOSIS — M54.41 CHRONIC BILATERAL LOW BACK PAIN WITH BILATERAL SCIATICA: ICD-10-CM

## 2025-06-02 DIAGNOSIS — G89.29 CHRONIC BILATERAL LOW BACK PAIN WITH BILATERAL SCIATICA: ICD-10-CM

## 2025-06-02 RX ORDER — CELECOXIB 100 MG/1
100 CAPSULE ORAL DAILY PRN
Qty: 30 CAPSULE | Refills: 2 | Status: SHIPPED | OUTPATIENT
Start: 2025-06-02

## 2025-06-08 DIAGNOSIS — G89.29 CHRONIC BILATERAL LOW BACK PAIN WITH BILATERAL SCIATICA: ICD-10-CM

## 2025-06-08 DIAGNOSIS — M54.42 CHRONIC BILATERAL LOW BACK PAIN WITH BILATERAL SCIATICA: ICD-10-CM

## 2025-06-08 DIAGNOSIS — M54.41 CHRONIC BILATERAL LOW BACK PAIN WITH BILATERAL SCIATICA: ICD-10-CM

## 2025-06-10 RX ORDER — OXYCODONE AND ACETAMINOPHEN 10; 325 MG/1; MG/1
1 TABLET ORAL EVERY 6 HOURS PRN
Qty: 120 TABLET | Refills: 0 | Status: SHIPPED | OUTPATIENT
Start: 2025-06-11 | End: 2025-07-11

## 2025-07-08 DIAGNOSIS — G89.29 CHRONIC BILATERAL LOW BACK PAIN WITH BILATERAL SCIATICA: ICD-10-CM

## 2025-07-08 DIAGNOSIS — M54.41 CHRONIC BILATERAL LOW BACK PAIN WITH BILATERAL SCIATICA: ICD-10-CM

## 2025-07-08 DIAGNOSIS — M54.42 CHRONIC BILATERAL LOW BACK PAIN WITH BILATERAL SCIATICA: ICD-10-CM

## 2025-07-09 RX ORDER — OXYCODONE AND ACETAMINOPHEN 10; 325 MG/1; MG/1
1 TABLET ORAL EVERY 6 HOURS PRN
Qty: 120 TABLET | Refills: 0 | Status: SHIPPED | OUTPATIENT
Start: 2025-07-12 | End: 2025-08-11

## 2025-08-11 DIAGNOSIS — G89.29 CHRONIC BILATERAL LOW BACK PAIN WITH BILATERAL SCIATICA: ICD-10-CM

## 2025-08-11 DIAGNOSIS — M54.42 CHRONIC BILATERAL LOW BACK PAIN WITH BILATERAL SCIATICA: ICD-10-CM

## 2025-08-11 DIAGNOSIS — M54.41 CHRONIC BILATERAL LOW BACK PAIN WITH BILATERAL SCIATICA: ICD-10-CM

## 2025-08-11 RX ORDER — OXYCODONE AND ACETAMINOPHEN 10; 325 MG/1; MG/1
1 TABLET ORAL EVERY 6 HOURS PRN
Qty: 120 TABLET | Refills: 0 | Status: SHIPPED | OUTPATIENT
Start: 2025-08-11 | End: 2025-09-10

## 2025-08-13 DIAGNOSIS — I10 ESSENTIAL HYPERTENSION: ICD-10-CM

## 2025-08-13 RX ORDER — AMLODIPINE BESYLATE 5 MG/1
5 TABLET ORAL DAILY
Qty: 90 TABLET | Refills: 0 | Status: SHIPPED | OUTPATIENT
Start: 2025-08-13

## (undated) DEVICE — BLADE 4IN EDGE INSULATED

## (undated) DEVICE — MARKER SKIN RULER STERILE

## (undated) DEVICE — DRAPE HAND STERILE

## (undated) DEVICE — DRESSING AQUACEL FOAM 3 X 3

## (undated) DEVICE — UNDERGLOVES BIOGEL PI SIZE 8

## (undated) DEVICE — DRESSING XEROFORM FOIL PK 1X8

## (undated) DEVICE — DRAPE CORETEMP FLD WRM 56X62IN

## (undated) DEVICE — SEE MEDLINE ITEM 157117

## (undated) DEVICE — DRAPE C-ARMOR EQUIPMENT COVER

## (undated) DEVICE — TRAY CATH 1-LYR URIMTR 16FR

## (undated) DEVICE — SEE MEDLINE ITEM 146417

## (undated) DEVICE — DRAPE STERI-DRAPE 1000 17X11IN

## (undated) DEVICE — SUT 2-0 SILK 30IN BLK BRAID

## (undated) DEVICE — GLOVE SURGEONS ULTRA TOUCH 6.5

## (undated) DEVICE — CLOSURE SKIN 1X5 STERI-STRIP

## (undated) DEVICE — DRESSING AQUACEL SACRAL 9 X 9

## (undated) DEVICE — SEE MEDLINE ITEM 152512

## (undated) DEVICE — SUT 2-0 VICRYL / CT-1

## (undated) DEVICE — DRAPE C ARM 42 X 120 10/BX

## (undated) DEVICE — SYS PRINEO SKIN CLOSURE

## (undated) DEVICE — SCALPEL #15 BLADE STRL DISP.

## (undated) DEVICE — SEE MEDLINE ITEM 157116

## (undated) DEVICE — SUT VICRYL PLUS 2-0 CT1 18

## (undated) DEVICE — BALLTIP STIMULATING PROBE

## (undated) DEVICE — CORD BIPOLAR 12 FOOT

## (undated) DEVICE — DRESSING AQUACEL FOAM 5 X 5

## (undated) DEVICE — GAUZE SPONGE 4X4 12PLY

## (undated) DEVICE — BLADE SURGICAL 15C

## (undated) DEVICE — SPONGE GAUZE 16PLY 4X4

## (undated) DEVICE — TRAY NEURO OMC

## (undated) DEVICE — SEE MEDLINE ITEM 157150

## (undated) DEVICE — DRAPE C-ARM ELAS CLIP 42X120IN

## (undated) DEVICE — UNDERGLOVES BIOGEL PI SZ 7 LF

## (undated) DEVICE — KIT SURGIFLO EVITHROM

## (undated) DEVICE — NDL SPINAL 18GX3.5 SPINOCAN

## (undated) DEVICE — STRAP OR TABLE 5IN X 72IN

## (undated) DEVICE — GOWN AERO CHROME W/ TOWEL XL

## (undated) DEVICE — BLADE MILL+ BONE MEDIUM DISP

## (undated) DEVICE — PACK SET UP 190 OMC-NS

## (undated) DEVICE — COVER BACK TBL HD 2-TIER 72IN

## (undated) DEVICE — TRAY FOLEY 16FR INFECTION CONT

## (undated) DEVICE — SEE MEDLINE ITEM 146347

## (undated) DEVICE — SEE MEDLINE ITEM 157125

## (undated) DEVICE — SPONGE LAP 4X18 PREWASHED

## (undated) DEVICE — DRESSING ADH ISLAND 3.6 X 14

## (undated) DEVICE — TOWEL OR DISP STRL BLUE 4/PK

## (undated) DEVICE — GOWN SURGICAL X-LARGE

## (undated) DEVICE — SUTURE STRATAFIX PGA PCL 3-0

## (undated) DEVICE — SUT PROLENE 6-0 24 BV-1

## (undated) DEVICE — BANDAGE MATRIX HK LOOP 2IN 5YD

## (undated) DEVICE — APPLICATOR CHLORAPREP ORN 26ML

## (undated) DEVICE — SUT 4/0 18IN ETHILON BL P3

## (undated) DEVICE — NDL HYPO REG 25G X 1 1/2

## (undated) DEVICE — SYR IRRIGATION BULB STER 60ML

## (undated) DEVICE — MARKER SKIN STND TIP BLUE BARR

## (undated) DEVICE — TRAY MINOR GEN SURG

## (undated) DEVICE — DRESSING SURGICAL 1/2X1/2

## (undated) DEVICE — ELECTRODE REM PLYHSV RETURN 9

## (undated) DEVICE — SEE MEDLINE ITEM 157131

## (undated) DEVICE — SUT MONOCRYL 3-0 PS-2 UND

## (undated) DEVICE — TAP POWER 6MM DOUBLE LEAD

## (undated) DEVICE — BANDAGE ESMARK ELASTIC ST 4X9

## (undated) DEVICE — KIT ENDO CARPEL TUNNAL SINGLE

## (undated) DEVICE — STOCKINETTE TUBULAR 2PL 6 X 4

## (undated) DEVICE — TAP 8MM SPINAL POWER

## (undated) DEVICE — GLOVE SURG ULTRA TOUCH 7.5

## (undated) DEVICE — SPONGE COTTON TRAY 4X4IN

## (undated) DEVICE — DRAPE TOP 53X102IN

## (undated) DEVICE — STRIP MEDI WND CLSR 1X5IN

## (undated) DEVICE — DRESSING ABSRBNT ISLAND 3.6X8

## (undated) DEVICE — NDL 20GX1-1/2IN IB

## (undated) DEVICE — GOWN POLY REINF BRTH SLV XL

## (undated) DEVICE — SEE MEDLINE ITEM 156905

## (undated) DEVICE — TUBE FRAZIER 5MM 2FT SOFT TIP

## (undated) DEVICE — DRAPE ABDOMINAL TIBURON 14X11

## (undated) DEVICE — DRESSING TRANS 4X4 TEGADERM

## (undated) DEVICE — DRESSING MEPORE ADH 3.5X12

## (undated) DEVICE — DRESSING MEPILEX BORDER 4 X 4

## (undated) DEVICE — TOURNIQUET SB QC DP 18X4IN

## (undated) DEVICE — POWDER ARISTA AH 3G

## (undated) DEVICE — KIT ANTIFOG W/SPONG & FLUID

## (undated) DEVICE — DRESSING TEGADERM 4.4X5IN

## (undated) DEVICE — BLADE SURG CARBON STEEL #10

## (undated) DEVICE — RASP ELITE HELICOIDAL

## (undated) DEVICE — SPONGE LAP 18X18 PREWASHED

## (undated) DEVICE — SKINMARKER & RULER REGULAR X-F

## (undated) DEVICE — STAPLER SKIN ROTATING HEAD

## (undated) DEVICE — DRAPE U SPLIT SHEET 54X76IN

## (undated) DEVICE — SOL 9P NACL IRR PIC IL

## (undated) DEVICE — BUR BONE CUT MICRO TPS 3X3.8MM

## (undated) DEVICE — EVACUATOR WOUND BULB 100CC

## (undated) DEVICE — SUT VICRYL+ 1 CT1 18IN

## (undated) DEVICE — PACK BASIC

## (undated) DEVICE — SUT SILK 2-0 PS 18IN BLACK

## (undated) DEVICE — BLADE MILL BONE MEDIUM

## (undated) DEVICE — COVER SURG LIGHT HANDLE

## (undated) DEVICE — SLING ORTHOPEDIC LARGE

## (undated) DEVICE — SEE MEDLINE ITEM 157128

## (undated) DEVICE — PAD CAST 2 IN X 4YDS STERILE

## (undated) DEVICE — KIT SURGIFLO HEMOSTATIC MATRIX

## (undated) DEVICE — DRESSING SURGICAL 1X3

## (undated) DEVICE — PAIRED SUBDERMAL NEEDLES

## (undated) DEVICE — KIT FIBRIN SEALANT EVICEL 5 ML

## (undated) DEVICE — BLADE SURG #15 CARBON STEEL

## (undated) DEVICE — SUT LIGACLIP SMALL XTRA

## (undated) DEVICE — ROUTER TAPERED 2.3MM

## (undated) DEVICE — DRAPE THREE-QTR REINF 53X77IN

## (undated) DEVICE — NDL HYPO 27G X 1 1/2

## (undated) DEVICE — GLOVE BIOGEL PI MICRO INDIC 7

## (undated) DEVICE — SEE MEDLINE ITEM 154981

## (undated) DEVICE — KIT SPINAL PATIENT CARE JACK

## (undated) DEVICE — DRAIN CHANNEL ROUND 15FR

## (undated) DEVICE — SEE MEDLINE ITEM 146372

## (undated) DEVICE — TIP YANKAUERS BULB NO VENT

## (undated) DEVICE — Device

## (undated) DEVICE — PACK UNIVERSAL SPLIT II

## (undated) DEVICE — GLOVE BIOGEL PIMICRO INDIC 6.5

## (undated) DEVICE — SPONGE DERMACEA 4X4IN 12PLY